# Patient Record
Sex: FEMALE | Race: BLACK OR AFRICAN AMERICAN | NOT HISPANIC OR LATINO | Employment: OTHER | URBAN - METROPOLITAN AREA
[De-identification: names, ages, dates, MRNs, and addresses within clinical notes are randomized per-mention and may not be internally consistent; named-entity substitution may affect disease eponyms.]

---

## 2017-01-11 ENCOUNTER — OFFICE VISIT (OUTPATIENT)
Dept: OPHTHALMOLOGY | Facility: CLINIC | Age: 72
End: 2017-01-11
Payer: MEDICARE

## 2017-01-11 DIAGNOSIS — H18.20 CORNEAL EDEMA: Primary | ICD-10-CM

## 2017-01-11 DIAGNOSIS — H27.02 APHAKIA, LEFT: ICD-10-CM

## 2017-01-11 DIAGNOSIS — H54.10 BLINDNESS AND LOW VISION: ICD-10-CM

## 2017-01-11 DIAGNOSIS — H21.41: ICD-10-CM

## 2017-01-11 PROCEDURE — 99212 OFFICE O/P EST SF 10 MIN: CPT | Mod: PBBFAC | Performed by: OPHTHALMOLOGY

## 2017-01-11 PROCEDURE — 99999 PR PBB SHADOW E&M-EST. PATIENT-LVL II: CPT | Mod: PBBFAC,,, | Performed by: OPHTHALMOLOGY

## 2017-01-11 PROCEDURE — 92014 COMPRE OPH EXAM EST PT 1/>: CPT | Mod: S$PBB,,, | Performed by: OPHTHALMOLOGY

## 2017-01-11 RX ORDER — MOXIFLOXACIN 5 MG/ML
1 SOLUTION/ DROPS OPHTHALMIC
Status: CANCELLED | OUTPATIENT
Start: 2017-01-11

## 2017-01-11 RX ORDER — TETRACAINE HYDROCHLORIDE 5 MG/ML
1 SOLUTION OPHTHALMIC
Status: CANCELLED | OUTPATIENT
Start: 2017-01-11

## 2017-01-11 RX ORDER — LIDOCAINE HYDROCHLORIDE 10 MG/ML
1 INJECTION, SOLUTION EPIDURAL; INFILTRATION; INTRACAUDAL; PERINEURAL ONCE
Status: CANCELLED | OUTPATIENT
Start: 2017-01-11 | End: 2017-01-11

## 2017-01-11 NOTE — MR AVS SNAPSHOT
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   2017 9:45 AM   Office Visit    Description:  Female : 1945   Provider:  Lucia Calvo MD   Department:  [facility] - Ophthalmology           Reason for Visit     corneal edema eval           Diagnoses this Visit        Comments    Corneal edema    -  Primary     Glaucoma shunt device of both eyes         Blindness and low vision         Aphakia, left                To Do List           Future Appointments        Provider Department Dept Phone    3/14/2017 10:00 AM Sagar Wilkerson MD [facility] - Ophthalmology [phone]      Goals (5 Years of Data)     None      [facility]     [facility] On Call Nurse Care Line -  Assistance  Registered nurses in the [facility] On Call Center provide clinical advisement, health education, appointment booking, and other advisory services.  Call for this free service at [phone].             Medications           Message regarding Medications     Verify the changes and/or additions to your medication regime listed below are the same as discussed with your clinician today.  If any of these changes or additions are incorrect, please notify your healthcare provider.             Verify that the below list of medications is an accurate representation of the medications you are currently taking.  If none reported, the list may be blank. If incorrect, please contact your healthcare provider. Carry this list with you in case of emergency.           Current Medications     artificial tears,hypromellose, 0.3 % Gel Place 1 drop into both eyes 4 (four) times daily. 1 Drops Both eyes At bedtime    calcium-vitamin D 500-125 mg-unit tablet Take 1 tablet by mouth 2 (two) times daily.      dorzolamide-timolol 2-0.5% (COSOPT) 22.3-6.8 mg/mL ophthalmic solution Place 1 drop into both eyes 2 (two) times daily. Disp 90 day  supply.    erythromycin (ROMYCIN) ophthalmic ointment Place 1 inch into both eyes every evening.    MULTIVITAMINS,THER W-MINERALS (VITAMINS & MINERALS ORAL)     prednisoLONE acetate (PRED FORTE) 1 % DrpS Place 1 drop into both eyes 4 (four) times daily. Only in right eye    sodium chloride 5% () 5 % ophthalmic solution Place 1 drop into the left eye as needed. 1 Drops Both eyes Four times a day.  Dispense 90 day supply           Clinical Reference Information           Allergies as of 1/11/2017     Augmentin [Amoxicillin-pot Clavulanate]    Sulfa (Sulfonamide Antibiotics)      Immunizations Administered on Date of Encounter - 1/11/2017     None

## 2017-01-11 NOTE — PROGRESS NOTES
HPI     corneal edema eval    Additional comments: referred by Dr. Wilkerson           Comments   DLS: 12/12/2016 Dr. Wilkerson             2/19/14 Dr. Calvo    Pt states she was sent back for corneal swelling and possible removing of   the membrane OD. At last visit with Dr. Calvo, was told was not time to   remove it. Feels it would improve her VA at this time. Her eye is very   sensitive to light and gets a stinging sensation when instills eye drops.       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 (History)            Assessment /Plan     For exam results, see Encounter Report.    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  -     tetracaine HCl (PF) 0.5 % Drop 1 drop; Place 1 drop into the right eye On call Procedure (Surgery).  -     moxifloxacin 0.5 % ophthalmic solution 1 drop; Place 1 drop into the right eye On call Procedure (surgery).      Failing PKP OD with dense fibrotic membrane over IOL.  Looks like diffuse fibrous ingrowth throughout AC.  Will likely need repeat PKP,  But will attempt membranectomy alone,  first.   OS has K edema, but is Aphakic. Will need to decide between aphakic DSEK vs PKP  May also have some fibrous ingrowth.    Plan:  Anterior membranectomy (removal of fibrous adhesions) OD.  30-45 minutes, TH, need microinstruments.

## 2017-01-17 ENCOUNTER — TELEPHONE (OUTPATIENT)
Dept: OPHTHALMOLOGY | Facility: CLINIC | Age: 72
End: 2017-01-17

## 2017-01-17 DIAGNOSIS — H18.20 CORNEA EDEMA: ICD-10-CM

## 2017-01-17 DIAGNOSIS — H21.41: Primary | ICD-10-CM

## 2017-01-26 ENCOUNTER — HOSPITAL ENCOUNTER (OUTPATIENT)
Facility: OTHER | Age: 72
Discharge: HOME | End: 2017-01-26
Attending: OPHTHALMOLOGY | Admitting: OPHTHALMOLOGY
Payer: MEDICARE

## 2017-01-26 ENCOUNTER — ANESTHESIA EVENT (OUTPATIENT)
Dept: SURGERY | Facility: OTHER | Age: 72
End: 2017-01-26
Payer: MEDICARE

## 2017-01-26 ENCOUNTER — ANESTHESIA (OUTPATIENT)
Dept: SURGERY | Facility: OTHER | Age: 72
End: 2017-01-26
Payer: MEDICARE

## 2017-01-26 VITALS
SYSTOLIC BLOOD PRESSURE: 119 MMHG | OXYGEN SATURATION: 99 % | TEMPERATURE: 98 F | HEART RATE: 72 BPM | BODY MASS INDEX: 37.69 KG/M2 | DIASTOLIC BLOOD PRESSURE: 67 MMHG | RESPIRATION RATE: 16 BRPM | HEIGHT: 60 IN | WEIGHT: 192 LBS

## 2017-01-26 DIAGNOSIS — H18.20 CORNEAL EDEMA: ICD-10-CM

## 2017-01-26 DIAGNOSIS — H21.41: Primary | ICD-10-CM

## 2017-01-26 DIAGNOSIS — H21.41: ICD-10-CM

## 2017-01-26 PROBLEM — H21.40: Status: ACTIVE | Noted: 2017-01-26

## 2017-01-26 PROCEDURE — 71000015 HC POSTOP RECOV 1ST HR: Performed by: OPHTHALMOLOGY

## 2017-01-26 PROCEDURE — 37000008 HC ANESTHESIA 1ST 15 MINUTES: Performed by: OPHTHALMOLOGY

## 2017-01-26 PROCEDURE — 25000003 PHARM REV CODE 250: Performed by: ANESTHESIOLOGY

## 2017-01-26 PROCEDURE — 36000704 HC OR TIME LEV I 1ST 15 MIN: Performed by: OPHTHALMOLOGY

## 2017-01-26 PROCEDURE — 37000009 HC ANESTHESIA EA ADD 15 MINS: Performed by: OPHTHALMOLOGY

## 2017-01-26 PROCEDURE — 71000016 HC POSTOP RECOV ADDL HR: Performed by: OPHTHALMOLOGY

## 2017-01-26 PROCEDURE — 65870 INCISE INNER EYE ADHESIONS: CPT | Mod: RT,,, | Performed by: OPHTHALMOLOGY

## 2017-01-26 PROCEDURE — 36000705 HC OR TIME LEV I EA ADD 15 MIN: Performed by: OPHTHALMOLOGY

## 2017-01-26 PROCEDURE — 63600175 PHARM REV CODE 636 W HCPCS: Performed by: NURSE ANESTHETIST, CERTIFIED REGISTERED

## 2017-01-26 PROCEDURE — 25000003 PHARM REV CODE 250: Performed by: OPHTHALMOLOGY

## 2017-01-26 RX ORDER — MOXIFLOXACIN 5 MG/ML
1 SOLUTION/ DROPS OPHTHALMIC
Status: COMPLETED | OUTPATIENT
Start: 2017-01-26 | End: 2017-01-26

## 2017-01-26 RX ORDER — SODIUM CHLORIDE, SODIUM LACTATE, POTASSIUM CHLORIDE, CALCIUM CHLORIDE 600; 310; 30; 20 MG/100ML; MG/100ML; MG/100ML; MG/100ML
INJECTION, SOLUTION INTRAVENOUS CONTINUOUS PRN
Status: DISCONTINUED | OUTPATIENT
Start: 2017-01-26 | End: 2017-01-26

## 2017-01-26 RX ORDER — LIDOCAINE HYDROCHLORIDE 10 MG/ML
INJECTION, SOLUTION EPIDURAL; INFILTRATION; INTRACAUDAL; PERINEURAL
Status: DISCONTINUED | OUTPATIENT
Start: 2017-01-26 | End: 2017-01-26 | Stop reason: HOSPADM

## 2017-01-26 RX ORDER — MIDAZOLAM HYDROCHLORIDE 1 MG/ML
INJECTION INTRAMUSCULAR; INTRAVENOUS
Status: DISCONTINUED | OUTPATIENT
Start: 2017-01-26 | End: 2017-01-26

## 2017-01-26 RX ORDER — HYDROCODONE BITARTRATE AND ACETAMINOPHEN 5; 325 MG/1; MG/1
1 TABLET ORAL EVERY 4 HOURS PRN
Status: DISCONTINUED | OUTPATIENT
Start: 2017-01-26 | End: 2017-01-26 | Stop reason: HOSPADM

## 2017-01-26 RX ORDER — LIDOCAINE HYDROCHLORIDE 10 MG/ML
1 INJECTION, SOLUTION EPIDURAL; INFILTRATION; INTRACAUDAL; PERINEURAL ONCE
Status: DISCONTINUED | OUTPATIENT
Start: 2017-01-26 | End: 2017-01-26 | Stop reason: HOSPADM

## 2017-01-26 RX ORDER — ACETAMINOPHEN 325 MG/1
650 TABLET ORAL EVERY 4 HOURS PRN
Status: DISCONTINUED | OUTPATIENT
Start: 2017-01-26 | End: 2017-01-26 | Stop reason: HOSPADM

## 2017-01-26 RX ORDER — MOXIFLOXACIN 5 MG/ML
SOLUTION/ DROPS OPHTHALMIC
Status: DISCONTINUED | OUTPATIENT
Start: 2017-01-26 | End: 2017-01-26 | Stop reason: HOSPADM

## 2017-01-26 RX ORDER — TETRACAINE HYDROCHLORIDE 5 MG/ML
1 SOLUTION OPHTHALMIC
Status: COMPLETED | OUTPATIENT
Start: 2017-01-26 | End: 2017-01-26

## 2017-01-26 RX ORDER — FENTANYL CITRATE 50 UG/ML
INJECTION, SOLUTION INTRAMUSCULAR; INTRAVENOUS
Status: DISCONTINUED | OUTPATIENT
Start: 2017-01-26 | End: 2017-01-26

## 2017-01-26 RX ADMIN — TETRACAINE HYDROCHLORIDE 1 DROP: 5 SOLUTION OPHTHALMIC at 09:01

## 2017-01-26 RX ADMIN — MIDAZOLAM HYDROCHLORIDE 1 MG: 1 INJECTION, SOLUTION INTRAMUSCULAR; INTRAVENOUS at 10:01

## 2017-01-26 RX ADMIN — FENTANYL CITRATE 25 MCG: 50 INJECTION, SOLUTION INTRAMUSCULAR; INTRAVENOUS at 10:01

## 2017-01-26 RX ADMIN — MOXIFLOXACIN HYDROCHLORIDE 1 DROP: 5 SOLUTION/ DROPS OPHTHALMIC at 09:01

## 2017-01-26 RX ADMIN — SODIUM CHLORIDE, SODIUM LACTATE, POTASSIUM CHLORIDE, AND CALCIUM CHLORIDE: 600; 310; 30; 20 INJECTION, SOLUTION INTRAVENOUS at 09:01

## 2017-01-26 NOTE — PLAN OF CARE
Ligia Curran has met all discharge criteria from Phase II. Vital Signs are stable, ambulating  without difficulty.Pain is now under control and tolerable for the pt. Pain score 4 at this time.  Discharge instructions given, patient verbalized understanding. Discharged from facility via wheelchair in stable condition.

## 2017-01-26 NOTE — PLAN OF CARE
Patient prefers to have  Sr[employer] friend present for discharge teaching. Please contact them @ [phone].

## 2017-01-26 NOTE — ANESTHESIA PREPROCEDURE EVALUATION
01/26/2017  Ligia Curran is a 71 y.o., female.    [facility] Anesthesia Evaluation    I have reviewed the Patient Summary Reports.    I have reviewed the Nursing Notes.   I have reviewed the Medications.     Review of Systems  Anesthesia Hx:  Denies Family Hx of Anesthesia complications.  Personal Hx of Anesthesia complications, Post-Operative Nausea/Vomiting, in the past, but not with recent anesthetics / prophylaxis   Hematology/Oncology:  Hematology Normal   Oncology Normal     EENT/Dental:EENT/Dental Normal   Cardiovascular:  Cardiovascular Normal Exercise tolerance: good     Pulmonary:  Pulmonary Normal    Renal/:  Renal/ Normal     Hepatic/GI:  Hepatic/GI Normal    Musculoskeletal:  Musculoskeletal Normal    Neurological:  Neurology Normal    Endocrine:  Endocrine Normal    Dermatological:  Skin Normal    Psych:  Psychiatric Normal           Physical Exam  General:  Obesity    Airway/Jaw/Neck:  Airway Findings: Mouth Opening: Normal Tongue: Normal  General Airway Assessment: Adult  Mallampati: I  TM Distance: Normal, at least 6 cm      Dental:  Dental Findings:             Anesthesia Plan  Type of Anesthesia, risks & benefits discussed:  Anesthesia Type:  MAC  Patient's Preference:   Intra-op Monitoring Plan: standard ASA monitors  Intra-op Monitoring Plan Comments:   Post Op Pain Control Plan:   Post Op Pain Control Plan Comments:   Induction:    Beta Blocker:         Informed Consent: Patient understands risks and agrees with Anesthesia plan.  Questions answered. Anesthesia consent signed with patient.  ASA Score: 2     Day of Surgery Review of History & Physical:            Ready For Surgery From Anesthesia Perspective.

## 2017-01-26 NOTE — DISCHARGE INSTRUCTIONS
KEEP RIGHT EYE SHIELDED AND PATCHED UNTIL POST OP APPT, NO BENDING OR HEAVY LIFTING/BRING EYE BAG TO POST OP APPT.           Home Care Instructions for Eye Surgeries    1. ACTIVITY:   Limit your activity today. Increase activity gradually. You may return to work or school as directed by your physician.    2. DIET:   Drink plenty of fluids. Resume your normal diet unless instructed otherwise.  3. PAIN:   Expect a moderate amount of pain. If a prescription for pain is not sent home with you, you may take your commonly used pain reliever as directed. If this is not sufficient, call your physician. You may resume any other prescription medication unless otherwise directed by your physician.     4. DRESSING:   Keep your dressing dry and intact.  5. COMMENTS:   No driving, operating heavy machinery or singing legal documents for 24 hours.    No bending forward at the waist.   See attached schedule of eye drops.    Discuss any problem with your physician as soon as it arises. Do not Delay.      EMERGENCY- If you are unable to contact your physician, please go to the nearest Emergency Room.           Anesthesia: Monitored Anesthesia Care (MAC)    Anesthesia Safety  · Have an adult family member or friend drive you home after the procedure.  · For the first 24 hours after your surgery:  ¨ Do not drive or use heavy equipment.  ¨ Do not make important decisions or sign documents.  ¨ Avoid alcohol.  ¨ Have someone stay with you, if possible. They can watch for problems and help keep you safe.

## 2017-01-26 NOTE — IP AVS SNAPSHOT
[facility] Location ([facility])  [address] [place] [id number]  Phone: [phone]           Patient Discharge Instructions     Our goal is to set you up for success. This packet includes information on your condition, medications, and your home care. It will help you to care for yourself so you don't get sicker and need to go back to the hospital.     Please ask your nurse if you have any questions.        There are many details to remember when preparing to leave the hospital. Here is what you will need to do:    1. Take your medicine. If you are prescribed medications, review your Medication List in the following pages. You may have new medications to  at the pharmacy and others that you'll need to stop taking. Review the instructions for how and when to take your medications. Talk with your doctor or nurses if you are unsure of what to do.     2. Go to your follow-up appointments. Specific follow-up information is listed in the following pages. Your may be contacted by a transition nurse or clinical provider about future appointments. Be sure we have all of the phone numbers to reach you, if needed. Please contact your provider's office if you are unable to make an appointment.     3. Watch for warning signs. Your doctor or nurse will give you detailed warning signs to watch for and when to call for assistance. These instructions may also include educational information about your condition. If you experience any of warning signs to your health, call your doctor.               ** Verify the list of medication(s) below is accurate and up to date. Carry this with you in case of emergency. If your medications have changed, please notify your healthcare provider.             Medication List      CONTINUE taking these medications        Additional Info                      artificial tears(hypromellose) 0.3 % Gel   Quantity:  3.5 Tube   Refills:  4   Dose:  1 drop    Instructions:  Place 1  drop into both eyes 4 (four) times daily. 1 Drops Both eyes At bedtime     Begin Date    AM    Noon    PM    Bedtime       calcium-vitamin D 500-125 mg-unit tablet   Refills:  0   Dose:  1 tablet    Instructions:  Take 1 tablet by mouth once daily.     Begin Date    AM    Noon    PM    Bedtime       dorzolamide-timolol 2-0.5% 22.3-6.8 mg/mL ophthalmic solution   Commonly known as:  COSOPT   Quantity:  10 mL   Refills:  4   Dose:  1 drop    Instructions:  Place 1 drop into both eyes 2 (two) times daily. Disp 90 day supply.     Begin Date    AM    Noon    PM    Bedtime       erythromycin ophthalmic ointment   Commonly known as:  ROMYCIN   Quantity:  3.5 g   Refills:  4   Dose:  1 inch    Instructions:  Place 1 inch into both eyes every evening.     Begin Date    AM    Noon    PM    Bedtime       prednisoLONE acetate 1 % Drps   Commonly known as:  PRED FORTE   Quantity:  3 Bottle   Refills:  4   Dose:  1 drop    Instructions:  Place 1 drop into both eyes 4 (four) times daily. Only in right eye     Begin Date    AM    Noon    PM    Bedtime       sodium chloride 5% 5 % ophthalmic solution   Commonly known as:     Refills:  0   Dose:  1 drop    Instructions:  Place 1 drop into the left eye as needed. 1 Drops Both eyes Four times a day.  Dispense 90 day supply     Begin Date    AM    Noon    PM    Bedtime       VITAMINS & MINERALS ORAL   Refills:  0      Begin Date    AM    Noon    PM    Bedtime                  Please bring to all follow up appointments:    1. A copy of your discharge instructions.  2. All medicines you are currently taking in their original bottles.  3. Identification and insurance card.    Please arrive 15 minutes ahead of scheduled appointment time.    Please call 24 hours in advance if you must reschedule your appointment and/or time.        Your Scheduled Appointments     Jan 27, 2017  8:15 AM CST   Post OP with MD Tang [facility]manza [facility] - Ophthalmology ([facility] )    [address]  [facility] [address] [phone]            Mar 14, 2017 10:00 AM CDT   Established Patient Visit with Sagar Wilkerson MD   [facility] - Ophthalmology ([address] )    [address]  [facility] [address] [phone]              Follow-up Information     Follow up with Lucia Calvo MD.    Specialty:  Ophthalmology    Contact information:    [address]  [address]  [facility] [address] [phone]          Follow up with Lucia Calvo MD In 1 day.    Specialty:  Ophthalmology    Why:  Follow up on 1/27/2017 at either 0815 or 0915     Contact information:    [address]  [facility] [address] [phone]          Discharge Instructions     Future Orders    Diet general     Questions:    Total calories:      Fat restriction, if any:      Protein restriction, if any:      Na restriction, if any:      Fluid restriction:      Additional restrictions:          Discharge Instructions       KEEP RIGHT EYE SHIELDED AND PATCHED UNTIL POST OP APPT, NO BENDING OR HEAVY LIFTING/BRING EYE BAG TO POST OP APPT.           Home Care Instructions for Eye Surgeries    1. ACTIVITY:   Limit your activity today. Increase activity gradually. You may return to work or school as directed by your physician.    2. DIET:   Drink plenty of fluids. Resume your normal diet unless instructed otherwise.  3. PAIN:   Expect a moderate amount of pain. If a prescription for pain is not sent home with you, you may take your commonly used pain reliever as directed. If this is not sufficient, call your physician. You may resume any other prescription medication unless otherwise directed by your physician.     4. DRESSING:   Keep your dressing dry and intact.  5. COMMENTS:   No driving, operating heavy machinery or singing legal documents for 24 hours.    No bending forward at the waist.   See attached schedule of eye drops.    Discuss any problem with your physician as soon as it arises. Do not  Delay.      EMERGENCY- If you are unable to contact your physician, please go to the nearest Emergency Room.           Anesthesia: Monitored Anesthesia Care (MAC)    Anesthesia Safety  · Have an adult family member or friend drive you home after the procedure.  · For the first 24 hours after your surgery:  ¨ Do not drive or use heavy equipment.  ¨ Do not make important decisions or sign documents.  ¨ Avoid alcohol.  ¨ Have someone stay with you, if possible. They can watch for problems and help keep you safe.          Primary Diagnosis     Your primary diagnosis was:  Not on File      Admission Information     Date & Time Provider Department CSN    1/26/2017  8:34 AM Lucia Calvo MD [facility] [id number]      Care Providers     Provider Role Specialty Primary office phone    Lucia Calvo MD Attending Provider Ophthalmology [phone]    Lucia Calvo MD Surgeon  Ophthalmology [phone]      Your Vitals Were     BP Pulse Temp Resp Height Weight    140/65 (BP Location: Right arm, Patient Position: Lying, BP Method: Automatic) 81 98.2 °F (36.8 °C) 16 5' (1.524 m) 87.1 kg (192 lb)    SpO2 BMI             96% 37.5 kg/m2         Recent Lab Values     No lab values to display.      Allergies as of 1/26/2017        Reactions    Augmentin [Amoxicillin-pot Clavulanate] Diarrhea    Sulfa (Sulfonamide Antibiotics) Other (See Comments)      [facility]     [facility] Nurse Care Line - 24/7 Assistance  Unless otherwise directed by your provider, please contact [facility], our nurse care line that is available for 24/7 assistance.     Registered nurses in the [facility] On Call Center provide clinical advisement, health education, appointment booking, and other advisory services.  Call for this free service at [phone].        Advance Directives     An advance directive is a document which, in the event you are no longer able to make decisions for yourself, tells your healthcare team  what kind of treatment you do or do not want to receive, or who you would like to make those decisions for you.  If you do not currently have an advance directive, [facility] encourages you to create one.  For more information call:  [phone]), [phone] ([phone]),  or log on to [email or web address].        Language Assistance Services     ATTENTION: Language assistance services are available, free of charge. Please call [phone].      ATENCIÓN: Si habla español, tiene a pinto disposición servicios gratuitos de asistencia lingüística. Llame al [phone].     CHÚ Ý: N?u b?n nói Ti?ng Vi?t, có các d?ch v? h? tr? ngôn ng? mi?n phí dành cho b?n. G?i s? [phone].         [facility] complies with applicable Federal civil rights laws and does not discriminate on the basis of race, color, national origin, age, disability, or sex.

## 2017-01-26 NOTE — H&P
Pre-op Eye Surgery H&P    CC: Painless, progressive loss of vision  Present Illness: Pupillary membrane   Allergies/Current Meds: see meds  Mental Status: A&O x3  Pertinent Medical History: n/a    Physical Exam  General: NAD  HEENT: Eye white/quiet  Lungs: Adequate respirations  Heart: + pulses  Abdomen: soft  Rectal/GI/: deferred    Impression: pupillary membrane  Plan: Membranectomy

## 2017-01-26 NOTE — DISCHARGE SUMMARY
Outcome: Successful outpatient ophthalmic surgical procedure  Preprinted Instructions given to patient.  Regular diet.  Activity: No restrictions  Meds: see Med Rec  Condition: stable  Follow up: 1 day with Dr Calvo  Disposition: Home  Diagnosis: s/p eye surgery

## 2017-01-26 NOTE — OP NOTE
SURGEON:  Lucia Calvo M.D.    PREOPERATIVE DIAGNOSIS:    1) Fibrous ingrowth right eye  2) Fibrotic pupillary membrane Right Eye    POSTOPERATIVE DIAGNOSIS:    1) Fibrous ingrowth right eye  2) Fibrotic pupillary membrane Right Eye    PROCEDURES:    Dissection and removal of fibrous pupillary membrane    DATE OF SURGERY: 01/26/2017      ANESTHESIA:  MAC with topical Lidocaine    COMPLICATIONS:  None    ESTIMATED BLOOD LOSS: None    SPECIMENS: None    INDICATIONS:    The patient has a history of painless progressive visual loss and  difficulty with activities of daily living secondary to membrane formation.  After a thorough discussion of the risks, benefits, and alternatives to surgery, including, but not limited to, the rare risks of infection, retinal detachment, hemorrhage, need for additional surgery, loss of vision, and even loss of the eye, the patient voices understanding and desires to proceed.    DESCRIPTION OF PROCEDURE:     After verification and marking of the proper eye in the preop holding area, the patient was brought to the operating room in supine position where the eye was prepped and draped in standard sterile fashion with 5% Betadine and a lid speculum placed in the eye.   Topical 4% Lidocaine was used in addition to the preoperative anesthesia and the procedure was begun by the creation of a paracentesis incision through which viscoelastic was used to fill the anterior chamber.  Next, a cystatome and retinal scissors were used to remove the pupillary membrane from over the IOL. The PKP running suture was also removed. Drops of Vigamox and Pred Forte were instilled and a shield was placed over the eye. The patient will follow up with Dr. Calvo in the morning.

## 2017-01-26 NOTE — ANESTHESIA POSTPROCEDURE EVALUATION
Anesthesia Post Evaluation    Patient: Ligia Curran    Procedure(s) Performed: Procedure(s) (LRB):  INCISION-CORNEA/antrior membranectomy/will need microinstruments (Right)    Final Anesthesia Type: MAC  Patient location during evaluation: [facility]  Patient participation: Yes- Able to Participate  Level of consciousness: awake and alert  Post-procedure vital signs: reviewed and stable  Pain management: adequate  Airway patency: patent  PONV status at discharge: No PONV  Anesthetic complications: no      Cardiovascular status: blood pressure returned to baseline  Respiratory status: unassisted, spontaneous ventilation and room air  Hydration status: euvolemic  Follow-up not needed.        Visit Vitals    BP (!) 150/82 (BP Location: Right arm, Patient Position: Sitting, BP Method: Automatic)    Pulse 80    Temp 36.7 °C (98 °F) (Oral)    Resp 16    Ht 5' (1.524 m)    Wt 87.1 kg (192 lb)    SpO2 95%    Breastfeeding No    BMI 37.5 kg/m2       Pain/Ammon Score: Pain Assessment Performed: Yes (1/26/2017  9:14 AM)  Presence of Pain: denies (1/26/2017  9:14 AM)    B/P-137/59, HR-81, RR-14, O2%-100

## 2017-01-27 ENCOUNTER — OFFICE VISIT (OUTPATIENT)
Dept: OPHTHALMOLOGY | Facility: CLINIC | Age: 72
End: 2017-01-27
Payer: MEDICARE

## 2017-01-27 VITALS — DIASTOLIC BLOOD PRESSURE: 75 MMHG | SYSTOLIC BLOOD PRESSURE: 133 MMHG | HEART RATE: 77 BPM

## 2017-01-27 DIAGNOSIS — H21.41: ICD-10-CM

## 2017-01-27 DIAGNOSIS — Z98.890 POST-OPERATIVE STATE: Primary | ICD-10-CM

## 2017-01-27 PROCEDURE — 99024 POSTOP FOLLOW-UP VISIT: CPT | Mod: ,,, | Performed by: OPHTHALMOLOGY

## 2017-01-27 PROCEDURE — 99999 PR PBB SHADOW E&M-EST. PATIENT-LVL II: CPT | Mod: PBBFAC,,, | Performed by: OPHTHALMOLOGY

## 2017-01-27 PROCEDURE — 99212 OFFICE O/P EST SF 10 MIN: CPT | Mod: PBBFAC | Performed by: OPHTHALMOLOGY

## 2017-01-27 NOTE — MR AVS SNAPSHOT
[facility] - Ophthalmology  [address]  [facility] [address]  Phone: [phone]  Fax: [phone]                  Ligia Curran   2017 8:15 AM   Office Visit    Description:  Female : 1945   Provider:  Lucia Calvo MD   Department:  [facility] - Ophthalmology           Reason for Visit     Post-op Evaluation           Diagnoses this Visit        Comments    Post-operative state    -  Primary     Adhesions and disruptions of pupillary membranes, right                To Do List           Future Appointments        Provider Department Dept Phone    3/14/2017 10:00 AM Sagar Wilkerson MD [facility] - Ophthalmology [phone]      Goals (5 Years of Data)     None      [facility]     [facility] Care [facility] -  Assistance  Registered nurses in the [facility] Center provide clinical advisement, health education, appointment booking, and other advisory services.  Call for this free service at [phone].             Medications           Message regarding Medications     Verify the changes and/or additions to your medication regime listed below are the same as discussed with your clinician today.  If any of these changes or additions are incorrect, please notify your healthcare provider.             Verify that the below list of medications is an accurate representation of the medications you are currently taking.  If none reported, the list may be blank. If incorrect, please contact your healthcare provider. Carry this list with you in case of emergency.           Current Medications     artificial tears,hypromellose, 0.3 % Gel Place 1 drop into both eyes 4 (four) times daily. 1 Drops Both eyes At bedtime    calcium-vitamin D 500-125 mg-unit tablet Take 1 tablet by mouth once daily.     dorzolamide-timolol 2-0.5% (COSOPT) 22.3-6.8 mg/mL ophthalmic solution Place 1 drop into both eyes 2 (two) times daily. Disp 90 day supply.    erythromycin (ROMYCIN) ophthalmic  ointment Place 1 inch into both eyes every evening.    MULTIVITAMINS,THER W-MINERALS (VITAMINS & MINERALS ORAL)     prednisoLONE acetate (PRED FORTE) 1 % DrpS Place 1 drop into both eyes 4 (four) times daily. Only in right eye    sodium chloride 5% () 5 % ophthalmic solution Place 1 drop into the left eye as needed. 1 Drops Both eyes Four times a day.  Dispense 90 day supply           Clinical Reference Information           Vital Signs - Last Recorded  Most recent update: 1/27/2017  9:09 AM by Yara Wiley MA    BP Pulse                133/75 (BP Location: Left arm, Patient Position: Sitting, BP Method: Automatic) 77          Blood Pressure          Most Recent Value    BP  133/75      Allergies as of 1/27/2017     Augmentin [Amoxicillin-pot Clavulanate]    Sulfa (Sulfonamide Antibiotics)      Immunizations Administered on Date of Encounter - 1/27/2017     None

## 2017-01-27 NOTE — PROGRESS NOTES
HPI     Post-op Evaluation    Additional comments: 1 day check.            Comments   POD Membranectomy OD    Pt states doing well, some burning type sensation but no real pain.     Patch removed at 9:20 am.        Last edited by Adelaide Romo on 1/27/2017  9:21 AM. (History)            Assessment /Plan     For exam results, see Encounter Report.    Post-operative state    Adhesions and disruptions of pupillary membranes, right      POD1 s/p pupillary membranectomy.  PF/Viga qid  Wounds stable and rodney negative.  IOP 10-12  1 week

## 2017-01-30 ENCOUNTER — TELEPHONE (OUTPATIENT)
Dept: OPHTHALMOLOGY | Facility: CLINIC | Age: 72
End: 2017-01-30

## 2017-01-30 NOTE — TELEPHONE ENCOUNTER
----- Message from Shital Gomez sent at 1/30/2017 10:39 AM CST -----  Contact: Ligia  Ms. Curran have questions about the medication that she has to use before surgery. She can be reached at [phone].

## 2017-02-03 ENCOUNTER — OFFICE VISIT (OUTPATIENT)
Dept: OPHTHALMOLOGY | Facility: CLINIC | Age: 72
End: 2017-02-03
Payer: MEDICARE

## 2017-02-03 DIAGNOSIS — Z98.890 POST-OPERATIVE STATE: Primary | ICD-10-CM

## 2017-02-03 PROCEDURE — 99999 PR PBB SHADOW E&M-EST. PATIENT-LVL I: CPT | Mod: PBBFAC,,, | Performed by: OPHTHALMOLOGY

## 2017-02-03 PROCEDURE — 99211 OFF/OP EST MAY X REQ PHY/QHP: CPT | Mod: PBBFAC | Performed by: OPHTHALMOLOGY

## 2017-02-03 PROCEDURE — 99024 POSTOP FOLLOW-UP VISIT: CPT | Mod: ,,, | Performed by: OPHTHALMOLOGY

## 2017-02-03 RX ORDER — DIFLUPREDNATE OPHTHALMIC 0.5 MG/ML
1 EMULSION OPHTHALMIC 4 TIMES DAILY
Qty: 1 BOTTLE | Refills: 3 | Status: SHIPPED | OUTPATIENT
Start: 2017-02-03 | End: 2017-03-05

## 2017-02-03 RX ORDER — MOXIFLOXACIN 5 MG/ML
1 SOLUTION/ DROPS OPHTHALMIC 4 TIMES DAILY
COMMUNITY
End: 2017-04-11

## 2017-02-03 NOTE — MR AVS SNAPSHOT
[facility] - Ophthalmology  [address]  [facility] [address]  Phone: [phone]  Fax: [phone]                  Ligia Curran   2/3/2017 10:15 AM   Office Visit    Description:  Female : 1945   Provider:  Lucia Calvo MD   Department:  [facility] - Ophthalmology           Reason for Visit     Post-op Evaluation           Diagnoses this Visit        Comments    Post-operative state    -  Primary            To Do List           Future Appointments        Provider Department Dept Phone    3/14/2017 10:00 AM Sagar Wilkerson MD [facility] Ophthalmology [phone]      Goals (5 Years of Data)     None       These Medications        Disp Refills Start End    difluprednate 0.05 % Drop ophthalmic solution 1 Bottle 3 2/3/2017 3/5/2017    Place 1 drop into the right eye 4 (four) times daily. - Right Eye    Pharmacy: [facility] [address] [place] Ph #: [phone]         [facility] On Call     [facility] On Call Nurse Care Line -  Assistance  Registered nurses in the [facility] On Call Center provide clinical advisement, health education, appointment booking, and other advisory services.  Call for this free service at [phone].             Medications           Message regarding Medications     Verify the changes and/or additions to your medication regime listed below are the same as discussed with your clinician today.  If any of these changes or additions are incorrect, please notify your healthcare provider.        START taking these NEW medications        Refills    difluprednate 0.05 % Drop ophthalmic solution 3    Sig: Place 1 drop into the right eye 4 (four) times daily.    Class: Normal    Route: Right Eye           Verify that the below list of medications is an accurate representation of the medications you are currently taking.  If none reported, the list may be blank. If incorrect, please contact your healthcare provider. Carry this  list with you in case of emergency.           Current Medications     artificial tears,hypromellose, 0.3 % Gel Place 1 drop into both eyes 4 (four) times daily. 1 Drops Both eyes At bedtime    calcium-vitamin D 500-125 mg-unit tablet Take 1 tablet by mouth once daily.     dorzolamide-timolol 2-0.5% (COSOPT) 22.3-6.8 mg/mL ophthalmic solution Place 1 drop into both eyes 2 (two) times daily. Disp 90 day supply.    erythromycin (ROMYCIN) ophthalmic ointment Place 1 inch into both eyes every evening.    moxifloxacin (VIGAMOX) 0.5 % ophthalmic solution Place 1 drop into the right eye 4 (four) times daily.    MULTIVITAMINS,THER W-MINERALS (VITAMINS & MINERALS ORAL)     prednisoLONE acetate (PRED FORTE) 1 % DrpS Place 1 drop into both eyes 4 (four) times daily. Only in right eye    sodium chloride 5% () 5 % ophthalmic solution Place 1 drop into the left eye as needed. 1 Drops Both eyes Four times a day.  Dispense 90 day supply    difluprednate 0.05 % Drop ophthalmic solution Place 1 drop into the right eye 4 (four) times daily.           Clinical Reference Information           Allergies as of 2/3/2017     Augmentin [Amoxicillin-pot Clavulanate]    Sulfa (Sulfonamide Antibiotics)      Immunizations Administered on Date of Encounter - 2/3/2017     None      Language Assistance Services     ATTENTION: Language assistance services are available, free of charge. Please call [phone].      ATENCIÓN: Si habla donovan, tiene a pinto disposición servicios gratuitos de asistencia lingüística. Llame al [phone].     CAROL ANN Ý: N?u b?n nói Ti?ng Vi?t, có các d?ch v? h? tr? ngôn ng? mi?n phí dành cho b?n. G?i s? [phone].         [facility] complies with applicable Federal civil rights laws and does not discriminate on the basis of race, color, national origin, age, disability, or sex.

## 2017-02-03 NOTE — PROGRESS NOTES
HPI     Post-op Evaluation    Additional comments: Pt. here for post op visit.            Comments   DLS: 01/27/2017  Dr. Calvo    S/P Membranectomy OD 01/26/2017    C/o: pt states she hasn't had much pain but has been tearing a lot this   morning.     Meds: PF qid OD             Vigamox qid OD             Cosopt bid OU              ATears bid ou               EES oint.  qday OD              Idania 128 5% tid OS              Last edited by Yodit Zhang MA on 2/3/2017 10:10 AM. (History)            Assessment /Plan     For exam results, see Encounter Report.    Post-operative state      POW1 membranectomy.  PKP with 3+ haze, despite nl IOP.  Use Durexol in place of PF for a while.

## 2017-02-13 ENCOUNTER — TELEPHONE (OUTPATIENT)
Dept: OPHTHALMOLOGY | Facility: CLINIC | Age: 72
End: 2017-02-13

## 2017-02-13 NOTE — TELEPHONE ENCOUNTER
I spoke to patient.  She was questioning if she should continue use of Vigamox.  Patient advised per Dr Umesh husain to d/c Vigamox. Continue Durezol and follow-up on Wednesday as planned.

## 2017-02-13 NOTE — TELEPHONE ENCOUNTER
----- Message from Rom Sanchez sent at 2/13/2017  9:05 AM CST -----  Contact: Ligia Curran [[id number]]  Pt wants to speak with nurse regarding Rx,please call back at [phone],thanks

## 2017-02-15 ENCOUNTER — OFFICE VISIT (OUTPATIENT)
Dept: OPHTHALMOLOGY | Facility: CLINIC | Age: 72
End: 2017-02-15
Payer: MEDICARE

## 2017-02-15 DIAGNOSIS — H18.20 CORNEAL EDEMA: Primary | ICD-10-CM

## 2017-02-15 PROCEDURE — 99211 OFF/OP EST MAY X REQ PHY/QHP: CPT | Mod: PBBFAC | Performed by: OPHTHALMOLOGY

## 2017-02-15 PROCEDURE — 99024 POSTOP FOLLOW-UP VISIT: CPT | Mod: ,,, | Performed by: OPHTHALMOLOGY

## 2017-02-15 PROCEDURE — 99999 PR PBB SHADOW E&M-EST. PATIENT-LVL I: CPT | Mod: PBBFAC,,, | Performed by: OPHTHALMOLOGY

## 2017-02-15 NOTE — MR AVS SNAPSHOT
[facility] - Ophthalmology  [address]  [facility] [address]  Phone: [phone]  Fax: [phone]                  Ligia Curran   2/15/2017 9:15 AM   Office Visit    Description:  Female : 1945   Provider:  Lucia Calvo MD   Department:  [facility] - Ophthalmology           Reason for Visit     Post-op Evaluation           Diagnoses this Visit        Comments    Corneal edema    -  Primary            To Do List           Future Appointments        Provider Department Dept Phone    3/14/2017 10:00 AM Sagar Wilkerson MD [facility] Ophthalmology [phone]      Goals (5 Years of Data)     None      [facility]     [facility] On Call Nurse Care Line -  Assistance  Registered nurses in the [facility] On Call Center provide clinical advisement, health education, appointment booking, and other advisory services.  Call for this free service at [phone].             Medications           Message regarding Medications     Verify the changes and/or additions to your medication regime listed below are the same as discussed with your clinician today.  If any of these changes or additions are incorrect, please notify your healthcare provider.             Verify that the below list of medications is an accurate representation of the medications you are currently taking.  If none reported, the list may be blank. If incorrect, please contact your healthcare provider. Carry this list with you in case of emergency.           Current Medications     artificial tears,hypromellose, 0.3 % Gel Place 1 drop into both eyes 4 (four) times daily. 1 Drops Both eyes At bedtime    calcium-vitamin D 500-125 mg-unit tablet Take 1 tablet by mouth once daily.     difluprednate 0.05 % Drop ophthalmic solution Place 1 drop into the right eye 4 (four) times daily.    dorzolamide-timolol 2-0.5% (COSOPT) 22.3-6.8 mg/mL ophthalmic solution Place 1 drop into both eyes 2 (two) times daily. Disp 90 day supply.     erythromycin (ROMYCIN) ophthalmic ointment Place 1 inch into both eyes every evening.    moxifloxacin (VIGAMOX) 0.5 % ophthalmic solution Place 1 drop into the right eye 4 (four) times daily.    MULTIVITAMINS,THER W-MINERALS (VITAMINS & MINERALS ORAL)     prednisoLONE acetate (PRED FORTE) 1 % DrpS Place 1 drop into both eyes 4 (four) times daily. Only in right eye    sodium chloride 5% () 5 % ophthalmic solution Place 1 drop into the left eye as needed. 1 Drops Both eyes Four times a day.  Dispense 90 day supply           Clinical Reference Information           Allergies as of 2/15/2017     Augmentin [Amoxicillin-pot Clavulanate]    Sulfa (Sulfonamide Antibiotics)      Immunizations Administered on Date of Encounter - 2/15/2017     None      Language Assistance Services     ATTENTION: Language assistance services are available, free of charge. Please call [phone].      ATENCIÓN: Si luisala donovan, tiene a pinto disposición servicios gratuitos de asistencia lingüística. Llame al [phone].     CAROL ANN Ý: N?u b?n nói Ti?ng Vi?t, có các d?ch v? h? tr? ngôn ng? mi?n phí dành cho b?n. G?i s? [phone].         [facility] complies with applicable Federal civil rights laws and does not discriminate on the basis of race, color, national origin, age, disability, or sex.

## 2017-02-15 NOTE — PROGRESS NOTES
HPI     Post-op Evaluation    Additional comments: Right Eye.            Comments   Pt states OD is feeling better, still experiencing some burning and   sensitivity mostly to indoor light.  Vision may be improving slowly.    Using drops regularly.  Ran out of vigamox 4 days ago.     Meds: Durezol qid OD             Cosopt bid OU               ATears bid ou               EES oint.  qday OD               Idania 128 5% tid OS          Last edited by Adelaide Romo on 2/15/2017  9:34 AM. (History)            Assessment /Plan     For exam results, see Encounter Report.    Corneal edema      Right PKP still thick with pigment, but improved.  Durezol qid. IOP good.

## 2017-02-24 ENCOUNTER — OFFICE VISIT (OUTPATIENT)
Dept: OPHTHALMOLOGY | Facility: CLINIC | Age: 72
End: 2017-02-24
Payer: MEDICARE

## 2017-02-24 ENCOUNTER — TELEPHONE (OUTPATIENT)
Dept: OPHTHALMOLOGY | Facility: CLINIC | Age: 72
End: 2017-02-24

## 2017-02-24 DIAGNOSIS — H40.2233 CHRONIC ANGLE-CLOSURE GLAUCOMA OF BOTH EYES, SEVERE STAGE: ICD-10-CM

## 2017-02-24 DIAGNOSIS — Z94.7 CORNEA REPLACED BY TRANSPLANT: ICD-10-CM

## 2017-02-24 DIAGNOSIS — T85.318A: Primary | ICD-10-CM

## 2017-02-24 DIAGNOSIS — Z98.41 STATUS POST CATARACT EXTRACTION AND INSERTION OF INTRAOCULAR LENS, RIGHT: ICD-10-CM

## 2017-02-24 DIAGNOSIS — Z96.1 STATUS POST CATARACT EXTRACTION AND INSERTION OF INTRAOCULAR LENS, RIGHT: ICD-10-CM

## 2017-02-24 DIAGNOSIS — H18.20 CORNEAL EDEMA: ICD-10-CM

## 2017-02-24 DIAGNOSIS — T85.698A: Primary | ICD-10-CM

## 2017-02-24 PROBLEM — T85.9XXA COMPLICATION OF DEVICE: Status: ACTIVE | Noted: 2017-02-24

## 2017-02-24 PROCEDURE — 99999 PR PBB SHADOW E&M-EST. PATIENT-LVL II: CPT | Mod: PBBFAC,,, | Performed by: OPHTHALMOLOGY

## 2017-02-24 PROCEDURE — 92014 COMPRE OPH EXAM EST PT 1/>: CPT | Mod: S$PBB,,, | Performed by: OPHTHALMOLOGY

## 2017-02-24 PROCEDURE — 99212 OFFICE O/P EST SF 10 MIN: CPT | Mod: PBBFAC | Performed by: OPHTHALMOLOGY

## 2017-02-24 NOTE — PROGRESS NOTES
HPI     Post-op Evaluation    Additional comments: Pain in OD x's 1 day           Comments   DLS: 2/15/2017- Umesh    S/P Membranectomy OD 01/26/2017    C/o: pt states she started to have pain in OD(pain scale 5-6) yesterday   that has continued through today. No discharge. Slight photophobia OD.               Last edited by Orville Valadez on 2/24/2017  9:09 AM. (History)            Assessment /Plan     For exam results, see Encounter Report.    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            Patient feels better with Idania 128  Improved Va    Complex ocular hx  multiple sx --> trabs, tubes and tube removes etc   Too numerous to count    CCT  537 // 611    IOP acceptable    Right eye  PKP - Quiet  PC IOL  Fibrotic anterior capsule membrane  Tubes x 2 in place ST & IN --> good coverage    Glc Shunt graft site OD  Revision 04/09/2013    Exposed Tube ST BV tube 2/24/2017  Discussed options    Right eye  PKP with Ant Cap fibrosis  Failing  Consider DSEK / Ant capsular fibrosis removal --> P Umesh    Left eye  Aphakia  K-edema      Both eyes  Cosopt BID  EES antonio q HS --> prn    Right eye  PF1 % BID OD  PF ATs throughout the day    Left eye  Idania 128 BID + / -  Genteal Gel  Considering DSEK and Lens implant --> Lucia Calvo    Dry Eye Syndrome: discussed use of warm compresses, preserved & non-preserved artificial tears, gel and PM ointment options.  Also discussed options utilizing medications.      Plan Removal of ST tube and possible removal of BV plate Right eye  Non-functioning ST BV OD    Glaucoma Incisional Surgery Consent: Patient with exposed ST Tube OD.  Discussed with patient options, risks and benefits, expectations of glaucoma surgery; utilized a glaucoma  eye model / BV implant with questions and answers to facilitate discussion.  The patient voice good understanding and patient wishes to proceed with surgery.  The patient will likely benefit from surgery and patient signed consent for Right Eye.    Increase EES TID / QID OD  Decrease Durezol QID --> BID      Plan  RTC 3 days for Tube / GDD explantation OD  RTC sooner prn with understanding

## 2017-02-24 NOTE — MR AVS SNAPSHOT
[facility] - Ophthalmology  [address]  [place] [address]  Phone: [phone]  Fax: [phone]                  Ligia Curran   2017 8:30 AM   Office Visit    Description:  Female : 1945   Provider:  Sagar Wilkerson MD   Department:  [facility] - Ophthalmology           Reason for Visit     Post-op Evaluation           Diagnoses this Visit        Comments    Mechanical breakdown of other ocular device, initial encounter    -  Primary     Corneal edema         Chronic angle-closure glaucoma of both eyes, severe stage         Status post cataract extraction and insertion of intraocular lens, right         Glaucoma shunt device of both eyes         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      [facility]     [facility] On Call Nurse [facility] -  Assistance  Registered nurses in the [facility] On Call Center provide clinical advisement, health education, appointment booking, and other advisory services.  Call for this free service at [phone].             Medications           Message regarding Medications     Verify the changes and/or additions to your medication regime listed below are the same as discussed with your clinician today.  If any of these changes or additions are incorrect, please notify your healthcare provider.             Verify that the below list of medications is an accurate representation of the medications you are currently taking.  If none reported, the list may be blank. If incorrect, please contact your healthcare provider. Carry  this list with you in case of emergency.           Current Medications     artificial tears,hypromellose, 0.3 % Gel Place 1 drop into both eyes 4 (four) times daily. 1 Drops Both eyes At bedtime    calcium-vitamin D 500-125 mg-unit tablet Take 1 tablet by mouth once daily.     difluprednate 0.05 % Drop ophthalmic solution Place 1 drop into the right eye 4 (four) times daily.    dorzolamide-timolol 2-0.5% (COSOPT) 22.3-6.8 mg/mL ophthalmic solution Place 1 drop into both eyes 2 (two) times daily. Disp 90 day supply.    erythromycin (ROMYCIN) ophthalmic ointment Place 1 inch into both eyes every evening.    moxifloxacin (VIGAMOX) 0.5 % ophthalmic solution Place 1 drop into the right eye 4 (four) times daily.    MULTIVITAMINS,THER W-MINERALS (VITAMINS & MINERALS ORAL)     prednisoLONE acetate (PRED FORTE) 1 % DrpS Place 1 drop into both eyes 4 (four) times daily. Only in right eye    sodium chloride 5% () 5 % ophthalmic solution Place 1 drop into the left eye as needed. 1 Drops Both eyes Four times a day.  Dispense 90 day supply           Clinical Reference Information           Allergies as of 2/24/2017     Augmentin [Amoxicillin-pot Clavulanate]    Sulfa (Sulfonamide Antibiotics)      Immunizations Administered on Date of Encounter - 2/24/2017     None      Language Assistance Services     ATTENTION: Language assistance services are available, free of charge. Please call [phone].      ATENCIÓN: Si luisala donovan, tiene a pinto disposición servicios gratuitos de asistencia lingüística. Llame al [phone].     CAROL ANN Ý: N?u b?n nói Ti?ng Vi?t, có các d?ch v? h? tr? ngôn ng? mi?n phí dành cho b?n. G?i s? [phone].         [facility] complies with applicable Federal civil rights laws and does not discriminate on the basis of race, color, national origin, age, disability, or sex.

## 2017-02-27 ENCOUNTER — ANESTHESIA (OUTPATIENT)
Dept: SURGERY | Facility: HOSPITAL | Age: 72
End: 2017-02-27
Payer: MEDICARE

## 2017-02-27 ENCOUNTER — ANESTHESIA EVENT (OUTPATIENT)
Dept: SURGERY | Facility: HOSPITAL | Age: 72
End: 2017-02-27
Payer: MEDICARE

## 2017-02-27 ENCOUNTER — SURGERY (OUTPATIENT)
Age: 72
End: 2017-02-27

## 2017-02-27 ENCOUNTER — HOSPITAL ENCOUNTER (OUTPATIENT)
Facility: HOSPITAL | Age: 72
Discharge: HOME | End: 2017-02-27
Attending: OPHTHALMOLOGY | Admitting: OPHTHALMOLOGY
Payer: MEDICARE

## 2017-02-27 VITALS
BODY MASS INDEX: 38.09 KG/M2 | DIASTOLIC BLOOD PRESSURE: 62 MMHG | TEMPERATURE: 98 F | HEIGHT: 60 IN | WEIGHT: 194 LBS | OXYGEN SATURATION: 100 % | HEART RATE: 75 BPM | RESPIRATION RATE: 18 BRPM | SYSTOLIC BLOOD PRESSURE: 118 MMHG

## 2017-02-27 DIAGNOSIS — H40.2233 CHRONIC ANGLE-CLOSURE GLAUCOMA OF BOTH EYES, SEVERE STAGE: ICD-10-CM

## 2017-02-27 DIAGNOSIS — H40.9 GLAUCOMA: ICD-10-CM

## 2017-02-27 DIAGNOSIS — T85.318A: Primary | ICD-10-CM

## 2017-02-27 PROCEDURE — 67120 REMOVE EYE IMPLANT MATERIAL: CPT | Mod: RT,,, | Performed by: OPHTHALMOLOGY

## 2017-02-27 PROCEDURE — 88300 SURGICAL PATH GROSS: CPT | Mod: 26,,, | Performed by: PATHOLOGY

## 2017-02-27 PROCEDURE — 37000009 HC ANESTHESIA EA ADD 15 MINS: Performed by: OPHTHALMOLOGY

## 2017-02-27 PROCEDURE — 65779 COVER EYE W/MEMBRANE SUTURE: CPT | Mod: 51,RT,, | Performed by: OPHTHALMOLOGY

## 2017-02-27 PROCEDURE — V2790 AMNIOTIC MEMBRANE: HCPCS | Performed by: OPHTHALMOLOGY

## 2017-02-27 PROCEDURE — D9220A PRA ANESTHESIA: Mod: ANES,,, | Performed by: ANESTHESIOLOGY

## 2017-02-27 PROCEDURE — 71000033 HC RECOVERY, INTIAL HOUR: Performed by: OPHTHALMOLOGY

## 2017-02-27 PROCEDURE — 37000008 HC ANESTHESIA 1ST 15 MINUTES: Performed by: OPHTHALMOLOGY

## 2017-02-27 PROCEDURE — 25000003 PHARM REV CODE 250: Performed by: OPHTHALMOLOGY

## 2017-02-27 PROCEDURE — 99499 UNLISTED E&M SERVICE: CPT | Mod: ,,, | Performed by: OPHTHALMOLOGY

## 2017-02-27 PROCEDURE — 71000015 HC POSTOP RECOV 1ST HR: Performed by: OPHTHALMOLOGY

## 2017-02-27 PROCEDURE — 36000707: Performed by: OPHTHALMOLOGY

## 2017-02-27 PROCEDURE — 71000039 HC RECOVERY, EACH ADD'L HOUR: Performed by: OPHTHALMOLOGY

## 2017-02-27 PROCEDURE — 88305 TISSUE EXAM BY PATHOLOGIST: CPT | Performed by: PATHOLOGY

## 2017-02-27 PROCEDURE — 88305 TISSUE EXAM BY PATHOLOGIST: CPT | Mod: 26,,, | Performed by: PATHOLOGY

## 2017-02-27 PROCEDURE — 63600175 PHARM REV CODE 636 W HCPCS: Performed by: NURSE ANESTHETIST, CERTIFIED REGISTERED

## 2017-02-27 PROCEDURE — 63600175 PHARM REV CODE 636 W HCPCS: Performed by: ANESTHESIOLOGY

## 2017-02-27 PROCEDURE — 36000706: Performed by: OPHTHALMOLOGY

## 2017-02-27 PROCEDURE — D9220A PRA ANESTHESIA: Mod: CRNA,,, | Performed by: NURSE ANESTHETIST, CERTIFIED REGISTERED

## 2017-02-27 PROCEDURE — 25000003 PHARM REV CODE 250: Performed by: NURSE ANESTHETIST, CERTIFIED REGISTERED

## 2017-02-27 DEVICE — TISSUE AMNIOGRAFT: Type: IMPLANTABLE DEVICE | Site: EYE | Status: FUNCTIONAL

## 2017-02-27 RX ORDER — ATROPINE SULFATE 10 MG/ML
SOLUTION/ DROPS OPHTHALMIC
Status: DISCONTINUED
Start: 2017-02-27 | End: 2017-02-27 | Stop reason: WASHOUT

## 2017-02-27 RX ORDER — ONDANSETRON 2 MG/ML
INJECTION INTRAMUSCULAR; INTRAVENOUS
Status: DISCONTINUED | OUTPATIENT
Start: 2017-02-27 | End: 2017-02-27

## 2017-02-27 RX ORDER — MOXIFLOXACIN 5 MG/ML
SOLUTION/ DROPS OPHTHALMIC
Status: DISCONTINUED | OUTPATIENT
Start: 2017-02-27 | End: 2017-02-27 | Stop reason: HOSPADM

## 2017-02-27 RX ORDER — PROPOFOL 10 MG/ML
VIAL (ML) INTRAVENOUS
Status: DISCONTINUED | OUTPATIENT
Start: 2017-02-27 | End: 2017-02-27

## 2017-02-27 RX ORDER — SODIUM CHLORIDE 0.9 % (FLUSH) 0.9 %
3 SYRINGE (ML) INJECTION
Status: DISCONTINUED | OUTPATIENT
Start: 2017-02-27 | End: 2017-02-27 | Stop reason: HOSPADM

## 2017-02-27 RX ORDER — LIDOCAINE HYDROCHLORIDE 10 MG/ML
INJECTION, SOLUTION INTRAVENOUS
Status: DISCONTINUED | OUTPATIENT
Start: 2017-02-27 | End: 2017-02-27

## 2017-02-27 RX ORDER — ONDANSETRON 2 MG/ML
4 INJECTION INTRAMUSCULAR; INTRAVENOUS DAILY PRN
Status: DISCONTINUED | OUTPATIENT
Start: 2017-02-27 | End: 2017-02-27 | Stop reason: HOSPADM

## 2017-02-27 RX ORDER — SODIUM CHLORIDE 9 MG/ML
INJECTION, SOLUTION INTRAVENOUS CONTINUOUS PRN
Status: DISCONTINUED | OUTPATIENT
Start: 2017-02-27 | End: 2017-02-27

## 2017-02-27 RX ORDER — ACETAMINOPHEN 10 MG/ML
1000 INJECTION, SOLUTION INTRAVENOUS ONCE
Status: COMPLETED | OUTPATIENT
Start: 2017-02-27 | End: 2017-02-27

## 2017-02-27 RX ORDER — ALBUTEROL SULFATE 90 UG/1
AEROSOL, METERED RESPIRATORY (INHALATION)
Status: DISCONTINUED | OUTPATIENT
Start: 2017-02-27 | End: 2017-02-27

## 2017-02-27 RX ORDER — LIDOCAINE HYDROCHLORIDE 40 MG/ML
INJECTION, SOLUTION RETROBULBAR
Status: DISCONTINUED | OUTPATIENT
Start: 2017-02-27 | End: 2017-02-27 | Stop reason: HOSPADM

## 2017-02-27 RX ORDER — MOXIFLOXACIN 5 MG/ML
SOLUTION/ DROPS OPHTHALMIC
Status: DISCONTINUED
Start: 2017-02-27 | End: 2017-02-27 | Stop reason: HOSPADM

## 2017-02-27 RX ORDER — LIDOCAINE HYDROCHLORIDE 40 MG/ML
1 INJECTION, SOLUTION RETROBULBAR
Status: DISCONTINUED | OUTPATIENT
Start: 2017-02-27 | End: 2017-02-27 | Stop reason: HOSPADM

## 2017-02-27 RX ORDER — PREDNISOLONE ACETATE 10 MG/ML
SUSPENSION/ DROPS OPHTHALMIC
Status: DISCONTINUED | OUTPATIENT
Start: 2017-02-27 | End: 2017-02-27 | Stop reason: HOSPADM

## 2017-02-27 RX ORDER — PREDNISOLONE ACETATE 10 MG/ML
SUSPENSION/ DROPS OPHTHALMIC
Status: DISCONTINUED
Start: 2017-02-27 | End: 2017-02-27 | Stop reason: HOSPADM

## 2017-02-27 RX ORDER — ACETAMINOPHEN 325 MG/1
650 TABLET ORAL EVERY 4 HOURS PRN
Status: DISCONTINUED | OUTPATIENT
Start: 2017-02-27 | End: 2017-02-27 | Stop reason: HOSPADM

## 2017-02-27 RX ORDER — FENTANYL CITRATE 50 UG/ML
INJECTION, SOLUTION INTRAMUSCULAR; INTRAVENOUS
Status: DISCONTINUED | OUTPATIENT
Start: 2017-02-27 | End: 2017-02-27

## 2017-02-27 RX ORDER — ROCURONIUM BROMIDE 10 MG/ML
INJECTION, SOLUTION INTRAVENOUS
Status: DISCONTINUED | OUTPATIENT
Start: 2017-02-27 | End: 2017-02-27

## 2017-02-27 RX ORDER — SODIUM CHLORIDE 0.9 % (FLUSH) 0.9 %
3 SYRINGE (ML) INJECTION EVERY 8 HOURS
Status: DISCONTINUED | OUTPATIENT
Start: 2017-02-27 | End: 2017-02-27 | Stop reason: HOSPADM

## 2017-02-27 RX ORDER — LIDOCAINE HYDROCHLORIDE 10 MG/ML
1 INJECTION, SOLUTION EPIDURAL; INFILTRATION; INTRACAUDAL; PERINEURAL ONCE
Status: COMPLETED | OUTPATIENT
Start: 2017-02-27 | End: 2017-02-27

## 2017-02-27 RX ORDER — SODIUM CHLORIDE 9 MG/ML
INJECTION, SOLUTION INTRAVENOUS CONTINUOUS
Status: DISCONTINUED | OUTPATIENT
Start: 2017-02-27 | End: 2017-02-27 | Stop reason: HOSPADM

## 2017-02-27 RX ORDER — PHENYLEPHRINE HYDROCHLORIDE 10 MG/ML
INJECTION INTRAVENOUS
Status: DISCONTINUED | OUTPATIENT
Start: 2017-02-27 | End: 2017-02-27

## 2017-02-27 RX ORDER — LIDOCAINE HYDROCHLORIDE 40 MG/ML
INJECTION, SOLUTION RETROBULBAR
Status: DISCONTINUED
Start: 2017-02-27 | End: 2017-02-27 | Stop reason: HOSPADM

## 2017-02-27 RX ORDER — LIDOCAINE HYDROCHLORIDE 10 MG/ML
INJECTION, SOLUTION EPIDURAL; INFILTRATION; INTRACAUDAL; PERINEURAL
Status: DISCONTINUED
Start: 2017-02-27 | End: 2017-02-27 | Stop reason: WASHOUT

## 2017-02-27 RX ADMIN — SODIUM CHLORIDE: 0.9 INJECTION, SOLUTION INTRAVENOUS at 01:02

## 2017-02-27 RX ADMIN — MOXIFLOXACIN HYDROCHLORIDE 6 DROP: 5 SOLUTION/ DROPS OPHTHALMIC at 02:02

## 2017-02-27 RX ADMIN — PROPOFOL 200 MG: 10 INJECTION, EMULSION INTRAVENOUS at 01:02

## 2017-02-27 RX ADMIN — SODIUM CHLORIDE: 0.9 INJECTION, SOLUTION INTRAVENOUS at 12:02

## 2017-02-27 RX ADMIN — LIDOCAINE HYDROCHLORIDE 5 ML: 40 INJECTION, SOLUTION RETROBULBAR; TOPICAL at 02:02

## 2017-02-27 RX ADMIN — LIDOCAINE HYDROCHLORIDE 0.1 MG: 10 INJECTION, SOLUTION EPIDURAL; INFILTRATION; INTRACAUDAL; PERINEURAL at 12:02

## 2017-02-27 RX ADMIN — FENTANYL CITRATE 100 MCG: 50 INJECTION, SOLUTION INTRAMUSCULAR; INTRAVENOUS at 01:02

## 2017-02-27 RX ADMIN — ALBUTEROL SULFATE 10 PUFF: 90 AEROSOL, METERED RESPIRATORY (INHALATION) at 01:02

## 2017-02-27 RX ADMIN — PREDNISOLONE ACETATE 6 DROP: 10 SUSPENSION/ DROPS OPHTHALMIC at 02:02

## 2017-02-27 RX ADMIN — LIDOCAINE HYDROCHLORIDE 100 MG: 10 INJECTION, SOLUTION INTRAVENOUS at 01:02

## 2017-02-27 RX ADMIN — ACETAMINOPHEN 1000 MG: 10 INJECTION, SOLUTION INTRAVENOUS at 04:02

## 2017-02-27 RX ADMIN — PHENYLEPHRINE HYDROCHLORIDE 200 MCG: 10 INJECTION INTRAVENOUS at 02:02

## 2017-02-27 RX ADMIN — ROCURONIUM BROMIDE 10 MG: 10 INJECTION, SOLUTION INTRAVENOUS at 01:02

## 2017-02-27 RX ADMIN — ONDANSETRON 4 MG: 2 INJECTION INTRAMUSCULAR; INTRAVENOUS at 01:02

## 2017-02-27 NOTE — TRANSFER OF CARE
Anesthesia Transfer of Care Note    Patient: Ligia Curran    Procedure(s) Performed: Procedure(s) (LRB):  REVISION-IMPLANT-GLAUCOMA (Right)    Patient location: PACU    Anesthesia Type: general    Transport from OR: Transported from OR on room air with adequate spontaneous ventilation. Transported from OR on 6-10 L/min O2 by face mask with adequate spontaneous ventilation    Post pain: adequate analgesia    Post assessment: no apparent anesthetic complications    Post vital signs: stable    Level of consciousness: awake and alert    Nausea/Vomiting: no nausea/vomiting    Complications: none          Last vitals:   Visit Vitals    /72 (BP Location: Right arm, Patient Position: Lying, BP Method: Automatic)    Pulse 80    Temp 36.8 °C (98.3 °F) (Oral)    Resp 18    Ht 5' (1.524 m)    Wt 88 kg (194 lb)    Breastfeeding No    BMI 37.89 kg/m2

## 2017-02-27 NOTE — DISCHARGE INSTRUCTIONS
Home Care Instructions    ACTIVITY LEVEL:  If you received sedation or an anesthetic, you may feel sleepy for several hours. Rest until you are more awake. Gradually resume your normal activities.    RESTRICTIONS - for the next 7 days   Do not lift anything over 10 pounds.   When bending, bend at the knees not the waist.   Do not rub the eye.   Do not get water in the eye.   Do not sleep on the side that had surgery.   Protect your eye at bedtime with the shield provided.    DIET:  At home, continue with liquids, and if there is no nausea, you may eat a soft diet. Gradually resume your normal diet.    BATHING:  You may shower or take a bath. Protect your eye with the shield provided.    MEDICATIONS:  You will receive instructions for any pain and/or antibiotic prescriptions. Pain medication should be taken only if needed and as directed. Antibiotics should be taken as directed until the entire prescription is completed.    EYE CARE:  1. Protect your eye at all times for the first month after surgery. Your old prescription glasses or sunglasses may be worn during the day. Any time the glasses are removed (ANYTIME, even while bathing or showering) wear the protective shield instead, especially at night during sleep.  2. Your doctor will tell you when to start using the eye drops. Use them as directed. They will help decrease the normal postoperative reaction and tenderness and help prevent infection.  3. The eye will be sensitive to light and glare for several weeks. This may produce a headache, secondarily. However, the eye itself should not be very painful, and taking Tylenol should relieve this.  Avoid aspirin products for the first few days after surgery, if possible, as these produce a bleeding tendency.    WHEN TO CALL THE DOCTOR:   If your eye becomes more painful or more red than when you left the hospital   For any significant decrease in vision    FOR EMERGENCIES:  If any unusual problems or  difficulties occur, contact Dr. Sagar Wilkerson or the resident at [phone] (page ) or at the Clinic office, [phone].    Return apmt - See Dr. Wilkerson Tuesday morning (2/28/17) 1st floor atrium by the cesar at 8:30 am.

## 2017-02-27 NOTE — BRIEF OP NOTE
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    Specimens: 1) Baerveldt Glaucoma implant for gross inspection & 2) Glaucoma Capsule    ESTIMATED BLOOD LOSS: Minimal.    ANESTHESIA: GETA    PROCEDURE IN DETIAL: The patient is a 71 year old  woman with extensive of glaucoma and too numerous count previous glaucoma surgeries and revisions of the right eye.  Patient presented with erosion and exposure of the ST BV tube of the right eye.  This Glaucoma Shunt was non functioning for some time and little would be gained with attempts at closure of the shunt defect.   Discussions have been carried out with this patient regarding the pertinent options, surgical risks and benefits of the procedure and expectations and the decision was made to remove the tube and if at all possible to remove the implant device.  The patient  voiced good understanding and wished to proceed with the above procedure.    The patient and correct eye to be operated on were identified by the operating surgeon and the patient was brought into the operating room. The patient received topical & GETA anesthesia and then prepped and draped in the standard sterile fashion.  A superotemporal l fornix-based conjunctival peritomy was performed with Vannas and Pepe scissor dissection.  The  Conjunctiva was reflect back with good exposure of the tube entrance.  The tube  was removed from the eye and 3 pre-placed 7-0 vicryl sutures were tied so that the stent track was closed in a water tight  fashion.  Next dissection was carried out to remove the BV implant.  This was performed without undo difficulty.  The Conjunctiva was re-approxamated into position and fixaed to the globe with interrupted 8-0 vicryl.  To ensure closure and enhance healing, an amniotic membrane BM up was placed over the surgical site and fixated into postion with interrupted 8-0 vicryl and Tiseel glue. The anterior chamber was well formed throughout the case and there were no complications.  Following the procedure, a collagen shield soaked in vigamox and prednisone 1%.  The eye was closed, patched, and a Richard shield was placed.  The patient was taken to the recovery room in good and stable condition.  The patient tolerated the procedure well.  The patient  was instructed to refrain from any heavy lifting, bending, stooping, or straining activities, discharge Home and to follow up in the morning for routine postoperative care with Dr. Sagar Wilkerson.

## 2017-02-27 NOTE — IP AVS SNAPSHOT
[facility]  [address]  [facility] [address]  Phone: [phone]           Patient Discharge Instructions     Our goal is to set you up for success. This packet includes information on your condition, medications, and your home care. It will help you to care for yourself so you don't get sicker and need to go back to the hospital.     Please ask your nurse if you have any questions.        There are many details to remember when preparing to leave the hospital. Here is what you will need to do:    1. Take your medicine. If you are prescribed medications, review your Medication List in the following pages. You may have new medications to  at the pharmacy and others that you'll need to stop taking. Review the instructions for how and when to take your medications. Talk with your doctor or nurses if you are unsure of what to do.     2. Go to your follow-up appointments. Specific follow-up information is listed in the following pages. Your may be contacted by a transition nurse or clinical provider about future appointments. Be sure we have all of the phone numbers to reach you, if needed. Please contact your provider's office if you are unable to make an appointment.     3. Watch for warning signs. Your doctor or nurse will give you detailed warning signs to watch for and when to call for assistance. These instructions may also include educational information about your condition. If you experience any of warning signs to your health, call your doctor.               [facility]  Unless otherwise directed by your provider, please contact [facility], our nurse care line that is available for 24/7 assistance.     [phone] (toll-free)    Registered nurses in the [facility] provide clinical advisement, health education, appointment booking, and other advisory services.                    ** Verify the list of medication(s) below is accurate and up  to date. Carry this with you in case of emergency. If your medications have changed, please notify your healthcare provider.             Medication List      CONTINUE taking these medications        Additional Info                      artificial tears(hypromellose) 0.3 % Gel   Quantity:  3.5 Tube   Refills:  4   Dose:  1 drop    Instructions:  Place 1 drop into both eyes 4 (four) times daily. 1 Drops Both eyes At bedtime     Begin Date    AM    Noon    PM    Bedtime       calcium-vitamin D 500-125 mg-unit tablet   Refills:  0   Dose:  1 tablet    Instructions:  Take 1 tablet by mouth once daily.     Begin Date    AM    Noon    PM    Bedtime       difluprednate 0.05 % Drop ophthalmic solution   Quantity:  1 Bottle   Refills:  3   Dose:  1 drop    Instructions:  Place 1 drop into the right eye 4 (four) times daily.     Begin Date    AM    Noon    PM    Bedtime       dorzolamide-timolol 2-0.5% 22.3-6.8 mg/mL ophthalmic solution   Commonly known as:  COSOPT   Quantity:  10 mL   Refills:  4   Dose:  1 drop    Instructions:  Place 1 drop into both eyes 2 (two) times daily. Disp 90 day supply.     Begin Date    AM    Noon    PM    Bedtime       erythromycin ophthalmic ointment   Commonly known as:  ROMYCIN   Quantity:  3.5 g   Refills:  4   Dose:  1 inch    Instructions:  Place 1 inch into both eyes every evening.     Begin Date    AM    Noon    PM    Bedtime       moxifloxacin 0.5 % ophthalmic solution   Commonly known as:  VIGAMOX   Refills:  0   Dose:  1 drop    Last time this was given:  6 drops on 2/27/2017  2:11 PM   Instructions:  Place 1 drop into the right eye 4 (four) times daily.     Begin Date    AM    Noon    PM    Bedtime       prednisoLONE acetate 1 % Drps   Commonly known as:  PRED FORTE   Quantity:  3 Bottle   Refills:  4   Dose:  1 drop    Last time this was given:  6 drops on 2/27/2017  2:12 PM   Instructions:  Place 1 drop into both eyes 4 (four) times daily. Only in right eye     Begin Date    AM     Noon    PM    Bedtime       sodium chloride 5% 5 % ophthalmic solution   Commonly known as:     Refills:  0   Dose:  1 drop    Instructions:  Place 1 drop into the left eye as needed. 1 Drops Both eyes Four times a day.  Dispense 90 day supply     Begin Date    AM    Noon    PM    Bedtime       VITAMINS & MINERALS ORAL   Refills:  0      Begin Date    AM    Noon    PM    Bedtime                  Please bring to all follow up appointments:    1. A copy of your discharge instructions.  2. All medicines you are currently taking in their original bottles.  3. Identification and insurance card.    Please arrive 15 minutes ahead of scheduled appointment time.    Please call 24 hours in advance if you must reschedule your appointment and/or time.        Your Scheduled Appointments     Mar 14, 2017 10:00 AM CDT   Established Patient Visit with Sagar Wilkerson MD   [facility] - [facility]hthalmology ([facility] )    [address]  [place] [address] [phone]            Mar 15, 2017 11:00 AM CDT   Established Patient Visit with Lucia Calvo MD   [facility]hthal[facility] ([facility]    [address]  [place] [id number] [phone]              Follow-up Information     Follow up with Sagar Wilkerson MD. Go in 1 day.    Specialty:  Ophthalmology    Why:  post-op    Contact information:    [facility] [address]  [phone]          Discharge Instructions     Future Orders    Activity as tolerated     Diet general     Questions:    Total calories:      Fat restriction, if any:      Protein restriction, if any:      Na restriction, if any:      Fluid restriction:      Additional restrictions:      Weight bearing restrictions (specify)         Discharge Instructions       Home Care Instructions    ACTIVITY LEVEL:  If you received sedation or an anesthetic, you may feel sleepy for several hours. Rest until you are more awake. Gradually resume your normal  activities.    RESTRICTIONS - for the next 7 days   Do not lift anything over 10 pounds.   When bending, bend at the knees not the waist.   Do not rub the eye.   Do not get water in the eye.   Do not sleep on the side that had surgery.   Protect your eye at bedtime with the shield provided.    DIET:  At home, continue with liquids, and if there is no nausea, you may eat a soft diet. Gradually resume your normal diet.    BATHING:  You may shower or take a bath. Protect your eye with the shield provided.    MEDICATIONS:  You will receive instructions for any pain and/or antibiotic prescriptions. Pain medication should be taken only if needed and as directed. Antibiotics should be taken as directed until the entire prescription is completed.    EYE CARE:  1. Protect your eye at all times for the first month after surgery. Your old prescription glasses or sunglasses may be worn during the day. Any time the glasses are removed (ANYTIME, even while bathing or showering) wear the protective shield instead, especially at night during sleep.  2. Your doctor will tell you when to start using the eye drops. Use them as directed. They will help decrease the normal postoperative reaction and tenderness and help prevent infection.  3. The eye will be sensitive to light and glare for several weeks. This may produce a headache, secondarily. However, the eye itself should not be very painful, and taking Tylenol should relieve this.  Avoid aspirin products for the first few days after surgery, if possible, as these produce a bleeding tendency.    WHEN TO CALL THE DOCTOR:   If your eye becomes more painful or more red than when you left the hospital   For any significant decrease in vision    FOR EMERGENCIES:  If any unusual problems or difficulties occur, contact Dr. Sagar Wilkerson or the resident at [phone] (page ) or at the Clinic office, [phone].    [facility] - See Dr. Wilkerson Tuesday morning  (2/28/17) 1st floor atrium by the cesar at 8:30 am.         Admission Information     Date & Time Provider Department CSN    2/27/2017 10:43 AM Sgaar Wilkerson MD [facility] [id number]      Care Providers     Provider Role Specialty Primary office phone    Sagar Wilkerson MD Attending Provider Ophthalmology [phone]    Sagar Wilkerson MD Surgeon  Ophthalmology [phone]      Your Vitals Were     BP                   131/82 (BP Location: Right arm)           Recent Lab Values     No lab values to display.      Pending Labs     Order Current Status    Specimen to Pathology - Surgery Collected (02/27/17 1431)      Allergies as of 2/27/2017        Reactions    Augmentin [Amoxicillin-pot Clavulanate] Diarrhea    Sulfa (Sulfonamide Antibiotics) Other (See Comments)      Advance Directives     An advance directive is a document which, in the event you are no longer able to make decisions for yourself, tells your healthcare team what kind of treatment you do or do not want to receive, or who you would like to make those decisions for you.  If you do not currently have an advance directive, [facility] encourages you to create one.  For more information call:  [phone]), [phone] ([phone]),  or log on to [email or web address].        Language Assistance Services     ATTENTION: Language assistance services are available, free of charge. Please call [phone].      ATENCIÓN: Si habla español, tiene a pinto disposición servicios gratuitos de asistencia lingüística. Llame al [phone].     CHÚ Ý: N?u b?n nói Ti?ng Vi?t, có các d?ch v? h? tr? ngôn ng? mi?n phí dành cho b?n. G?i s? [phone].        [facility] Sign-Up     Activating your [facility] account is as easy as 1-2-3!     1) Visit [email or web address], select Sign Up Now, enter this activation code and your date of birth, then select Next.  Activation code not generated  Current Patient Portal  Status: Account disabled      2) Create a username and password to use when you visit [facility] in the future and select a security question in case you lose your password and select Next.    3) Enter your e-mail address and click Sign Up!    Additional Information  If you have questions, please e-mail [email or web address] or call [phone] to talk to our [facility] staff. Remember, [facility] is NOT to be used for urgent needs. For medical emergencies, dial 911.          [facility] complies with applicable Federal civil rights laws and does not discriminate on the basis of race, color, national origin, age, disability, or sex.

## 2017-02-27 NOTE — ANESTHESIA PREPROCEDURE EVALUATION
02/27/2017  Ligia Curran is a 71 y.o., female with multiple prior surgeries, obesity, and glaucoma here for revision of glaucoma.  Prior remote PONV but no issues since more recent surgeries.  No problems with last surgeries here.    [facility] Anesthesia Evaluation    I have reviewed the Patient Summary Reports.     I have reviewed the Nursing Notes.      Review of Systems  Anesthesia Hx:  History of prior surgery of interest to airway management or planning: Denies Family Hx of Anesthesia complications.  Personal Hx of Anesthesia complications, Post-Operative Nausea/Vomiting, in the past, but not with recent anesthetics / prophylaxis   Social:  Non-Smoker    Hematology/Oncology:  Hematology Normal        Cardiovascular:   Exercise tolerance: good Denies Hypertension.   Denies Angina. ECG has been reviewed.    >4 mets   Pulmonary:   Denies Asthma.  Denies Sleep Apnea.    Renal/:  Renal/ Normal     Hepatic/GI:   Denies GERD.    Musculoskeletal:   Arthritis     Neurological:  Neurology Normal Denies Seizures.    Endocrine:   Denies Diabetes.        Physical Exam  General:  Well nourished    Airway/Jaw/Neck:  Airway Findings: Mouth Opening: Normal Tongue: Normal  General Airway Assessment: Average, Adult  Mallampati: III  Improves to II with phonation.  TM Distance: Normal, at least 6 cm  Jaw/Neck Findings:     Neck ROM: Normal ROM      Dental:  Dental Findings: (top right front implant, molar bridge & caps.) In tact, molar caps   Chest/Lungs:  Chest/Lungs Findings: Clear to auscultation, Normal Respiratory Rate     Heart/Vascular:  Heart Findings: Rate: Normal  Rhythm: Regular Rhythm        Mental Status:  Mental Status Findings:  Cooperative, Alert and Oriented         Anesthesia Plan  Type of Anesthesia, risks & benefits discussed:  Anesthesia Type:  general  Patient's Preference:   Intra-op Monitoring Plan:  standard ASA monitors  Intra-op Monitoring Plan Comments:   Post Op Pain Control Plan:   Post Op Pain Control Plan Comments:   Induction:   IV  Beta Blocker:  Patient is not currently on a Beta-Blocker (No further documentation required).       Informed Consent: Patient understands risks and agrees with Anesthesia plan.  Questions answered. Anesthesia consent signed with patient.  ASA Score: 2     Day of Surgery Review of History & Physical:    H&P update referred to the surgeon.         Ready For Surgery From Anesthesia Perspective.

## 2017-02-27 NOTE — ANESTHESIA POSTPROCEDURE EVALUATION
Anesthesia Post Evaluation    Patient: Ligia Curran    Procedure(s) Performed: Procedure(s) (LRB):  REVISION-IMPLANT-GLAUCOMA (Right)    Final Anesthesia Type: general  Patient location during evaluation: PACU  Patient participation: Yes- Able to Participate  Level of consciousness: awake and alert, awake and oriented  Post-procedure vital signs: reviewed and stable  Pain management: adequate  Airway patency: patent  PONV status at discharge: No PONV  Anesthetic complications: no      Cardiovascular status: blood pressure returned to baseline and hemodynamically stable  Respiratory status: unassisted, spontaneous ventilation and room air  Hydration status: euvolemic  Follow-up not needed.        Visit Vitals    BP (!) 118/46    Pulse 74    Temp 36.5 °C (97.7 °F) (Temporal)    Resp 18    Ht 5' (1.524 m)    Wt 88 kg (194 lb)    SpO2 100%    Breastfeeding No    BMI 37.89 kg/m2       Pain/Ammon Score: Pain Assessment Performed: Yes (2/27/2017  3:45 PM)  Presence of Pain: non-verbal indicators absent (2/27/2017  3:45 PM)  Pain Rating Prior to Med Admin: 6 (2/27/2017  4:04 PM)  Modified Ammon Score: 20 (2/27/2017 12:05 PM)

## 2017-02-27 NOTE — PROGRESS NOTES
Plan of care reviewed with pt & fellow sisters, all both verbalized understanding, pt progressing with plan of care, denies nausea & pain, tolerating PO, reviewed all DC instructions, home meds, when to call MD, when to follow-up, answered questions.

## 2017-02-27 NOTE — ANESTHESIA RELEASE NOTE
Anesthesia Release from PACU Note    Patient: Ligia Curran    Procedure(s) Performed: Procedure(s) (LRB):  REVISION-IMPLANT-GLAUCOMA (Right)    Anesthesia type: general    Post pain: Adequate analgesia    Post assessment: no apparent anesthetic complications, tolerated procedure well and no evidence of recall    Last Vitals:   Visit Vitals    BP (!) 118/46    Pulse 74    Temp 36.5 °C (97.7 °F) (Temporal)    Resp 18    Ht 5' (1.524 m)    Wt 88 kg (194 lb)    SpO2 100%    Breastfeeding No    BMI 37.89 kg/m2       Post vital signs: stable    Level of consciousness: awake, alert  and oriented    Nausea/Vomiting: no nausea/no vomiting    Complications: none    Airway Patency: patent    Respiratory: unassisted, spontaneous ventilation, room air    Cardiovascular: stable and blood pressure at baseline    Hydration: euvolemic

## 2017-03-03 ENCOUNTER — OFFICE VISIT (OUTPATIENT)
Dept: OPHTHALMOLOGY | Facility: CLINIC | Age: 72
End: 2017-03-03
Payer: MEDICARE

## 2017-03-03 DIAGNOSIS — H04.123 BILATERAL DRY EYES: ICD-10-CM

## 2017-03-03 DIAGNOSIS — H54.10 BLINDNESS AND LOW VISION: ICD-10-CM

## 2017-03-03 DIAGNOSIS — T85.318A: Primary | ICD-10-CM

## 2017-03-03 DIAGNOSIS — H40.2233 CHRONIC ANGLE-CLOSURE GLAUCOMA OF BOTH EYES, SEVERE STAGE: ICD-10-CM

## 2017-03-03 DIAGNOSIS — Z96.1 STATUS POST CATARACT EXTRACTION AND INSERTION OF INTRAOCULAR LENS, RIGHT: ICD-10-CM

## 2017-03-03 DIAGNOSIS — Z98.41 STATUS POST CATARACT EXTRACTION AND INSERTION OF INTRAOCULAR LENS, RIGHT: ICD-10-CM

## 2017-03-03 PROCEDURE — 99211 OFF/OP EST MAY X REQ PHY/QHP: CPT | Mod: PBBFAC | Performed by: OPHTHALMOLOGY

## 2017-03-03 PROCEDURE — 99999 PR PBB SHADOW E&M-EST. PATIENT-LVL I: CPT | Mod: PBBFAC,,, | Performed by: OPHTHALMOLOGY

## 2017-03-03 PROCEDURE — 99024 POSTOP FOLLOW-UP VISIT: CPT | Mod: ,,, | Performed by: OPHTHALMOLOGY

## 2017-03-03 NOTE — PROGRESS NOTES
HPI     S/P Superior Temporal Baerveldt  glaucoma shunt removal with Amniotic   Membrane placement over Conjunctival defect right eye 02/27/2017  4 day post op    BOTH EYES  Cosopt BID   EES antonio PRN     RIGHT EYE   PF 1% BID OD     LEFT EYE   Idania 128 BID   Genteal gel          Last edited by Rukhsana Carty on 3/3/2017 10:58 AM.         Assessment /Plan     For exam results, see Encounter Report.    Mechanical breakdown of other ocular device, initial encounter    Chronic angle-closure glaucoma of both eyes, severe stage    Bilateral dry eyes    Blindness and low vision - Both Eyes    Status post cataract extraction and insertion of intraocular lens, right    Glaucoma shunt device of both eyes          Patient feels better with Idania 128  Improved Va    Complex ocular hx  multiple sx --> trabs, tubes and tube removes etc   Too numerous to count    CCT  537 // 611    IOP acceptable    Right eye  PKP - Quiet  PC IOL  Fibrotic anterior capsule membrane  Tubes x 2 in place ST & IN --> good coverage    Glc Shunt graft site OD  Revision 04/09/2013    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  Also discussed options utilizing medications.      Glaucoma Incisional Surgery  Patient with exposed ST Tube OD  SP OD ST BV / Amniotic membrane Removal 2/27/2017    Post-op right Eye, POD 4: Patient doing well, reviewed post-op instructions handout with limited activity & eye care instructions, eye drop therapy and endophthalmitis precautions. The patient and family voice good understanding and will return as scheduled or sooner as needed.      EES BID  Durezol  BID  Viq QID  PF1% QID  Cosopt  BID  Xal q day      Plan  RTC 1 week postTube / GDD explantation OD  RTC sooner prn with understanding

## 2017-03-03 NOTE — MR AVS SNAPSHOT
[facility] - Ophthalmology  [address]  [facility] [address]  Phone: [phone]  Fax: [phone]                  Ligia Curran   3/3/2017 10:30 AM   Office Visit    Description:  Female : 1945   Provider:  Sagar Wilkerson MD   Department:  [facility] - Ophthalmology           Reason for Visit     Post-op Evaluation           Diagnoses this Visit        Comments    Mechanical breakdown of other ocular device, initial encounter    -  Primary     Chronic angle-closure glaucoma of both eyes, severe stage         Bilateral dry eyes         Blindness and low vision         Status post cataract extraction and insertion of intraocular lens, right         Glaucoma shunt device of both eyes                To Do List           Future Appointments        Provider Department Dept Phone    3/10/2017 11:30 AM Sagar Wilkerson MD [facility] [phone]    3/14/2017 10:00 AM aSgar Wilkerson MD [facility] [phone]    3/15/2017 11:00 AM Lucia Calvo MD [facility] [phone]      Goals (5 Years of Data)     None      Follow-Up and Disposition     Return in about 1 week (around 3/10/2017), or if symptoms worsen or fail to improve, for Pressure check, Post-op check.      [facility]     [facility] On Call Nurse Care Line - 24/7 Assistance  Registered nurses in the [facility] On Call Center provide clinical advisement, health education, appointment booking, and other advisory services.  Call for this free service at [phone].             Medications           Message regarding Medications     Verify the changes and/or additions to your medication regime listed below are the same as discussed with your clinician today.  If any of these changes or additions are incorrect, please notify your healthcare provider.             Verify that the below list of medications is an accurate representation of the medications you are currently taking.  If  none reported, the list may be blank. If incorrect, please contact your healthcare provider. Carry this list with you in case of emergency.           Current Medications     artificial tears,hypromellose, 0.3 % Gel Place 1 drop into both eyes 4 (four) times daily. 1 Drops Both eyes At bedtime    calcium-vitamin D 500-125 mg-unit tablet Take 1 tablet by mouth once daily.     dorzolamide-timolol 2-0.5% (COSOPT) 22.3-6.8 mg/mL ophthalmic solution Place 1 drop into both eyes 2 (two) times daily. Disp 90 day supply.    erythromycin (ROMYCIN) ophthalmic ointment Place 1 inch into both eyes every evening.    moxifloxacin (VIGAMOX) 0.5 % ophthalmic solution Place 1 drop into the right eye 4 (four) times daily.    MULTIVITAMINS,THER W-MINERALS (VITAMINS & MINERALS ORAL)     prednisoLONE acetate (PRED FORTE) 1 % DrpS Place 1 drop into both eyes 4 (four) times daily. Only in right eye    sodium chloride 5% () 5 % ophthalmic solution Place 1 drop into the left eye as needed. 1 Drops Both eyes Four times a day.  Dispense 90 day supply    difluprednate 0.05 % Drop ophthalmic solution Place 1 drop into the right eye 4 (four) times daily.           Clinical Reference Information           Allergies as of 3/3/2017     Augmentin [Amoxicillin-pot Clavulanate]    Sulfa (Sulfonamide Antibiotics)      Immunizations Administered on Date of Encounter - 3/3/2017     None      [facility] Sign-Up     Activating your [facility] account is as easy as 1-2-3!     1) Visit [email or web address], select Sign Up Now, enter this activation code and your date of birth, then select Next.  Activation code not generated  Current Patient Portal Status: Account disabled      2) Create a username and password to use when you visit [facility] in the future and select a security question in case you lose your password and select Next.    3) Enter your e-mail address and click Sign Up!    Additional Information  If you have questions, please e-mail  [email or web address] or call [phone] to talk to our [facility] staff. Remember, [facility] is NOT to be used for urgent needs. For medical emergencies, dial 911.         Language Assistance Services     ATTENTION: Language assistance services are available, free of charge. Please call [phone].      ATENCIÓN: Si habla español, tiene a pinto disposición servicios gratuitos de asistencia lingüística. Llame al [phone].     CHÚ Ý: N?u b?n nói Ti?ng Vi?t, có các d?ch v? h? tr? ngôn ng? mi?n phí dành cho b?n. G?i s? [phone].         [facility] complies with applicable Federal civil rights laws and does not discriminate on the basis of race, color, national origin, age, disability, or sex.

## 2017-03-10 ENCOUNTER — OFFICE VISIT (OUTPATIENT)
Dept: OPHTHALMOLOGY | Facility: CLINIC | Age: 72
End: 2017-03-10
Payer: MEDICARE

## 2017-03-10 DIAGNOSIS — H18.232 SECONDARY CORNEAL EDEMA OF LEFT EYE: ICD-10-CM

## 2017-03-10 DIAGNOSIS — T85.318A: Primary | ICD-10-CM

## 2017-03-10 DIAGNOSIS — H40.2233 CHRONIC ANGLE-CLOSURE GLAUCOMA OF BOTH EYES, SEVERE STAGE: ICD-10-CM

## 2017-03-10 DIAGNOSIS — H04.123 BILATERAL DRY EYES: ICD-10-CM

## 2017-03-10 DIAGNOSIS — H27.02 APHAKIA, LEFT: ICD-10-CM

## 2017-03-10 DIAGNOSIS — H54.10 BLINDNESS AND LOW VISION: ICD-10-CM

## 2017-03-10 DIAGNOSIS — Z96.1 STATUS POST CATARACT EXTRACTION AND INSERTION OF INTRAOCULAR LENS, RIGHT: ICD-10-CM

## 2017-03-10 DIAGNOSIS — Z98.41 STATUS POST CATARACT EXTRACTION AND INSERTION OF INTRAOCULAR LENS, RIGHT: ICD-10-CM

## 2017-03-10 DIAGNOSIS — Z94.7 CORNEA REPLACED BY TRANSPLANT: ICD-10-CM

## 2017-03-10 PROCEDURE — 99999 PR PBB SHADOW E&M-EST. PATIENT-LVL II: CPT | Mod: PBBFAC,,, | Performed by: OPHTHALMOLOGY

## 2017-03-10 PROCEDURE — 99024 POSTOP FOLLOW-UP VISIT: CPT | Mod: ,,, | Performed by: OPHTHALMOLOGY

## 2017-03-10 PROCEDURE — 99212 OFFICE O/P EST SF 10 MIN: CPT | Mod: PBBFAC | Performed by: OPHTHALMOLOGY

## 2017-03-10 NOTE — PROGRESS NOTES
HPI     Post-op Evaluation    Additional comments: 1 wk po iop chk           Comments   Patient states her eyes are feeling better today. She still has some   photophobia in OD but doing ok.    BOTH EYES   Cosopt BID     RIGHT EYE   PF 1% BID OD   EES antonio OD    LEFT EYE    Idania 128 BID   Genteal gel        Last edited by Orville Valadez on 3/10/2017 11:48 AM. (History)            Assessment /Plan     For exam results, see Encounter Report.    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        Patient feels better with Idania 128  Improved Va    Complex ocular hx  multiple sx --> trabs, tubes and tube removes etc   Too numerous to count    CCT  537 // 611    IOP acceptable    Right eye  PKP - Quiet  PC IOL  Fibrotic anterior capsule membrane  Tubes x 2 in place ST & IN --> good coverage    Glc Shunt graft site OD  Revision 04/09/2013    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  Also discussed options utilizing medications.      Glaucoma Incisional Surgery  Patient with exposed ST Tube OD  SP OD ST BV / Amniotic membrane Removal 2/27/2017    Post-op right Eye, POW 2: Patient doing well, reviewed post-op instructions handout with limited activity & eye care instructions, eye drop therapy and endophthalmitis precautions. The patient and  family voice good understanding and will return as scheduled or sooner as needed.      EES BID  Durezol  BID  Viq QID --> finish  PF1% QID --> BID  Cosopt BID  Xal q day  EES q HS and BID      Plan  RTC 2 week postTube / GDD explantation OD  RTC sooner prn with understanding

## 2017-03-10 NOTE — MR AVS SNAPSHOT
[facility] - Ophthalmology  [address]  [facility] [address]  Phone: [phone]  Fax: [phone]                  Ligia Curran   3/10/2017 11:30 AM   Office Visit    Description:  Female : 1945   Provider:  Sagar Wilkerson MD   Department:  [facility] - Ophthalmology           Reason for Visit     Post-op Evaluation           Diagnoses this Visit        Comments    Mechanical breakdown of other ocular device, initial encounter    -  Primary     Chronic angle-closure glaucoma of both eyes, severe stage         Secondary corneal edema of left eye         Bilateral dry eyes         Status post cataract extraction and insertion of intraocular lens, right         Blindness and low vision         Aphakia, left         Glaucoma shunt device of both eyes         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      [facility]     [facility] On Call Nurse [place] [facility] Line -  Assistance  Registered nurses in the [facility] On Call Center provide clinical advisement, health education, appointment booking, and other advisory services.  Call for this free service at [phone].             Medications           Message regarding Medications     Verify the changes and/or additions to your medication regime listed below are the same as discussed with your clinician today.  If any of these changes or additions are incorrect, please notify your healthcare provider.             Verify that the below list of medications is an accurate representation of the medications you are  currently taking.  If none reported, the list may be blank. If incorrect, please contact your healthcare provider. Carry this list with you in case of emergency.           Current Medications     artificial tears,hypromellose, 0.3 % Gel Place 1 drop into both eyes 4 (four) times daily. 1 Drops Both eyes At bedtime    calcium-vitamin D 500-125 mg-unit tablet Take 1 tablet by mouth once daily.     dorzolamide-timolol 2-0.5% (COSOPT) 22.3-6.8 mg/mL ophthalmic solution Place 1 drop into both eyes 2 (two) times daily. Disp 90 day supply.    erythromycin (ROMYCIN) ophthalmic ointment Place 1 inch into both eyes every evening.    moxifloxacin (VIGAMOX) 0.5 % ophthalmic solution Place 1 drop into the right eye 4 (four) times daily.    MULTIVITAMINS,THER W-MINERALS (VITAMINS & MINERALS ORAL)     prednisoLONE acetate (PRED FORTE) 1 % DrpS Place 1 drop into both eyes 4 (four) times daily. Only in right eye    sodium chloride 5% () 5 % ophthalmic solution Place 1 drop into the left eye as needed. 1 Drops Both eyes Four times a day.  Dispense 90 day supply           Clinical Reference Information           Allergies as of 3/10/2017     Augmentin [Amoxicillin-pot Clavulanate]    Sulfa (Sulfonamide Antibiotics)      Immunizations Administered on Date of Encounter - 3/10/2017     None      [facility] Sign-Up     Activating your [facility] account is as easy as 1-2-3!     1) Visit [email or web address], select Sign Up Now, enter this activation code and your date of birth, then select Next.  Activation code not generated  Current Patient Portal Status: Account disabled      2) Create a username and password to use when you visit [facility] in the future and select a security question in case you lose your password and select Next.    3) Enter your e-mail address and click Sign Up!    Additional Information  If you have questions, please e-mail [email or web address] or call [phone] to talk to our [facility] staff. Remember,  [facility] is NOT to be used for urgent needs. For medical emergencies, dial 911.         Language Assistance Services     ATTENTION: Language assistance services are available, free of charge. Please call [phone].      ATENCIÓN: Si habla donovan, tiene a pinto disposición servicios gratuitos de asistencia lingüística. Llame al [phone].     CHÚ Ý: N?u b?n nói Ti?ng Vi?t, có các d?ch v? h? tr? ngôn ng? mi?n phí dành cho b?n. G?i s? [phone].         [facility] complies with applicable Federal civil rights laws and does not discriminate on the basis of race, color, national origin, age, disability, or sex.

## 2017-03-15 ENCOUNTER — OFFICE VISIT (OUTPATIENT)
Dept: OPHTHALMOLOGY | Facility: CLINIC | Age: 72
End: 2017-03-15
Payer: MEDICARE

## 2017-03-15 DIAGNOSIS — H40.2233 CHRONIC ANGLE-CLOSURE GLAUCOMA OF BOTH EYES, SEVERE STAGE: Primary | ICD-10-CM

## 2017-03-15 DIAGNOSIS — H18.20 CORNEAL EDEMA: ICD-10-CM

## 2017-03-15 DIAGNOSIS — Z94.7 CORNEA REPLACED BY TRANSPLANT: ICD-10-CM

## 2017-03-15 PROCEDURE — 99999 PR PBB SHADOW E&M-EST. PATIENT-LVL II: CPT | Mod: PBBFAC,,, | Performed by: OPHTHALMOLOGY

## 2017-03-15 PROCEDURE — 92014 COMPRE OPH EXAM EST PT 1/>: CPT | Mod: 24,S$PBB,, | Performed by: OPHTHALMOLOGY

## 2017-03-15 PROCEDURE — 99212 OFFICE O/P EST SF 10 MIN: CPT | Mod: PBBFAC | Performed by: OPHTHALMOLOGY

## 2017-03-15 NOTE — MR AVS SNAPSHOT
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 Ophthalmology [phone]    3/24/2017 9:30 AM Sagar Wilkerson MD [facility] Ophthalmology [phone]      Goals (5 Years of Data)     None      [facility]     [facility] Nurse Care Line -  Assistance  Registered nurses in the [facility] On Call Center provide clinical advisement, health education, appointment booking, and other advisory services.  Call for this free service at [phone].             Medications           Message regarding Medications     Verify the changes and/or additions to your medication regime listed below are the same as discussed with your clinician today.  If any of these changes or additions are incorrect, please notify your healthcare provider.             Verify that the below list of medications is an accurate representation of the medications you are currently taking.  If none reported, the list may be blank. If incorrect, please contact your healthcare provider. Carry this list with you in case of emergency.           Current Medications     artificial tears,hypromellose, 0.3 % Gel Place 1 drop into both eyes 4 (four) times daily. 1 Drops Both eyes At bedtime    calcium-vitamin D 500-125 mg-unit tablet Take 1 tablet by mouth once daily.     dorzolamide-timolol 2-0.5% (COSOPT) 22.3-6.8 mg/mL ophthalmic solution Place 1 drop into both eyes 2 (two) times  daily. Disp 90 day supply.    erythromycin (ROMYCIN) ophthalmic ointment Place 1 inch into both eyes every evening.    moxifloxacin (VIGAMOX) 0.5 % ophthalmic solution Place 1 drop into the right eye 4 (four) times daily.    MULTIVITAMINS,THER W-MINERALS (VITAMINS & MINERALS ORAL)     prednisoLONE acetate (PRED FORTE) 1 % DrpS Place 1 drop into both eyes 4 (four) times daily. Only in right eye    sodium chloride 5% () 5 % ophthalmic solution Place 1 drop into the left eye as needed. 1 Drops Both eyes Four times a day.  Dispense 90 day supply           Clinical Reference Information           Allergies as of 3/15/2017     Augmentin [Amoxicillin-pot Clavulanate]    Sulfa (Sulfonamide Antibiotics)      Immunizations Administered on Date of Encounter - 3/15/2017     None      [facility] Sign-Up     Activating your [facility] account is as easy as 1-2-3!     1) Visit [email or web address], select Sign Up Now, enter this activation code and your date of birth, then select Next.  Activation code not generated  Current Patient Portal Status: Account disabled      2) Create a username and password to use when you visit [facility] in the future and select a security question in case you lose your password and select Next.    3) Enter your e-mail address and click Sign Up!    Additional Information  If you have questions, please e-mail [email or web address] or call [phone] to talk to our [facility] staff. Remember, [facility] is NOT to be used for urgent needs. For medical emergencies, dial 911.         Language Assistance Services     ATTENTION: Language assistance services are available, free of charge. Please call [phone].      ATENCIÓN: Si habla español, tiene a pinto disposición servicios gratuitos de asistencia lingüística. Llame al [phone].     CHÚ Ý: N?u b?n nói Ti?ng Vi?t, có các d?ch v? h? tr? ngôn ng? mi?n phí dành cho b?n. G?i s? [phone].         [facility] complies with applicable  Federal civil rights laws and does not discriminate on the basis of race, color, national origin, age, disability, or sex.

## 2017-03-15 NOTE — PROGRESS NOTES
HPI     DLS 2/15/17 Dr. Calvo          3/10/17 Dr. Wilkerson has appt with him next week    Pt states since her visit with Dr. Calvo had emergency surgery on the OD to   remove the tube that was not working.  She is having lots of probs with OD   burning and is seeing Dr. Wilkerson Friday for another f/u.    OS is a little worse, but dealing with OD at present.  Can see better some   days out of it.     BOTH EYES   Cosopt BID     RIGHT EYE   PF 1% BID OD   VM BID til bottle empty  EES antonio OD    LEFT EYE    Idania 128 BID   Genteal gel        Last edited by Alice Montano on 3/15/2017 10:30 AM.         Assessment /Plan     For exam results, see Encounter Report.    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  OS edema progressing (aphakic OS)...

## 2017-03-24 ENCOUNTER — OFFICE VISIT (OUTPATIENT)
Dept: OPHTHALMOLOGY | Facility: CLINIC | Age: 72
End: 2017-03-24
Payer: MEDICARE

## 2017-03-24 DIAGNOSIS — H54.10 BLINDNESS AND LOW VISION: ICD-10-CM

## 2017-03-24 DIAGNOSIS — H04.123 BILATERAL DRY EYES: ICD-10-CM

## 2017-03-24 DIAGNOSIS — T85.318A: Primary | ICD-10-CM

## 2017-03-24 DIAGNOSIS — Z94.7 CORNEA REPLACED BY TRANSPLANT: ICD-10-CM

## 2017-03-24 DIAGNOSIS — Z98.41 STATUS POST CATARACT EXTRACTION AND INSERTION OF INTRAOCULAR LENS, RIGHT: ICD-10-CM

## 2017-03-24 DIAGNOSIS — H27.02 APHAKIA, LEFT: ICD-10-CM

## 2017-03-24 DIAGNOSIS — H40.2233 CHRONIC ANGLE-CLOSURE GLAUCOMA OF BOTH EYES, SEVERE STAGE: ICD-10-CM

## 2017-03-24 DIAGNOSIS — Z96.1 STATUS POST CATARACT EXTRACTION AND INSERTION OF INTRAOCULAR LENS, RIGHT: ICD-10-CM

## 2017-03-24 PROCEDURE — 99024 POSTOP FOLLOW-UP VISIT: CPT | Mod: ,,, | Performed by: OPHTHALMOLOGY

## 2017-03-24 PROCEDURE — 99999 PR PBB SHADOW E&M-EST. PATIENT-LVL II: CPT | Mod: PBBFAC,,, | Performed by: OPHTHALMOLOGY

## 2017-03-24 PROCEDURE — 99212 OFFICE O/P EST SF 10 MIN: CPT | Mod: PBBFAC | Performed by: OPHTHALMOLOGY

## 2017-03-24 NOTE — PROGRESS NOTES
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         Assessment /Plan     For exam results, see Encounter Report.    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          Patient feels better with Idania 128  Improved Va    Complex ocular hx  multiple sx --> trabs, tubes and tube removes etc   Too numerous to count    CCT  537 // 611    IOP acceptable    Right eye  PKP - Quiet  PC IOL  Fibrotic anterior capsule membrane  Tubes x 2 in place ST & IN --> good coverage    Glc Shunt graft site OD  Revision 04/09/2013    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  Also discussed options utilizing medications.      Glaucoma Incisional Surgery  Patient with exposed ST Tube OD  SP OD ST BV / Amniotic membrane Removal 2/27/2017    Post-op right Eye, POW 4: Patient doing well, reviewed post-op instructions handout with limited activity & eye care instructions, eye drop therapy and endophthalmitis precautions. The patient and family voice good understanding and will return as scheduled or sooner as needed.      EES BID  Durezol  BID  PF1%  BID  Cosopt BID  Xal q day  EES q HS  and TID      Plan  RTC 2-3 week postTube / GDD explantation OD  Will need fu with CORDELIA Calvo  RTC sooner prn with understanding

## 2017-03-24 NOTE — MR AVS SNAPSHOT
[facility] - Ophthalmology  [address]  [facility] [address]  Phone: [phone]  Fax: [phone]                  Ligia Curran   3/24/2017 9:30 AM   Office Visit    Description:  Female : 1945   Provider:  Sagar Wilkerson MD   Department:  [facility] - Ophthalmology           Reason for Visit     Post-op Evaluation           Diagnoses this Visit        Comments    Mechanical breakdown of other ocular device, initial encounter    -  Primary     Glaucoma shunt device of both eyes         Aphakia, left         Blindness and low vision         Status post cataract extraction and insertion of intraocular lens, right         Bilateral dry eyes         Chronic angle-closure glaucoma of both eyes, severe stage         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      [facility]     [facility] On Call Nurse Care Line -  Assistance  Registered nurses in the [facility] On Call Center provide clinical advisement, health education, appointment booking, and other advisory services.  Call for this free service at [phone].             Medications           Message regarding Medications     Verify the changes and/or additions to your medication regime listed below are the same as discussed with your clinician today.  If any of these changes or additions are incorrect, please notify your healthcare provider.             Verify that the below list of medications is an accurate representation of the medications you are currently taking.  If none reported, the list may be blank. If incorrect, please contact your healthcare provider. Carry this  list with you in case of emergency.           Current Medications     artificial tears,hypromellose, 0.3 % Gel Place 1 drop into both eyes 4 (four) times daily. 1 Drops Both eyes At bedtime    calcium-vitamin D 500-125 mg-unit tablet Take 1 tablet by mouth once daily.     dorzolamide-timolol 2-0.5% (COSOPT) 22.3-6.8 mg/mL ophthalmic solution Place 1 drop into both eyes 2 (two) times daily. Disp 90 day supply.    erythromycin (ROMYCIN) ophthalmic ointment Place 1 inch into both eyes every evening.    moxifloxacin (VIGAMOX) 0.5 % ophthalmic solution Place 1 drop into the right eye 4 (four) times daily.    MULTIVITAMINS,THER W-MINERALS (VITAMINS & MINERALS ORAL)     prednisoLONE acetate (PRED FORTE) 1 % DrpS Place 1 drop into both eyes 4 (four) times daily. Only in right eye    sodium chloride 5% () 5 % ophthalmic solution Place 1 drop into the left eye as needed. 1 Drops Both eyes Four times a day.  Dispense 90 day supply           Clinical Reference Information           Allergies as of 3/24/2017     Augmentin [Amoxicillin-pot Clavulanate]    Sulfa (Sulfonamide Antibiotics)      Immunizations Administered on Date of Encounter - 3/24/2017     None      [facility] Sign-Up     Activating your [facility] account is as easy as 1-2-3!     1) Visit [email or web address], select Sign Up Now, enter this activation code and your date of birth, then select Next.  Activation code not generated  Current Patient Portal Status: Account disabled      2) Create a username and password to use when you visit [facility] in the future and select a security question in case you lose your password and select Next.    3) Enter your e-mail address and click Sign Up!    Additional Information  If you have questions, please e-mail [email or web address] or call [phone] to talk to our [facility] staff. Remember, [facility] is NOT to be used for urgent needs. For medical emergencies, dial 911.         Language Assistance Services     ATTENTION:  Language assistance services are available, free of charge. Please call [phone].      ATENCIÓN: Si habla donovan, tiene a pinto disposición servicios gratuitos de asistencia lingüística. Llame al [phone].     CHÚ Ý: N?u b?n nói Ti?ng Vi?t, có các d?ch v? h? tr? ngôn ng? mi?n phí dành cho b?n. G?i s? [phone].         [facility] complies with applicable Federal civil rights laws and does not discriminate on the basis of race, color, national origin, age, disability, or sex.

## 2017-04-11 ENCOUNTER — OFFICE VISIT (OUTPATIENT)
Dept: OPHTHALMOLOGY | Facility: CLINIC | Age: 72
End: 2017-04-11
Payer: MEDICARE

## 2017-04-11 DIAGNOSIS — H40.2233 CHRONIC ANGLE-CLOSURE GLAUCOMA OF BOTH EYES, SEVERE STAGE: ICD-10-CM

## 2017-04-11 DIAGNOSIS — H18.232 SECONDARY CORNEAL EDEMA OF LEFT EYE: ICD-10-CM

## 2017-04-11 DIAGNOSIS — H54.10 BLINDNESS AND LOW VISION: ICD-10-CM

## 2017-04-11 DIAGNOSIS — H18.20 CORNEAL EDEMA: ICD-10-CM

## 2017-04-11 DIAGNOSIS — H27.02 APHAKIA, LEFT: ICD-10-CM

## 2017-04-11 DIAGNOSIS — T85.318A: Primary | ICD-10-CM

## 2017-04-11 DIAGNOSIS — Z94.7 CORNEA REPLACED BY TRANSPLANT: ICD-10-CM

## 2017-04-11 DIAGNOSIS — Z98.41 STATUS POST CATARACT EXTRACTION AND INSERTION OF INTRAOCULAR LENS, RIGHT: ICD-10-CM

## 2017-04-11 DIAGNOSIS — H04.123 BILATERAL DRY EYES: ICD-10-CM

## 2017-04-11 DIAGNOSIS — Z96.1 STATUS POST CATARACT EXTRACTION AND INSERTION OF INTRAOCULAR LENS, RIGHT: ICD-10-CM

## 2017-04-11 PROCEDURE — 99024 POSTOP FOLLOW-UP VISIT: CPT | Mod: ,,, | Performed by: OPHTHALMOLOGY

## 2017-04-11 PROCEDURE — 99999 PR PBB SHADOW E&M-EST. PATIENT-LVL II: CPT | Mod: PBBFAC,,, | Performed by: OPHTHALMOLOGY

## 2017-04-11 PROCEDURE — 99212 OFFICE O/P EST SF 10 MIN: CPT | Mod: PBBFAC | Performed by: OPHTHALMOLOGY

## 2017-04-11 NOTE — PROGRESS NOTES
HPI     Glaucoma    Additional comments: 2 wk iop chk           Comments           BOTH EYES   Cosopt BID     RIGHT EYE   PF 1% BID OD   EES antonio OD    LEFT EYE    Idania 128 BID           Last edited by Orville Valadez on 4/11/2017 10:49 AM. (History)            Assessment /Plan     For exam results, see Encounter Report.    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 - Right Eye    Aphakia, left        Complex ocular hx  multiple sx --> trabs, tubes and tube removes etc   Too numerous to count    CCT  537 // 611    IOP acceptable    Right eye  PKP - Quiet  PC IOL  Fibrotic anterior capsule membrane  Tubes x 2 in place ST & IN --> removed ST BV 2/27/2017  Glc Shunt graft site OD  Revision 04/09/2013    Right eye  PKP with Ant Cap fibrosis  Failing  Consider DSEK / Ant capsular fibrosis removal --> CORDELIA Calvo    Left eye  Aphakia  K-edema      Dry Eye Syndrome: discussed use of warm compresses, preserved & non-preserved artificial tears, gel and PM ointment options.  Also discussed options utilizing medications.      Glaucoma Incisional Surgery  Patient with exposed ST Tube OD  SP OD ST BV / Amniotic membrane Removal 2/27/2017    Post-op right Eye, POW 6: Patient doing well, reviewed post-op instructions handout with limited activity & eye care instructions, eye drop therapy and endophthalmitis precautions. The patient and family voice good understanding and will return as scheduled or sooner as needed.    Right eye  EES BID  PF1%  BID    Left eye  Idania 128 BID + / -  Genteal Gel  Considering DSEK and Lens implant --> Lucia Calvo    Both eyes  Cosopt BID      Plan  RTC 6 week postTube /  GDD explantation OD  Will need fu with CORDELIA KEY sooner prn with understanding

## 2017-04-11 NOTE — MR AVS SNAPSHOT
[facility] - Ophthalmology  [address]  [facility] [address]  Phone: [phone]  Fax: [phone]                  Ligia Curran   2017 10:00 AM   Office Visit    Description:  Female : 1945   Provider:  Sagar Wilkerson MD   Department:  [facility] - Ophthalmology           Reason for Visit     Glaucoma           Diagnoses this Visit        Comments    Mechanical breakdown of other ocular device, initial encounter    -  Primary     Corneal edema         Chronic angle-closure glaucoma of both eyes, severe stage         Secondary corneal edema of left eye         Status post cataract extraction and insertion of intraocular lens, right         Bilateral dry eyes         Blindness and low vision         Glaucoma shunt device of both eyes         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      [facility] On Call     [facility] On Call Nurse Care Line -  Assistance  Unless otherwise directed by your provider, please contact [facility] On-Call, our nurse care line that is available for  assistance.     Registered nurses in the [facility] provide: appointment scheduling, clinical advisement, health education, and other advisory services.  Call: [phone] (toll free)               Medications           Message regarding Medications     Verify the changes and/or additions to your medication regime listed below are the same as discussed with your clinician today.  If any of these changes or additions are incorrect, please notify your healthcare provider.        STOP taking these medications     moxifloxacin (VIGAMOX)  0.5 % ophthalmic solution Place 1 drop into the right eye 4 (four) times daily.           Verify that the below list of medications is an accurate representation of the medications you are currently taking.  If none reported, the list may be blank. If incorrect, please contact your healthcare provider. Carry this list with you in case of emergency.           Current Medications     artificial tears,hypromellose, 0.3 % Gel Place 1 drop into both eyes 4 (four) times daily. 1 Drops Both eyes At bedtime    calcium-vitamin D 500-125 mg-unit tablet Take 1 tablet by mouth once daily.     dorzolamide-timolol 2-0.5% (COSOPT) 22.3-6.8 mg/mL ophthalmic solution Place 1 drop into both eyes 2 (two) times daily. Disp 90 day supply.    erythromycin (ROMYCIN) ophthalmic ointment Place 1 inch into both eyes every evening.    MULTIVITAMINS,THER W-MINERALS (VITAMINS & MINERALS ORAL)     prednisoLONE acetate (PRED FORTE) 1 % DrpS Place 1 drop into both eyes 4 (four) times daily. Only in right eye    sodium chloride 5% () 5 % ophthalmic solution Place 1 drop into the left eye as needed. 1 Drops Both eyes Four times a day.  Dispense 90 day supply           Clinical Reference Information           Allergies as of 4/11/2017     Augmentin [Amoxicillin-pot Clavulanate]    Sulfa (Sulfonamide Antibiotics)      Immunizations Administered on Date of Encounter - 4/11/2017     None      [facility] Sign-Up     Activating your [facility] account is as easy as 1-2-3!     1) Visit [email or web address], select Sign Up Now, enter this activation code and your date of birth, then select Next.  Activation code not generated  Current Patient Portal Status: Account disabled      2) Create a username and password to use when you visit [facility] in the future and select a security question in case you lose your password and select Next.    3) Enter your e-mail address and click Sign Up!    Additional Information  If you have questions, please e-mail  [email or web address] or call [phone] to talk to our [facility] staff. Remember, [facility] is NOT to be used for urgent needs. For medical emergencies, dial 911.         Language Assistance Services     ATTENTION: Language assistance services are available, free of charge. Please call [phone].      ATENCIÓN: Si habla español, tiene a pinto disposición servicios gratuitos de asistencia lingüística. Llame al [phone].     CHÚ Ý: N?u b?n nói Ti?ng Vi?t, có các d?ch v? h? tr? ngôn ng? mi?n phí dành cho b?n. G?i s? [phone].         [facility] complies with applicable Federal civil rights laws and does not discriminate on the basis of race, color, national origin, age, disability, or sex.

## 2017-05-30 ENCOUNTER — OFFICE VISIT (OUTPATIENT)
Dept: OPHTHALMOLOGY | Facility: CLINIC | Age: 72
End: 2017-05-30
Payer: MEDICARE

## 2017-05-30 DIAGNOSIS — Z98.41 STATUS POST CATARACT EXTRACTION AND INSERTION OF INTRAOCULAR LENS, RIGHT: ICD-10-CM

## 2017-05-30 DIAGNOSIS — H40.2233 CHRONIC ANGLE-CLOSURE GLAUCOMA OF BOTH EYES, SEVERE STAGE: Primary | ICD-10-CM

## 2017-05-30 DIAGNOSIS — Z96.1 STATUS POST CATARACT EXTRACTION AND INSERTION OF INTRAOCULAR LENS, RIGHT: ICD-10-CM

## 2017-05-30 DIAGNOSIS — H54.10 BLINDNESS AND LOW VISION: ICD-10-CM

## 2017-05-30 DIAGNOSIS — H18.232 SECONDARY CORNEAL EDEMA OF LEFT EYE: ICD-10-CM

## 2017-05-30 DIAGNOSIS — H27.02 APHAKIA, LEFT: ICD-10-CM

## 2017-05-30 DIAGNOSIS — H18.20 CORNEAL EDEMA: ICD-10-CM

## 2017-05-30 PROBLEM — T85.9XXA COMPLICATION OF DEVICE: Status: RESOLVED | Noted: 2017-02-24 | Resolved: 2017-05-30

## 2017-05-30 PROCEDURE — 99999 PR PBB SHADOW E&M-EST. PATIENT-LVL II: CPT | Mod: PBBFAC,,, | Performed by: OPHTHALMOLOGY

## 2017-05-30 PROCEDURE — 99212 OFFICE O/P EST SF 10 MIN: CPT | Mod: PBBFAC | Performed by: OPHTHALMOLOGY

## 2017-05-30 PROCEDURE — 92012 INTRM OPH EXAM EST PATIENT: CPT | Mod: S$PBB,,, | Performed by: OPHTHALMOLOGY

## 2017-05-30 NOTE — PROGRESS NOTES
HPI     Glaucoma    Additional comments: 7 wk iop chk           Comments           BOTH EYES   Cosopt BID     RIGHT EYE   PF 1% BID OD   EES antonio OD    LEFT EYE    Idania 128 BID   Genteal Gel QHS          Last edited by Orville Valadez on 5/30/2017  9:31 AM. (History)            Assessment /Plan     For exam results, see Encounter Report.    Chronic angle-closure glaucoma of both eyes, severe stage    Corneal edema    Secondary corneal edema of left eye    Blindness and low vision - Both Eyes    Aphakia, left    Status post cataract extraction and insertion of intraocular lens, right        [facility] [employer]    Complex ocular hx  multiple sx --> trabs, tubes and tube removes etc   Too numerous to count    CCT  537 // 611    IOP acceptable    Right eye  PKP - Quiet  PC IOL  Fibrotic anterior capsule membrane  Tubes x 2 in place ST & IN --> removed ST BV 2/27/2017  Glc Shunt graft site OD  Revision 04/09/2013    Right eye  PKP with Ant Cap fibrosis  Failing  Consider DSEK / Ant capsular fibrosis removal --> CORDELIA Calvo    Left eye  Aphakia  K-edema      Dry Eye Syndrome: discussed use of warm compresses, preserved & non-preserved artificial tears, gel and PM ointment options.  Also discussed options utilizing medications.      Glaucoma Incisional Surgery  Patient with exposed ST Tube OD  SP OD ST BV / Amniotic membrane Removal 2/27/2017      Right eye  EES BID  PF1%  BID    Left eye  Idania 128 BID + / -  Genteal Gel  Considering DSEK and Lens implant --> Lucia Calvo    Both eyes  Cosopt BID      Plan  Will need fu with CORDELIA Calvo --> consider PKP OD  RTC sooner prn with understanding

## 2017-06-21 ENCOUNTER — OFFICE VISIT (OUTPATIENT)
Dept: OPHTHALMOLOGY | Facility: CLINIC | Age: 72
End: 2017-06-21
Payer: MEDICARE

## 2017-06-21 DIAGNOSIS — H18.232 SECONDARY CORNEAL EDEMA OF LEFT EYE: ICD-10-CM

## 2017-06-21 DIAGNOSIS — H54.10 BLINDNESS AND LOW VISION: ICD-10-CM

## 2017-06-21 DIAGNOSIS — H18.20 CORNEAL EDEMA: ICD-10-CM

## 2017-06-21 DIAGNOSIS — Z94.7 CORNEA REPLACED BY TRANSPLANT: ICD-10-CM

## 2017-06-21 DIAGNOSIS — Z96.1 STATUS POST CATARACT EXTRACTION AND INSERTION OF INTRAOCULAR LENS, RIGHT: ICD-10-CM

## 2017-06-21 DIAGNOSIS — H40.2233 CHRONIC ANGLE-CLOSURE GLAUCOMA OF BOTH EYES, SEVERE STAGE: Primary | ICD-10-CM

## 2017-06-21 DIAGNOSIS — H27.02 APHAKIA, LEFT: ICD-10-CM

## 2017-06-21 DIAGNOSIS — Z98.41 STATUS POST CATARACT EXTRACTION AND INSERTION OF INTRAOCULAR LENS, RIGHT: ICD-10-CM

## 2017-06-21 PROCEDURE — 99999 PR PBB SHADOW E&M-EST. PATIENT-LVL II: CPT | Mod: PBBFAC,,, | Performed by: OPHTHALMOLOGY

## 2017-06-21 PROCEDURE — 92012 INTRM OPH EXAM EST PATIENT: CPT | Mod: S$PBB,,, | Performed by: OPHTHALMOLOGY

## 2017-06-21 PROCEDURE — 99212 OFFICE O/P EST SF 10 MIN: CPT | Mod: PBBFAC | Performed by: OPHTHALMOLOGY

## 2017-06-21 NOTE — PROGRESS NOTES
HPI     DLS: 05/30/2017  Glaucoma   Pt awoke with eye pain in the right eye  RIGHT EYE   PKP   PC IOL   Fibrotic anterior capsule membrane  Tubes x 2 in place ST & IN--removed ST BV 02/27/2017  Glaucoma Shunt graft site OD   Revision 04/09/2013    LEFT EYE   Aphakia   K-edema    RIGHT EYE  EES BID   PF 1% BID     LEFT EYE   Idania 128 BID   Genteal gel     BOTH EYE   Cosopt BID OU      Last edited by Rukhsana Carty on 6/21/2017 10:59 AM. (History)            Assessment /Plan     For exam results, see Encounter Report.    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      Right periorbital pain not Ocular origin  Has had similar associated with sinuses in past      [facility] [employer]    Complex ocular hx  multiple sx --> trabs, tubes and tube removes etc   Too numerous to count    CCT  537 // 611    IOP acceptable    Right eye  PKP - Quiet  PC IOL  Fibrotic anterior capsule membrane  Tubes x 2 in place ST & IN --> removed ST BV 2/27/2017  Glc Shunt graft site OD  Revision 04/09/2013    Right eye  PKP with Ant Cap fibrosis  Failing  Consider DSEK / Ant capsular fibrosis removal --> CORDELIA Calvo    Left eye  Aphakia  K-edema      Dry Eye Syndrome: discussed use of warm compresses, preserved & non-preserved artificial tears, gel and PM ointment options.  Also discussed options utilizing medications.      Glaucoma Incisional Surgery  Patient with exposed ST Tube OD  SP OD ST BV / Amniotic membrane Removal 2/27/2017      Right eye  EES BID  PF1%  BID    Left eye  Idania 128 BID + / -  Genteal Gel  Considering DSEK and Lens implant --> Lucia Calvo    Both eyes  Cosopt BID      Plan  fu with CORDELIA Calvo --> consider PKP OD  Keep fu with [facility]  sooner prn with understanding

## 2017-07-12 ENCOUNTER — OFFICE VISIT (OUTPATIENT)
Dept: OPHTHALMOLOGY | Facility: CLINIC | Age: 72
End: 2017-07-12
Payer: MEDICARE

## 2017-07-12 DIAGNOSIS — H27.02 APHAKIA, LEFT: ICD-10-CM

## 2017-07-12 DIAGNOSIS — H18.20 CORNEAL EDEMA: Primary | ICD-10-CM

## 2017-07-12 DIAGNOSIS — T86.8419 CORNEAL TRANSPLANT FAILURE: ICD-10-CM

## 2017-07-12 DIAGNOSIS — H54.10 BLINDNESS AND LOW VISION: ICD-10-CM

## 2017-07-12 PROCEDURE — 92014 COMPRE OPH EXAM EST PT 1/>: CPT | Mod: S$PBB,,, | Performed by: OPHTHALMOLOGY

## 2017-07-12 PROCEDURE — 99999 PR PBB SHADOW E&M-EST. PATIENT-LVL II: CPT | Mod: PBBFAC,,, | Performed by: OPHTHALMOLOGY

## 2017-07-12 PROCEDURE — 99212 OFFICE O/P EST SF 10 MIN: CPT | Mod: PBBFAC | Performed by: OPHTHALMOLOGY

## 2017-07-12 RX ORDER — LIDOCAINE HYDROCHLORIDE 10 MG/ML
1 INJECTION, SOLUTION EPIDURAL; INFILTRATION; INTRACAUDAL; PERINEURAL ONCE
Status: CANCELLED | OUTPATIENT
Start: 2017-07-12 | End: 2017-07-12

## 2017-07-12 RX ORDER — MOXIFLOXACIN 5 MG/ML
1 SOLUTION/ DROPS OPHTHALMIC
Status: CANCELLED | OUTPATIENT
Start: 2017-07-12

## 2017-07-12 RX ORDER — TETRACAINE HYDROCHLORIDE 5 MG/ML
1 SOLUTION OPHTHALMIC
Status: CANCELLED | OUTPATIENT
Start: 2017-07-12

## 2017-07-12 NOTE — PROGRESS NOTES
HPI     Corneal Edema    Additional comments: 3 month ck            Comments   Pt here for 3 month ck of corneal edema and possible repeat PKP OD per Dr. Brown.    1. Chronic Angle Closure OU  2. Failed PKP OD  3. Corneal Edema OD>OS  4. Aphakic OS    MEDS:  Cosopt BID OU  PF BID OD  EES antonio PRN OD  Idania 128 BID OS  Genteal gel PRN OS       Last edited by Vandana Stauffer MA on 7/12/2017 10:45 AM. (History)            Assessment /Plan     For exam results, see Encounter Report.    Corneal edema    Corneal transplant failure    Aphakia, left    Blindness and low vision - Both Eyes      PKP OD failing (after 5 yrs).   May consider repeat PKP OD.(has tube/fibrous ingrowth)  PKP OD    OS with K edema and folds, but functional.  Aphakic, but May need DSEK later.

## 2017-07-20 ENCOUNTER — TELEPHONE (OUTPATIENT)
Dept: OPHTHALMOLOGY | Facility: CLINIC | Age: 72
End: 2017-07-20

## 2017-07-20 NOTE — TELEPHONE ENCOUNTER
----- Message from Mireya Willis sent at 7/20/2017  2:46 PM CDT -----  Contact: Pt      ----- Message -----  From: Cristina Zhou  Sent: 7/20/2017   2:32 PM  To: Umesh JARAMILLO Staff    Pt would like to schedule her corneal transplant surgery with Dr. Calvo. She can be reached at [phone]

## 2017-08-14 ENCOUNTER — TELEPHONE (OUTPATIENT)
Dept: OPHTHALMOLOGY | Facility: CLINIC | Age: 72
End: 2017-08-14

## 2017-08-14 NOTE — TELEPHONE ENCOUNTER
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 Calvo. She can be reached at [phone]    Spoke with pt. Surgery moved to 10/26/17. [facility]

## 2017-08-17 ENCOUNTER — TELEPHONE (OUTPATIENT)
Dept: OPHTHALMOLOGY | Facility: CLINIC | Age: 72
End: 2017-08-17

## 2017-08-17 DIAGNOSIS — T86.8419 CORNEAL TRANSPLANT FAILURE: Primary | ICD-10-CM

## 2017-09-13 ENCOUNTER — OFFICE VISIT (OUTPATIENT)
Dept: OPHTHALMOLOGY | Facility: CLINIC | Age: 72
End: 2017-09-13
Payer: MEDICARE

## 2017-09-13 DIAGNOSIS — H27.02 APHAKIA, LEFT: ICD-10-CM

## 2017-09-13 DIAGNOSIS — Z94.7 CORNEA REPLACED BY TRANSPLANT: ICD-10-CM

## 2017-09-13 DIAGNOSIS — Z96.1 STATUS POST CATARACT EXTRACTION AND INSERTION OF INTRAOCULAR LENS, RIGHT: ICD-10-CM

## 2017-09-13 DIAGNOSIS — H18.20 CORNEAL EDEMA: ICD-10-CM

## 2017-09-13 DIAGNOSIS — H40.20X3 ANGLE-CLOSURE GLAUCOMA, SEVERE STAGE: ICD-10-CM

## 2017-09-13 DIAGNOSIS — H54.10 BLINDNESS AND LOW VISION: ICD-10-CM

## 2017-09-13 DIAGNOSIS — Z98.41 STATUS POST CATARACT EXTRACTION AND INSERTION OF INTRAOCULAR LENS, RIGHT: ICD-10-CM

## 2017-09-13 DIAGNOSIS — H21.41: ICD-10-CM

## 2017-09-13 DIAGNOSIS — H04.123 BILATERAL DRY EYES: ICD-10-CM

## 2017-09-13 DIAGNOSIS — H40.2233 CHRONIC ANGLE-CLOSURE GLAUCOMA OF BOTH EYES, SEVERE STAGE: Primary | ICD-10-CM

## 2017-09-13 PROCEDURE — 92012 INTRM OPH EXAM EST PATIENT: CPT | Mod: S$PBB,,, | Performed by: OPHTHALMOLOGY

## 2017-09-13 PROCEDURE — 99999 PR PBB SHADOW E&M-EST. PATIENT-LVL II: CPT | Mod: PBBFAC,,, | Performed by: OPHTHALMOLOGY

## 2017-09-13 PROCEDURE — 99212 OFFICE O/P EST SF 10 MIN: CPT | Mod: PBBFAC | Performed by: OPHTHALMOLOGY

## 2017-09-13 RX ORDER — PREDNISOLONE ACETATE 10 MG/ML
1 SUSPENSION/ DROPS OPHTHALMIC 4 TIMES DAILY
Qty: 3 BOTTLE | Refills: 4 | Status: SHIPPED | OUTPATIENT
Start: 2017-09-13 | End: 2018-01-10 | Stop reason: SDUPTHER

## 2017-09-13 RX ORDER — DORZOLAMIDE HYDROCHLORIDE AND TIMOLOL MALEATE 20; 5 MG/ML; MG/ML
1 SOLUTION/ DROPS OPHTHALMIC 2 TIMES DAILY
Qty: 10 ML | Refills: 4 | Status: SHIPPED | OUTPATIENT
Start: 2017-09-13 | End: 2018-05-08 | Stop reason: SDUPTHER

## 2017-09-13 RX ORDER — ERYTHROMYCIN 5 MG/G
1 OINTMENT OPHTHALMIC NIGHTLY
Qty: 3.5 G | Refills: 4 | Status: SHIPPED | OUTPATIENT
Start: 2017-09-13 | End: 2018-05-04

## 2017-09-13 NOTE — PROGRESS NOTES
"HPI     Eye Pain    Additional comments: eye pain od x's 1 day           Comments   Patient states she has been experiencing pain in OD that "comes and goes"   over the past day or so. OS is doing ok. No discharge OU.    1. Chronic Angle Closure OU  2. Failed PKP OD  3. Corneal Edema OD>OS  4. Aphakic OS    MEDS:  Cosopt BID OU  PF BID OD  EES antonio PRN OD  Idania 128 BID OS  Genteal gel PRN OS       Last edited by Orville Valadez on 9/13/2017 11:28 AM. (History)            Assessment /Plan     For exam results, see Encounter Report.    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        Right periorbital pain not Ocular origin  Has had similar associated with sinuses in past      [facility] [employer]    Complex ocular hx  multiple sx --> trabs, tubes and tube removes etc   Too numerous to count    CCT  537 // 611    IOP acceptable    Right eye  PKP - Quiet  PC IOL  Fibrotic anterior capsule membrane  Tubes x 2 in place ST & IN --> removed ST BV 2/27/2017  Glc Shunt graft site OD  Revision 04/09/2013    Right eye  PKP with Ant Cap fibrosis  Failing  Consider DSEK / Ant capsular fibrosis removal --> CORDELIA Calvo    Left eye  Aphakia  K-edema      Dry Eye Syndrome: discussed use of warm compresses, preserved & non-preserved artificial tears, gel and PM ointment options.  Also discussed options utilizing medications.      Glaucoma Incisional Surgery  Patient with exposed ST Tube OD  SP OD ST BV / Amniotic membrane Removal 2/27/2017      Right eye  EES BID  PF1%  BID    Left eye  Idania 128 BID + / -  Genteal Gel  Considering DSEK and Lens implant --> Lucia Calvo    Both eyes  Cosopt BID      Plan  CORDELIA Calvo --> " planning PKP YURIY Wilkerson 3 months after PKP  RTC sooner prn with understanding

## 2017-10-16 ENCOUNTER — OFFICE VISIT (OUTPATIENT)
Dept: FAMILY MEDICINE | Facility: CLINIC | Age: 72
End: 2017-10-16
Attending: FAMILY MEDICINE
Payer: MEDICARE

## 2017-10-16 ENCOUNTER — LAB VISIT (OUTPATIENT)
Dept: LAB | Facility: HOSPITAL | Age: 72
End: 2017-10-16
Attending: FAMILY MEDICINE
Payer: MEDICARE

## 2017-10-16 VITALS
RESPIRATION RATE: 16 BRPM | WEIGHT: 201.19 LBS | HEART RATE: 85 BPM | BODY MASS INDEX: 39.5 KG/M2 | DIASTOLIC BLOOD PRESSURE: 78 MMHG | SYSTOLIC BLOOD PRESSURE: 110 MMHG | OXYGEN SATURATION: 97 % | HEIGHT: 60 IN

## 2017-10-16 DIAGNOSIS — E66.9 OBESITY, UNSPECIFIED CLASSIFICATION, UNSPECIFIED OBESITY TYPE, UNSPECIFIED WHETHER SERIOUS COMORBIDITY PRESENT: ICD-10-CM

## 2017-10-16 DIAGNOSIS — Z12.31 ENCOUNTER FOR SCREENING MAMMOGRAM FOR BREAST CANCER: ICD-10-CM

## 2017-10-16 DIAGNOSIS — Z00.00 ANNUAL PHYSICAL EXAM: Primary | ICD-10-CM

## 2017-10-16 DIAGNOSIS — Z00.00 ANNUAL PHYSICAL EXAM: ICD-10-CM

## 2017-10-16 LAB
BASOPHILS # BLD AUTO: 0.04 K/UL
BASOPHILS NFR BLD: 0.5 %
BILIRUB SERPL-MCNC: NEGATIVE MG/DL
BLOOD URINE, POC: NEGATIVE
COLOR, POC UA: YELLOW
DIFFERENTIAL METHOD: NORMAL
EOSINOPHIL # BLD AUTO: 0.1 K/UL
EOSINOPHIL NFR BLD: 0.6 %
ERYTHROCYTE [DISTWIDTH] IN BLOOD BY AUTOMATED COUNT: 12.7 %
GLUCOSE UR QL STRIP: NORMAL
HCT VFR BLD AUTO: 45.3 %
HGB BLD-MCNC: 14.7 G/DL
IMM GRANULOCYTES # BLD AUTO: 0.02 K/UL
IMM GRANULOCYTES NFR BLD AUTO: 0.2 %
KETONES UR QL STRIP: NEGATIVE
LEUKOCYTE ESTERASE URINE, POC: NEGATIVE
LYMPHOCYTES # BLD AUTO: 2.3 K/UL
LYMPHOCYTES NFR BLD: 26.8 %
MCH RBC QN AUTO: 30.6 PG
MCHC RBC AUTO-ENTMCNC: 32.5 G/DL
MCV RBC AUTO: 94 FL
MONOCYTES # BLD AUTO: 0.5 K/UL
MONOCYTES NFR BLD: 5.8 %
NEUTROPHILS # BLD AUTO: 5.6 K/UL
NEUTROPHILS NFR BLD: 66.1 %
NITRITE, POC UA: NEGATIVE
NRBC BLD-RTO: 0 /100 WBC
PH, POC UA: 7
PLATELET # BLD AUTO: 179 K/UL
PMV BLD AUTO: 11.9 FL
PROTEIN, POC: NEGATIVE
RBC # BLD AUTO: 4.8 M/UL
SPECIFIC GRAVITY, POC UA: 1.01
UROBILINOGEN, POC UA: NORMAL
WBC # BLD AUTO: 8.43 K/UL

## 2017-10-16 PROCEDURE — 99999 PR PBB SHADOW E&M-EST. PATIENT-LVL III: CPT | Mod: PBBFAC,,, | Performed by: FAMILY MEDICINE

## 2017-10-16 PROCEDURE — 81001 URINALYSIS AUTO W/SCOPE: CPT | Mod: PBBFAC,PO | Performed by: FAMILY MEDICINE

## 2017-10-16 PROCEDURE — 99214 OFFICE O/P EST MOD 30 MIN: CPT | Mod: 25,S$PBB,, | Performed by: FAMILY MEDICINE

## 2017-10-16 PROCEDURE — 36415 COLL VENOUS BLD VENIPUNCTURE: CPT | Mod: PO

## 2017-10-16 PROCEDURE — 85025 COMPLETE CBC W/AUTO DIFF WBC: CPT

## 2017-10-16 PROCEDURE — 80061 LIPID PANEL: CPT

## 2017-10-16 PROCEDURE — 80053 COMPREHEN METABOLIC PANEL: CPT

## 2017-10-16 PROCEDURE — 99213 OFFICE O/P EST LOW 20 MIN: CPT | Mod: PBBFAC,PO | Performed by: FAMILY MEDICINE

## 2017-10-16 NOTE — PROGRESS NOTES
Subjective:       Patient ID: Ligia Curran is a 71 y.o. female.    Chief Complaint: Annual Exam    HPI   Pt is here for annual exam pt is generally well chronic eye problems followed in opthalmology  Pt is overweight is active but not actively exercising she is not on a weight loss diet   Review of Systems   Constitutional: Negative for activity change, chills, diaphoresis, fatigue and fever.   HENT: Negative for congestion, ear discharge, ear pain, hearing loss, postnasal drip, rhinorrhea, sinus pressure, sneezing, sore throat, trouble swallowing and voice change.    Eyes: Negative for photophobia, discharge, redness, itching and visual disturbance.   Respiratory: Negative for cough, chest tightness, shortness of breath and wheezing.    Cardiovascular: Negative for chest pain, palpitations and leg swelling.   Gastrointestinal: Negative for abdominal pain, anal bleeding, blood in stool, constipation, diarrhea, nausea, rectal pain and vomiting.   Genitourinary: Negative for dyspareunia, dysuria, flank pain, frequency, hematuria, menstrual problem, pelvic pain, urgency, vaginal bleeding and vaginal discharge.   Musculoskeletal: Negative for arthralgias, back pain, joint swelling and neck pain.   Skin: Negative for color change and rash.   Neurological: Negative for dizziness, speech difficulty, weakness, light-headedness, numbness and headaches.   Hematological: Does not bruise/bleed easily.   Psychiatric/Behavioral: Negative for agitation, confusion, decreased concentration, sleep disturbance and suicidal ideas. The patient is not nervous/anxious.        Objective:      Physical Exam   Constitutional: She appears well-developed and well-nourished.   HENT:   Head: Normocephalic and atraumatic.   Right Ear: External ear normal.   Left Ear: External ear normal.   Nose: Nose normal.   Mouth/Throat: Oropharynx is clear and moist.   Eyes: Conjunctivae and EOM are normal. Pupils are equal, round, and reactive to light.  "Right eye exhibits no discharge. Left eye exhibits no discharge.   Neck: Normal range of motion. Neck supple. No thyromegaly present.   Cardiovascular: Normal rate, regular rhythm and intact distal pulses.  Exam reveals no gallop and no friction rub.    Pulmonary/Chest: Effort normal and breath sounds normal. She has no wheezes. She has no rales.   Abdominal: Soft. Bowel sounds are normal. She exhibits no distension. There is no tenderness. There is no rebound and no guarding.   Musculoskeletal: Normal range of motion. She exhibits no edema or tenderness.   Lymphadenopathy:     She has no cervical adenopathy.   Neurological: She is alert. No cranial nerve deficit. She exhibits normal muscle tone. Coordination normal.   Skin: Skin is warm and dry. No rash noted. No erythema.   Psychiatric: She has a normal mood and affect. Her behavior is normal. Judgment and thought content normal.       Assessment:       1. Annual physical exam    2. Obesity, unspecified classification, unspecified obesity type, unspecified whether serious comorbidity present        Plan:     orders cmp lipid poc u (tsh declined ins doesn't cover)  Cont meds  F/u ophthalmology  Weight loss diet  Graded exercise  rtc annually and prn    Health maintenance  Colonoscopy discussed  Tetanus q 10 years  Flu shot discussed  prevnar declined  Mammogram discussed         "This note will not be shared with the patient."   "

## 2017-10-17 LAB
ALBUMIN SERPL BCP-MCNC: 3.8 G/DL
ALP SERPL-CCNC: 93 U/L
ALT SERPL W/O P-5'-P-CCNC: 13 U/L
ANION GAP SERPL CALC-SCNC: 6 MMOL/L
AST SERPL-CCNC: 18 U/L
BILIRUB SERPL-MCNC: 1 MG/DL
BUN SERPL-MCNC: 7 MG/DL
CALCIUM SERPL-MCNC: 9.2 MG/DL
CHLORIDE SERPL-SCNC: 105 MMOL/L
CHOLEST SERPL-MCNC: 158 MG/DL
CHOLEST/HDLC SERPL: 3.6 {RATIO}
CO2 SERPL-SCNC: 29 MMOL/L
CREAT SERPL-MCNC: 0.7 MG/DL
EST. GFR  (AFRICAN AMERICAN): >60 ML/MIN/1.73 M^2
EST. GFR  (NON AFRICAN AMERICAN): >60 ML/MIN/1.73 M^2
GLUCOSE SERPL-MCNC: 100 MG/DL
HDLC SERPL-MCNC: 44 MG/DL
HDLC SERPL: 27.8 %
LDLC SERPL CALC-MCNC: 86.6 MG/DL
NONHDLC SERPL-MCNC: 114 MG/DL
POTASSIUM SERPL-SCNC: 4.3 MMOL/L
PROT SERPL-MCNC: 7.6 G/DL
SODIUM SERPL-SCNC: 140 MMOL/L
TRIGL SERPL-MCNC: 137 MG/DL

## 2017-10-18 ENCOUNTER — TELEPHONE (OUTPATIENT)
Dept: FAMILY MEDICINE | Facility: CLINIC | Age: 72
End: 2017-10-18

## 2017-10-18 NOTE — TELEPHONE ENCOUNTER
----- Message from Vanessa Pelaez sent at 10/18/2017  3:22 PM CDT -----  Contact: Pt  x_  1st Request  _  2nd Request  _  3rd Request        Who:  Pt    Why: Requesting a call back in regards to  Lab results    What Number to Call Back:  [phone]    When to Expect a call back: (Within 24 hours)    Please return the call at earliest convenience. Thanks!

## 2017-10-19 ENCOUNTER — TELEPHONE (OUTPATIENT)
Dept: FAMILY MEDICINE | Facility: CLINIC | Age: 72
End: 2017-10-19

## 2017-10-19 ENCOUNTER — HOSPITAL ENCOUNTER (OUTPATIENT)
Dept: RADIOLOGY | Facility: HOSPITAL | Age: 72
Discharge: HOME | End: 2017-10-19
Attending: FAMILY MEDICINE
Payer: MEDICARE

## 2017-10-19 VITALS — WEIGHT: 201 LBS | HEIGHT: 60 IN | BODY MASS INDEX: 39.46 KG/M2

## 2017-10-19 DIAGNOSIS — Z12.31 ENCOUNTER FOR SCREENING MAMMOGRAM FOR BREAST CANCER: ICD-10-CM

## 2017-10-19 PROCEDURE — 77067 SCR MAMMO BI INCL CAD: CPT | Mod: TC

## 2017-10-19 PROCEDURE — 77063 BREAST TOMOSYNTHESIS BI: CPT | Mod: 26,,, | Performed by: RADIOLOGY

## 2017-10-19 PROCEDURE — 77067 SCR MAMMO BI INCL CAD: CPT | Mod: 26,,, | Performed by: RADIOLOGY

## 2017-10-19 NOTE — TELEPHONE ENCOUNTER
----- Message from Jacoby Price sent at 10/19/2017  3:53 PM CDT -----  Contact: Ligia Curran  X_  1st Request  _  2nd Request  _  3rd Request        Who: Ligia Curran    Why: Patient called for her test results of blood work    What Number to Call Back: [phone]    When to Expect a call back: (Within 24 hours)    Please return the call at earliest convenience. Thanks!

## 2017-10-20 ENCOUNTER — TELEPHONE (OUTPATIENT)
Dept: OPHTHALMOLOGY | Facility: CLINIC | Age: 72
End: 2017-10-20

## 2017-10-20 NOTE — TELEPHONE ENCOUNTER
The patient was left a detailed message stating her labs are fine,showing nothing acute. Patient will receive a copy of her test results.

## 2017-10-20 NOTE — TELEPHONE ENCOUNTER
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 She can be reached at [phone]

## 2017-10-24 RX ORDER — OFLOXACIN 3 MG/ML
1 SOLUTION/ DROPS OPHTHALMIC 4 TIMES DAILY
COMMUNITY
End: 2018-01-10 | Stop reason: ALTCHOICE

## 2017-10-26 ENCOUNTER — HOSPITAL ENCOUNTER (OUTPATIENT)
Facility: OTHER | Age: 72
Discharge: HOME | End: 2017-10-26
Attending: OPHTHALMOLOGY | Admitting: OPHTHALMOLOGY
Payer: MEDICARE

## 2017-10-26 ENCOUNTER — ANESTHESIA (OUTPATIENT)
Dept: SURGERY | Facility: OTHER | Age: 72
End: 2017-10-26
Payer: MEDICARE

## 2017-10-26 ENCOUNTER — ANESTHESIA EVENT (OUTPATIENT)
Dept: SURGERY | Facility: OTHER | Age: 72
End: 2017-10-26
Payer: MEDICARE

## 2017-10-26 VITALS
OXYGEN SATURATION: 98 % | SYSTOLIC BLOOD PRESSURE: 144 MMHG | WEIGHT: 201 LBS | BODY MASS INDEX: 39.46 KG/M2 | HEIGHT: 60 IN | HEART RATE: 73 BPM | TEMPERATURE: 98 F | RESPIRATION RATE: 18 BRPM | DIASTOLIC BLOOD PRESSURE: 65 MMHG

## 2017-10-26 DIAGNOSIS — T86.8419 CORNEAL TRANSPLANT FAILURE: ICD-10-CM

## 2017-10-26 DIAGNOSIS — H18.20 CORNEAL EDEMA: ICD-10-CM

## 2017-10-26 PROCEDURE — 36000707: Performed by: OPHTHALMOLOGY

## 2017-10-26 PROCEDURE — 65730 CORNEAL TRANSPLANT: CPT | Mod: RT,,, | Performed by: OPHTHALMOLOGY

## 2017-10-26 PROCEDURE — 37000009 HC ANESTHESIA EA ADD 15 MINS: Performed by: OPHTHALMOLOGY

## 2017-10-26 PROCEDURE — 36000706: Performed by: OPHTHALMOLOGY

## 2017-10-26 PROCEDURE — 88305 TISSUE EXAM BY PATHOLOGIST: CPT | Performed by: PATHOLOGY

## 2017-10-26 PROCEDURE — 27201423 OPTIME MED/SURG SUP & DEVICES STERILE SUPPLY: Performed by: OPHTHALMOLOGY

## 2017-10-26 PROCEDURE — 37000008 HC ANESTHESIA 1ST 15 MINUTES: Performed by: OPHTHALMOLOGY

## 2017-10-26 PROCEDURE — 63600175 PHARM REV CODE 636 W HCPCS: Performed by: OPHTHALMOLOGY

## 2017-10-26 PROCEDURE — 25000003 PHARM REV CODE 250: Performed by: OPHTHALMOLOGY

## 2017-10-26 PROCEDURE — 88305 TISSUE EXAM BY PATHOLOGIST: CPT | Mod: 26,,, | Performed by: PATHOLOGY

## 2017-10-26 PROCEDURE — 71000015 HC POSTOP RECOV 1ST HR: Performed by: OPHTHALMOLOGY

## 2017-10-26 PROCEDURE — 63600175 PHARM REV CODE 636 W HCPCS: Performed by: NURSE ANESTHETIST, CERTIFIED REGISTERED

## 2017-10-26 PROCEDURE — V2785 CORNEAL TISSUE PROCESSING: HCPCS | Performed by: OPHTHALMOLOGY

## 2017-10-26 PROCEDURE — S0020 INJECTION, BUPIVICAINE HYDRO: HCPCS | Performed by: OPHTHALMOLOGY

## 2017-10-26 RX ORDER — TETRACAINE HYDROCHLORIDE 5 MG/ML
1 SOLUTION OPHTHALMIC
Status: COMPLETED | OUTPATIENT
Start: 2017-10-26 | End: 2017-10-26

## 2017-10-26 RX ORDER — HYDROCODONE BITARTRATE AND ACETAMINOPHEN 5; 325 MG/1; MG/1
1 TABLET ORAL EVERY 4 HOURS PRN
Status: DISCONTINUED | OUTPATIENT
Start: 2017-10-26 | End: 2017-10-26 | Stop reason: HOSPADM

## 2017-10-26 RX ORDER — LIDOCAINE HYDROCHLORIDE 20 MG/ML
INJECTION, SOLUTION INFILTRATION; PERINEURAL
Status: DISCONTINUED | OUTPATIENT
Start: 2017-10-26 | End: 2017-10-26 | Stop reason: HOSPADM

## 2017-10-26 RX ORDER — MOXIFLOXACIN 5 MG/ML
1 SOLUTION/ DROPS OPHTHALMIC
Status: COMPLETED | OUTPATIENT
Start: 2017-10-26 | End: 2017-10-26

## 2017-10-26 RX ORDER — LIDOCAINE HYDROCHLORIDE 10 MG/ML
1 INJECTION, SOLUTION EPIDURAL; INFILTRATION; INTRACAUDAL; PERINEURAL ONCE
Status: DISCONTINUED | OUTPATIENT
Start: 2017-10-26 | End: 2017-10-26 | Stop reason: HOSPADM

## 2017-10-26 RX ORDER — ONDANSETRON 2 MG/ML
INJECTION INTRAMUSCULAR; INTRAVENOUS
Status: DISCONTINUED | OUTPATIENT
Start: 2017-10-26 | End: 2017-10-26

## 2017-10-26 RX ORDER — MIDAZOLAM HYDROCHLORIDE 1 MG/ML
INJECTION INTRAMUSCULAR; INTRAVENOUS
Status: DISCONTINUED | OUTPATIENT
Start: 2017-10-26 | End: 2017-10-26

## 2017-10-26 RX ORDER — CEFAZOLIN SODIUM 1 G/3ML
INJECTION, POWDER, FOR SOLUTION INTRAMUSCULAR; INTRAVENOUS
Status: DISCONTINUED | OUTPATIENT
Start: 2017-10-26 | End: 2017-10-26 | Stop reason: HOSPADM

## 2017-10-26 RX ORDER — BUPIVACAINE HYDROCHLORIDE 7.5 MG/ML
INJECTION, SOLUTION EPIDURAL; RETROBULBAR
Status: DISCONTINUED | OUTPATIENT
Start: 2017-10-26 | End: 2017-10-26 | Stop reason: HOSPADM

## 2017-10-26 RX ORDER — DEXAMETHASONE SODIUM PHOSPHATE 4 MG/ML
INJECTION, SOLUTION INTRA-ARTICULAR; INTRALESIONAL; INTRAMUSCULAR; INTRAVENOUS; SOFT TISSUE
Status: DISCONTINUED | OUTPATIENT
Start: 2017-10-26 | End: 2017-10-26 | Stop reason: HOSPADM

## 2017-10-26 RX ORDER — ACETAMINOPHEN 325 MG/1
650 TABLET ORAL EVERY 4 HOURS PRN
Status: DISCONTINUED | OUTPATIENT
Start: 2017-10-26 | End: 2017-10-26 | Stop reason: HOSPADM

## 2017-10-26 RX ADMIN — ONDANSETRON 4 MG: 2 INJECTION INTRAMUSCULAR; INTRAVENOUS at 12:10

## 2017-10-26 RX ADMIN — MOXIFLOXACIN HYDROCHLORIDE 1 DROP: 5 SOLUTION/ DROPS OPHTHALMIC at 10:10

## 2017-10-26 RX ADMIN — TETRACAINE HYDROCHLORIDE 1 DROP: 5 SOLUTION OPHTHALMIC at 10:10

## 2017-10-26 RX ADMIN — MIDAZOLAM HYDROCHLORIDE 2 MG: 1 INJECTION, SOLUTION INTRAMUSCULAR; INTRAVENOUS at 11:10

## 2017-10-26 RX ADMIN — MIDAZOLAM HYDROCHLORIDE 2 MG: 1 INJECTION, SOLUTION INTRAMUSCULAR; INTRAVENOUS at 12:10

## 2017-10-26 NOTE — H&P
Pre-op Eye Surgery H&P     CC: Painless, progressive loss of vision  Present Illness :Corneal edema/opacity  Allergies/Current Meds: see meds  Mental Status: A&O x3  Pertinent Medical History: n/a     Physical Exam  General: NAD  HEENT: Eye white/quiet  Lungs: Adequate respirations  Heart: + pulses  Abdomen: soft  Rectal/GI/: deferred     Impression: Corneal Edema/opacity  Plan:Corneal Transplant

## 2017-10-26 NOTE — PLAN OF CARE
Problem: Patient Care Overview  Goal: Individualization & Mutuality  Patient prefers to have [place] present for discharge. [phone]

## 2017-10-26 NOTE — LETTER
October 26, 2017         [address]  [place] [address]  Phone: [phone]  Fax: [phone]       Patient: Ligia Curran   YOB: 1945  Date of Visit: 10/26/2017    To Whom It May Concern:    Emir Curran  was at [facility] on 10/26/2017. {HE SHE CAPITAL LETTER:87343} may return to work/school on *** {With/no:93633} restrictions. If you have any questions or concerns, or if I can be of further assistance, please do not hesitate to contact me.    Sincerely,    Peggy Wells RN

## 2017-10-26 NOTE — OP NOTE
SURGEON:  Lucia Calvo M.D.    PREOPERATIVE DIAGNOSIS:    1) Failed Corneal Graft OD  2) Severe glaucoma    POSTOPERATIVE DIAGNOSIS:     1) Failed Corneal Graft OD  2) Severe glaucoma    PROCEDURE:    1. Penetrating keratoplasty, right eye  2.     ANESTHESIA:  RBB      DATE OF SURGERY:  10/26/2017      GRAFT SIZE:  8.25 mm donor button into a   7.75 mm host trephination      COMPLICATIONS:  None.    BLOOD LOSS: Less than 5 cc    SPECIMENS:   1) Cornea     INDICATIONS FOR PROCEDURE :       After a thorough discussion of risks, benefits and alternatives, including, but not limited to, infection, severe hemorrhage, graft failure, need for repeat transplantation, loss of vision, and loss of the eye,  the patient voices understanding and desires to proceed with corneal transplantation.    PROCEDURE IN DETAIL:    The patient was brought to the operating room in supine position where anesthesia was achieved without complication.  Next, the eye was prepped and draped in standard sterile fashion with 5% Betadine and a lid speculum placed in the eye.  The procedure was begun by marking the center of the cornea and sizers  used to determine the necessary size of the grafts.    Attention was then directed to the side table where a donor punch was used to cut the donor tissue.  Attention was then redirected to the patient's eye where a  trephine and an [place] PKP tess blade were used to excise the patient's cornea.  The donor button was then sewn into place with 10-0 nylon using 8 interrupted sutures and a 16 x running.  All remaining viscoelastics were removed from the anterior chamber.  The eye was reformed with BSS and wound integrity verified.  Subconjunctival injections of antibiotic and steroid were administered and a patch placed over the eye. The patient will follow up in the eye clinic tomorrow with Dr. Calvo.

## 2017-10-26 NOTE — ANESTHESIA PREPROCEDURE EVALUATION
10/26/2017  Ligia Curran is a 71 y.o., female.    Anesthesia Evaluation    I have reviewed the Patient Summary Reports.    I have reviewed the Nursing Notes.   I have reviewed the Medications.     Review of Systems  Anesthesia Hx:  Minor PONV in past Denies Family Hx of Anesthesia complications.   Denies Personal Hx of Anesthesia complications.   Social:  Non-Smoker    Hematology/Oncology:  Hematology Normal        EENT/Dental:   chronic allergic rhinitis   Cardiovascular:  Cardiovascular Normal     Pulmonary:  Pulmonary Normal    Renal/:  Renal/ Normal     Hepatic/GI:  Hepatic/GI Normal    Musculoskeletal:   Arthritis     Neurological:  Neurology Normal    Endocrine:  Endocrine Normal        Physical Exam  General:  Well nourished    Airway/Jaw/Neck:  Airway Findings: Mouth Opening: Small, but > 3cm Tongue: Normal  General Airway Assessment: Adult  Mallampati: III         Dental:  Dental Findings:Upper permanent bridge             Anesthesia Plan  Type of Anesthesia, risks & benefits discussed:  Anesthesia Type:  MAC  Patient's Preference:   Intra-op Monitoring Plan: standard ASA monitors  Intra-op Monitoring Plan Comments:   Post Op Pain Control Plan:   Post Op Pain Control Plan Comments:   Induction:    Beta Blocker:         Informed Consent: Patient understands risks and agrees with Anesthesia plan.  Questions answered. Anesthesia consent signed with patient.  ASA Score: 2     Day of Surgery Review of History & Physical:    H&P update referred to the surgeon.         Ready For Surgery From Anesthesia Perspective.

## 2017-10-26 NOTE — DISCHARGE INSTRUCTIONS
SEE DR. BRANNON'S POSTOP INSTRUCTION SHEET ON CORNEAL TRANSPLANT SURGERY    Home Care Instructions for Eye Surgeries    1. ACTIVITY:   Limit your activity today. Increase activity gradually. You may return to work or school as directed by your physician.    2. DIET:   Drink plenty of fluids. Resume your normal diet unless instructed otherwise.  3. PAIN:   Expect a moderate amount of pain. If a prescription for pain is not sent home with you, you may take your commonly used pain reliever as directed. If this is not sufficient, call your physician. You may resume any other prescription medication unless otherwise directed by your physician.     4. DRESSING:   Keep your dressing dry and intact.  5. COMMENTS:   No driving, operating heavy machinery or singing legal documents for 24 hours.    No bending forward at the waist.   See attached schedule of eye drops.    Discuss any problem with your physician as soon as it arises. Do not Delay.      EMERGENCY- If you are unable to contact your physician, please go to the nearest Emergency Room.       Anesthesia: Monitored Anesthesia Care (MAC)    Youre due to have surgery. During surgery, youll be given medicine called anesthesia. This will keep you comfortable and pain-free. Your surgeon will use monitored anesthesia care (MAC). This sheet tells you more about this type of anesthesia.  What is monitored anesthesia care?  MAC keeps you very drowsy during surgery. You may be awake, but you will likely not remember much. And you wont feel pain. With MAC, medicines are given through an IV line into a vein in your arm or hand. A local anesthetic will usually be injected into the skin and muscle around the surgical site to numb it. The anesthesia provider monitors you during the procedure. He or she checks your heart rate and rhythm, blood pressure, and blood oxygen level.  Anesthesia tools and medicines that may be near you during your procedure  You will likely have:  · A  pulse oximeter on the end of your finger. This measures your blood oxygen level.  · Electrocardiography leads (electrodes) on your chest. These record your heart rate and rhythm.  · Medicines given through an IV. These relax you and prevent pain. You may be awake or sleep lightly. If you have local anesthetic, it is injected directly into your skin.  · A facemask to give you oxygen, if needed.  Risks and possible complications  MAC has some risks. These include:  · Breathing problems  · Nausea and vomiting  · Allergic reaction to the anesthetic    Anesthesia safety  Tips for anesthesia safety include the following:   · Follow all instructions you are given for how long not to eat or drink before your procedure.  · Be sure your healthcare provider knows what medicines you take, especially any anti-inflammatory medicine or blood thinners. This includes aspirin and any other over-the-counter medicines, herbs, and supplements.  · Have an adult family member or friend drive you home after the procedure.  · For the first 24 hours after your surgery:  ¨ Do not drive or use heavy equipment.  ¨ Do not make important decisions or sign documents.  ¨ Avoid alcohol.  ¨ Have someone stay with you, if possible. They can watch for problems and help keep you safe.  Date Last Reviewed: 12/1/2016  © 9974-4951 [employer]. [address] [place] [address], [place] [address]. All rights reserved. This information is not intended as a substitute for professional medical care. Always follow your healthcare professional's instructions.

## 2017-10-26 NOTE — ANESTHESIA POSTPROCEDURE EVALUATION
Anesthesia Post Evaluation    Patient: Ligia Curran    Procedure(s) Performed: Procedure(s) (LRB):  TRANSPLANT-CORNEA (Right)    Final Anesthesia Type: MAC  Patient location during evaluation: [facility]  Patient participation: Yes- Able to Participate  Level of consciousness: awake and alert  Post-procedure vital signs: reviewed and stable  Pain management: adequate  Airway patency: patent  PONV status at discharge: No PONV  Anesthetic complications: no      Cardiovascular status: blood pressure returned to baseline  Respiratory status: unassisted, spontaneous ventilation and room air  Hydration status: euvolemic  Follow-up not needed.        Visit Vitals  BP (!) 145/82 (BP Location: Right arm, Patient Position: Lying)   Pulse 77   Temp 36.6 °C (97.8 °F) (Oral)   Resp 16   Ht 5' (1.524 m)   Wt 91.2 kg (201 lb)   SpO2 98%   Breastfeeding? No   BMI 39.26 kg/m²       Pain/Ammon Score: Pain Assessment Performed: Yes (10/26/2017 10:11 AM)  Presence of Pain: denies (10/26/2017 10:11 AM)

## 2017-10-27 ENCOUNTER — TELEPHONE (OUTPATIENT)
Dept: OPHTHALMOLOGY | Facility: CLINIC | Age: 72
End: 2017-10-27

## 2017-10-27 ENCOUNTER — OFFICE VISIT (OUTPATIENT)
Dept: OPHTHALMOLOGY | Facility: CLINIC | Age: 72
End: 2017-10-27
Payer: MEDICARE

## 2017-10-27 DIAGNOSIS — Z94.7 STATUS POST CORNEAL TRANSPLANT: Primary | ICD-10-CM

## 2017-10-27 PROCEDURE — 99212 OFFICE O/P EST SF 10 MIN: CPT | Mod: PBBFAC | Performed by: OPHTHALMOLOGY

## 2017-10-27 PROCEDURE — 99024 POSTOP FOLLOW-UP VISIT: CPT | Mod: ,,, | Performed by: OPHTHALMOLOGY

## 2017-10-27 PROCEDURE — 99999 PR PBB SHADOW E&M-EST. PATIENT-LVL II: CPT | Mod: PBBFAC,,, | Performed by: OPHTHALMOLOGY

## 2017-10-27 NOTE — TELEPHONE ENCOUNTER
----- Message from Cristina Jose sent at 10/27/2017 12:38 PM CDT -----  Contact: Pt  Pt states that she has a question regarding the prescription, She can be reached at [phone]

## 2017-10-27 NOTE — TELEPHONE ENCOUNTER
Patient questioning if she can use E-mycin or Genteal Gel for dryness in operative eye.  Advised yes per Dr Calvo may use E-mycin ungt or Genteal Gel per for dryness.

## 2017-10-27 NOTE — PROGRESS NOTES
HPI     1 DAY PO PKP OD --Pt reports no eye pain.     NOTE : Eye patch removed and area cleaned by Diana. 10/27/17      PohX:   S/P PKP OD---Penetrating keratoplasty, right eye- ODGRAFT SIZE:  8.25 mm   donor button into a   7.75 mm host trephination by Dr. Umesh MD  (10/26/17)     1. Chronic Angle Closure OU  2. Failed PKP OD  3. Corneal Edema OD>OS  4. Aphakic OS    MEDS:  Cosopt BID OU  PF QID OD  EES antonio PRN OD  Idania 128 BID OS  Ocuflox OD QID  Genteal gel PRN OS    Last edited by Diana Pederson MA on 10/27/2017  9:05 AM. (History)            Assessment /Plan     For exam results, see Encounter Report.    Status post corneal transplant      POD1 PKP: Wound stable. Graft with normal edema, folds. Post operative precautions reviewed.

## 2017-11-08 ENCOUNTER — OFFICE VISIT (OUTPATIENT)
Dept: OPHTHALMOLOGY | Facility: CLINIC | Age: 72
End: 2017-11-08
Payer: MEDICARE

## 2017-11-08 ENCOUNTER — TELEPHONE (OUTPATIENT)
Dept: RADIOLOGY | Facility: HOSPITAL | Age: 72
End: 2017-11-08

## 2017-11-08 DIAGNOSIS — Z94.7 STATUS POST CORNEAL TRANSPLANT: Primary | ICD-10-CM

## 2017-11-08 PROCEDURE — 99024 POSTOP FOLLOW-UP VISIT: CPT | Mod: ,,, | Performed by: OPHTHALMOLOGY

## 2017-11-08 PROCEDURE — 99212 OFFICE O/P EST SF 10 MIN: CPT | Mod: PBBFAC | Performed by: OPHTHALMOLOGY

## 2017-11-08 PROCEDURE — 99999 PR PBB SHADOW E&M-EST. PATIENT-LVL II: CPT | Mod: PBBFAC,,, | Performed by: OPHTHALMOLOGY

## 2017-11-08 NOTE — TELEPHONE ENCOUNTER
Spoke with patient and explained mammogram findings.Patient expressed understanding of results. Patient is scheduled for a abnormal mammogram follow up appointment at [facility] on 11/14/17

## 2017-11-08 NOTE — PROGRESS NOTES
HPI     DLS: 10/27/17    Pt here for 10 day post PKP OD- 10/26/17  Pt states no pain but it does burn after she puts her drops in.     Meds: Cosopt bid ou             PF qid od             Idania 128 bid os             Ocuflox qid od             EES antonio prn od             Genteal gel prn os     Last edited by Alycia Conley on 11/8/2017  9:58 AM. (History)            Assessment /Plan     For exam results, see Encounter Report.    Status post corneal transplant      PKP stable and clearing. Signs and symptoms of graft rejection reviewed.  2 week

## 2017-11-14 ENCOUNTER — HOSPITAL ENCOUNTER (OUTPATIENT)
Dept: RADIOLOGY | Facility: HOSPITAL | Age: 72
Discharge: HOME | End: 2017-11-14
Attending: FAMILY MEDICINE
Payer: MEDICARE

## 2017-11-14 DIAGNOSIS — R92.8 ABNORMAL MAMMOGRAM: ICD-10-CM

## 2017-11-14 PROCEDURE — 77061 BREAST TOMOSYNTHESIS UNI: CPT | Mod: TC

## 2017-11-14 PROCEDURE — 76642 ULTRASOUND BREAST LIMITED: CPT | Mod: 26,RT,, | Performed by: RADIOLOGY

## 2017-11-14 PROCEDURE — 76642 ULTRASOUND BREAST LIMITED: CPT | Mod: TC,RT

## 2017-11-14 PROCEDURE — 77065 DX MAMMO INCL CAD UNI: CPT | Mod: 26,,, | Performed by: RADIOLOGY

## 2017-11-14 PROCEDURE — 77061 BREAST TOMOSYNTHESIS UNI: CPT | Mod: 26,,, | Performed by: RADIOLOGY

## 2017-12-01 ENCOUNTER — OFFICE VISIT (OUTPATIENT)
Dept: OPHTHALMOLOGY | Facility: CLINIC | Age: 72
End: 2017-12-01
Payer: MEDICARE

## 2017-12-01 DIAGNOSIS — Z94.7 STATUS POST CORNEAL TRANSPLANT: Primary | ICD-10-CM

## 2017-12-01 DIAGNOSIS — H40.2233 CHRONIC ANGLE-CLOSURE GLAUCOMA OF BOTH EYES, SEVERE STAGE: ICD-10-CM

## 2017-12-01 PROCEDURE — 99024 POSTOP FOLLOW-UP VISIT: CPT | Mod: ,,, | Performed by: OPHTHALMOLOGY

## 2017-12-01 PROCEDURE — 99999 PR PBB SHADOW E&M-EST. PATIENT-LVL II: CPT | Mod: PBBFAC,,, | Performed by: OPHTHALMOLOGY

## 2017-12-01 PROCEDURE — 99212 OFFICE O/P EST SF 10 MIN: CPT | Mod: PBBFAC | Performed by: OPHTHALMOLOGY

## 2017-12-01 RX ORDER — LATANOPROST 50 UG/ML
1 SOLUTION/ DROPS OPHTHALMIC DAILY
Qty: 2.5 ML | Refills: 4 | Status: SHIPPED | OUTPATIENT
Start: 2017-12-01 | End: 2019-05-17 | Stop reason: SDUPTHER

## 2017-12-01 NOTE — PROGRESS NOTES
HPI     Post-op Evaluation    Additional comments: Right Eye.            Comments   S/p PKP OD- 10/26/17    Pt states OD has been burning. More so when instilling drops.  Questions   if the sutures could be bothering her.  Also concerned that tube could be   malfunctioning.     Pred qid OD  Cosopt bid OU  Refresh tid-qid OD  Genteal Gel prn OS  E-mycin ungt qhs OD       Last edited by Adelaide Romo on 12/1/2017 10:26 AM. (History)            Assessment /Plan     For exam results, see Encounter Report.    Status post corneal transplant    Chronic angle-closure glaucoma of both eyes, severe stage      Few filaments on PKP, but otherwise eye looks great.  Increase lubrication.    IOP elevated to 26 today, so add Xalatan qhs

## 2017-12-07 NOTE — PROGRESS NOTES
Assessment /Plan     For exam results, see Encounter Report.    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  Also discussed options utilizing medications.      Glaucoma Incisional Surgery  Patient with exposed ST Tube OD  SP OD ST BV / Amniotic membrane Removal 2/27/2017      Right eye  EES BID  PF1%  BID    Left eye  Idania 128 BID + / -  Genteal Gel  Considering DSEK and Lens implant --> Lucia Calvo    Both eyes  Cosopt BID      Plan  RTC 1 month Nussrf IOP and OCT RNFL OU  RTC sooner prn with understanding

## 2017-12-08 ENCOUNTER — OFFICE VISIT (OUTPATIENT)
Dept: OPHTHALMOLOGY | Facility: CLINIC | Age: 72
End: 2017-12-08
Payer: MEDICARE

## 2017-12-08 DIAGNOSIS — H40.2233 CHRONIC ANGLE-CLOSURE GLAUCOMA OF BOTH EYES, SEVERE STAGE: Primary | ICD-10-CM

## 2017-12-08 DIAGNOSIS — Z96.1 STATUS POST CATARACT EXTRACTION AND INSERTION OF INTRAOCULAR LENS OF RIGHT EYE: ICD-10-CM

## 2017-12-08 DIAGNOSIS — H54.10 BLINDNESS AND LOW VISION: ICD-10-CM

## 2017-12-08 DIAGNOSIS — Z98.41 STATUS POST CATARACT EXTRACTION AND INSERTION OF INTRAOCULAR LENS OF RIGHT EYE: ICD-10-CM

## 2017-12-08 DIAGNOSIS — H27.02 APHAKIA OF LEFT EYE: ICD-10-CM

## 2017-12-08 DIAGNOSIS — Z94.7 CORNEA REPLACED BY TRANSPLANT: ICD-10-CM

## 2017-12-08 PROCEDURE — 99212 OFFICE O/P EST SF 10 MIN: CPT | Mod: PBBFAC | Performed by: OPHTHALMOLOGY

## 2017-12-08 PROCEDURE — 99999 PR PBB SHADOW E&M-EST. PATIENT-LVL II: CPT | Mod: PBBFAC,,, | Performed by: OPHTHALMOLOGY

## 2017-12-08 PROCEDURE — 92012 INTRM OPH EXAM EST PATIENT: CPT | Mod: 24,S$PBB,, | Performed by: OPHTHALMOLOGY

## 2018-01-10 ENCOUNTER — OFFICE VISIT (OUTPATIENT)
Dept: OPHTHALMOLOGY | Facility: CLINIC | Age: 73
End: 2018-01-10
Payer: MEDICARE

## 2018-01-10 DIAGNOSIS — H18.20 CORNEAL EDEMA: Primary | ICD-10-CM

## 2018-01-10 DIAGNOSIS — Z94.7 STATUS POST CORNEAL TRANSPLANT: ICD-10-CM

## 2018-01-10 DIAGNOSIS — Z94.7 CORNEA REPLACED BY TRANSPLANT: ICD-10-CM

## 2018-01-10 DIAGNOSIS — H40.2233 CHRONIC ANGLE-CLOSURE GLAUCOMA OF BOTH EYES, SEVERE STAGE: ICD-10-CM

## 2018-01-10 DIAGNOSIS — H27.02 APHAKIA, LEFT: ICD-10-CM

## 2018-01-10 PROCEDURE — 99024 POSTOP FOLLOW-UP VISIT: CPT | Mod: ,,, | Performed by: OPHTHALMOLOGY

## 2018-01-10 PROCEDURE — 99212 OFFICE O/P EST SF 10 MIN: CPT | Mod: PBBFAC | Performed by: OPHTHALMOLOGY

## 2018-01-10 PROCEDURE — 99999 PR PBB SHADOW E&M-EST. PATIENT-LVL II: CPT | Mod: PBBFAC,,, | Performed by: OPHTHALMOLOGY

## 2018-01-10 RX ORDER — PREDNISOLONE ACETATE 10 MG/ML
1 SUSPENSION/ DROPS OPHTHALMIC 4 TIMES DAILY
Qty: 3 BOTTLE | Refills: 4 | Status: SHIPPED | OUTPATIENT
Start: 2018-01-10 | End: 2018-10-31 | Stop reason: ALTCHOICE

## 2018-01-10 NOTE — PROGRESS NOTES
HPI     Post-op Evaluation    Additional comments: Pt states that OD feels dry and sometimes blurry           Comments   S/p PKP OD 10/26/17    MEDS:  PF QID OD  Cosopt BID OD  Refresh QID OD  EES antonio QHS OD    Cosopt BID OS  Idania 128 BID OS  Genteal gel QHS OS       Last edited by Vandana Stauffer MA on 1/10/2018 10:11 AM. (History)            Assessment /Plan     For exam results, see Encounter Report.    Corneal edema    Aphakia, left    Status post corneal transplant    Chronic angle-closure glaucoma of both eyes, severe stage      PKP OD 10/2017  Healing well, needs new MRx OD    OS with edema and aphakia, may consider surgery later.  Able to read small print with 5x magnifier OS.

## 2018-02-12 NOTE — PROGRESS NOTES
Assessment /Plan     For exam results, see Encounter Report.    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  Also discussed options utilizing medications.      Glaucoma Incisional Surgery  Patient with exposed ST Tube OD  SP OD ST BV / Amniotic membrane Removal 2/27/2017      Right eye  EES BID  PF1%  BID  Xal q day    Left eye  Idania 128 BID + / -  Genteal Gel  Considering DSEK and Lens implant --> Lucia Calvo    Both eyes  Cosopt BID      Plan  LV with New PKP OD  RTC 6 weeks [place] IOP & CCT  RTC sooner prn with understanding

## 2018-02-21 ENCOUNTER — OFFICE VISIT (OUTPATIENT)
Dept: OPHTHALMOLOGY | Facility: CLINIC | Age: 73
End: 2018-02-21
Payer: MEDICARE

## 2018-02-21 DIAGNOSIS — H40.2233 CHRONIC ANGLE-CLOSURE GLAUCOMA OF BOTH EYES, SEVERE STAGE: Primary | ICD-10-CM

## 2018-02-21 DIAGNOSIS — Z98.41 STATUS POST CATARACT EXTRACTION AND INSERTION OF INTRAOCULAR LENS OF RIGHT EYE: ICD-10-CM

## 2018-02-21 DIAGNOSIS — H04.123 BILATERAL DRY EYES: ICD-10-CM

## 2018-02-21 DIAGNOSIS — Z94.7 CORNEA REPLACED BY TRANSPLANT: ICD-10-CM

## 2018-02-21 DIAGNOSIS — Z96.1 STATUS POST CATARACT EXTRACTION AND INSERTION OF INTRAOCULAR LENS OF RIGHT EYE: ICD-10-CM

## 2018-02-21 DIAGNOSIS — H54.10 BLINDNESS AND LOW VISION: ICD-10-CM

## 2018-02-21 DIAGNOSIS — H27.02 APHAKIA OF LEFT EYE: ICD-10-CM

## 2018-02-21 PROCEDURE — 99212 OFFICE O/P EST SF 10 MIN: CPT | Mod: PBBFAC,25 | Performed by: OPHTHALMOLOGY

## 2018-02-21 PROCEDURE — 99999 PR PBB SHADOW E&M-EST. PATIENT-LVL II: CPT | Mod: PBBFAC,,, | Performed by: OPHTHALMOLOGY

## 2018-02-21 PROCEDURE — 92133 CPTRZD OPH DX IMG PST SGM ON: CPT | Mod: PBBFAC | Performed by: OPHTHALMOLOGY

## 2018-02-21 PROCEDURE — 92012 INTRM OPH EXAM EST PATIENT: CPT | Mod: S$PBB,,, | Performed by: OPHTHALMOLOGY

## 2018-03-26 NOTE — PROGRESS NOTES
Assessment /Plan     For exam results, see Encounter Report.    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  Also discussed options utilizing medications.      Glaucoma Incisional Surgery  Patient with exposed ST Tube OD  SP OD ST BV / Amniotic membrane Removal 2/27/2017      Right eye  EES BID  PF1%  BID  Xal q day    Left eye  Idania 128 BID + / -  Genteal Gel  Considering DSEK and Lens implant --> Lucia Calvo    Both eyes  Cosopt BID      Plan  Keep fu with Dr. Calvo --> consider DSEK / IOL placement OS  RTC 6 weeks [place] [facility] ==> after sx with Dr. Calvo  RTC sooner prn with understanding

## 2018-04-03 ENCOUNTER — OFFICE VISIT (OUTPATIENT)
Dept: OPHTHALMOLOGY | Facility: CLINIC | Age: 73
End: 2018-04-03
Payer: MEDICARE

## 2018-04-03 DIAGNOSIS — Z98.41 STATUS POST CATARACT EXTRACTION AND INSERTION OF INTRAOCULAR LENS OF RIGHT EYE: ICD-10-CM

## 2018-04-03 DIAGNOSIS — H54.10 BLINDNESS AND LOW VISION: ICD-10-CM

## 2018-04-03 DIAGNOSIS — H04.123 BILATERAL DRY EYES: ICD-10-CM

## 2018-04-03 DIAGNOSIS — H27.02 APHAKIA OF LEFT EYE: ICD-10-CM

## 2018-04-03 DIAGNOSIS — Z94.7 CORNEA REPLACED BY TRANSPLANT: ICD-10-CM

## 2018-04-03 DIAGNOSIS — H40.2233 CHRONIC ANGLE-CLOSURE GLAUCOMA OF BOTH EYES, SEVERE STAGE: Primary | ICD-10-CM

## 2018-04-03 DIAGNOSIS — Z96.1 STATUS POST CATARACT EXTRACTION AND INSERTION OF INTRAOCULAR LENS OF RIGHT EYE: ICD-10-CM

## 2018-04-03 PROCEDURE — 99212 OFFICE O/P EST SF 10 MIN: CPT | Mod: PBBFAC | Performed by: OPHTHALMOLOGY

## 2018-04-03 PROCEDURE — 92012 INTRM OPH EXAM EST PATIENT: CPT | Mod: S$PBB,,, | Performed by: OPHTHALMOLOGY

## 2018-04-03 PROCEDURE — 99999 PR PBB SHADOW E&M-EST. PATIENT-LVL II: CPT | Mod: PBBFAC,,, | Performed by: OPHTHALMOLOGY

## 2018-04-11 ENCOUNTER — OFFICE VISIT (OUTPATIENT)
Dept: OPHTHALMOLOGY | Facility: CLINIC | Age: 73
End: 2018-04-11
Payer: MEDICARE

## 2018-04-11 DIAGNOSIS — H18.20 CORNEAL EDEMA: ICD-10-CM

## 2018-04-11 DIAGNOSIS — H27.02 APHAKIA OF LEFT EYE: ICD-10-CM

## 2018-04-11 DIAGNOSIS — H40.2233 CHRONIC ANGLE-CLOSURE GLAUCOMA OF BOTH EYES, SEVERE STAGE: Primary | ICD-10-CM

## 2018-04-11 DIAGNOSIS — Z94.7 CORNEA REPLACED BY TRANSPLANT: ICD-10-CM

## 2018-04-11 PROCEDURE — 99999 PR PBB SHADOW E&M-EST. PATIENT-LVL II: CPT | Mod: PBBFAC,,, | Performed by: OPHTHALMOLOGY

## 2018-04-11 PROCEDURE — 99212 OFFICE O/P EST SF 10 MIN: CPT | Mod: PBBFAC | Performed by: OPHTHALMOLOGY

## 2018-04-11 PROCEDURE — 92014 COMPRE OPH EXAM EST PT 1/>: CPT | Mod: S$PBB,,, | Performed by: OPHTHALMOLOGY

## 2018-04-11 NOTE — PROGRESS NOTES
HPI     Eye Problem    Additional comments: Recheck s/p PKP            Comments   Pt presents for 1 month check.  Last visit with Dr Wilkerson 04/03/2018.   Pt states OU are feeling good.  Some times feel dry but relieved with   drops.   Has not yet had new Mrx done.  Wanted to wait until after this visit.     COAG-severe   PC IOL OD   Aphakia OS  Glaucoma Shunt OU   PKP OD 10/26/2017    MEDS:   PF BID OD   Cosopt BID OU  Refresh QID OU   EES FERNANDO QOHS OD   Idania 128 BID OS   Genteal gel Q HS OS       Last edited by Adelaide Romo on 4/11/2018 11:05 AM. (History)            Assessment /Plan     For exam results, see Encounter Report.    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 Signs and symptoms of graft rejection reviewed.  OS K 3+ edema, aphakic, but ok for now.    Continue current treatment/medications

## 2018-04-18 ENCOUNTER — TELEPHONE (OUTPATIENT)
Dept: OPHTHALMOLOGY | Facility: CLINIC | Age: 73
End: 2018-04-18

## 2018-05-04 ENCOUNTER — OFFICE VISIT (OUTPATIENT)
Dept: OPTOMETRY | Facility: CLINIC | Age: 73
End: 2018-05-04
Payer: MEDICARE

## 2018-05-04 DIAGNOSIS — T86.8419 CORNEAL TRANSPLANT FAILURE: ICD-10-CM

## 2018-05-04 DIAGNOSIS — H54.3 LOW VISION, BOTH EYES: Primary | ICD-10-CM

## 2018-05-04 PROCEDURE — 92012 INTRM OPH EXAM EST PATIENT: CPT | Mod: S$PBB,,, | Performed by: OPTOMETRIST

## 2018-05-04 PROCEDURE — 99212 OFFICE O/P EST SF 10 MIN: CPT | Mod: PBBFAC,PO | Performed by: OPTOMETRIST

## 2018-05-04 PROCEDURE — 99999 PR PBB SHADOW E&M-EST. PATIENT-LVL II: CPT | Mod: PBBFAC,,, | Performed by: OPTOMETRIST

## 2018-05-04 PROCEDURE — 99499 UNLISTED E&M SERVICE: CPT | Mod: S$PBB,,, | Performed by: OPTOMETRIST

## 2018-05-04 PROCEDURE — 92015 DETERMINE REFRACTIVE STATE: CPT | Mod: ,,, | Performed by: OPTOMETRIST

## 2018-05-04 PROCEDURE — 99212 OFFICE O/P EST SF 10 MIN: CPT | Mod: PBBFAC,27,PO | Performed by: OPTOMETRIST

## 2018-05-04 NOTE — PROGRESS NOTES
"BRODY MOSS 04/2018 with Dr. Chamberlain, last low vision exam 06/2014. Using   Latanoprost OD QHS, Idaina 128 prn OS, PF BID OD and Cosopt BID OU.  Has had   corneal transplant since last low vision exam, Dr. Wilkerson recommended   another visit to check glasses and magnifiers.  Patient is using using   lighted hand held magnifier  "Smart Mag" 5 x.  Has large CC TV, zoom text   program on computer, has audio books, has been [facility] [employer] and taken   all the required classes.Sometimes patient sees better near when holding   glasses about 2 inches away from face.   Has magnifier, may want larger one.     Last edited by Sea Owusu, OD on 5/4/2018  1:36 PM. (History)            Assessment /Plan     For exam results, see Encounter Report.    Low vision, both eyes    Corneal transplant failure      1. Gave rx for new specs and hand held magnifier. Pt read J1 print with magnifier.   2. Unable to get improved vision with trial frame refraction OD, no auto refract reading and no keratometer. Will recheck in 6 mos and get topography.                "

## 2018-05-04 NOTE — PROGRESS NOTES
Assessment /Plan     For exam results, see Encounter Report.    Low vision, both eyes      1. See low vision exam same date.

## 2018-05-08 DIAGNOSIS — H40.20X3 ANGLE-CLOSURE GLAUCOMA, SEVERE STAGE: ICD-10-CM

## 2018-05-08 RX ORDER — DORZOLAMIDE HYDROCHLORIDE AND TIMOLOL MALEATE 20; 5 MG/ML; MG/ML
1 SOLUTION/ DROPS OPHTHALMIC 2 TIMES DAILY
Qty: 10 ML | Refills: 4 | Status: SHIPPED | OUTPATIENT
Start: 2018-05-08 | End: 2019-01-03 | Stop reason: SDUPTHER

## 2018-05-21 ENCOUNTER — TELEPHONE (OUTPATIENT)
Dept: OPHTHALMOLOGY | Facility: CLINIC | Age: 73
End: 2018-05-21

## 2018-05-21 NOTE — TELEPHONE ENCOUNTER
----- Message from Ashlyn Ash sent at 5/21/2018 11:11 AM CDT -----  Contact: Tobi(Pharmacist)  Pharmacy Calling:  Drugs    Name of Caller:Tobi(Pharmacist)  Pharmacy Name:[facility]  Prescription Name:dorzolamide-timolol 2-0.5% (COSOPT) 22.3-6.8 mg/mL ophthalmic solution  What do they need to clarify?: Rx is on back and is looking for a replacement drug.  Communication Preference: Call Back # and timeframe):[phone]  Additional Information:

## 2018-06-14 ENCOUNTER — OFFICE VISIT (OUTPATIENT)
Dept: URGENT CARE | Facility: CLINIC | Age: 73
End: 2018-06-14
Payer: MEDICARE

## 2018-06-14 VITALS
TEMPERATURE: 98 F | WEIGHT: 201 LBS | DIASTOLIC BLOOD PRESSURE: 84 MMHG | SYSTOLIC BLOOD PRESSURE: 162 MMHG | HEIGHT: 60 IN | RESPIRATION RATE: 18 BRPM | OXYGEN SATURATION: 98 % | BODY MASS INDEX: 39.46 KG/M2 | HEART RATE: 79 BPM
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
## 2018-06-14 PROCEDURE — 99214 OFFICE O/P EST MOD 30 MIN: CPT | Mod: S$GLB,,, | Performed by: EMERGENCY MEDICINE

## 2018-06-14 RX ORDER — TRIAMCINOLONE ACETONIDE 5 MG/G
CREAM TOPICAL 3 TIMES DAILY
Qty: 30 G | Refills: 1 | Status: SHIPPED | OUTPATIENT
Start: 2018-06-14 | End: 2021-04-13

## 2018-06-14 NOTE — PATIENT INSTRUCTIONS
Return to Clinic for worsening symptoms or signs of infection    Benadryl OTC as directed for 7 days    Claritin, Zyrtec, or Allegra OTC as directed for 7 days      Insect Bite  Insects most often bite to protect themselves or their nests. Certain bugs, like fleas and mosquitoes, bite to feed. In some cases, the actual bite causes no pain. An itchy red welt or swelling may develop at the site of the bite. Most insect bites do not cause illness. And the itching and swelling most often go away without treatment. However, an infection can develop if the bite is scratched and the skin broken. Rarely, a person may have an allergic reaction to an insect bite.  If a stinger is visible at the bite spot, remove it as quickly as possible, as this can decrease the amount of venom that gets into your body. Scrape it out with a dull edge, such as the edge of a credit card. Try not to squeeze it. Do not try to dig it out, as you may damage the skin and also increase the chance of infection.     To help reduce swelling and itching, apply a cold pack or ice in a zip-top plastic bag wrapped in a thin towel.   Home care  · Your healthcare provider may prescribe over-the-counter medicines to help relieve itching and swelling. Use each medicine according to the directions on the package. If the bite becomes infected, you will need an antibiotic. This may be in pill form taken by mouth or as an ointment or cream put directly on the skin. Be sure to use them exactly as prescribed.  · Bite symptoms usually go away on their own within a week or two.  · To help prevent infection, avoid scratching or picking at the bite.  · To help relieve itching and swelling, apply ice in a zip-top plastic bag wrapped in a thin towel to the bites. Do this for up to 10 minutes at a time. Avoid hot showers or baths as these tend to make itching worse.  · An over-the-counter anti-itch medicine such as calamine lotion or an antihistamine cream may be  helpful.  · If you suspect you have insects in your home, talk to a licensed pest-control professional. He or she can inspect your home and tell you how to get rid of bugs safely.  Follow-up care  Follow up with your healthcare provider, or as advised.  Call 911  Call 911 if any of these occur:  · Trouble breathing or swallowing  · Wheezing  · Feeling like your throat is closing up  · Fainting, loss of consciousness  · Swelling around the face or mouth  When to seek medical advice  Call your healthcare provider right away if any of these occur:  · Fever of 100.4°F (38°C) or higher, or as directed by your healthcare provider  · Signs of infection, such as increased swelling and pain, warmth, red streaks, or drainage from the skin  · Signs of allergic reaction, such as hives, a spreading rash, or throat itching  Date Last Reviewed: 10/1/2016  © 0963-5260 [employer]. [address] [place] [address], [place] [address]. All rights reserved. This information is not intended as a substitute for professional medical care. Always follow your healthcare professional's instructions.

## 2018-06-14 NOTE — PROGRESS NOTES
Subjective:       Patient ID: Ligia Curran is a 72 y.o. female.    Vitals:    06/14/18 1005   BP: (!) 162/84   Pulse: 79   Resp: 18   Temp: 97.6 °F (36.4 °C)   TempSrc: Oral   SpO2: 98%   Weight: 91.2 kg (201 lb)   Height: 5' (1.524 m)       Chief Complaint: Insect Bite    Pt c/o an insect bite that has been there for a few weeks that has been itching. Pt states she thought it was a mole at first and noticed on Sunday that it has gotten bigger and might be inflamed.  Pt states it is near her right shoulder.  Pt states it has been itching a lot more.  Pt states she has been using anti itch cream in a yellow tube.      Insect Bite   This is a recurrent problem. The current episode started 1 to 4 weeks ago. The problem occurs constantly. The problem has been gradually worsening. Pertinent negatives include no chills, fever, rash or sore throat. Nothing aggravates the symptoms. Treatments tried: anti itch cream. The treatment provided mild relief.     Review of Systems   Constitution: Negative for chills and fever.   HENT: Negative for sore throat.    Respiratory: Negative for shortness of breath.    Skin: Positive for color change, itching, poor wound healing and suspicious lesions. Negative for rash.   Musculoskeletal: Negative for joint pain.       Objective:      Physical Exam   Constitutional: She is oriented to person, place, and time. She appears well-developed and well-nourished.   HENT:   Head: Normocephalic and atraumatic. Head is without abrasion, without contusion and without laceration.   Right Ear: External ear normal.   Left Ear: External ear normal.   Nose: Nose normal.   Mouth/Throat: Oropharynx is clear and moist.   Eyes: Conjunctivae, EOM and lids are normal. Pupils are equal, round, and reactive to light.   Neck: Trachea normal, full passive range of motion without pain and phonation normal. Neck supple.   Cardiovascular: Normal rate, regular rhythm and normal heart sounds.    Pulmonary/Chest:  Effort normal and breath sounds normal. No stridor. No respiratory distress.   Musculoskeletal: Normal range of motion.   Neurological: She is alert and oriented to person, place, and time.   Skin: Skin is warm, dry and intact. Capillary refill takes less than 2 seconds. No abrasion, no bruising, no burn, no ecchymosis, no laceration, no lesion and no rash noted. No erythema.        Insect bite 1cm round to right upper chest wall  Mild erythema  No induration  nontender  No fluctuance   Psychiatric: She has a normal mood and affect. Her speech is normal and behavior is normal. Judgment and thought content normal. Cognition and memory are normal.   Nursing note and vitals reviewed.      Assessment:       1. Insect bite, initial encounter        Plan:       Ligia was seen today for insect bite.    Diagnoses and all orders for this visit:    Insect bite, initial encounter    Other orders  -     triamcinolone acetonide 0.5% (KENALOG) 0.5 % Crea; Apply topically 3 (three) times daily.          Patient Instructions       Return to Clinic for worsening symptoms or signs of infection    Benadryl OTC as directed for 7 days    Claritin, Zyrtec, or Allegra OTC as directed for 7 days      Insect Bite  Insects most often bite to protect themselves or their nests. Certain bugs, like fleas and mosquitoes, bite to feed. In some cases, the actual bite causes no pain. An itchy red welt or swelling may develop at the site of the bite. Most insect bites do not cause illness. And the itching and swelling most often go away without treatment. However, an infection can develop if the bite is scratched and the skin broken. Rarely, a person may have an allergic reaction to an insect bite.  If a stinger is visible at the bite spot, remove it as quickly as possible, as this can decrease the amount of venom that gets into your body. Scrape it out with a dull edge, such as the edge of a credit card. Try not to squeeze it. Do not try to dig it  out, as you may damage the skin and also increase the chance of infection.     To help reduce swelling and itching, apply a cold pack or ice in a zip-top plastic bag wrapped in a thin towel.   Home care  · Your healthcare provider may prescribe over-the-counter medicines to help relieve itching and swelling. Use each medicine according to the directions on the package. If the bite becomes infected, you will need an antibiotic. This may be in pill form taken by mouth or as an ointment or cream put directly on the skin. Be sure to use them exactly as prescribed.  · Bite symptoms usually go away on their own within a week or two.  · To help prevent infection, avoid scratching or picking at the bite.  · To help relieve itching and swelling, apply ice in a zip-top plastic bag wrapped in a thin towel to the bites. Do this for up to 10 minutes at a time. Avoid hot showers or baths as these tend to make itching worse.  · An over-the-counter anti-itch medicine such as calamine lotion or an antihistamine cream may be helpful.  · If you suspect you have insects in your home, talk to a licensed pest-control professional. He or she can inspect your home and tell you how to get rid of bugs safely.  Follow-up care  Follow up with your healthcare provider, or as advised.  Call 911  Call 911 if any of these occur:  · Trouble breathing or swallowing  · Wheezing  · Feeling like your throat is closing up  · Fainting, loss of consciousness  · Swelling around the face or mouth  When to seek medical advice  Call your healthcare provider right away if any of these occur:  · Fever of 100.4°F (38°C) or higher, or as directed by your healthcare provider  · Signs of infection, such as increased swelling and pain, warmth, red streaks, or drainage from the skin  · Signs of allergic reaction, such as hives, a spreading rash, or throat itching  Date Last Reviewed: 10/1/2016  © 3964-7006 [employer]. [address] [place] [address]  [place] [address]. All rights reserved. This information is not intended as a substitute for professional medical care. Always follow your healthcare professional's instructions.

## 2018-07-16 ENCOUNTER — TELEPHONE (OUTPATIENT)
Dept: OPHTHALMOLOGY | Facility: CLINIC | Age: 73
End: 2018-07-16

## 2018-07-16 NOTE — TELEPHONE ENCOUNTER
----- Message from Ashlyn Ash sent at 7/16/2018  4:03 PM CDT -----  Contact: Ligia  Needs Advice    Reason for call:   Pt called to discuss her current condition and possibly getting scheduled this week.   Communication Preference:[phone]  Additional Information:

## 2018-07-18 ENCOUNTER — OFFICE VISIT (OUTPATIENT)
Dept: OPHTHALMOLOGY | Facility: CLINIC | Age: 73
End: 2018-07-18
Payer: MEDICARE

## 2018-07-18 DIAGNOSIS — Z98.41 STATUS POST CATARACT EXTRACTION AND INSERTION OF INTRAOCULAR LENS OF RIGHT EYE: ICD-10-CM

## 2018-07-18 DIAGNOSIS — H04.123 BILATERAL DRY EYES: ICD-10-CM

## 2018-07-18 DIAGNOSIS — T86.8419 CORNEAL TRANSPLANT FAILURE: ICD-10-CM

## 2018-07-18 DIAGNOSIS — Z96.1 STATUS POST CATARACT EXTRACTION AND INSERTION OF INTRAOCULAR LENS OF RIGHT EYE: ICD-10-CM

## 2018-07-18 DIAGNOSIS — H27.02 APHAKIA OF LEFT EYE: ICD-10-CM

## 2018-07-18 DIAGNOSIS — Z94.7 CORNEA REPLACED BY TRANSPLANT: ICD-10-CM

## 2018-07-18 DIAGNOSIS — H40.2233 CHRONIC ANGLE-CLOSURE GLAUCOMA OF BOTH EYES, SEVERE STAGE: Primary | ICD-10-CM

## 2018-07-18 PROCEDURE — 99211 OFF/OP EST MAY X REQ PHY/QHP: CPT | Mod: PBBFAC | Performed by: OPHTHALMOLOGY

## 2018-07-18 PROCEDURE — 99999 PR PBB SHADOW E&M-EST. PATIENT-LVL I: CPT | Mod: PBBFAC,,, | Performed by: OPHTHALMOLOGY

## 2018-07-18 PROCEDURE — 92012 INTRM OPH EXAM EST PATIENT: CPT | Mod: S$PBB,,, | Performed by: OPHTHALMOLOGY

## 2018-07-18 NOTE — PROGRESS NOTES
Assessment /Plan     For exam results, see Encounter Report.    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    Aphakia of left eye    Bilateral dry eyes    Glaucoma shunt device of both eyes    Corneal transplant failure    Status post cataract extraction and insertion of intraocular lens of right eye        Right periorbital pain not Ocular origin  Has had similar associated with sinuses in past      [facility]    Complex ocular hx  multiple sx --> trabs, tubes and tube removes etc   Too numerous to count    CCT  537 // 611    IOP acceptable    Right eye  PKP - Quiet --> 10/26/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  Also discussed options utilizing medications.      Glaucoma Incisional Surgery  Patient with exposed ST Tube OD  SP OD ST BV / Amniotic membrane Removal 2/27/2017      Right eye  EES BID  PF1%  BID  Xal q day    Left eye  Idania 128 BID + / -  Genteal Gel  Considering DSEK and Lens implant --> Lucia Calvo    Both eyes  Cosopt BID      Plan  Keep fu with Dr. Calvo --> consider DSEK / IOL placement OS  RTC 4 months [place] IOP ==> after sx with Dr. Calvo  RTC sooner prn with understanding

## 2018-07-20 ENCOUNTER — TELEPHONE (OUTPATIENT)
Dept: OPHTHALMOLOGY | Facility: CLINIC | Age: 73
End: 2018-07-20

## 2018-07-23 ENCOUNTER — TELEPHONE (OUTPATIENT)
Dept: OPHTHALMOLOGY | Facility: CLINIC | Age: 73
End: 2018-07-23

## 2018-07-23 NOTE — TELEPHONE ENCOUNTER
Faxed ok, again, to [facility], [phone], to ok 3 refills w/90 day supply for latanoprost

## 2018-08-06 ENCOUNTER — OFFICE VISIT (OUTPATIENT)
Dept: DERMATOLOGY | Facility: CLINIC | Age: 73
End: 2018-08-06
Payer: MEDICARE

## 2018-08-06 DIAGNOSIS — L91.8 SKIN TAG: ICD-10-CM

## 2018-08-06 DIAGNOSIS — D48.9 NEOPLASM OF UNCERTAIN BEHAVIOR: Primary | ICD-10-CM

## 2018-08-06 PROCEDURE — 99202 OFFICE O/P NEW SF 15 MIN: CPT | Mod: S$PBB,,, | Performed by: DERMATOLOGY

## 2018-08-06 PROCEDURE — 99212 OFFICE O/P EST SF 10 MIN: CPT | Mod: PBBFAC | Performed by: DERMATOLOGY

## 2018-08-06 PROCEDURE — 99999 PR PBB SHADOW E&M-EST. PATIENT-LVL II: CPT | Mod: PBBFAC,,, | Performed by: DERMATOLOGY

## 2018-08-06 NOTE — PROGRESS NOTES
Subjective:       Patient ID:  Ligia Curran is a 72 y.o. female who presents for   Chief Complaint   Patient presents with    Insect Bite     right upper chest     Insect Bite  - Initial  Affected locations: chest  Duration: 2 months  Signs / symptoms: non-healing and itching  Aggravated by: nothing  Relieving factors/Treatments tried: OTC hydrocortisone    Pt noted an itchy mole or bug bite in June 2018; went to urgent care for persistent itching and was given TAC cream - it helps a little with the itching but the spot has not healed.  +firm  +history of keloids in past after gallbladder surgery.   No known bite that she can recall in the area or otherwise inciting trauma.    Review of Systems   Eyes: Positive for visual change.   Hematologic/Lymphatic: Bruises/bleeds easily.        Objective:    Physical Exam   Constitutional: She appears well-developed and well-nourished. No distress.   Neurological: She is alert and oriented to person, place, and time. She is not disoriented.   Psychiatric: She has a normal mood and affect.   Skin:   Areas Examined (abnormalities noted in diagram):   Head / Face Inspection Performed  Neck Inspection Performed  Chest / Axilla Inspection Performed              Diagram Legend     Erythematous scaling macule/papule c/w actinic keratosis       Vascular papule c/w angioma      Pigmented verrucoid papule/plaque c/w seborrheic keratosis      Yellow umbilicated papule c/w sebaceous hyperplasia      Irregularly shaped tan macule c/w lentigo     1-2 mm smooth white papules consistent with Milia      Movable subcutaneous cyst with punctum c/w epidermal inclusion cyst      Subcutaneous movable cyst c/w pilar cyst      Firm pink to brown papule c/w dermatofibroma      Pedunculated fleshy papule(s) c/w skin tag(s)      Evenly pigmented macule c/w junctional nevus     Mildly variegated pigmented, slightly irregular-bordered macule c/w mildly atypical nevus      Flesh colored to evenly  pigmented papule c/w intradermal nevus       Pink pearly papule/plaque c/w basal cell carcinoma      Erythematous hyperkeratotic cursted plaque c/w SCC      Surgical scar with no sign of skin cancer recurrence      Open and closed comedones      Inflammatory papules and pustules      Verrucoid papule consistent consistent with wart     Erythematous eczematous patches and plaques     Dystrophic onycholytic nail with subungual debris c/w onychomycosis     Umbilicated papule    Erythematous-base heme-crusted tan verrucoid plaque consistent with inflamed seborrheic keratosis     Erythematous Silvery Scaling Plaque c/w Psoriasis     See annotation      Assessment / Plan:        Neoplasm of uncertain behavior - right chest  May be keloid or dermatofibroma; advise excision to rule out malignancy ;   rv for excision    Skin tag  Reassurance given to patient. No treatment is necessary.              Follow-up in about 2 weeks (around 8/20/2018) for for excision.

## 2018-08-06 NOTE — LETTER
August 6, 2018      Destinee Louie MD  [address]  [address]  [facility] [address]           [facility] - Dermatology  [address]  [place] [address]  Phone: [phone]  Fax: [phone]          Patient: Ligia Curran   MR Number: [id number]   YOB: 1945   Date of Visit: 8/6/2018       Dear Dr. Destinee Louie:    Thank you for referring Ligia Curran to me for evaluation. Attached you will find relevant portions of my assessment and plan of care.    If you have questions, please do not hesitate to call me. I look forward to following Ligia Curran along with you.    Sincerely,    Mireya Lees MD    Enclosure  CC:  No Recipients    If you would like to receive this communication electronically, please contact [email or web address] or [phone] to request more information on [employer] access.    For providers and/or their staff who would like to refer a patient to [facility], please contact us through our one-stop-shop provider referral line, [facility], at [phone].    If you feel you have received this communication in error or would no longer like to receive these types of communications, please e-mail [email or web address]

## 2018-08-08 ENCOUNTER — OFFICE VISIT (OUTPATIENT)
Dept: OPHTHALMOLOGY | Facility: CLINIC | Age: 73
End: 2018-08-08
Payer: MEDICARE

## 2018-08-08 DIAGNOSIS — H27.02 APHAKIA OF LEFT EYE: ICD-10-CM

## 2018-08-08 DIAGNOSIS — H18.20 CORNEAL EDEMA: ICD-10-CM

## 2018-08-08 DIAGNOSIS — H16.121 FILAMENTARY KERATITIS OF RIGHT EYE: Primary | ICD-10-CM

## 2018-08-08 PROCEDURE — 99999 PR PBB SHADOW E&M-EST. PATIENT-LVL II: CPT | Mod: PBBFAC,,, | Performed by: OPHTHALMOLOGY

## 2018-08-08 PROCEDURE — 92014 COMPRE OPH EXAM EST PT 1/>: CPT | Mod: S$PBB,,, | Performed by: OPHTHALMOLOGY

## 2018-08-08 PROCEDURE — 99212 OFFICE O/P EST SF 10 MIN: CPT | Mod: PBBFAC | Performed by: OPHTHALMOLOGY

## 2018-08-08 RX ORDER — ACETYLCYSTEINE 100 MG/ML
10 SOLUTION ORAL; RESPIRATORY (INHALATION) EVERY 4 HOURS
Qty: 10 ML | Refills: 3 | Status: SHIPPED | OUTPATIENT
Start: 2018-08-08 | End: 2018-09-07

## 2018-08-08 RX ORDER — AMOXICILLIN 500 MG
1 CAPSULE ORAL DAILY
COMMUNITY

## 2018-08-08 NOTE — PROGRESS NOTES
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 Brown. Pt has been noticing that OD seems to be getting dimmer.    1. Severe Chronic Angle Closure OU  2. PCIOL OD  3. Aphakic OS  4. PKP OD  5. Dry Eyes    MEDS:  Cosopt BID  OU  PF BID OD  Xalatan QHS OD  Idania 128 BID OS  Genteal Gel QHS OS       Last edited by Vandana Stauffer MA on 8/8/2018  9:41 AM. (History)            Assessment /Plan     For exam results, see Encounter Report.    Filamentary keratitis of right eye    Aphakia of left eye    Corneal edema      Filaments on PKP, so use Mucomyst qid  OD PKP stable and clear. Signs and symptoms of graft rejection reviewed.    OS aphakic with K edema, may consider aphakic DSEK OS...

## 2018-08-10 NOTE — PROGRESS NOTES
Assessment /Plan     For exam results, see Encounter Report.    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    Blindness and low vision - Both Eyes    Glaucoma shunt device of both eyes        Right periorbital pain not Ocular origin  Has had similar associated with sinuses in past      [facility]    Complex ocular hx  multiple sx --> trabs, tubes and tube removes etc   Too numerous to count    CCT  537 // 611    IOP acceptable    Right eye  PKP - Quiet --> 10/26/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  Also discussed options utilizing medications.      Glaucoma Incisional Surgery  Patient with exposed ST Tube OD  SP OD ST BV / Amniotic membrane Removal 2/27/2017      Right eye  EES BID  PF1%  BID --> FML BID --> possible steroid response --> discussed Rejection RTC sooner  Xal q day    Left eye  Idania 128 BID + / -  Genteal Gel  Considering DSEK and Lens implant --> Pulin Calvo    Both eyes  Cosopt BID      Plan  RTC 2-3 weeks [place] IOP  RTC sooner prn with understanding

## 2018-08-14 ENCOUNTER — OFFICE VISIT (OUTPATIENT)
Dept: OPHTHALMOLOGY | Facility: CLINIC | Age: 73
End: 2018-08-14
Payer: MEDICARE

## 2018-08-14 DIAGNOSIS — H54.10 BLINDNESS AND LOW VISION: ICD-10-CM

## 2018-08-14 DIAGNOSIS — H40.2233 CHRONIC ANGLE-CLOSURE GLAUCOMA OF BOTH EYES, SEVERE STAGE: Primary | ICD-10-CM

## 2018-08-14 DIAGNOSIS — Z94.7 CORNEA REPLACED BY TRANSPLANT: ICD-10-CM

## 2018-08-14 PROCEDURE — 92012 INTRM OPH EXAM EST PATIENT: CPT | Mod: S$PBB,,, | Performed by: OPHTHALMOLOGY

## 2018-08-14 PROCEDURE — 99213 OFFICE O/P EST LOW 20 MIN: CPT | Mod: PBBFAC | Performed by: OPHTHALMOLOGY

## 2018-08-14 PROCEDURE — 99999 PR PBB SHADOW E&M-EST. PATIENT-LVL III: CPT | Mod: PBBFAC,,, | Performed by: OPHTHALMOLOGY

## 2018-08-16 ENCOUNTER — TELEPHONE (OUTPATIENT)
Dept: DERMATOLOGY | Facility: CLINIC | Age: 73
End: 2018-08-16

## 2018-08-16 NOTE — TELEPHONE ENCOUNTER
Returned pt's call about her concerns with tomorrow's procedure. Advised pt there are no pre-op instructions for her. Let her know we will go over all instructions about her procedure and what to expect afterwards. Pt thanked me for the call. ----- Message from Janis Chávez sent at 8/16/2018 11:37 AM CDT -----  Contact: babita   Needs Advice    Reason for call: Patient is calling in ref to her procedure on tomorrow. Would like to know if there is anything she needs to do prior to appt.        Communication Preference: [phone]

## 2018-08-17 ENCOUNTER — PROCEDURE VISIT (OUTPATIENT)
Dept: DERMATOLOGY | Facility: CLINIC | Age: 73
End: 2018-08-17
Payer: MEDICARE

## 2018-08-17 DIAGNOSIS — D48.5 NEOPLASM OF UNCERTAIN BEHAVIOR OF SKIN: Primary | ICD-10-CM

## 2018-08-17 PROCEDURE — 88341 IMHCHEM/IMCYTCHM EA ADD ANTB: CPT | Mod: 26,,, | Performed by: PATHOLOGY

## 2018-08-17 PROCEDURE — 88341 IMHCHEM/IMCYTCHM EA ADD ANTB: CPT | Mod: 59 | Performed by: PATHOLOGY

## 2018-08-17 PROCEDURE — 99499 UNLISTED E&M SERVICE: CPT | Mod: S$PBB,,, | Performed by: DERMATOLOGY

## 2018-08-17 PROCEDURE — 88365 INSITU HYBRIDIZATION (FISH): CPT | Mod: 26,,, | Performed by: PATHOLOGY

## 2018-08-17 PROCEDURE — 11402 EXC TR-EXT B9+MARG 1.1-2 CM: CPT | Mod: PBBFAC | Performed by: DERMATOLOGY

## 2018-08-17 PROCEDURE — 88342 IMHCHEM/IMCYTCHM 1ST ANTB: CPT | Performed by: PATHOLOGY

## 2018-08-17 PROCEDURE — 12031 INTMD RPR S/A/T/EXT 2.5 CM/<: CPT | Mod: S$PBB,,, | Performed by: DERMATOLOGY

## 2018-08-17 PROCEDURE — 88305 TISSUE EXAM BY PATHOLOGIST: CPT | Mod: 26,,, | Performed by: PATHOLOGY

## 2018-08-17 PROCEDURE — 11402 EXC TR-EXT B9+MARG 1.1-2 CM: CPT | Mod: S$PBB,51,, | Performed by: DERMATOLOGY

## 2018-08-17 PROCEDURE — 81340 TRB@ GENE REARRANGE AMPLIFY: CPT | Performed by: PATHOLOGY

## 2018-08-17 PROCEDURE — 88364 INSITU HYBRIDIZATION (FISH): CPT | Mod: 26,,, | Performed by: PATHOLOGY

## 2018-08-17 PROCEDURE — 88342 IMHCHEM/IMCYTCHM 1ST ANTB: CPT | Mod: 26,,, | Performed by: PATHOLOGY

## 2018-08-17 PROCEDURE — 12031 INTMD RPR S/A/T/EXT 2.5 CM/<: CPT | Mod: 59,PBBFAC | Performed by: DERMATOLOGY

## 2018-08-17 NOTE — PROGRESS NOTES
PROCEDURE: Elliptical excision with intermediate layered repair in order to decrease dead space.    ANESTHETIC: 4.5 cc 1% Xylocaine with Epinephrine 1:100,000, buffered    SURGEON: Mireya Lees M.D.    ASSISTANTS: Desirae Carpio MA    PREOPERATIVE DIAGNOSIS:  neoplasm uncertain behavior - dermatofibroma vs non melanoma skin cancer     Right chest pre op photograph shows 1 cm dermal sq pink nodule        POSTOPERATIVE DIAGNOSIS:  Same as preoperative diagnosis    PATHOLOGIC DIAGNOSIS: Pending    LOCATION: right chest    INITIAL LESION SIZE: 1 cm    EXCISED DIAMETER: 1 cm    PREPARATION: The diagnosis, procedure, alternatives, benefits and risks, including but not limited to: infection, bleeding/bruising, drug reactions, pain, scar or cosmetic defect, local sensation disturbances, wound dehiscence (separation of wound edges after sutures removed) and/or recurrence of present condition were explained to the patient. The patient elected to proceed.  Patient's identity was verified using 2 patient identifiers and the side and site was verified.  Time out period with surgeon, assistant and patient in surgical suite was taken.    PROCEDURE: The location noted above was prepped, draped, and anesthetized in the usual sterile fashion per Ghislaine Solano LPN. Lesional tissue was carefully marked with at least 3 mm margins of clinically normal skin in all directions. A fusiform elliptical excision was done with #15 blade carried down completely through the dermis into the deep subcutaneous tissues to the level of the non-muscle fascia, and dissection was carried out in that plane.  Electrocoagulation was used to obtain hemostasis. Blood loss was minimal. The wound was then approximated in a layered fashion with subcutaneous and intradermal sutures of 4.0 Vicryl, approximately 3 in number, and the wound was then superficially closed with running sutures of 4.0 Prolene.    The patient tolerated the procedure well.    The area was  cleaned and dressed appropriately and the patient was given wound care instructions, as well as an appointment for follow-up evaluation.    LENGTH OF REPAIR: 2 cm

## 2018-08-23 NOTE — PROGRESS NOTES
Assessment /Plan     For exam results, see Encounter Report.    Corneal edema    Corneal transplant failure    Aphakia of left eye    Bilateral dry eyes    Blindness and low vision - Both Eyes    Glaucoma shunt device of both eyes    Status post cataract extraction and insertion of intraocular lens of right eye        Right periorbital pain not Ocular origin  Has had similar associated with sinuses in past      [facility]    Complex ocular hx  multiple sx --> trabs, tubes and tube removes etc   Too numerous to count    CCT  537 // 611    IOP livable --> discussed options OD with G6/MP3 laser --> patient held with Q & A    Right eye  PKP - Quiet --> 10/26/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  Also discussed options utilizing medications.      Glaucoma Incisional Surgery  Patient with exposed ST Tube OD  SP OD ST BV / Amniotic membrane Removal 2/27/2017      Right eye --> consider G6 Laser as discussed  EES BID  PF1%  BID --> FML BID --> possible steroid response --> discussed Rejection RTC sooner  Xal q day    Left eye  Idania 128 BID + / -  Genteal Gel  Considering DSEK and Lens implant --> Pulin Calvo    Both eyes  Cosopt BID      Plan  RTC 2 months [place] IOP  RTC sooner prn with understanding

## 2018-08-31 ENCOUNTER — OFFICE VISIT (OUTPATIENT)
Dept: DERMATOLOGY | Facility: CLINIC | Age: 73
End: 2018-08-31
Payer: MEDICARE

## 2018-08-31 ENCOUNTER — OFFICE VISIT (OUTPATIENT)
Dept: OPHTHALMOLOGY | Facility: CLINIC | Age: 73
End: 2018-08-31
Payer: MEDICARE

## 2018-08-31 DIAGNOSIS — H04.123 BILATERAL DRY EYES: ICD-10-CM

## 2018-08-31 DIAGNOSIS — Z98.41 STATUS POST CATARACT EXTRACTION AND INSERTION OF INTRAOCULAR LENS OF RIGHT EYE: ICD-10-CM

## 2018-08-31 DIAGNOSIS — H18.20 CORNEAL EDEMA: ICD-10-CM

## 2018-08-31 DIAGNOSIS — H40.1122 PRIMARY OPEN ANGLE GLAUCOMA (POAG) OF LEFT EYE, MODERATE STAGE: ICD-10-CM

## 2018-08-31 DIAGNOSIS — T86.8419 CORNEAL TRANSPLANT FAILURE: ICD-10-CM

## 2018-08-31 DIAGNOSIS — H40.2213 CHRONIC ANGLE-CLOSURE GLAUCOMA OF RIGHT EYE, SEVERE STAGE: Primary | ICD-10-CM

## 2018-08-31 DIAGNOSIS — H54.10 BLINDNESS AND LOW VISION: ICD-10-CM

## 2018-08-31 DIAGNOSIS — H27.02 APHAKIA OF LEFT EYE: ICD-10-CM

## 2018-08-31 DIAGNOSIS — Z96.1 STATUS POST CATARACT EXTRACTION AND INSERTION OF INTRAOCULAR LENS OF RIGHT EYE: ICD-10-CM

## 2018-08-31 DIAGNOSIS — L98.6 ATYPICAL LYMPHOCYTIC INFILTRATE OF SKIN: Primary | ICD-10-CM

## 2018-08-31 PROCEDURE — 99212 OFFICE O/P EST SF 10 MIN: CPT | Mod: S$PBB,,, | Performed by: DERMATOLOGY

## 2018-08-31 PROCEDURE — 99999 PR PBB SHADOW E&M-EST. PATIENT-LVL II: CPT | Mod: PBBFAC,,, | Performed by: OPHTHALMOLOGY

## 2018-08-31 PROCEDURE — 92012 INTRM OPH EXAM EST PATIENT: CPT | Mod: S$PBB,,, | Performed by: OPHTHALMOLOGY

## 2018-08-31 PROCEDURE — 99212 OFFICE O/P EST SF 10 MIN: CPT | Mod: PBBFAC,27 | Performed by: OPHTHALMOLOGY

## 2018-08-31 PROCEDURE — 99211 OFF/OP EST MAY X REQ PHY/QHP: CPT | Mod: PBBFAC | Performed by: DERMATOLOGY

## 2018-08-31 PROCEDURE — 99999 PR PBB SHADOW E&M-EST. PATIENT-LVL I: CPT | Mod: PBBFAC,,, | Performed by: DERMATOLOGY

## 2018-08-31 NOTE — PROGRESS NOTES
"73 yo female here for suture removal and biopsy results.  Path below.   Site has healed well, no recurrence or pain.    ROS positive intermittent arthritis  No bony pains, fevers, nightsweats, weight loss    FINAL PATHOLOGIC DIAGNOSIS  Skin, right chest, excision:  -ATYPICAL LYMPHOID PROLIFERATION, see comment  COMMENT: Although I favor the lesion to represent a benign process (such as cutaneous lymphoid hyperplasia  which may occur as a solitary lesion), there are a few atypical findings including an increased number of T-cells in  the infiltrate, partial loss of CD7 and T-cell gene receptor rearrangement studies that were equivocal (see  supplemental T-cell gene rearrangement report). For these reasons, I recommend follow up of the patient and  monitor for new lesions of this nature with additional biopsy if indicated. Correlation is recommended.  CD20,ASR,ASR  Diagnosed by: Ezekiel Dominguez  (Electronically Signed: 2018-08-29 16:53:27)  Microscopic Examination  Sections of the excision show a multinodular infiltrate involving the superficial dermis, dermis, and extending into the  subcutaneous tissue. The infiltrate also surrounds adnexal structures and vessels. The infiltrate is predominantly  composed of small lymphocytes, histiocytes, plasma cells, and rare eosinophils. Given the density of the infiltrate, a  panel of immunohistochemical stains was performed on block "B" to evaluate for a hematopoietic malignancy.  Immunohistochemical stains show that the infiltrate is a mixture of B-cells (CD20) and T-cells (CD3), with the T-cells  somewhat predominating the infiltrate. CD5 and BCL-2 also highlight the T-cells in the infiltrate. CD4 to CD8 ratio is  approximately 3:1. There is partial loss of CD7. CD10 is overall negative in the lymphoid population. BCL-6 highlights  rare scattered lymphocytes.  highlights scattered and focally clustered plasma cells which are biclonal as  demonstrated by Kappa and Lambda " in-situ hybridization (Kappa to Lambda ratio is approximately 1:1). The infiltrate  does not involve the epidermis.  This case was also reviewed by Dr. Ethan Calhoun who agrees with the above diagnosis.    TCR equivocal    PE  Well healed scar site right chest  TBSE performed - SKs on back, chest; no other erythematous dermal nodules noted    A/P  Atypical lymphoid proliferation right chest   Excised  TCR equivocal  Discussed results with patient, will monitor for recurrence at site and for any new lesions in future  May have been benign reaction to arthropod as started with itching  Regardless, close clinical monitoring indicated  No 'B' symptoms on ROS

## 2018-09-12 ENCOUNTER — TELEPHONE (OUTPATIENT)
Dept: DERMATOLOGY | Facility: CLINIC | Age: 73
End: 2018-09-12

## 2018-09-12 NOTE — TELEPHONE ENCOUNTER
Spoke with pt and was able to reschedule pt to October 3rd.----- Message from Janis Chávez sent at 9/12/2018  3:31 PM CDT -----  Patient Requesting Sooner Appointment.     Reason for sooner appt.: Patient is currently scheduled for 10/01 and would like to reschedule.     When is the first available appointment? 10/24, but patient would like to know If there is an available appt on 10/03    Communication Preference: [phone]

## 2018-10-03 ENCOUNTER — OFFICE VISIT (OUTPATIENT)
Dept: DERMATOLOGY | Facility: CLINIC | Age: 73
End: 2018-10-03
Payer: MEDICARE

## 2018-10-03 DIAGNOSIS — L82.1 SEBORRHEIC KERATOSES: ICD-10-CM

## 2018-10-03 DIAGNOSIS — L98.6 ATYPICAL LYMPHOCYTIC INFILTRATE OF SKIN: Primary | ICD-10-CM

## 2018-10-03 PROCEDURE — 99999 PR PBB SHADOW E&M-EST. PATIENT-LVL II: CPT | Mod: PBBFAC,,, | Performed by: DERMATOLOGY

## 2018-10-03 PROCEDURE — 99212 OFFICE O/P EST SF 10 MIN: CPT | Mod: PBBFAC | Performed by: DERMATOLOGY

## 2018-10-03 PROCEDURE — 99212 OFFICE O/P EST SF 10 MIN: CPT | Mod: S$PBB,,, | Performed by: DERMATOLOGY

## 2018-10-03 NOTE — PROGRESS NOTES
Subjective:       Patient ID:  Ligia Curran is a 72 y.o. female who presents for   Chief Complaint   Patient presents with    Spot     f/u     Spot  - Follow-up  Symptom course: resolved  Affected locations: chest  Signs / symptoms: asymptomatic    71 yo female here for f/u recently excised 8/17/2018 atypical lymphoid proliferation of the right chest, with negative TCR gene rearrangement studies.  The area has healed well, no recurrence, pain, or itching.  Patient repots no new nodules have developed near the site or elsewhere.    Skin, right chest, excision:  -ATYPICAL LYMPHOID PROLIFERATION, see comment  COMMENT: Although I favor the lesion to represent a benign process (such as cutaneous lymphoid hyperplasia  which may occur as a solitary lesion), there are a few atypical findings including an increased number of T-cells in  the infiltrate, partial loss of CD7 and T-cell gene receptor rearrangement studies that were equivocal (see  supplemental T-cell gene rearrangement report). For these reasons, I recommend follow up of the patient and  monitor for new lesions of this nature with additional biopsy if indicated. Correlation is recommended.    Review of Systems   Hematologic/Lymphatic: Does not bruise/bleed easily.        Objective:    Physical Exam   Constitutional: She appears well-developed and well-nourished. No distress.   Lymphadenopathy:     She has no cervical adenopathy.     She has no axillary adenopathy.   Neurological: She is alert and oriented to person, place, and time. She is not disoriented.   Psychiatric: She has a normal mood and affect.   Skin:   Areas Examined (abnormalities noted in diagram):   Head / Face Inspection Performed  Neck Inspection Performed  Chest / Axilla Inspection Performed  Abdomen Inspection Performed  Back Inspection Performed  RUE Inspected  LUE Inspection Performed              Diagram Legend     Erythematous scaling macule/papule c/w actinic keratosis       Vascular  papule c/w angioma      Pigmented verrucoid papule/plaque c/w seborrheic keratosis      Yellow umbilicated papule c/w sebaceous hyperplasia      Irregularly shaped tan macule c/w lentigo     1-2 mm smooth white papules consistent with Milia      Movable subcutaneous cyst with punctum c/w epidermal inclusion cyst      Subcutaneous movable cyst c/w pilar cyst      Firm pink to brown papule c/w dermatofibroma      Pedunculated fleshy papule(s) c/w skin tag(s)      Evenly pigmented macule c/w junctional nevus     Mildly variegated pigmented, slightly irregular-bordered macule c/w mildly atypical nevus      Flesh colored to evenly pigmented papule c/w intradermal nevus       Pink pearly papule/plaque c/w basal cell carcinoma      Erythematous hyperkeratotic cursted plaque c/w SCC      Surgical scar with no sign of skin cancer recurrence      Open and closed comedones      Inflammatory papules and pustules      Verrucoid papule consistent consistent with wart     Erythematous eczematous patches and plaques     Dystrophic onycholytic nail with subungual debris c/w onychomycosis     Umbilicated papule    Erythematous-base heme-crusted tan verrucoid plaque consistent with inflamed seborrheic keratosis     Erythematous Silvery Scaling Plaque c/w Psoriasis     See annotation      Assessment / Plan:        Atypical lymphocytic infiltrate of skin - right chest - s/p excision - site has healed well; two small spitting buried stitches removed today  Continue to monitor for regrowth/recurrence or any similar lesions in future, rv 3 mo  No cervical/axillary LAD    Seborrheic keratoses  These are benign inherited growths without a malignant potential. Reassurance given to patient. No treatment is necessary.              Follow-up in about 3 months (around 1/3/2019).

## 2018-10-23 ENCOUNTER — LAB VISIT (OUTPATIENT)
Dept: LAB | Facility: HOSPITAL | Age: 73
End: 2018-10-23
Attending: FAMILY MEDICINE
Payer: MEDICARE

## 2018-10-23 ENCOUNTER — OFFICE VISIT (OUTPATIENT)
Dept: FAMILY MEDICINE | Facility: CLINIC | Age: 73
End: 2018-10-23
Attending: FAMILY MEDICINE
Payer: MEDICARE

## 2018-10-23 VITALS
BODY MASS INDEX: 38.97 KG/M2 | HEART RATE: 77 BPM | OXYGEN SATURATION: 98 % | SYSTOLIC BLOOD PRESSURE: 130 MMHG | WEIGHT: 198.5 LBS | DIASTOLIC BLOOD PRESSURE: 70 MMHG | HEIGHT: 60 IN

## 2018-10-23 DIAGNOSIS — Z00.00 ANNUAL PHYSICAL EXAM: Primary | ICD-10-CM

## 2018-10-23 DIAGNOSIS — R53.83 OTHER FATIGUE: ICD-10-CM

## 2018-10-23 DIAGNOSIS — R10.32 LEFT LOWER QUADRANT PAIN: ICD-10-CM

## 2018-10-23 DIAGNOSIS — E66.9 OBESITY (BMI 35.0-39.9 WITHOUT COMORBIDITY): ICD-10-CM

## 2018-10-23 DIAGNOSIS — Z00.00 ANNUAL PHYSICAL EXAM: ICD-10-CM

## 2018-10-23 LAB
ALBUMIN SERPL BCP-MCNC: 3.8 G/DL
ALP SERPL-CCNC: 95 U/L
ALT SERPL W/O P-5'-P-CCNC: 13 U/L
ANION GAP SERPL CALC-SCNC: 7 MMOL/L
AST SERPL-CCNC: 19 U/L
BASOPHILS # BLD AUTO: 0.05 K/UL
BASOPHILS NFR BLD: 0.5 %
BILIRUB SERPL-MCNC: 1 MG/DL
BILIRUB SERPL-MCNC: NEGATIVE MG/DL
BLOOD URINE, POC: NEGATIVE
BUN SERPL-MCNC: 7 MG/DL
CALCIUM SERPL-MCNC: 10 MG/DL
CHLORIDE SERPL-SCNC: 105 MMOL/L
CHOLEST SERPL-MCNC: 166 MG/DL
CHOLEST/HDLC SERPL: 3.6 {RATIO}
CO2 SERPL-SCNC: 29 MMOL/L
COLOR, POC UA: YELLOW
CREAT SERPL-MCNC: 0.7 MG/DL
DIFFERENTIAL METHOD: ABNORMAL
EOSINOPHIL # BLD AUTO: 0.1 K/UL
EOSINOPHIL NFR BLD: 0.5 %
ERYTHROCYTE [DISTWIDTH] IN BLOOD BY AUTOMATED COUNT: 12.7 %
EST. GFR  (AFRICAN AMERICAN): >60 ML/MIN/1.73 M^2
EST. GFR  (NON AFRICAN AMERICAN): >60 ML/MIN/1.73 M^2
GLUCOSE SERPL-MCNC: 113 MG/DL
GLUCOSE UR QL STRIP: NORMAL
HCT VFR BLD AUTO: 47.6 %
HDLC SERPL-MCNC: 46 MG/DL
HDLC SERPL: 27.7 %
HGB BLD-MCNC: 15.2 G/DL
IMM GRANULOCYTES # BLD AUTO: 0.02 K/UL
IMM GRANULOCYTES NFR BLD AUTO: 0.2 %
KETONES UR QL STRIP: NEGATIVE
LDLC SERPL CALC-MCNC: 96.6 MG/DL
LEUKOCYTE ESTERASE URINE, POC: NEGATIVE
LYMPHOCYTES # BLD AUTO: 2.3 K/UL
LYMPHOCYTES NFR BLD: 25.1 %
MCH RBC QN AUTO: 30.7 PG
MCHC RBC AUTO-ENTMCNC: 31.9 G/DL
MCV RBC AUTO: 96 FL
MONOCYTES # BLD AUTO: 0.5 K/UL
MONOCYTES NFR BLD: 5.6 %
NEUTROPHILS # BLD AUTO: 6.2 K/UL
NEUTROPHILS NFR BLD: 68.1 %
NITRITE, POC UA: NEGATIVE
NONHDLC SERPL-MCNC: 120 MG/DL
NRBC BLD-RTO: 0 /100 WBC
PH, POC UA: 6
PLATELET # BLD AUTO: 193 K/UL
PMV BLD AUTO: 12 FL
POTASSIUM SERPL-SCNC: 4.5 MMOL/L
PROT SERPL-MCNC: 7.8 G/DL
PROTEIN, POC: NEGATIVE
RBC # BLD AUTO: 4.95 M/UL
SODIUM SERPL-SCNC: 141 MMOL/L
SPECIFIC GRAVITY, POC UA: 1.01
TRIGL SERPL-MCNC: 117 MG/DL
TSH SERPL DL<=0.005 MIU/L-ACNC: 1.25 UIU/ML
UROBILINOGEN, POC UA: NORMAL
WBC # BLD AUTO: 9.16 K/UL

## 2018-10-23 PROCEDURE — 85025 COMPLETE CBC W/AUTO DIFF WBC: CPT

## 2018-10-23 PROCEDURE — 81001 URINALYSIS AUTO W/SCOPE: CPT | Mod: PBBFAC,PO | Performed by: FAMILY MEDICINE

## 2018-10-23 PROCEDURE — 80061 LIPID PANEL: CPT

## 2018-10-23 PROCEDURE — 99214 OFFICE O/P EST MOD 30 MIN: CPT | Mod: PBBFAC,PO | Performed by: FAMILY MEDICINE

## 2018-10-23 PROCEDURE — 36415 COLL VENOUS BLD VENIPUNCTURE: CPT | Mod: PO

## 2018-10-23 PROCEDURE — 80053 COMPREHEN METABOLIC PANEL: CPT

## 2018-10-23 PROCEDURE — 99999 PR PBB SHADOW E&M-EST. PATIENT-LVL IV: CPT | Mod: PBBFAC,,, | Performed by: FAMILY MEDICINE

## 2018-10-23 PROCEDURE — 84443 ASSAY THYROID STIM HORMONE: CPT

## 2018-10-23 PROCEDURE — 99214 OFFICE O/P EST MOD 30 MIN: CPT | Mod: S$PBB,,, | Performed by: FAMILY MEDICINE

## 2018-10-23 NOTE — MEDICAL/APP STUDENT
Subjective:       Patient ID: Ligia Curran is a 72 y.o. female.    Chief Complaint: Annual Exam    Mrs Curran is a 72 year old female coming in today for an annual exam. Pt complains of abdominal burning that started four month ago. Sates that is not always present and is not present this visit. Reports burning is in her left lower abdominal quadrant. Denies any OTC medications. Reports nothing relieves or alleviates symptoms. Denies any other complaints.       Review of Systems   Constitutional: Negative.    HENT: Negative.    Eyes:        Glaucoma left eye, right eye cornea transplant with decrease vision   Respiratory: Negative.    Cardiovascular: Negative.    Gastrointestinal:        LLQ abdominal pain/burning   Endocrine: Negative.    Genitourinary: Negative.    Musculoskeletal: Negative.    Skin: Negative.    Allergic/Immunologic: Negative.    Neurological: Negative.    Hematological: Negative.    Psychiatric/Behavioral: Negative.        Objective:      Physical Exam   Constitutional: She is oriented to person, place, and time. She appears well-developed and well-nourished.   HENT:   Head: Normocephalic and atraumatic.   Right Ear: External ear normal.   Left Ear: External ear normal.   Nose: Nose normal.   Mouth/Throat: Oropharynx is clear and moist.   Eyes: Conjunctivae and EOM are normal. Pupils are equal, round, and reactive to light.   Neck: Normal range of motion. Neck supple.   Abdominal: Soft. Bowel sounds are normal. There is tenderness.   Left lower quadrant tenderness   Musculoskeletal: Normal range of motion.   Neurological: She is alert and oriented to person, place, and time.   Skin: Skin is warm and dry.   Psychiatric: She has a normal mood and affect. Her behavior is normal.       Assessment:       1. Annual physical exam    2. Left lower quadrant pain    3. Obesity (BMI 35.0-39.9 without comorbidity)    4. Other fatigue        Plan:       Ligia was seen today for annual exam.    Diagnoses  and all orders for this visit:    Annual physical exam  -     CBC auto differential; Future  -     Comprehensive metabolic panel; Future  -     POCT urinalysis, dipstick or tablet reag  -     Lipid panel; Future    Left lower quadrant pain  -     CBC auto differential; Future  -     Comprehensive metabolic panel; Future  -     POCT urinalysis, dipstick or tablet reag  -     Lipid panel; Future  -     X-Ray Abdomen Flat And Erect; Future  -     Fecal Immunochemical Test (iFOBT); Future    Obesity (BMI 35.0-39.9 without comorbidity)  -     CBC auto differential; Future  -     Comprehensive metabolic panel; Future  -     POCT urinalysis, dipstick or tablet reag  -     Lipid panel; Future    Other fatigue  -     TSH; Future    Pt to RTC PRN

## 2018-10-23 NOTE — PATIENT INSTRUCTIONS
Your test results will be communicated to you via : [facility], Telephone or Letter.   If you have not received your test results in one week, please contact the clinic at [phone].

## 2018-10-23 NOTE — PROGRESS NOTES
Subjective:       Patient ID: Ligia Curran is a 72 y.o. female.    Chief Complaint: Annual Exam    HPI   Pt is here for annual exam pt is generally well however over the past few months pt has noted intermittent left lower quadrant pain burning sensation no change in bowel habits   No brbpr no vaginal bleeding no dysuria or hematuria pt I s s/p kai bso no unexplained weight changes pt is obese she is not on a weight loss diet no regular exercise  Review of Systems   Constitutional: Negative for activity change, chills, diaphoresis, fatigue and fever.   HENT: Negative for congestion, ear discharge, ear pain, hearing loss, postnasal drip, rhinorrhea, sinus pressure, sneezing, sore throat, trouble swallowing and voice change.    Eyes: Negative for photophobia, discharge, redness, itching and visual disturbance.   Respiratory: Negative for cough, chest tightness, shortness of breath and wheezing.    Cardiovascular: Negative for chest pain, palpitations and leg swelling.   Gastrointestinal: Positive for abdominal pain. Negative for anal bleeding, blood in stool, constipation, diarrhea, nausea, rectal pain and vomiting.   Genitourinary: Negative for dyspareunia, dysuria, flank pain, frequency, hematuria, menstrual problem, pelvic pain, urgency, vaginal bleeding and vaginal discharge.   Musculoskeletal: Negative for arthralgias, back pain, joint swelling and neck pain.   Skin: Negative for color change and rash.   Neurological: Negative for dizziness, speech difficulty, weakness, light-headedness, numbness and headaches.   Hematological: Does not bruise/bleed easily.   Psychiatric/Behavioral: Negative for agitation, confusion, decreased concentration, sleep disturbance and suicidal ideas. The patient is not nervous/anxious.        Objective:      Physical Exam   Constitutional: She appears well-developed and well-nourished.   HENT:   Head: Normocephalic and atraumatic.   Right Ear: External ear normal.   Left Ear:  "External ear normal.   Nose: Nose normal.   Mouth/Throat: Oropharynx is clear and moist.   Eyes: Conjunctivae and EOM are normal. Pupils are equal, round, and reactive to light. Right eye exhibits no discharge. Left eye exhibits no discharge.   Neck: Normal range of motion. Neck supple. No thyromegaly present.   Cardiovascular: Normal rate, regular rhythm and intact distal pulses. Exam reveals no gallop and no friction rub.   Pulmonary/Chest: Effort normal and breath sounds normal. She has no wheezes. She has no rales.   Abdominal: Soft. Bowel sounds are normal. She exhibits no distension. There is no tenderness. There is no rebound and no guarding.   Genitourinary:   Genitourinary Comments: declined   Musculoskeletal: Normal range of motion. She exhibits no edema or tenderness.   Lymphadenopathy:     She has no cervical adenopathy.   Neurological: She is alert. No cranial nerve deficit. She exhibits normal muscle tone. Coordination normal.   Skin: Skin is warm and dry. No rash noted. No erythema.   Psychiatric: She has a normal mood and affect. Her behavior is normal. Judgment and thought content normal.       Assessment:       1. Annual physical exam    2. Left lower quadrant pain    3. Obesity (BMI 35.0-39.9 without comorbidity)    4. Other fatigue        Plan:     orders cmp cbc lipid tsh urine abd film stool hem  Cont meds  Increase water intake  High fiber diet  Graded exercise  rtc 6 months and prn    Health maintenance     lipid ordered  Flu discussed  tetanus q 10 years   Pneumonia discussed  Shingles discussed  Colonoscopy discussed      "This note will not be shared with the patient."   "

## 2018-10-26 ENCOUNTER — TELEPHONE (OUTPATIENT)
Dept: FAMILY MEDICINE | Facility: CLINIC | Age: 73
End: 2018-10-26

## 2018-10-26 NOTE — TELEPHONE ENCOUNTER
When You Have Temporomandibular Disorder (TMD)  The temporomandibular joint (TMJ) is a ball-and-socket joint located where the upper and lower jaws meet. The TMJ and its nearby muscles make up a complex, loosely connected system. Because of this, a problem in one part of the system can affect the other parts. This can cause you to have temporomandibular disorder (TMD).         How the temporomandibular joint works  You have 1 joint on each side of your mouth that together make up the TMJ. These joints are part of a large group of muscles, ligaments, and bones that work together as a system. When the system is healthy, you can talk, chew, and even yawn in comfort. Muscles contract and relax to open and close the joint. The disk is made of cartilage and is located between the condyle and the skull. It absorbs pressure in the joint and allows the jaws to open and close smoothly. Fluid (called synovial fluid) lubricates the joints. Ligaments connect the lower jaw bones to the skull. They also support the joint.  Common temporomandibular problems  When there is a problem with the TMJ and its related system, you can develop TMD. Common TMD problems include tight muscles, inflamed joints, and damaged joints.  In some cases, symptoms may be related to the teeth or bite.  · Tight muscles. The muscles surrounding the TMJ can go into spasm (tighten) and cause pain. Referred pain happens in a part of the body separate from the source of the problem. For example, pain in the face or teeth could be coming from a problem in the TMJ. Myofascial pain happens in soft tissues, like muscle. Trigger points in these pain areas often cause referred pain. You may feel jaw, neck, or shoulder pain.  · Inflamed joints. Inflammation may include pain, redness, heat, swelling, or loss of function. Synovitis happens when certain tissues surrounding the TMJ become inflamed. It causes pain that increases with jaw movement. Inflamed ligamentscan  Patient was informed her labs look fine.Patient will also get a copy of her test results mailed to her home.   be caused by strain or injury. When this happens, the ligaments are unable to support the joint. Rheumatoid arthritis is a joint disease. It leads to inflammation in the TMJ.  · Damaged joints. Many people hear clicking when their jaw moves. If you feel pain along with the noise, the joint may be damaged. Impingement happens when the disk slips out of place (displacement). This causes the jaw to catch. As the disk slips, you may hear a clicking sound. Locked jaw happens when the disk gets stuck in one position. As a result, the jaw locks open or closed. Osteoarthritis is a joint disease. It causes the TMJ to wear away (degenerate). This leads to pain during movement.     Other problems  The parts of the jaw and mouth make up a single unit. Thats why a problem in 1 area can cause symptoms elsewhere. Teeth or bite problems associated with TMD include:  · Bruxism (grinding your teeth side to side)  · Clenching (biting down on your teeth)  · Malocclusion (when the teeth or bite is out of alignment)  Your health care provider will give you more information about these problems if needed.   Date Last Reviewed: 7/13/2015 © 2000-2017 [employer]. [address] [place] [address], [place] [address]. All rights reserved. This information is not intended as a substitute for professional medical care. Always follow your healthcare professional's instructions.

## 2018-10-29 ENCOUNTER — HOSPITAL ENCOUNTER (OUTPATIENT)
Dept: RADIOLOGY | Facility: HOSPITAL | Age: 73
Discharge: HOME | End: 2018-10-29
Attending: FAMILY MEDICINE
Payer: MEDICARE

## 2018-10-29 DIAGNOSIS — R10.32 LEFT LOWER QUADRANT PAIN: ICD-10-CM

## 2018-10-29 PROCEDURE — 74019 RADEX ABDOMEN 2 VIEWS: CPT | Mod: TC

## 2018-10-29 PROCEDURE — 74019 RADEX ABDOMEN 2 VIEWS: CPT | Mod: 26,,, | Performed by: RADIOLOGY

## 2018-10-30 ENCOUNTER — TELEPHONE (OUTPATIENT)
Dept: FAMILY MEDICINE | Facility: CLINIC | Age: 73
End: 2018-10-30

## 2018-10-30 NOTE — TELEPHONE ENCOUNTER
----- Message from Destinee Louie MD sent at 10/29/2018  8:20 PM CDT -----  Please let pt know her abd x-ray is fine please offer pt a ct scan for better visualization

## 2018-10-30 NOTE — TELEPHONE ENCOUNTER
Patient was left a detailed message regarding her xray results.Also a copy of her results have been mailed.

## 2018-10-31 ENCOUNTER — TELEPHONE (OUTPATIENT)
Dept: OPHTHALMOLOGY | Facility: CLINIC | Age: 73
End: 2018-10-31

## 2018-10-31 ENCOUNTER — OFFICE VISIT (OUTPATIENT)
Dept: OPHTHALMOLOGY | Facility: CLINIC | Age: 73
End: 2018-10-31
Payer: MEDICARE

## 2018-10-31 DIAGNOSIS — H18.20 CORNEAL EDEMA: ICD-10-CM

## 2018-10-31 DIAGNOSIS — H16.121 FILAMENTARY KERATITIS OF RIGHT EYE: Primary | ICD-10-CM

## 2018-10-31 DIAGNOSIS — H54.10 BLINDNESS AND LOW VISION: ICD-10-CM

## 2018-10-31 PROCEDURE — 92014 COMPRE OPH EXAM EST PT 1/>: CPT | Mod: S$PBB,,, | Performed by: OPHTHALMOLOGY

## 2018-10-31 PROCEDURE — 99212 OFFICE O/P EST SF 10 MIN: CPT | Mod: PBBFAC | Performed by: OPHTHALMOLOGY

## 2018-10-31 PROCEDURE — 99999 PR PBB SHADOW E&M-EST. PATIENT-LVL II: CPT | Mod: PBBFAC,,, | Performed by: OPHTHALMOLOGY

## 2018-10-31 RX ORDER — ACETYLCYSTEINE 100 MG/ML
10 SOLUTION ORAL; RESPIRATORY (INHALATION) EVERY 4 HOURS
Qty: 10 ML | Refills: 3 | Status: SHIPPED | OUTPATIENT
Start: 2018-10-31 | End: 2018-11-30

## 2018-10-31 RX ORDER — FLUOROMETHOLONE 1 MG/ML
1 SUSPENSION/ DROPS OPHTHALMIC 2 TIMES DAILY
COMMUNITY
Start: 2018-10-31 | End: 2019-07-02

## 2018-10-31 RX ORDER — FLUOROMETHOLONE 1 MG/ML
1 SUSPENSION/ DROPS OPHTHALMIC 2 TIMES DAILY
Qty: 15 ML | Refills: 3 | Status: SHIPPED | OUTPATIENT
Start: 2018-10-31 | End: 2018-11-10

## 2018-10-31 NOTE — TELEPHONE ENCOUNTER
----- Message from Althea Avery sent at 10/31/2018  3:45 PM CDT -----  Contact: Ligia  Pt states she need to speak with some about her prescription. She can be reached at [phone].

## 2018-10-31 NOTE — PROGRESS NOTES
HPI     Eye Pain      Additional comments: Pt c/o red painful OD x 2 days              Comments     Pt here today for red irritated OD with some pain x 2 days. Pt states eye   feels tender and scratchy.      1. Severe Chronic Angle Closure OU  2. PCIOL OD  3. Aphakic OS  4. PKP OD  5. Dry Eyes     MEDS:  Cosopt BID  OU  FML BID OD  Xalatan QHS OD  Idania 128 BID OS  Genteal Gel QHS OS          Last edited by Vandana Stauffer MA on 10/31/2018 10:39 AM. (History)            Assessment /Plan     For exam results, see Encounter Report.    Filamentary keratitis of right eye    Blindness and low vision - Both Eyes    Corneal edema      Filaments OD PKP, otherwise clear, but IOP elevated. Recheck with MARGARETH.  K edema aphakia OS.    Use Mucomyst 10% qid OD cont FML.

## 2018-11-13 ENCOUNTER — OFFICE VISIT (OUTPATIENT)
Dept: OPHTHALMOLOGY | Facility: CLINIC | Age: 73
End: 2018-11-13
Payer: MEDICARE

## 2018-11-13 DIAGNOSIS — H27.02 APHAKIA OF LEFT EYE: ICD-10-CM

## 2018-11-13 DIAGNOSIS — T86.8419 CORNEAL TRANSPLANT FAILURE: ICD-10-CM

## 2018-11-13 DIAGNOSIS — H40.2213 CHRONIC ANGLE-CLOSURE GLAUCOMA OF RIGHT EYE, SEVERE STAGE: ICD-10-CM

## 2018-11-13 DIAGNOSIS — Z98.41 STATUS POST CATARACT EXTRACTION AND INSERTION OF INTRAOCULAR LENS OF RIGHT EYE: ICD-10-CM

## 2018-11-13 DIAGNOSIS — Z94.7 CORNEA REPLACED BY TRANSPLANT: ICD-10-CM

## 2018-11-13 DIAGNOSIS — H18.232 SECONDARY CORNEAL EDEMA OF LEFT EYE: ICD-10-CM

## 2018-11-13 DIAGNOSIS — H04.123 BILATERAL DRY EYES: ICD-10-CM

## 2018-11-13 DIAGNOSIS — H40.1122 PRIMARY OPEN ANGLE GLAUCOMA (POAG) OF LEFT EYE, MODERATE STAGE: Primary | ICD-10-CM

## 2018-11-13 DIAGNOSIS — Z96.1 STATUS POST CATARACT EXTRACTION AND INSERTION OF INTRAOCULAR LENS OF RIGHT EYE: ICD-10-CM

## 2018-11-13 PROCEDURE — 99999 PR PBB SHADOW E&M-EST. PATIENT-LVL II: CPT | Mod: PBBFAC,,, | Performed by: OPHTHALMOLOGY

## 2018-11-13 PROCEDURE — 92012 INTRM OPH EXAM EST PATIENT: CPT | Mod: S$PBB,,, | Performed by: OPHTHALMOLOGY

## 2018-11-13 PROCEDURE — 99212 OFFICE O/P EST SF 10 MIN: CPT | Mod: PBBFAC | Performed by: OPHTHALMOLOGY

## 2018-11-13 NOTE — PROGRESS NOTES
Assessment /Plan     For exam results, see Encounter Report.    Primary open angle glaucoma (POAG) of left eye, moderate stage    Status post cataract extraction and insertion of intraocular lens of right eye    Secondary corneal edema of left eye    Glaucoma shunt device of both eyes    Corneal transplant failure    Chronic angle-closure glaucoma of right eye, severe stage    Cornea replaced by transplant - Right Eye    Aphakia of left eye    Bilateral dry eyes        Right periorbital pain not Ocular origin  Has had similar associated with sinuses in past      [facility]    Complex ocular hx  multiple sx --> trabs, tubes and tube removes etc   Too numerous to count    CCT  537 // 611    IOP livable --> discussed options OD with G6/MP3 laser --> patient held with Q & A    Right eye  PKP - Quiet --> 10/26/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  Also discussed options utilizing medications.      Glaucoma Incisional Surgery  Patient with exposed ST Tube OD  SP OD ST BV / Amniotic membrane Removal 2/27/2017      Right eye --> consider G6 Laser as re-discussed  EES BID  PF1%  BID --> FML BID --> possible steroid response --> discussed Rejection RTC sooner  Xal q day      Left eye  Idania 128 BID + / -  Genteal Gel  Mucomist  Considering DSEK and Lens implant --> Lucia Calvo    Both eyes  Cosopt BID      Plan  RTC 2 months [place] [facility]  RTC sooner prn with understanding

## 2019-01-03 DIAGNOSIS — H40.20X3 ANGLE-CLOSURE GLAUCOMA, SEVERE STAGE: ICD-10-CM

## 2019-01-04 RX ORDER — DORZOLAMIDE HYDROCHLORIDE AND TIMOLOL MALEATE 20; 5 MG/ML; MG/ML
1 SOLUTION/ DROPS OPHTHALMIC 2 TIMES DAILY
Qty: 10 ML | Refills: 6 | Status: SHIPPED | OUTPATIENT
Start: 2019-01-04 | End: 2019-10-01 | Stop reason: SDUPTHER

## 2019-01-09 ENCOUNTER — OFFICE VISIT (OUTPATIENT)
Dept: DERMATOLOGY | Facility: CLINIC | Age: 74
End: 2019-01-09
Payer: MEDICARE

## 2019-01-09 DIAGNOSIS — L82.1 SEBORRHEIC KERATOSES: ICD-10-CM

## 2019-01-09 DIAGNOSIS — L98.6 ATYPICAL LYMPHOCYTIC INFILTRATE OF SKIN: Primary | ICD-10-CM

## 2019-01-09 DIAGNOSIS — L91.8 SKIN TAG: ICD-10-CM

## 2019-01-09 PROCEDURE — 99212 OFFICE O/P EST SF 10 MIN: CPT | Mod: PBBFAC | Performed by: DERMATOLOGY

## 2019-01-09 PROCEDURE — 99212 PR OFFICE/OUTPT VISIT, EST, LEVL II, 10-19 MIN: ICD-10-PCS | Mod: S$PBB,,, | Performed by: DERMATOLOGY

## 2019-01-09 PROCEDURE — 99999 PR PBB SHADOW E&M-EST. PATIENT-LVL II: CPT | Mod: PBBFAC,,, | Performed by: DERMATOLOGY

## 2019-01-09 PROCEDURE — 99999 PR PBB SHADOW E&M-EST. PATIENT-LVL II: ICD-10-PCS | Mod: PBBFAC,,, | Performed by: DERMATOLOGY

## 2019-01-09 PROCEDURE — 99212 OFFICE O/P EST SF 10 MIN: CPT | Mod: S$PBB,,, | Performed by: DERMATOLOGY

## 2019-01-09 NOTE — PROGRESS NOTES
Subjective:       Patient ID:  Ligia Curran is a 73 y.o. female who presents for   Chief Complaint   Patient presents with    Spot     f/u     Spot  - Follow-up  Symptom course: unchanged and stable  Affected locations: chest  Signs / symptoms: asymptomatic      73 yo female here for f/u excised 8/17/2018 atypical lymphoid proliferation of the right chest, with negative TCR gene rearrangement studies.  The area has healed well, no recurrence, pain, or itching.  Patient repots no new nodules have developed near the site or elsewhere.    Skin, right chest, excision:  -ATYPICAL LYMPHOID PROLIFERATION, see comment  COMMENT: Although I favor the lesion to represent a benign process (such as cutaneous lymphoid hyperplasia  which may occur as a solitary lesion), there are a few atypical findings including an increased number of T-cells in  the infiltrate, partial loss of CD7 and T-cell gene receptor rearrangement studies that were equivocal (see  supplemental T-cell gene rearrangement report). For these reasons, I recommend follow up of the patient and  monitor for new lesions of this nature with additional biopsy if indicated. Correlation is recommended.    Review of Systems   Skin: Negative for daily sunscreen use, activity-related sunscreen use and recent sunburn.   Hematologic/Lymphatic: Does not bruise/bleed easily.        Objective:    Physical Exam   Constitutional: She appears well-developed and well-nourished. No distress.   Lymphadenopathy:     She has no cervical adenopathy.     She has no axillary adenopathy.   Neurological: She is alert and oriented to person, place, and time. She is not disoriented.   Psychiatric: She has a normal mood and affect.   Skin:   Areas Examined (abnormalities noted in diagram):   Head / Face Inspection Performed  Neck Inspection Performed  Chest / Axilla Inspection Performed  Back Inspection Performed  RUE Inspected  LUE Inspection Performed              Diagram Legend      Erythematous scaling macule/papule c/w actinic keratosis       Vascular papule c/w angioma      Pigmented verrucoid papule/plaque c/w seborrheic keratosis      Yellow umbilicated papule c/w sebaceous hyperplasia      Irregularly shaped tan macule c/w lentigo     1-2 mm smooth white papules consistent with Milia      Movable subcutaneous cyst with punctum c/w epidermal inclusion cyst      Subcutaneous movable cyst c/w pilar cyst      Firm pink to brown papule c/w dermatofibroma      Pedunculated fleshy papule(s) c/w skin tag(s)      Evenly pigmented macule c/w junctional nevus     Mildly variegated pigmented, slightly irregular-bordered macule c/w mildly atypical nevus      Flesh colored to evenly pigmented papule c/w intradermal nevus       Pink pearly papule/plaque c/w basal cell carcinoma      Erythematous hyperkeratotic cursted plaque c/w SCC      Surgical scar with no sign of skin cancer recurrence      Open and closed comedones      Inflammatory papules and pustules      Verrucoid papule consistent consistent with wart     Erythematous eczematous patches and plaques     Dystrophic onycholytic nail with subungual debris c/w onychomycosis     Umbilicated papule    Erythematous-base heme-crusted tan verrucoid plaque consistent with inflamed seborrheic keratosis     Erythematous Silvery Scaling Plaque c/w Psoriasis     See annotation      Assessment / Plan:        Atypical lymphocytic infiltrate of skin - s/p excision - no clinical recurrence at site - new pink papule at chest favor acne - to monitor this site and skin for any new or growing painful or itchy bump similar to this one when it began  No cervical or axillary LAD on exam    Seborrheic keratoses  These are benign inherited growths without a malignant potential. Reassurance given to patient. No treatment is necessary.     Skin tag   - stable and chronic               Follow-up in about 6 months (around 7/9/2019).

## 2019-01-30 ENCOUNTER — OFFICE VISIT (OUTPATIENT)
Dept: OPHTHALMOLOGY | Facility: CLINIC | Age: 74
End: 2019-01-30
Payer: MEDICARE

## 2019-01-30 DIAGNOSIS — H27.02 APHAKIA OF LEFT EYE: ICD-10-CM

## 2019-01-30 DIAGNOSIS — Z96.1 STATUS POST CATARACT EXTRACTION AND INSERTION OF INTRAOCULAR LENS OF RIGHT EYE: ICD-10-CM

## 2019-01-30 DIAGNOSIS — H16.121: Primary | ICD-10-CM

## 2019-01-30 DIAGNOSIS — Z98.41 STATUS POST CATARACT EXTRACTION AND INSERTION OF INTRAOCULAR LENS OF RIGHT EYE: ICD-10-CM

## 2019-01-30 DIAGNOSIS — H04.123 BILATERAL DRY EYES: Primary | ICD-10-CM

## 2019-01-30 DIAGNOSIS — Z94.7 CORNEA REPLACED BY TRANSPLANT: ICD-10-CM

## 2019-01-30 DIAGNOSIS — H54.10 BLINDNESS AND LOW VISION: ICD-10-CM

## 2019-01-30 DIAGNOSIS — H40.1122 PRIMARY OPEN ANGLE GLAUCOMA (POAG) OF LEFT EYE, MODERATE STAGE: ICD-10-CM

## 2019-01-30 DIAGNOSIS — H16.121 FILAMENTARY KERATITIS OF RIGHT EYE: ICD-10-CM

## 2019-01-30 DIAGNOSIS — H40.2213 CHRONIC ANGLE-CLOSURE GLAUCOMA OF RIGHT EYE, SEVERE STAGE: ICD-10-CM

## 2019-01-30 PROCEDURE — 92012 INTRM OPH EXAM EST PATIENT: CPT | Mod: S$PBB,,, | Performed by: OPHTHALMOLOGY

## 2019-01-30 PROCEDURE — 99999 PR PBB SHADOW E&M-EST. PATIENT-LVL II: ICD-10-PCS | Mod: PBBFAC,,, | Performed by: OPHTHALMOLOGY

## 2019-01-30 PROCEDURE — 92012 PR EYE EXAM, EST PATIENT,INTERMED: ICD-10-PCS | Mod: S$PBB,,, | Performed by: OPHTHALMOLOGY

## 2019-01-30 PROCEDURE — 99999 PR PBB SHADOW E&M-EST. PATIENT-LVL II: CPT | Mod: PBBFAC,,, | Performed by: OPHTHALMOLOGY

## 2019-01-30 PROCEDURE — 99212 OFFICE O/P EST SF 10 MIN: CPT | Mod: PBBFAC | Performed by: OPHTHALMOLOGY

## 2019-01-30 RX ORDER — ACETYLCYSTEINE 100 MG/ML
10 SOLUTION ORAL; RESPIRATORY (INHALATION) 4 TIMES DAILY
Qty: 10 ML | Refills: 3 | Status: SHIPPED | OUTPATIENT
Start: 2019-01-30 | End: 2019-05-29 | Stop reason: SDUPTHER

## 2019-01-30 RX ORDER — ACETYLCYSTEINE 100 MG/ML
10 SOLUTION ORAL; RESPIRATORY (INHALATION) 4 TIMES DAILY
COMMUNITY
End: 2019-01-30 | Stop reason: SDUPTHER

## 2019-01-30 NOTE — PROGRESS NOTES
Assessment /Plan     For exam results, see Encounter Report.    Bilateral dry eyes    Cornea replaced by transplant - Right Eye    Glaucoma shunt device of both eyes    Status post cataract extraction and insertion of intraocular lens of right eye    Primary open angle glaucoma (POAG) of left eye, moderate stage    Chronic angle-closure glaucoma of right eye, severe stage    Blindness and low vision - Both Eyes    Aphakia of left eye    Filamentary keratitis of right eye        Right periorbital pain not Ocular origin  Has had similar associated with sinuses in past      [facility]    Complex ocular hx  multiple sx --> trabs, tubes and tube removes etc   Too numerous to count    CCT  537 // 611    IOP livable --> discussed options OD with G6/MP3 laser --> patient held with Q & A    Right eye  PKP - Quiet --> 10/26/2017 PKP  PC IOL  Fibrotic anterior capsule membrane  Tubes x 2 in place ST & IN --> removed ST BV 2/27/2017  Glc Shunt graft site OD  Revision 04/09/2013    Right eye  PKP with Ant Cap fibrosis    Left eye  Aphakia  K-edema        Glaucoma Incisional Surgery  Patient with exposed ST Tube OD  SP OD ST BV / Amniotic membrane Removal 2/27/2017      Right eye --> consider G6 Laser as re-discussed  Mucomyst 10% --> start BID / QID OD  EES BID  PF1%  BID --> FML BID --> possible steroid response --> discussed Rejection RTC sooner  Xal q day      Left eye  Idania 128 BID + / -  Genteal Gel  Considering DSEK and Lens implant --> Pulin Calvo    Both eyes  Cosopt BID    Dry Eye Syndrome: discussed use of warm compresses, preserved & non-preserved artificial tears, gel and PM ointment options.  Also discussed options utilizing medications.        Plan  RTC 1 months [place] IOP & keratitis check  RTC sooner prn with understanding

## 2019-02-07 NOTE — TELEPHONE ENCOUNTER
Daily Note     Today's date: 2019  Patient name: Stephanie Hayward  : 2000  MRN: [id number]  Referring provider: Kathy Dawson MD  Dx:   Encounter Diagnosis     ICD-10-CM    1  Closed fracture of lateral portion of right tibial plateau with malunion S82 121P    2  Orthopedic aftercare Z47 89                   Subjective: Patient reports he is feeling pretty good with no major changes or complaints        Objective: See treatment diary below    Precautions: asthma, prediabetic, hypothyroidism  s/p MCL tear and tibial plateau fracture with ORIF 18      Daily Treatment Diary     Manual                                                                               Exercise Diary              Bike/elliptical x10' 10'  x10' x10' x10'        Hamstring stretch long sitting 30"x4 30"x4 30"x4 30"x4 30"x4        Calf stretch 30"x4 30"x4 30"x4 30"x4 30"x4        Prone quad stretch 30"x4 30"x4 30"x4 30"x4 30"x4        Heel slides 10" x15 10" x15 10" x15 10" x15 10"x15        Quad sets (prone) 5# 5"x30 5# 5"x30 5# 5"x30 10# 5"x30 10# 5"x30        SLR: flexion, extension, abduction 3# 3x10 4# 3x10 ea 4# 3x10 ea 5# 3x10 ea 5# 3x10 ea        Forward step up L3 x15 12" x15 12" x20 18" 2x10 18" 3x10        Lateral step up L3 x15 np np          Eccentric step down: forward, lateral L3 x15 ea L3 2x10 ea L3 2x10 ea L3 3x10 ea L3 3x10 ea        Leg press -130 x10 ea 130# x10  150# 2x10 130# x10  150# 2x10 -- --        SLS ABC's x2 x2 x2 x2 x2        Single RDL 2x10 1/2 depth 2x10 1/2 depth 2x10 3/4 depth 2x10 3/4 depth 3x10 3/4 depth        SL- leg press    50# 2x10 50# 2x10        BOSU squat    2x10 2x10        Jump downs    nv nv                                                                Modalities              CP- post x10' x10' np np np                                    MMT:  R quads: 4+/5  R HS: 4+/5      Assessment: Held jump downs due to patient demonstrating 4+/5 quad and HS strength Patient wants to know if she can use her erythromycin antonio and tristen 128. Advised patient to hold the tristen 128 and continue to use her PF and viga. Patient states the antonio soothes the eye and wanted to use it during the day. Informed patient the antonio will make her vision blurry. She was ok with that.    on MMT  Patient demonstrated good technique with all exercise  Able to progress in reps for RDL and step ups with no difficulty  Tolerated treatment well  Patient would benefit from continued PT to progress strengthening and progress to return to running  Plan: Continue per plan of care

## 2019-02-13 ENCOUNTER — OFFICE VISIT (OUTPATIENT)
Dept: OPHTHALMOLOGY | Facility: CLINIC | Age: 74
End: 2019-02-13
Payer: MEDICARE

## 2019-02-13 DIAGNOSIS — H40.1122 PRIMARY OPEN ANGLE GLAUCOMA (POAG) OF LEFT EYE, MODERATE STAGE: ICD-10-CM

## 2019-02-13 DIAGNOSIS — H54.10 BLINDNESS AND LOW VISION: ICD-10-CM

## 2019-02-13 DIAGNOSIS — H16.121 FILAMENTARY KERATITIS OF RIGHT EYE: Primary | ICD-10-CM

## 2019-02-13 DIAGNOSIS — Z96.1 STATUS POST CATARACT EXTRACTION AND INSERTION OF INTRAOCULAR LENS OF RIGHT EYE: ICD-10-CM

## 2019-02-13 DIAGNOSIS — H27.02 APHAKIA OF LEFT EYE: ICD-10-CM

## 2019-02-13 DIAGNOSIS — Z94.7 CORNEA REPLACED BY TRANSPLANT: ICD-10-CM

## 2019-02-13 DIAGNOSIS — Z98.41 STATUS POST CATARACT EXTRACTION AND INSERTION OF INTRAOCULAR LENS OF RIGHT EYE: ICD-10-CM

## 2019-02-13 DIAGNOSIS — T86.8419 CORNEAL TRANSPLANT FAILURE: ICD-10-CM

## 2019-02-13 DIAGNOSIS — H40.2213 CHRONIC ANGLE-CLOSURE GLAUCOMA OF RIGHT EYE, SEVERE STAGE: ICD-10-CM

## 2019-02-13 PROCEDURE — 99999 PR PBB SHADOW E&M-EST. PATIENT-LVL II: ICD-10-PCS | Mod: PBBFAC,,, | Performed by: OPHTHALMOLOGY

## 2019-02-13 PROCEDURE — 99999 PR PBB SHADOW E&M-EST. PATIENT-LVL II: CPT | Mod: PBBFAC,,, | Performed by: OPHTHALMOLOGY

## 2019-02-13 PROCEDURE — 92012 INTRM OPH EXAM EST PATIENT: CPT | Mod: S$PBB,,, | Performed by: OPHTHALMOLOGY

## 2019-02-13 PROCEDURE — 99212 OFFICE O/P EST SF 10 MIN: CPT | Mod: PBBFAC | Performed by: OPHTHALMOLOGY

## 2019-02-13 PROCEDURE — 92012 PR EYE EXAM, EST PATIENT,INTERMED: ICD-10-PCS | Mod: S$PBB,,, | Performed by: OPHTHALMOLOGY

## 2019-02-13 NOTE — PROGRESS NOTES
Assessment /Plan     For exam results, see Encounter Report.    Filamentary keratitis of right eye    Primary open angle glaucoma (POAG) of left eye, moderate stage    Corneal transplant failure    Chronic angle-closure glaucoma of right eye, severe stage    Blindness and low vision - Both Eyes    Cornea replaced by transplant - Right Eye    Glaucoma shunt device of both eyes    Status post cataract extraction and insertion of intraocular lens of right eye    Aphakia of left eye        Right periorbital pain not Ocular origin  Has had similar associated with sinuses in past      [facility]    Complex ocular hx  multiple sx --> trabs, tubes and tube removes etc   Too numerous to count    CCT  537 // 611    IOP livable --> discussed options OD with G6/MP3 laser --> patient held with Q & A    Right eye  PKP - Quiet --> 10/26/2017 PKP  PC IOL  Fibrotic anterior capsule membrane  Tubes x 2 in place ST & IN --> removed ST BV 2/27/2017  Glc Shunt graft site OD  Revision 04/09/2013    Right eye  PKP with Ant Cap fibrosis    Left eye  Aphakia  K-edema        Glaucoma Incisional Surgery  Patient with exposed ST Tube OD  SP OD ST BV / Amniotic membrane Removal 2/27/2017    Both eyes  Cosopt BID      Right eye --> consider G6 Laser as re-discussed    Mucomyst 10% --> start BID / QID OD --> BID / PRN  EES BID  PF1%  BID --> FML BID --> possible steroid response --> discussed Rejection RTC sooner  Xal q day      Left eye  Idania 128 BID + / -  Genteal Gel  Considering DSEK and Lens implant --> Lucia Calvo    Dry Eye Syndrome: discussed use of warm compresses, preserved & non-preserved artificial tears, gel and PM ointment options.  Also discussed options utilizing medications.      Plan  RTC 1 months [facility] IOP & keratitis check  RTC sooner prn with understanding

## 2019-03-12 ENCOUNTER — TELEPHONE (OUTPATIENT)
Dept: FAMILY MEDICINE | Facility: CLINIC | Age: 74
End: 2019-03-12

## 2019-03-12 NOTE — TELEPHONE ENCOUNTER
----- Message from Lucero Minor sent at 3/12/2019  1:44 PM CDT -----  Name of Who is Calling: MAYANK DIAL [[id number]]      What is the request in detail: Pt would like to make an appt sooner than next avail for abdomen pain       Can the clinic reply by [facility]:   No       What Number to Call Back if not in [facility]: ##[phone]

## 2019-03-12 NOTE — TELEPHONE ENCOUNTER
----- Message from Lucero Minor sent at 3/12/2019  1:44 PM CDT -----  Name of Who is Calling: MAYANK DIAL [[id number]]      What is the request in detail: Pt would like to make an appt sooner than next avail for abdomen pain       Can the clinic reply by [facility]:   No       What Number to Call Back if not in [facility]: ##[phone]

## 2019-03-13 ENCOUNTER — OFFICE VISIT (OUTPATIENT)
Dept: FAMILY MEDICINE | Facility: CLINIC | Age: 74
End: 2019-03-13
Payer: MEDICARE

## 2019-03-13 VITALS
BODY MASS INDEX: 40.09 KG/M2 | RESPIRATION RATE: 16 BRPM | HEART RATE: 78 BPM | HEIGHT: 60 IN | WEIGHT: 204.19 LBS | OXYGEN SATURATION: 98 % | SYSTOLIC BLOOD PRESSURE: 124 MMHG | DIASTOLIC BLOOD PRESSURE: 80 MMHG

## 2019-03-13 DIAGNOSIS — R10.32 ABDOMINAL PAIN, LEFT LOWER QUADRANT: Primary | ICD-10-CM

## 2019-03-13 DIAGNOSIS — Z12.39 SCREENING FOR BREAST CANCER: ICD-10-CM

## 2019-03-13 PROCEDURE — 99213 OFFICE O/P EST LOW 20 MIN: CPT | Mod: S$PBB,,, | Performed by: NURSE PRACTITIONER

## 2019-03-13 PROCEDURE — 99214 OFFICE O/P EST MOD 30 MIN: CPT | Mod: PBBFAC,PO | Performed by: NURSE PRACTITIONER

## 2019-03-13 PROCEDURE — 99999 PR PBB SHADOW E&M-EST. PATIENT-LVL IV: ICD-10-PCS | Mod: PBBFAC,,, | Performed by: NURSE PRACTITIONER

## 2019-03-13 PROCEDURE — 99213 PR OFFICE/OUTPT VISIT, EST, LEVL III, 20-29 MIN: ICD-10-PCS | Mod: S$PBB,,, | Performed by: NURSE PRACTITIONER

## 2019-03-13 PROCEDURE — 99999 PR PBB SHADOW E&M-EST. PATIENT-LVL IV: CPT | Mod: PBBFAC,,, | Performed by: NURSE PRACTITIONER

## 2019-03-13 NOTE — PROGRESS NOTES
Subjective:       Patient ID: Ligia Curran is a 73 y.o. female.    Chief Complaint: No chief complaint on file.    HPI     Patient presents with complaints of left lower abdominal pain. 6/10, burning pain.  Pain is not constant but when it is present it is worse than it was 4 months ago.  Also states that she notices some swelling to the area that was not present 4 months ago. She will take tylenol or ibuprofen for the pain. BM movements daily. Fairly soft stools. Does not notice straining when having a bowel movement. Rates diet as fair, does eat a good bit of fried foods but diet includes salads, vegetables and lots of water and she eats prunes regularly for BM regularity.     Review of Systems   Constitutional: Negative for chills, fatigue and fever.   HENT: Negative.    Gastrointestinal: Positive for abdominal pain. Negative for blood in stool, constipation, diarrhea, nausea and vomiting.   Musculoskeletal: Positive for arthralgias (arthiritis pain).   Neurological: Negative for dizziness, light-headedness and headaches (occasional sinus headache).         Objective:      Vitals:    03/13/19 1104   BP: 124/80   Pulse: 78   Resp: 16     Physical Exam   Constitutional: She is oriented to person, place, and time. She appears well-developed and well-nourished.   HENT:   Head: Normocephalic and atraumatic.   Right Ear: Tympanic membrane normal.   Left Ear: Tympanic membrane normal.   Mouth/Throat: Oropharynx is clear and moist.   Eyes: No scleral icterus.   Cardiovascular: Normal rate, regular rhythm and normal heart sounds. Exam reveals no friction rub.   No murmur heard.  Pulmonary/Chest: Effort normal and breath sounds normal. No respiratory distress. She has no wheezes. She has no rales.   Abdominal: Soft. Bowel sounds are normal. She exhibits no distension, no abdominal bruit and no mass. There is no hepatosplenomegaly. There is tenderness (states that she feels pressure more than pain) in the left lower  quadrant. There is no rigidity, no rebound and negative Herrera's sign. No hernia.   Neurological: She is alert and oriented to person, place, and time.   Skin: Skin is warm and dry. No rash noted. No erythema.   Psychiatric: She has a normal mood and affect. Her behavior is normal. Thought content normal.       Assessment:       1. Abdominal pain, left lower quadrant    2. Screening for breast cancer        Plan:     CT of abdomen  Avoid fried and fatty foods. Eat more greens and vegetables. Drink plenty water.  Discussed possible GI referral - pt would like to wait until after her CT results.     Mammogram ordered.     Further recommendations to follow after above.

## 2019-03-13 NOTE — PATIENT INSTRUCTIONS
Your test results will be communicated to you via : [facility], Telephone or Letter.   If you have not received your test results in one week, please contact the clinic at [phone].        Ligia,     We are always striving for excellence. Should you receive a patient experience survey in the mail, we would appreciate if you would take a few moments to give us your feedback. These surveys let us know our strengths as well as areas of opportunity for improvement to better serve you.    Thank you for your time,  Alexey Simpson MA

## 2019-03-14 ENCOUNTER — HOSPITAL ENCOUNTER (OUTPATIENT)
Dept: RADIOLOGY | Facility: OTHER | Age: 74
Discharge: HOME | End: 2019-03-14
Attending: NURSE PRACTITIONER
Payer: MEDICARE

## 2019-03-14 DIAGNOSIS — R10.32 ABDOMINAL PAIN, LEFT LOWER QUADRANT: ICD-10-CM

## 2019-03-14 PROCEDURE — 74177 CT ABD & PELVIS W/CONTRAST: CPT | Mod: 26,,, | Performed by: RADIOLOGY

## 2019-03-14 PROCEDURE — 74177 CT ABDOMEN PELVIS WITH CONTRAST: ICD-10-PCS | Mod: 26,,, | Performed by: RADIOLOGY

## 2019-03-14 PROCEDURE — 74177 CT ABD & PELVIS W/CONTRAST: CPT | Mod: TC

## 2019-03-14 PROCEDURE — 25500020 PHARM REV CODE 255: Performed by: NURSE PRACTITIONER

## 2019-03-14 RX ADMIN — IOHEXOL 30 ML: 350 INJECTION, SOLUTION INTRAVENOUS at 12:03

## 2019-03-14 RX ADMIN — IOHEXOL 100 ML: 350 INJECTION, SOLUTION INTRAVENOUS at 12:03

## 2019-03-15 ENCOUNTER — TELEPHONE (OUTPATIENT)
Dept: FAMILY MEDICINE | Facility: CLINIC | Age: 74
End: 2019-03-15

## 2019-03-15 NOTE — TELEPHONE ENCOUNTER
Detailed message with results was left for Sister Ligia.  Sister Ligia was asked to return my call if she has any questions or would like to see JANIS Miller. thanks

## 2019-03-15 NOTE — TELEPHONE ENCOUNTER
----- Message from Angela Joseph NP sent at 3/14/2019  7:32 PM CDT -----  Please call Ms Curran and let her know her CT results have come back and is unremarkable. There were no findings in her abdomen that would explain her pain. There is no free air or gas found, no masses seen and no other abnormalities. She has one renal cyst in each kidney but they are benign and by looking at her lab work, they are not effecting her kidney function. She also has a simple cyst in her liver.  These can be common finding and would also not explain her pain.  Let her know that I can send in a referral for GI if she would be interested in that.   Thanks!

## 2019-03-21 ENCOUNTER — TELEPHONE (OUTPATIENT)
Dept: FAMILY MEDICINE | Facility: CLINIC | Age: 74
End: 2019-03-21

## 2019-03-21 NOTE — TELEPHONE ENCOUNTER
----- Message from April [facility] sent at 3/21/2019  2:26 PM CDT -----  Name of Who is Calling:Self        What is the request in detail: Pt is requesting a call back from clinical staff in regards to a order or a referral that was to go out. Please contact to further discuss and advise.        Can the clinic reply by [facility]: No      [facility]: [phone] Sister Magdy

## 2019-04-09 ENCOUNTER — HOSPITAL ENCOUNTER (OUTPATIENT)
Dept: RADIOLOGY | Facility: HOSPITAL | Age: 74
Discharge: HOME | End: 2019-04-09
Attending: NURSE PRACTITIONER
Payer: MEDICARE

## 2019-04-09 DIAGNOSIS — Z12.39 SCREENING FOR BREAST CANCER: ICD-10-CM

## 2019-04-09 PROCEDURE — 77063 BREAST TOMOSYNTHESIS BI: CPT | Mod: 26,,, | Performed by: RADIOLOGY

## 2019-04-09 PROCEDURE — 77063 MAMMO DIGITAL SCREENING BILAT WITH TOMOSYNTHESIS_CAD: ICD-10-PCS | Mod: 26,,, | Performed by: RADIOLOGY

## 2019-04-09 PROCEDURE — 77067 SCR MAMMO BI INCL CAD: CPT | Mod: 26,,, | Performed by: RADIOLOGY

## 2019-04-09 PROCEDURE — 77067 SCR MAMMO BI INCL CAD: CPT | Mod: TC

## 2019-04-09 PROCEDURE — 77067 MAMMO DIGITAL SCREENING BILAT WITH TOMOSYNTHESIS_CAD: ICD-10-PCS | Mod: 26,,, | Performed by: RADIOLOGY

## 2019-04-10 ENCOUNTER — TELEPHONE (OUTPATIENT)
Dept: FAMILY MEDICINE | Facility: CLINIC | Age: 74
End: 2019-04-10

## 2019-04-10 NOTE — TELEPHONE ENCOUNTER
"Left detailed message informing pt per Nurse Practitioner Angela's note "that mammogram results are back and there are no changes when compared to her previous studies.  No signs of malignancy"    Requested a call with any question.  "

## 2019-04-10 NOTE — TELEPHONE ENCOUNTER
----- Message from Angela Joseph NP sent at 4/10/2019  2:26 PM CDT -----  Please call patient and let her know that her mammogram results are back and there are no changes when compared to her previous studies. No signs of malignancy.  Thanks  Angela

## 2019-05-08 ENCOUNTER — OFFICE VISIT (OUTPATIENT)
Dept: OPHTHALMOLOGY | Facility: CLINIC | Age: 74
End: 2019-05-08
Payer: MEDICARE

## 2019-05-08 DIAGNOSIS — H40.2213 CHRONIC ANGLE-CLOSURE GLAUCOMA OF RIGHT EYE, SEVERE STAGE: Primary | ICD-10-CM

## 2019-05-08 DIAGNOSIS — H21.41: ICD-10-CM

## 2019-05-08 DIAGNOSIS — Z98.41 STATUS POST CATARACT EXTRACTION AND INSERTION OF INTRAOCULAR LENS OF RIGHT EYE: ICD-10-CM

## 2019-05-08 DIAGNOSIS — H27.02 APHAKIA OF LEFT EYE: ICD-10-CM

## 2019-05-08 DIAGNOSIS — Z96.1 STATUS POST CATARACT EXTRACTION AND INSERTION OF INTRAOCULAR LENS OF RIGHT EYE: ICD-10-CM

## 2019-05-08 DIAGNOSIS — H04.123 BILATERAL DRY EYES: ICD-10-CM

## 2019-05-08 DIAGNOSIS — Z94.7 CORNEA REPLACED BY TRANSPLANT: ICD-10-CM

## 2019-05-08 DIAGNOSIS — H54.10 BLINDNESS AND LOW VISION: ICD-10-CM

## 2019-05-08 DIAGNOSIS — H40.1122 PRIMARY OPEN ANGLE GLAUCOMA (POAG) OF LEFT EYE, MODERATE STAGE: ICD-10-CM

## 2019-05-08 PROCEDURE — 99212 OFFICE O/P EST SF 10 MIN: CPT | Mod: PBBFAC | Performed by: OPHTHALMOLOGY

## 2019-05-08 PROCEDURE — 92012 INTRM OPH EXAM EST PATIENT: CPT | Mod: S$PBB,,, | Performed by: OPHTHALMOLOGY

## 2019-05-08 PROCEDURE — 92012 PR EYE EXAM, EST PATIENT,INTERMED: ICD-10-PCS | Mod: S$PBB,,, | Performed by: OPHTHALMOLOGY

## 2019-05-08 PROCEDURE — 99999 PR PBB SHADOW E&M-EST. PATIENT-LVL II: ICD-10-PCS | Mod: PBBFAC,,, | Performed by: OPHTHALMOLOGY

## 2019-05-08 PROCEDURE — 99999 PR PBB SHADOW E&M-EST. PATIENT-LVL II: CPT | Mod: PBBFAC,,, | Performed by: OPHTHALMOLOGY

## 2019-05-08 NOTE — PROGRESS NOTES
Assessment /Plan     For exam results, see Encounter Report.    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    Primary open angle glaucoma (POAG) of left eye, moderate stage    Cornea replaced by transplant - Right Eye    Glaucoma shunt device of both eyes    Bilateral dry eyes    Status post cataract extraction and insertion of intraocular lens of right eye    Aphakia of left eye        Right periorbital pain not Ocular origin  Has had similar associated with sinuses in past      [facility]    Complex ocular hx  multiple sx --> trabs, tubes and tube removes etc   Too numerous to count    CCT  537 // 611    IOP livable --> discussed options OD with G6/MP3 laser    Right eye  PKP - Quiet --> 10/26/2017 PKP  PC IOL  Fibrotic anterior capsule membrane  Tubes x 2 in place ST & IN --> removed ST BV 2/27/2017  Glc Shunt graft site OD  Revision 04/09/2013    Right eye  PKP with Ant Cap fibrosis    Left eye  Aphakia  K-edema        Glaucoma Incisional Surgery  Patient with exposed ST Tube OD  SP OD ST BV / Amniotic membrane Removal 2/27/2017    Both eyes  Cosopt BID    Right eye --> consider G6 Laser as re-discussed  Mucomyst 10% BID / QID OD --> BID / PRN --> not using  EES BID  PF1%  BID --> FML BID --> possible steroid response --> discussed Rejection RTC sooner  Xal q day      Left eye  Idania 128 BID + / -  Genteal Gel  Considering DSEK and Lens implant --> Lucia Calvo    Dry Eye Syndrome: discussed use of warm compresses, preserved & non-preserved artificial tears, gel and PM ointment options.  Also discussed options utilizing medications.      Plan  RTC 3 months [facility] IOP & keratitis check  RTC sooner prn with understanding

## 2019-05-16 ENCOUNTER — TELEPHONE (OUTPATIENT)
Dept: OPHTHALMOLOGY | Facility: CLINIC | Age: 74
End: 2019-05-16

## 2019-05-16 NOTE — TELEPHONE ENCOUNTER
Left Message.    ----- Message from Adry Hdz sent at 5/16/2019  2:07 PM CDT -----  Contact: Ligia Curran   Pt would like to speak with  nurse about the eye drops ,pt can be reached at cell#  [phone] please thank you.

## 2019-05-17 DIAGNOSIS — H40.2213 CHRONIC ANGLE-CLOSURE GLAUCOMA OF RIGHT EYE, SEVERE STAGE: Primary | ICD-10-CM

## 2019-05-17 RX ORDER — LATANOPROST 50 UG/ML
1 SOLUTION/ DROPS OPHTHALMIC DAILY
Qty: 2.5 ML | Refills: 4 | Status: SHIPPED | OUTPATIENT
Start: 2019-05-17 | End: 2020-03-20

## 2019-05-22 ENCOUNTER — OFFICE VISIT (OUTPATIENT)
Dept: OPHTHALMOLOGY | Facility: CLINIC | Age: 74
End: 2019-05-22
Payer: MEDICARE

## 2019-05-22 DIAGNOSIS — H54.10 BLINDNESS AND LOW VISION: ICD-10-CM

## 2019-05-22 DIAGNOSIS — H21.41: ICD-10-CM

## 2019-05-22 DIAGNOSIS — T86.8419 CORNEAL TRANSPLANT FAILURE: ICD-10-CM

## 2019-05-22 DIAGNOSIS — H18.232 SECONDARY CORNEAL EDEMA OF LEFT EYE: ICD-10-CM

## 2019-05-22 DIAGNOSIS — H40.2213 CHRONIC ANGLE-CLOSURE GLAUCOMA OF RIGHT EYE, SEVERE STAGE: Primary | ICD-10-CM

## 2019-05-22 PROCEDURE — 92014 PR EYE EXAM, EST PATIENT,COMPREHESV: ICD-10-PCS | Mod: S$PBB,,, | Performed by: OPHTHALMOLOGY

## 2019-05-22 PROCEDURE — 92014 COMPRE OPH EXAM EST PT 1/>: CPT | Mod: S$PBB,,, | Performed by: OPHTHALMOLOGY

## 2019-05-22 PROCEDURE — 99999 PR PBB SHADOW E&M-EST. PATIENT-LVL II: CPT | Mod: PBBFAC,,, | Performed by: OPHTHALMOLOGY

## 2019-05-22 PROCEDURE — 99212 OFFICE O/P EST SF 10 MIN: CPT | Mod: PBBFAC | Performed by: OPHTHALMOLOGY

## 2019-05-22 PROCEDURE — 99999 PR PBB SHADOW E&M-EST. PATIENT-LVL II: ICD-10-PCS | Mod: PBBFAC,,, | Performed by: OPHTHALMOLOGY

## 2019-05-22 NOTE — PROGRESS NOTES
HPI     Patient here for cornea check. Patient states that her vision is very   blurry, tearing, dryness, flashes. Patient denies any other ocular   complaints.    Cosopt BID OU  FML BID OD  Xalatan Q nightly OU  Idania 128 BID OS  Systane gel every other night--patient unable to obtain genteal gel.    S/p postop cataract/PKP OD (10/26/2017)  S/p corneal transplant OD (10/26/2017)  S/p removal of baerveldt glaucoma shunt OD (2/27/2017)  S/p dissection and removal of fibrous pupillary membrane OD (1/26/2017)  S/p removal of necrotic tissue of eye (4/9/2013)  S/p baerveldt glaucoma shunt OD (12/26/2012)  S/p corneal transplant OD (5/24/2012)  S/p CE  Glaucoma shunt device OU  Aphakia OS  Blindness and low vision OU  Dry eyes OU  Secondary corneal edema OS  Chronic angle-closure glaucoma severe stage OD  Corneal edema  Adhesions and disruptions of pupillary membrane  Corneal transplant failure  Filamentary keratitis OD    Last edited by Cb Espinoza on 5/22/2019 10:30 AM. (History)            Assessment /Plan     For exam results, see Encounter Report.    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 Signs and symptoms of graft rejection reviewed.  Severe fibrous ingrowth OD with occluding pupillary membrane. May try YAG again...    OS with 3-4+ K edema, aphakic, with 2 tubes...  Could consider ACIOL with DSEK...

## 2019-05-27 ENCOUNTER — TELEPHONE (OUTPATIENT)
Dept: OPHTHALMOLOGY | Facility: CLINIC | Age: 74
End: 2019-05-27

## 2019-05-27 NOTE — TELEPHONE ENCOUNTER
pt had yag cap with Dr. Calvo last week   OD tearing and burning.   Request check   Apt booked for 05/28/2019.

## 2019-05-27 NOTE — TELEPHONE ENCOUNTER
----- Message from Althea Avery sent at 5/27/2019  2:49 PM CDT -----  Contact: Ligia  Needs Advice    Reason for call: pt stated she needed to speak with someone in the office. Pt stated she wanted to come in on tomorrow she's having a lot of tearing and burning. Pt stated this have been going on since Friday evening.        Communication Preference: [phone]    Additional Information:

## 2019-05-28 ENCOUNTER — OFFICE VISIT (OUTPATIENT)
Dept: OPHTHALMOLOGY | Facility: CLINIC | Age: 74
End: 2019-05-28
Payer: MEDICARE

## 2019-05-28 DIAGNOSIS — Z96.1 STATUS POST CATARACT EXTRACTION AND INSERTION OF INTRAOCULAR LENS OF RIGHT EYE: ICD-10-CM

## 2019-05-28 DIAGNOSIS — H40.2213 CHRONIC ANGLE-CLOSURE GLAUCOMA OF RIGHT EYE, SEVERE STAGE: ICD-10-CM

## 2019-05-28 DIAGNOSIS — Z94.7 CORNEA REPLACED BY TRANSPLANT: ICD-10-CM

## 2019-05-28 DIAGNOSIS — H16.121 FILAMENTARY KERATITIS OF RIGHT EYE: Primary | ICD-10-CM

## 2019-05-28 DIAGNOSIS — Z98.41 STATUS POST CATARACT EXTRACTION AND INSERTION OF INTRAOCULAR LENS OF RIGHT EYE: ICD-10-CM

## 2019-05-28 DIAGNOSIS — H54.10 BLINDNESS AND LOW VISION: ICD-10-CM

## 2019-05-28 DIAGNOSIS — H40.1122 PRIMARY OPEN ANGLE GLAUCOMA (POAG) OF LEFT EYE, MODERATE STAGE: ICD-10-CM

## 2019-05-28 DIAGNOSIS — H21.41: ICD-10-CM

## 2019-05-28 DIAGNOSIS — H27.02 APHAKIA OF LEFT EYE: ICD-10-CM

## 2019-05-28 PROCEDURE — 99999 PR PBB SHADOW E&M-EST. PATIENT-LVL II: ICD-10-PCS | Mod: PBBFAC,,, | Performed by: OPHTHALMOLOGY

## 2019-05-28 PROCEDURE — 92133 CPTRZD OPH DX IMG PST SGM ON: CPT | Mod: PBBFAC | Performed by: OPHTHALMOLOGY

## 2019-05-28 PROCEDURE — 92012 INTRM OPH EXAM EST PATIENT: CPT | Mod: S$PBB,,, | Performed by: OPHTHALMOLOGY

## 2019-05-28 PROCEDURE — 92133 POSTERIOR SEGMENT OCT OPTIC NERVE(OCULAR COHERENCE TOMOGRAPHY) - OU - BOTH EYES: ICD-10-PCS | Mod: 26,S$PBB,, | Performed by: OPHTHALMOLOGY

## 2019-05-28 PROCEDURE — 99999 PR PBB SHADOW E&M-EST. PATIENT-LVL II: CPT | Mod: PBBFAC,,, | Performed by: OPHTHALMOLOGY

## 2019-05-28 PROCEDURE — 99212 OFFICE O/P EST SF 10 MIN: CPT | Mod: PBBFAC | Performed by: OPHTHALMOLOGY

## 2019-05-28 PROCEDURE — 92012 PR EYE EXAM, EST PATIENT,INTERMED: ICD-10-PCS | Mod: S$PBB,,, | Performed by: OPHTHALMOLOGY

## 2019-05-28 NOTE — PROGRESS NOTES
Assessment /Plan     For exam results, see Encounter Report.    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  -     B Scan    Glaucoma shunt device of both eyes    Status post cataract extraction and insertion of intraocular lens of right eye        SP YAG Ant Cap OD c P Umesh  Irritation  + YUAN as discussed  Feels a bit improvement with Yag Ant Cap  + red reflex  No F/ F etc  Unable to view with OCT  Will try B-scan OD @ Month  Consider Touch up    Right periorbital pain not Ocular origin  Has had similar associated with sinuses in past      [facility]    Complex ocular hx  multiple sx --> trabs, tubes and tube removes etc   Too numerous to count    CCT  537 // 611    IOP livable --> discussed options OD with G6/MP3 laser    Right eye  PKP - Quiet --> 10/26/2017 PKP  PC IOL  Fibrotic anterior capsule membrane  Tubes x 2 in place ST & IN --> removed ST BV 2/27/2017  Glc Shunt graft site OD  Revision 04/09/2013    Right eye  PKP with Ant Cap fibrosis    Left eye  Aphakia  K-edema        Glaucoma Incisional Surgery  Patient with exposed ST Tube OD  SP OD ST BV / Amniotic membrane Removal 2/27/2017    Both eyes  Cosopt BID    Right eye --> consider G6 Laser as re-discussed  Mucomyst 10% BID / QID OD --> BID / PRN --> not using  EES BID  PF1%  BID --> FML BID --> possible steroid response --> discussed Rejection RTC sooner  Xal q day      Left eye  Idania 128 BID + / -  Genteal Gel  Considering DSEK and Lens implant --> Lucia Calvo    Dry Eye Syndrome: discussed use of warm compresses, preserved & non-preserved artificial tears, gel and PM  ointment options.  Also discussed options utilizing medications.      Plan  RTC 1 months [place] [facility] & B-scan OD --> consider Yag Cap touch up  RTC sooner prn with understanding

## 2019-05-29 DIAGNOSIS — H16.121: ICD-10-CM

## 2019-06-04 RX ORDER — ACETYLCYSTEINE 100 MG/ML
SOLUTION ORAL; RESPIRATORY (INHALATION)
Qty: 10 ML | Refills: 3 | Status: SHIPPED | OUTPATIENT
Start: 2019-06-04 | End: 2019-08-06 | Stop reason: SDUPTHER

## 2019-07-01 NOTE — PROGRESS NOTES
Assessment /Plan     For exam results, see Encounter Report.    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    Bilateral dry eyes          [facility]  Strong FMHx Glc / Blindness --> Justyn Creole Dz / mom's side    Complex ocular hx  multiple sx --> trabs, tubes and tube removes etc   Too numerous to count    CCT  537 // 611    IOP livable --> discussed options OD with G6/MP3 laser    Right eye  PKP - Quiet --> 10/26/2017 PKP  PC IOL  Fibrotic anterior capsule membrane  Tubes x 2 in place ST & IN --> removed ST BV 2/27/2017  Glc Shunt graft site OD  Revision 04/09/2013    B-scan No RD 7/2/2019    Right eye  PKP with Ant Cap fibrosis    Left eye  Aphakia  K-edema        Glaucoma Incisional Surgery  Patient with exposed ST Tube OD  SP OD ST BV / Amniotic membrane Removal 2/27/2017    Both eyes  Cosopt BID    Right eye --> consider G6 Laser as re-discussed  Mucomyst 10% BID / QID OD --> BID / PRN --> not using  EES BID  PF1%  BID --> FML BID --> possible steroid response --> discussed Rejection RTC sooner  Xal q day      Left eye  Idania 128 BID + / -  Genteal Gel  Considering DSEK and Lens implant --> Lucia Calvo    Dry Eye Syndrome: discussed use of warm compresses, preserved & non-preserved artificial tears, gel and PM ointment options.  Also discussed options utilizing medications.      Plan  RTC 3 months [place] IOP --> consider Yag Cap touch up  RTC sooner prn with understanding

## 2019-07-02 ENCOUNTER — OFFICE VISIT (OUTPATIENT)
Dept: OPHTHALMOLOGY | Facility: CLINIC | Age: 74
End: 2019-07-02
Payer: MEDICARE

## 2019-07-02 DIAGNOSIS — Z94.7 CORNEA REPLACED BY TRANSPLANT: ICD-10-CM

## 2019-07-02 DIAGNOSIS — Z96.1 STATUS POST CATARACT EXTRACTION AND INSERTION OF INTRAOCULAR LENS OF RIGHT EYE: ICD-10-CM

## 2019-07-02 DIAGNOSIS — Z94.7 CORNEAL TRANSPLANT STATUS: Primary | ICD-10-CM

## 2019-07-02 DIAGNOSIS — H40.1122 PRIMARY OPEN ANGLE GLAUCOMA (POAG) OF LEFT EYE, MODERATE STAGE: ICD-10-CM

## 2019-07-02 DIAGNOSIS — Z98.41 STATUS POST CATARACT EXTRACTION AND INSERTION OF INTRAOCULAR LENS OF RIGHT EYE: ICD-10-CM

## 2019-07-02 DIAGNOSIS — H01.009 BLEPHARITIS, UNSPECIFIED LATERALITY, UNSPECIFIED TYPE: ICD-10-CM

## 2019-07-02 DIAGNOSIS — H54.10 BLINDNESS AND LOW VISION: ICD-10-CM

## 2019-07-02 DIAGNOSIS — H21.41: ICD-10-CM

## 2019-07-02 DIAGNOSIS — H04.123 BILATERAL DRY EYES: ICD-10-CM

## 2019-07-02 DIAGNOSIS — H27.02 APHAKIA OF LEFT EYE: ICD-10-CM

## 2019-07-02 DIAGNOSIS — H40.2213 CHRONIC ANGLE-CLOSURE GLAUCOMA OF RIGHT EYE, SEVERE STAGE: Primary | ICD-10-CM

## 2019-07-02 PROCEDURE — 76512 B SCAN: ICD-10-PCS | Mod: 26,50,S$PBB, | Performed by: OPHTHALMOLOGY

## 2019-07-02 PROCEDURE — 76512 OPH US DX B-SCAN: CPT | Mod: 50,PBBFAC | Performed by: OPHTHALMOLOGY

## 2019-07-02 PROCEDURE — 92012 PR EYE EXAM, EST PATIENT,INTERMED: ICD-10-PCS | Mod: S$PBB,,, | Performed by: OPHTHALMOLOGY

## 2019-07-02 PROCEDURE — 99999 PR PBB SHADOW E&M-EST. PATIENT-LVL II: ICD-10-PCS | Mod: PBBFAC,,, | Performed by: OPHTHALMOLOGY

## 2019-07-02 PROCEDURE — 99999 PR PBB SHADOW E&M-EST. PATIENT-LVL II: CPT | Mod: PBBFAC,,, | Performed by: OPHTHALMOLOGY

## 2019-07-02 PROCEDURE — 99212 OFFICE O/P EST SF 10 MIN: CPT | Mod: PBBFAC,25 | Performed by: OPHTHALMOLOGY

## 2019-07-02 PROCEDURE — 92012 INTRM OPH EXAM EST PATIENT: CPT | Mod: S$PBB,,, | Performed by: OPHTHALMOLOGY

## 2019-07-02 RX ORDER — FLUOROMETHOLONE 1 MG/ML
1 SUSPENSION/ DROPS OPHTHALMIC 2 TIMES DAILY
Qty: 10 ML | Refills: 6 | Status: SHIPPED | OUTPATIENT
Start: 2019-07-02 | End: 2021-05-06 | Stop reason: CLARIF

## 2019-07-02 RX ORDER — ERYTHROMYCIN 5 MG/G
1 OINTMENT OPHTHALMIC NIGHTLY
Qty: 1 TUBE | Refills: 6 | Status: SHIPPED | OUTPATIENT
Start: 2019-07-02 | End: 2019-09-20 | Stop reason: SDUPTHER

## 2019-07-02 RX ORDER — ERYTHROMYCIN 5 MG/G
1 OINTMENT OPHTHALMIC NIGHTLY
COMMUNITY
End: 2019-07-02 | Stop reason: SDUPTHER

## 2019-07-02 RX ORDER — FLUOROMETHOLONE 1 MG/ML
1 SUSPENSION/ DROPS OPHTHALMIC 2 TIMES DAILY
COMMUNITY
End: 2019-07-02 | Stop reason: SDUPTHER

## 2019-07-23 NOTE — PROGRESS NOTES
Assessment /Plan     For exam results, see Encounter Report.    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    Glaucoma shunt device of both eyes    Bilateral dry eyes    Status post cataract extraction and insertion of intraocular lens of right eye          [facility]  Strong FMHx Glc / Blindness --> Justyn Creole Dz / mom's side    Complex ocular hx  multiple sx --> trabs, tubes and tube removes etc   Too numerous to count    CCT  537 // 611    IOP livable --> discussed options OD with G6/MP3 laser    Right eye  PKP - Quiet --> 10/26/2017 PKP  PC IOL  Fibrotic anterior capsule membrane  Tubes x 2 in place ST & IN --> removed ST BV 2/27/2017  Glc Shunt graft site OD  Revision 04/09/2013    B-scan No RD 7/2/2019    Right eye  PKP with Ant Cap fibrosis    Left eye  Aphakia  K-edema        Glaucoma Incisional Surgery  Patient with exposed ST Tube OD  SP OD ST BV / Amniotic membrane Removal 2/27/2017    Both eyes  Cosopt BID    Right eye --> consider G6 Laser as re-discussed  Mucomyst 10% BID / QID OD --> BID / PRN --> restart OU  EES BID  PF1%  BID --> FML BID --> possible steroid response --> discussed Rejection RTC sooner  Xal q day      Left eye  Idania 128 BID + / -  Genteal Gel  Considering DSEK and Lens implant --> Lucia Calvo    Dry Eye Syndrome: discussed use of warm compresses, preserved & non-preserved artificial tears, gel and PM ointment options.  Also discussed options utilizing medications.      Plan  RTC 3 months [place] IOP --> consider Yag Cap touch up  RTC sooner prn with understanding

## 2019-08-06 ENCOUNTER — OFFICE VISIT (OUTPATIENT)
Dept: OPHTHALMOLOGY | Facility: CLINIC | Age: 74
End: 2019-08-06
Payer: MEDICARE

## 2019-08-06 ENCOUNTER — TELEPHONE (OUTPATIENT)
Dept: OPHTHALMOLOGY | Facility: CLINIC | Age: 74
End: 2019-08-06

## 2019-08-06 DIAGNOSIS — Z98.41 STATUS POST CATARACT EXTRACTION AND INSERTION OF INTRAOCULAR LENS OF RIGHT EYE: ICD-10-CM

## 2019-08-06 DIAGNOSIS — Z94.7 CORNEA REPLACED BY TRANSPLANT: ICD-10-CM

## 2019-08-06 DIAGNOSIS — T86.8419 CORNEAL TRANSPLANT FAILURE: ICD-10-CM

## 2019-08-06 DIAGNOSIS — H40.2213 CHRONIC ANGLE-CLOSURE GLAUCOMA OF RIGHT EYE, SEVERE STAGE: ICD-10-CM

## 2019-08-06 DIAGNOSIS — Z96.1 STATUS POST CATARACT EXTRACTION AND INSERTION OF INTRAOCULAR LENS OF RIGHT EYE: ICD-10-CM

## 2019-08-06 DIAGNOSIS — H54.10 BLINDNESS AND LOW VISION: ICD-10-CM

## 2019-08-06 DIAGNOSIS — H27.02 APHAKIA OF LEFT EYE: ICD-10-CM

## 2019-08-06 DIAGNOSIS — H21.41: ICD-10-CM

## 2019-08-06 DIAGNOSIS — H04.123 BILATERAL DRY EYES: Primary | ICD-10-CM

## 2019-08-06 DIAGNOSIS — H16.121: ICD-10-CM

## 2019-08-06 PROCEDURE — 99999 PR PBB SHADOW E&M-EST. PATIENT-LVL II: ICD-10-PCS | Mod: PBBFAC,,, | Performed by: OPHTHALMOLOGY

## 2019-08-06 PROCEDURE — 92012 INTRM OPH EXAM EST PATIENT: CPT | Mod: S$PBB,,, | Performed by: OPHTHALMOLOGY

## 2019-08-06 PROCEDURE — 92012 PR EYE EXAM, EST PATIENT,INTERMED: ICD-10-PCS | Mod: S$PBB,,, | Performed by: OPHTHALMOLOGY

## 2019-08-06 PROCEDURE — 99212 OFFICE O/P EST SF 10 MIN: CPT | Mod: PBBFAC | Performed by: OPHTHALMOLOGY

## 2019-08-06 PROCEDURE — 99999 PR PBB SHADOW E&M-EST. PATIENT-LVL II: CPT | Mod: PBBFAC,,, | Performed by: OPHTHALMOLOGY

## 2019-08-06 RX ORDER — ACETYLCYSTEINE 100 MG/ML
4 SOLUTION ORAL; RESPIRATORY (INHALATION) 4 TIMES DAILY
Qty: 10 ML | Refills: 3 | Status: SHIPPED | OUTPATIENT
Start: 2019-08-06 | End: 2019-09-20

## 2019-08-06 NOTE — TELEPHONE ENCOUNTER
Clarified with [facility] proper way medication is to be dispensed.   Spoke to Monica in compounding

## 2019-08-06 NOTE — TELEPHONE ENCOUNTER
----- Message from Estela Parker sent at 8/6/2019 11:48 AM CDT -----  Contact: [facility]  Pharmacy Calling    Reason for call:Needs clarity on how to dispense medication    Pharm[facility] Name[facility]    Prescription Name: acetylcysteine 100 mg/ml, 10%, (MUCOMYST) 100 mg/mL (10 %) nebulizer solution    Phone Number: [phone]     Additional Information: directions state to use 4 ml 4x's a day but we only dispensed 10 ml, please call to clarify

## 2019-08-26 RX ORDER — DORZOLAMIDE HYDROCHLORIDE AND TIMOLOL MALEATE 20; 5 MG/ML; MG/ML
SOLUTION/ DROPS OPHTHALMIC
Qty: 30 ML | Refills: 0 | Status: SHIPPED | OUTPATIENT
Start: 2019-08-26 | End: 2019-09-20 | Stop reason: SDUPTHER

## 2019-08-26 RX ORDER — DORZOLAMIDE HYDROCHLORIDE AND TIMOLOL MALEATE 20; 5 MG/ML; MG/ML
SOLUTION/ DROPS OPHTHALMIC
Qty: 10 ML | Refills: 4 | Status: SHIPPED | OUTPATIENT
Start: 2019-08-26 | End: 2019-09-20 | Stop reason: SDUPTHER

## 2019-09-18 ENCOUNTER — TELEPHONE (OUTPATIENT)
Dept: OPHTHALMOLOGY | Facility: CLINIC | Age: 74
End: 2019-09-18

## 2019-09-18 NOTE — TELEPHONE ENCOUNTER
----- Message from Antonette Jean sent at 9/18/2019  1:40 PM CDT -----  Contact: self [phone]  Pt states she is having pain her right eye states she also having drainage with pain please call back to discuss states can she come Friday morning.

## 2019-09-20 ENCOUNTER — OFFICE VISIT (OUTPATIENT)
Dept: OPHTHALMOLOGY | Facility: CLINIC | Age: 74
End: 2019-09-20
Payer: MEDICARE

## 2019-09-20 DIAGNOSIS — Z94.7 CORNEA REPLACED BY TRANSPLANT: ICD-10-CM

## 2019-09-20 DIAGNOSIS — H54.10 BLINDNESS AND LOW VISION: ICD-10-CM

## 2019-09-20 DIAGNOSIS — Z98.41 STATUS POST CATARACT EXTRACTION AND INSERTION OF INTRAOCULAR LENS OF RIGHT EYE: ICD-10-CM

## 2019-09-20 DIAGNOSIS — H01.009 BLEPHARITIS, UNSPECIFIED LATERALITY, UNSPECIFIED TYPE: ICD-10-CM

## 2019-09-20 DIAGNOSIS — H40.1122 PRIMARY OPEN ANGLE GLAUCOMA (POAG) OF LEFT EYE, MODERATE STAGE: ICD-10-CM

## 2019-09-20 DIAGNOSIS — H40.2213 CHRONIC ANGLE-CLOSURE GLAUCOMA OF RIGHT EYE, SEVERE STAGE: ICD-10-CM

## 2019-09-20 DIAGNOSIS — H02.88B MEIBOMIAN GLAND DYSFUNCTION (MGD), BILATERAL, BOTH UPPER AND LOWER LIDS: Primary | ICD-10-CM

## 2019-09-20 DIAGNOSIS — H18.20 CORNEAL EDEMA: ICD-10-CM

## 2019-09-20 DIAGNOSIS — T86.8419 CORNEAL TRANSPLANT FAILURE: ICD-10-CM

## 2019-09-20 DIAGNOSIS — H02.88A MEIBOMIAN GLAND DYSFUNCTION (MGD), BILATERAL, BOTH UPPER AND LOWER LIDS: Primary | ICD-10-CM

## 2019-09-20 DIAGNOSIS — Z96.1 STATUS POST CATARACT EXTRACTION AND INSERTION OF INTRAOCULAR LENS OF RIGHT EYE: ICD-10-CM

## 2019-09-20 PROCEDURE — 99999 PR PBB SHADOW E&M-EST. PATIENT-LVL II: ICD-10-PCS | Mod: PBBFAC,,, | Performed by: OPHTHALMOLOGY

## 2019-09-20 PROCEDURE — 92012 PR EYE EXAM, EST PATIENT,INTERMED: ICD-10-PCS | Mod: S$PBB,,, | Performed by: OPHTHALMOLOGY

## 2019-09-20 PROCEDURE — 99999 PR PBB SHADOW E&M-EST. PATIENT-LVL II: CPT | Mod: PBBFAC,,, | Performed by: OPHTHALMOLOGY

## 2019-09-20 PROCEDURE — 92012 INTRM OPH EXAM EST PATIENT: CPT | Mod: S$PBB,,, | Performed by: OPHTHALMOLOGY

## 2019-09-20 PROCEDURE — 99212 OFFICE O/P EST SF 10 MIN: CPT | Mod: PBBFAC | Performed by: OPHTHALMOLOGY

## 2019-09-20 RX ORDER — ERYTHROMYCIN 5 MG/G
1 OINTMENT OPHTHALMIC NIGHTLY
Qty: 2 TUBE | Refills: 6 | Status: SHIPPED | OUTPATIENT
Start: 2019-09-20 | End: 2020-10-01

## 2019-09-20 NOTE — PROGRESS NOTES
Assessment /Plan     For exam results, see Encounter Report.    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    Glaucoma shunt device of both eyes    Status post cataract extraction and insertion of intraocular lens of right eye          [facility] [employer] [facility]  Strong FMHx Glc / Blindness --> Justyn Creole Dz / mom's side    Complex ocular hx  multiple sx --> trabs, tubes and tube removes etc   Too numerous to count    CCT  537 // 611    IOP livable --> discussed options OD with G6/MP3 laser    Right eye  PKP - Quiet --> 10/26/2017 PKP  PC IOL  Fibrotic anterior capsule membrane  Tubes x 2 in place ST & IN --> removed ST BV 2/27/2017  Glc Shunt graft site OD  Revision 04/09/2013    B-scan No RD 7/2/2019    Right eye  PKP with Ant Cap fibrosis    Left eye  Aphakia  K-edema        Glaucoma Incisional Surgery  Patient with exposed ST Tube OD  SP OD ST BV / Amniotic membrane Removal 2/27/2017    Both eyes  Cosopt BID    Right eye --> consider G6 Laser as re-discussed  PF1%  BID --> FML BID --> possible steroid response --> discussed Rejection RTC sooner  Xal q day      Left eye  Idania 128 BID + / -  Genteal Gel  Considering DSEK and Lens implant --> Lucia Calvo      MGD  Dry Eye Syndrome: discussed use of warm compresses, preserved & non-preserved artificial tears, gel and PM ointment options.  Also discussed options utilizing medications.    Mucomyst 10% BID / QID OD --> BID / PRN  EES BID --> q hs    --> consider Doxy PO as discussed      Plan  RTC  IOP --> as scheduled  RTC sooner prn with understanding

## 2019-09-24 NOTE — PROGRESS NOTES
Assessment /Plan     For exam results, see Encounter Report.    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    Glaucoma shunt device of both eyes    Status post cataract extraction and insertion of intraocular lens of right eye          [facility]  Strong FMHx Glc / Blindness --> Justyn Creole Dz / mom's side    Complex ocular hx  multiple sx --> trabs, tubes and tube removes etc   Too numerous to count    CCT  537 // 611    IOP livable --> discussed options OD with G6/MP3 laser    Right eye  PKP - Quiet --> 10/26/2017 PKP  PC IOL  Fibrotic anterior capsule membrane  Tubes x 2 in place ST & IN --> removed ST BV 2/27/2017  Glc Shunt graft site OD  Revision 04/09/2013    B-scan No RD 7/2/2019    Right eye  PKP with Ant Cap fibrosis    Left eye  Aphakia  K-edema        Glaucoma Incisional Surgery  Patient with exposed ST Tube OD  SP OD ST BV / Amniotic membrane Removal 2/27/2017    Both eyes  Cosopt BID    Right eye --> consider G6 Laser as re-discussed  PF1%  BID --> FML BID --> possible steroid response --> discussed Rejection RTC sooner  Xal q day      Left eye  Idania 128 BID + / -  Genteal Gel  Considering DSEK and Lens implant --> Lucia Calvo      MGD --> improved surface --> improved Va 10/1/2019  Dry Eye Syndrome: discussed use of warm compresses, preserved & non-preserved artificial tears, gel and PM ointment options.  Also discussed options utilizing medications.    Mucomyst 10% BID / QID OD --> BID / PRN  EES BID --> q hs    --> consider Doxy PO as discussed      Plan  RTC  3 months IOP  RTC sooner prn with understanding

## 2019-09-30 ENCOUNTER — PATIENT OUTREACH (OUTPATIENT)
Dept: ADMINISTRATIVE | Facility: OTHER | Age: 74
End: 2019-09-30

## 2019-10-01 ENCOUNTER — OFFICE VISIT (OUTPATIENT)
Dept: OPHTHALMOLOGY | Facility: CLINIC | Age: 74
End: 2019-10-01
Payer: MEDICARE

## 2019-10-01 DIAGNOSIS — H40.1122 PRIMARY OPEN ANGLE GLAUCOMA (POAG) OF LEFT EYE, MODERATE STAGE: ICD-10-CM

## 2019-10-01 DIAGNOSIS — H40.20X3 ANGLE-CLOSURE GLAUCOMA, SEVERE STAGE: ICD-10-CM

## 2019-10-01 DIAGNOSIS — H18.20 CORNEAL EDEMA: ICD-10-CM

## 2019-10-01 DIAGNOSIS — Z94.7 CORNEA REPLACED BY TRANSPLANT: ICD-10-CM

## 2019-10-01 DIAGNOSIS — H54.10 BLINDNESS AND LOW VISION: ICD-10-CM

## 2019-10-01 DIAGNOSIS — H40.2213 CHRONIC ANGLE-CLOSURE GLAUCOMA OF RIGHT EYE, SEVERE STAGE: Primary | ICD-10-CM

## 2019-10-01 DIAGNOSIS — Z98.41 STATUS POST CATARACT EXTRACTION AND INSERTION OF INTRAOCULAR LENS OF RIGHT EYE: ICD-10-CM

## 2019-10-01 DIAGNOSIS — Z96.1 STATUS POST CATARACT EXTRACTION AND INSERTION OF INTRAOCULAR LENS OF RIGHT EYE: ICD-10-CM

## 2019-10-01 PROCEDURE — 99999 PR PBB SHADOW E&M-EST. PATIENT-LVL II: CPT | Mod: PBBFAC,,, | Performed by: OPHTHALMOLOGY

## 2019-10-01 PROCEDURE — 99999 PR PBB SHADOW E&M-EST. PATIENT-LVL II: ICD-10-PCS | Mod: PBBFAC,,, | Performed by: OPHTHALMOLOGY

## 2019-10-01 PROCEDURE — 92012 INTRM OPH EXAM EST PATIENT: CPT | Mod: S$PBB,,, | Performed by: OPHTHALMOLOGY

## 2019-10-01 PROCEDURE — 99212 OFFICE O/P EST SF 10 MIN: CPT | Mod: PBBFAC | Performed by: OPHTHALMOLOGY

## 2019-10-01 PROCEDURE — 92012 PR EYE EXAM, EST PATIENT,INTERMED: ICD-10-PCS | Mod: S$PBB,,, | Performed by: OPHTHALMOLOGY

## 2019-10-01 RX ORDER — DORZOLAMIDE HYDROCHLORIDE AND TIMOLOL MALEATE 20; 5 MG/ML; MG/ML
1 SOLUTION/ DROPS OPHTHALMIC 2 TIMES DAILY
Qty: 30 ML | Refills: 4 | Status: SHIPPED | OUTPATIENT
Start: 2019-10-01 | End: 2020-11-05

## 2019-10-25 ENCOUNTER — LAB VISIT (OUTPATIENT)
Dept: LAB | Facility: HOSPITAL | Age: 74
End: 2019-10-25
Attending: FAMILY MEDICINE
Payer: MEDICARE

## 2019-10-25 ENCOUNTER — OFFICE VISIT (OUTPATIENT)
Dept: FAMILY MEDICINE | Facility: CLINIC | Age: 74
End: 2019-10-25
Attending: FAMILY MEDICINE
Payer: MEDICARE

## 2019-10-25 VITALS
BODY MASS INDEX: 39.07 KG/M2 | DIASTOLIC BLOOD PRESSURE: 77 MMHG | HEIGHT: 60 IN | HEART RATE: 74 BPM | WEIGHT: 199 LBS | SYSTOLIC BLOOD PRESSURE: 127 MMHG

## 2019-10-25 DIAGNOSIS — Z78.0 ASYMPTOMATIC MENOPAUSE: ICD-10-CM

## 2019-10-25 DIAGNOSIS — Z12.11 SCREEN FOR COLON CANCER: ICD-10-CM

## 2019-10-25 DIAGNOSIS — Z00.00 ANNUAL PHYSICAL EXAM: Primary | ICD-10-CM

## 2019-10-25 DIAGNOSIS — R73.9 HYPERGLYCEMIA: ICD-10-CM

## 2019-10-25 DIAGNOSIS — E66.9 OBESITY, CLASS II, BMI 35-39.9: ICD-10-CM

## 2019-10-25 DIAGNOSIS — Z00.00 ANNUAL PHYSICAL EXAM: ICD-10-CM

## 2019-10-25 LAB
ALBUMIN SERPL BCP-MCNC: 4 G/DL (ref 3.5–5.2)
ALP SERPL-CCNC: 94 U/L (ref 55–135)
ALT SERPL W/O P-5'-P-CCNC: 13 U/L (ref 10–44)
ANION GAP SERPL CALC-SCNC: 8 MMOL/L (ref 8–16)
AST SERPL-CCNC: 20 U/L (ref 10–40)
BASOPHILS # BLD AUTO: 0.05 K/UL (ref 0–0.2)
BASOPHILS NFR BLD: 0.6 % (ref 0–1.9)
BILIRUB SERPL-MCNC: 1 MG/DL (ref 0.1–1)
BILIRUB SERPL-MCNC: ABNORMAL MG/DL
BLOOD URINE, POC: ABNORMAL
BUN SERPL-MCNC: 8 MG/DL (ref 8–23)
CALCIUM SERPL-MCNC: 10.1 MG/DL (ref 8.7–10.5)
CHLORIDE SERPL-SCNC: 103 MMOL/L (ref 95–110)
CHOLEST SERPL-MCNC: 175 MG/DL (ref 120–199)
CHOLEST/HDLC SERPL: 4 {RATIO} (ref 2–5)
CO2 SERPL-SCNC: 29 MMOL/L (ref 23–29)
COLOR, POC UA: YELLOW
CREAT SERPL-MCNC: 0.8 MG/DL (ref 0.5–1.4)
DIFFERENTIAL METHOD: ABNORMAL
EOSINOPHIL # BLD AUTO: 0.1 K/UL (ref 0–0.5)
EOSINOPHIL NFR BLD: 0.6 % (ref 0–8)
ERYTHROCYTE [DISTWIDTH] IN BLOOD BY AUTOMATED COUNT: 12.7 % (ref 11.5–14.5)
EST. GFR  (AFRICAN AMERICAN): >60 ML/MIN/1.73 M^2
EST. GFR  (NON AFRICAN AMERICAN): >60 ML/MIN/1.73 M^2
GLUCOSE SERPL-MCNC: 107 MG/DL (ref 70–110)
GLUCOSE UR QL STRIP: NORMAL
HCT VFR BLD AUTO: 48.6 % (ref 37–48.5)
HDLC SERPL-MCNC: 44 MG/DL (ref 40–75)
HDLC SERPL: 25.1 % (ref 20–50)
HGB BLD-MCNC: 15.3 G/DL (ref 12–16)
IMM GRANULOCYTES # BLD AUTO: 0.02 K/UL (ref 0–0.04)
IMM GRANULOCYTES NFR BLD AUTO: 0.3 % (ref 0–0.5)
KETONES UR QL STRIP: ABNORMAL
LDLC SERPL CALC-MCNC: 106.4 MG/DL (ref 63–159)
LEUKOCYTE ESTERASE URINE, POC: ABNORMAL
LYMPHOCYTES # BLD AUTO: 2.3 K/UL (ref 1–4.8)
LYMPHOCYTES NFR BLD: 28.2 % (ref 18–48)
MCH RBC QN AUTO: 31.4 PG (ref 27–31)
MCHC RBC AUTO-ENTMCNC: 31.5 G/DL (ref 32–36)
MCV RBC AUTO: 100 FL (ref 82–98)
MONOCYTES # BLD AUTO: 0.4 K/UL (ref 0.3–1)
MONOCYTES NFR BLD: 5.5 % (ref 4–15)
NEUTROPHILS # BLD AUTO: 5.2 K/UL (ref 1.8–7.7)
NEUTROPHILS NFR BLD: 64.8 % (ref 38–73)
NITRITE, POC UA: ABNORMAL
NONHDLC SERPL-MCNC: 131 MG/DL
NRBC BLD-RTO: 0 /100 WBC
PH, POC UA: 9
PLATELET # BLD AUTO: 187 K/UL (ref 150–350)
PMV BLD AUTO: 12.1 FL (ref 9.2–12.9)
POTASSIUM SERPL-SCNC: 4.8 MMOL/L (ref 3.5–5.1)
PROT SERPL-MCNC: 7.8 G/DL (ref 6–8.4)
PROTEIN, POC: ABNORMAL
RBC # BLD AUTO: 4.87 M/UL (ref 4–5.4)
SODIUM SERPL-SCNC: 140 MMOL/L (ref 136–145)
SPECIFIC GRAVITY, POC UA: 1.01
TRIGL SERPL-MCNC: 123 MG/DL (ref 30–150)
UROBILINOGEN, POC UA: NORMAL
WBC # BLD AUTO: 7.97 K/UL (ref 3.9–12.7)

## 2019-10-25 PROCEDURE — 99999 PR PBB SHADOW E&M-EST. PATIENT-LVL III: CPT | Mod: PBBFAC,,, | Performed by: FAMILY MEDICINE

## 2019-10-25 PROCEDURE — 99213 OFFICE O/P EST LOW 20 MIN: CPT | Mod: PBBFAC,PO | Performed by: FAMILY MEDICINE

## 2019-10-25 PROCEDURE — 99999 PR PBB SHADOW E&M-EST. PATIENT-LVL III: ICD-10-PCS | Mod: PBBFAC,,, | Performed by: FAMILY MEDICINE

## 2019-10-25 PROCEDURE — 80061 LIPID PANEL: CPT

## 2019-10-25 PROCEDURE — 36415 COLL VENOUS BLD VENIPUNCTURE: CPT | Mod: PO

## 2019-10-25 PROCEDURE — 85025 COMPLETE CBC W/AUTO DIFF WBC: CPT

## 2019-10-25 PROCEDURE — 80053 COMPREHEN METABOLIC PANEL: CPT

## 2019-10-25 PROCEDURE — 99214 PR OFFICE/OUTPT VISIT, EST, LEVL IV, 30-39 MIN: ICD-10-PCS | Mod: S$PBB,,, | Performed by: FAMILY MEDICINE

## 2019-10-25 PROCEDURE — 81001 URINALYSIS AUTO W/SCOPE: CPT | Mod: PBBFAC,PO | Performed by: FAMILY MEDICINE

## 2019-10-25 PROCEDURE — 99214 OFFICE O/P EST MOD 30 MIN: CPT | Mod: S$PBB,,, | Performed by: FAMILY MEDICINE

## 2019-10-27 NOTE — PROGRESS NOTES
Subjective:       Patient ID: Ligia Curran is a 73 y.o. female.    Chief Complaint: Annual Exam    HPI   Pt is here for annual exam pt is generally well no sob/cp no change in bowel habits no brbpr  Pt is obese she is not on a weight loss diet no regular exercise.  Mildly elevated bs no polyuria/dipsia/phagia  Review of Systems   Constitutional: Negative for activity change, chills, diaphoresis, fatigue and fever.   HENT: Negative for congestion, ear discharge, ear pain, hearing loss, postnasal drip, rhinorrhea, sinus pressure, sneezing, sore throat, trouble swallowing and voice change.    Eyes: Negative for photophobia, discharge, redness, itching and visual disturbance.   Respiratory: Negative for cough, chest tightness, shortness of breath and wheezing.    Cardiovascular: Negative for chest pain, palpitations and leg swelling.   Gastrointestinal: Negative for abdominal pain, anal bleeding, blood in stool, constipation, diarrhea, nausea, rectal pain and vomiting.   Genitourinary: Negative for dyspareunia, dysuria, flank pain, frequency, hematuria, menstrual problem, pelvic pain, urgency, vaginal bleeding and vaginal discharge.   Musculoskeletal: Negative for arthralgias, back pain, joint swelling and neck pain.   Skin: Negative for color change and rash.   Neurological: Negative for dizziness, speech difficulty, weakness, light-headedness, numbness and headaches.   Hematological: Does not bruise/bleed easily.   Psychiatric/Behavioral: Negative for agitation, confusion, decreased concentration, sleep disturbance and suicidal ideas. The patient is not nervous/anxious.        Objective:      Physical Exam   Constitutional: She appears well-developed and well-nourished.   HENT:   Head: Normocephalic and atraumatic.   Right Ear: External ear normal.   Left Ear: External ear normal.   Nose: Nose normal.   Mouth/Throat: Oropharynx is clear and moist.   Eyes: Pupils are equal, round, and reactive to light. Conjunctivae  "and EOM are normal. Right eye exhibits no discharge. Left eye exhibits no discharge.   Neck: Normal range of motion. Neck supple. No thyromegaly present.   Cardiovascular: Normal rate and regular rhythm. Exam reveals no gallop.   Pulmonary/Chest: Effort normal and breath sounds normal. She has no wheezes. She has no rales.   Abdominal: Soft. Bowel sounds are normal. She exhibits no distension. There is no tenderness. There is no rebound and no guarding.   Musculoskeletal: Normal range of motion. She exhibits no edema or tenderness.   Lymphadenopathy:     She has no cervical adenopathy.   Neurological: She is alert. No cranial nerve deficit. She exhibits normal muscle tone. Coordination normal.   Skin: Skin is warm and dry. No rash noted. No erythema.   Psychiatric: She has a normal mood and affect. Her behavior is normal. Judgment and thought content normal.       Assessment:       1. Annual physical exam    2. Hyperglycemia    3. Screen for colon cancer    4. Obesity, Class II, BMI 35-39.9    5. Asymptomatic menopause        Plan:     orders cmp lipid cbc tsh (tsh not covered by ins)  Cont meds  Low sugar weight loss diet  Graded exercise  rtc quarterly    Health maintenance'  Colonoscopy discussed  Flu discussed  Pneumonia discussed  Tetanus q 10 years  bmd discussed  Shingles discussed         "This note will not be shared with the patient."   "

## 2019-10-29 ENCOUNTER — TELEPHONE (OUTPATIENT)
Dept: FAMILY MEDICINE | Facility: CLINIC | Age: 74
End: 2019-10-29

## 2019-10-29 NOTE — TELEPHONE ENCOUNTER
Labs are generally fine but id like to repeat the cbc in 3 months as her mcv is high this time.  Please make sure she eats all food groups including lean meats and avoids etoh

## 2019-10-29 NOTE — TELEPHONE ENCOUNTER
----- Message from Lesa Hester sent at 10/29/2019  4:05 PM CDT -----  Contact: Self    Name of Who is Calling: MAYANK DIAL [[id number]]      What is the request in detail: Pt is calling to request lab results that she had done on 10/25. Please contact to further discuss and advise.        Can the clinic reply by [facility]: N      What Number to Call Back [facility] not [facility]: [phone]

## 2019-10-30 NOTE — TELEPHONE ENCOUNTER
Patient returned call. Discussed lab results and recommendations.     Patient will receive an appt reminder in 3 months to  Repeat labs. Patient also states she would like a copy of her lab results mailed out to her home address.

## 2019-10-30 NOTE — TELEPHONE ENCOUNTER
Attempted to contact the patient to discuss labs and recommendations. No answer. Will try contacting again later.

## 2019-11-07 ENCOUNTER — TELEPHONE (OUTPATIENT)
Dept: OPHTHALMOLOGY | Facility: CLINIC | Age: 74
End: 2019-11-07

## 2019-11-07 NOTE — TELEPHONE ENCOUNTER
----- Message from Skinny Lewis sent at 11/7/2019  1:57 PM CST -----  Contact: Pt   Type:  Sooner Appoointment Request    Caller is requesting a sooner appointment.      Name of Caller: Ligia Curran    When is the first available appointment? N/A    Symptoms: Pain in R eye    Would the patient rather a call back or a response via [facility]? Callback    Best Call Back Number: [phone]     Additional Information: Pt would like to be seen tomorrow. If available pt would like appt     with Dr. Calvo

## 2019-11-07 NOTE — TELEPHONE ENCOUNTER
Spoke with pt and her OD started hurting this Am. I told her to use tears more and increase FML TID and scheduled her for 11/13/19. SDF

## 2019-11-13 ENCOUNTER — OFFICE VISIT (OUTPATIENT)
Dept: OPHTHALMOLOGY | Facility: CLINIC | Age: 74
End: 2019-11-13
Payer: MEDICARE

## 2019-11-13 DIAGNOSIS — H27.02 APHAKIA OF LEFT EYE: ICD-10-CM

## 2019-11-13 DIAGNOSIS — H40.2213 CHRONIC ANGLE-CLOSURE GLAUCOMA OF RIGHT EYE, SEVERE STAGE: Primary | ICD-10-CM

## 2019-11-13 DIAGNOSIS — H54.10 BLINDNESS AND LOW VISION: ICD-10-CM

## 2019-11-13 DIAGNOSIS — Z94.7 STATUS POST CORNEAL TRANSPLANT: ICD-10-CM

## 2019-11-13 PROCEDURE — 99212 OFFICE O/P EST SF 10 MIN: CPT | Mod: PBBFAC | Performed by: OPHTHALMOLOGY

## 2019-11-13 PROCEDURE — 99999 PR PBB SHADOW E&M-EST. PATIENT-LVL II: ICD-10-PCS | Mod: PBBFAC,,, | Performed by: OPHTHALMOLOGY

## 2019-11-13 PROCEDURE — 92014 COMPRE OPH EXAM EST PT 1/>: CPT | Mod: S$PBB,,, | Performed by: OPHTHALMOLOGY

## 2019-11-13 PROCEDURE — 99999 PR PBB SHADOW E&M-EST. PATIENT-LVL II: CPT | Mod: PBBFAC,,, | Performed by: OPHTHALMOLOGY

## 2019-11-13 PROCEDURE — 92014 PR EYE EXAM, EST PATIENT,COMPREHESV: ICD-10-PCS | Mod: S$PBB,,, | Performed by: OPHTHALMOLOGY

## 2019-11-13 RX ORDER — PREDNISOLONE ACETATE 10 MG/ML
1 SUSPENSION/ DROPS OPHTHALMIC 4 TIMES DAILY
Qty: 10 ML | Refills: 3 | Status: SHIPPED | OUTPATIENT
Start: 2019-11-13 | End: 2021-04-21 | Stop reason: SDUPTHER

## 2019-11-13 NOTE — PROGRESS NOTES
HPI     Concerns About Ocular Health      Additional comments: OD              Comments     Patient had called on 11/07/2019 due to her right eye being painful,   today she reports that the pain has fully went away and she thinks that it   could have been sinus pressure.   Today patient is concerned with her right eye because recently it has been   excessively tearing and running down her face as before she didn't have   that issue. She also is complaining that OD is very sensitive to light and   glare from over head lighting   Patient also is having a burning sensation in her right eye in and around   the area of the eye the Glaucoma Shunt was placed     On 10/01/2019 patient went and saw Dr. Wilkerson for her routine scheduled   Glaucoma follow-up which at that time Dr. Wilkerson noticed that she had   some corneal edema in her right eye and that her MG were clogged. Patient   admits to using all eye drops as directed        DROPS:   FML BID OD   Cosopt BID OU   Idania 128 BID OS   Latanaprost qhs OD    History:  Chronic Angle Closure glaucoma of the right eye   Blindness and low vision -both eyes   Corneal Edema   POAG of the left eye   PKP in the right eye   Aphakic in the left eye   PCIOL in the right eye   Glaucoma Shunt in both eyes             Last edited by Morgan Cadena on 11/13/2019 10:36 AM. (History)            Assessment /Plan     For exam results, see Encounter Report.    Chronic angle-closure glaucoma of right eye, severe stage    Blindness and low vision - Both Eyes    Aphakia of left eye    Status post corneal transplant      Pain OD: no conj erosions or epi defects noted.  PKP stable and clear with AC fibrosis, but no hypopyon.  Try PF tid OD

## 2019-11-14 ENCOUNTER — TELEPHONE (OUTPATIENT)
Dept: OPHTHALMOLOGY | Facility: CLINIC | Age: 74
End: 2019-11-14

## 2019-11-14 NOTE — TELEPHONE ENCOUNTER
----- Message from Raffaele Branham sent at 11/14/2019  2:34 PM CST -----  Contact: pt  Pt calling in regards to stronger eye drops, but worried eye pressure may go up, wants to    Set up appt to get a different opinion     Call back: [phone]

## 2019-11-14 NOTE — PROGRESS NOTES
Assessment /Plan     For exam results, see Encounter Report.    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    Status post cataract extraction and insertion of intraocular lens of right eye    Bilateral dry eyes          [facility]  Strong FMHx Glc / Blindness --> Justyn Creole Dz / mom's side    Complex ocular hx  multiple sx --> trabs, tubes and tube removes etc   Too numerous to count    CCT  537 // 611    IOP livable --> discussed options OD with G6/MP3 laser    Right eye  PKP - Quiet --> 10/26/2017 PKP  PC IOL  Fibrotic anterior capsule membrane  Tubes x 2 in place ST & IN --> removed ST BV 2/27/2017  Glc Shunt graft site OD  Revision 04/09/2013    B-scan No RD 7/2/2019    Right eye  PKP with Ant Cap fibrosis    Left eye  Aphakia  K-edema        Glaucoma Incisional Surgery  Patient with exposed ST Tube OD  SP OD ST BV / Amniotic membrane Removal 2/27/2017    Both eyes  Cosopt BID    Right eye --> consider G6 Laser as re-discussed  PF1%  BID --> x 2 weeks and taper off--> possible steroid response --> discussed Rejection RTC sooner  Xal q day      Left eye  Idania 128 BID + / -  Genteal Gel  Considering DSEK and Lens implant --> Lucia Calvo      MGD --> improved surface --> improved Va 10/1/2019  Dry Eye Syndrome: discussed use of warm compresses, preserved & non-preserved artificial tears, gel and PM ointment options.  Also discussed options utilizing medications.    Mucomyst 10% BID / QID OD --> BID / PRN  EES BID --> q hs    --> consider Doxy PO as discussed      Plan  RTC 1 months IOP  & iritis check  RTC sooner prn with understanding

## 2019-11-19 ENCOUNTER — OFFICE VISIT (OUTPATIENT)
Dept: OPHTHALMOLOGY | Facility: CLINIC | Age: 74
End: 2019-11-19
Payer: MEDICARE

## 2019-11-19 DIAGNOSIS — H18.232 SECONDARY CORNEAL EDEMA OF LEFT EYE: ICD-10-CM

## 2019-11-19 DIAGNOSIS — H40.1122 PRIMARY OPEN ANGLE GLAUCOMA (POAG) OF LEFT EYE, MODERATE STAGE: Primary | ICD-10-CM

## 2019-11-19 DIAGNOSIS — H21.41: ICD-10-CM

## 2019-11-19 DIAGNOSIS — Z98.41 STATUS POST CATARACT EXTRACTION AND INSERTION OF INTRAOCULAR LENS OF RIGHT EYE: ICD-10-CM

## 2019-11-19 DIAGNOSIS — T86.8419 CORNEAL TRANSPLANT FAILURE: ICD-10-CM

## 2019-11-19 DIAGNOSIS — Z94.7 CORNEA REPLACED BY TRANSPLANT: ICD-10-CM

## 2019-11-19 DIAGNOSIS — H04.123 BILATERAL DRY EYES: ICD-10-CM

## 2019-11-19 DIAGNOSIS — Z96.1 STATUS POST CATARACT EXTRACTION AND INSERTION OF INTRAOCULAR LENS OF RIGHT EYE: ICD-10-CM

## 2019-11-19 DIAGNOSIS — H54.10 BLINDNESS AND LOW VISION: ICD-10-CM

## 2019-11-19 DIAGNOSIS — H40.2213 CHRONIC ANGLE-CLOSURE GLAUCOMA OF RIGHT EYE, SEVERE STAGE: ICD-10-CM

## 2019-11-19 DIAGNOSIS — H16.121 FILAMENTARY KERATITIS OF RIGHT EYE: ICD-10-CM

## 2019-11-19 DIAGNOSIS — H18.20 CORNEAL EDEMA: ICD-10-CM

## 2019-11-19 DIAGNOSIS — H27.02 APHAKIA OF LEFT EYE: ICD-10-CM

## 2019-11-19 PROCEDURE — 92012 INTRM OPH EXAM EST PATIENT: CPT | Mod: S$PBB,,, | Performed by: OPHTHALMOLOGY

## 2019-11-19 PROCEDURE — 99212 OFFICE O/P EST SF 10 MIN: CPT | Mod: PBBFAC | Performed by: OPHTHALMOLOGY

## 2019-11-19 PROCEDURE — 92012 PR EYE EXAM, EST PATIENT,INTERMED: ICD-10-PCS | Mod: S$PBB,,, | Performed by: OPHTHALMOLOGY

## 2019-11-19 PROCEDURE — 99999 PR PBB SHADOW E&M-EST. PATIENT-LVL II: ICD-10-PCS | Mod: PBBFAC,,, | Performed by: OPHTHALMOLOGY

## 2019-11-19 PROCEDURE — 99999 PR PBB SHADOW E&M-EST. PATIENT-LVL II: CPT | Mod: PBBFAC,,, | Performed by: OPHTHALMOLOGY

## 2019-11-26 ENCOUNTER — HOSPITAL ENCOUNTER (OUTPATIENT)
Dept: RADIOLOGY | Facility: CLINIC | Age: 74
Discharge: HOME | End: 2019-11-26
Attending: FAMILY MEDICINE
Payer: MEDICARE

## 2019-11-26 DIAGNOSIS — Z78.0 ASYMPTOMATIC MENOPAUSE: ICD-10-CM

## 2019-11-26 PROCEDURE — 77080 DEXA BONE DENSITY SPINE HIP: ICD-10-PCS | Mod: 26,,, | Performed by: INTERNAL MEDICINE

## 2019-11-26 PROCEDURE — 77080 DXA BONE DENSITY AXIAL: CPT | Mod: 26,,, | Performed by: INTERNAL MEDICINE

## 2019-11-26 PROCEDURE — 77080 DXA BONE DENSITY AXIAL: CPT | Mod: TC

## 2019-12-06 ENCOUNTER — TELEPHONE (OUTPATIENT)
Dept: OPHTHALMOLOGY | Facility: CLINIC | Age: 74
End: 2019-12-06

## 2019-12-06 NOTE — TELEPHONE ENCOUNTER
----- Message from Estela Parker sent at 12/6/2019 11:35 AM CST -----  Contact: Jennifer Strong  Pt calling to inform that something has come up and she cannot come in for 12/18/19.  She would like to know if it could be changed to 12/17/19 instead.  Please contact her at [phone] to discuss further

## 2019-12-09 NOTE — PROGRESS NOTES
Assessment /Plan     For exam results, see Encounter Report.    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    Bilateral dry eyes          [facility]  Strong FMHx Glc / Blindness --> Justyn Creole Dz / mom's side    Complex ocular hx  multiple sx --> trabs, tubes and tube removes etc   Too numerous to count    CCT  537 // 611    IOP livable --> discussed options OD with G6/MP3 laser    Right eye  PKP - Quiet --> 10/26/2017 PKP  PC IOL  Fibrotic anterior capsule membrane  Tubes x 2 in place ST & IN --> removed ST BV 2/27/2017  Glc Shunt graft site OD  Revision 04/09/2013    B-scan No RD 7/2/2019    Right eye  PKP with Ant Cap fibrosis    Left eye  Aphakia  K-edema        Glaucoma Incisional Surgery  Patient with exposed ST Tube OD  SP OD ST BV / Amniotic membrane Removal 2/27/2017    Both eyes  Cosopt BID    Right eye --> consider G6 Laser as re-discussed  PF1%  BID --> x 2 weeks and taper off--> using  Xal q day      Left eye  Idania 128 BID + / -  Genteal Gel  Considering DSEK and Lens implant --> Lucia Calvo      MGD --> improved surface --> improved Va 10/1/2019  Dry Eye Syndrome: discussed use of warm compresses, preserved & non-preserved artificial tears, gel and PM ointment options.  Also discussed options utilizing medications.    Mucomyst 10% BID / QID OD --> BID / PRN  EES BID --> q hs    --> consider Doxy PO as discussed      Plan  RTC 2 months IOP & iritis check  RTC sooner prn with understanding

## 2019-12-12 ENCOUNTER — TELEPHONE (OUTPATIENT)
Dept: FAMILY MEDICINE | Facility: CLINIC | Age: 74
End: 2019-12-12

## 2019-12-12 NOTE — TELEPHONE ENCOUNTER
----- Message from Destinee Louie MD sent at 12/7/2019  8:04 AM CST -----  Please have pt come in to go over her bone test

## 2019-12-13 ENCOUNTER — TELEPHONE (OUTPATIENT)
Dept: FAMILY MEDICINE | Facility: CLINIC | Age: 74
End: 2019-12-13

## 2019-12-17 ENCOUNTER — OFFICE VISIT (OUTPATIENT)
Dept: OPHTHALMOLOGY | Facility: CLINIC | Age: 74
End: 2019-12-17
Payer: MEDICARE

## 2019-12-17 DIAGNOSIS — H40.2213 CHRONIC ANGLE-CLOSURE GLAUCOMA OF RIGHT EYE, SEVERE STAGE: ICD-10-CM

## 2019-12-17 DIAGNOSIS — Z96.1 STATUS POST CATARACT EXTRACTION AND INSERTION OF INTRAOCULAR LENS OF RIGHT EYE: ICD-10-CM

## 2019-12-17 DIAGNOSIS — H18.232 SECONDARY CORNEAL EDEMA OF LEFT EYE: ICD-10-CM

## 2019-12-17 DIAGNOSIS — H18.20 CORNEAL EDEMA: ICD-10-CM

## 2019-12-17 DIAGNOSIS — H27.02 APHAKIA OF LEFT EYE: ICD-10-CM

## 2019-12-17 DIAGNOSIS — Z98.41 STATUS POST CATARACT EXTRACTION AND INSERTION OF INTRAOCULAR LENS OF RIGHT EYE: ICD-10-CM

## 2019-12-17 DIAGNOSIS — H16.121 FILAMENTARY KERATITIS OF RIGHT EYE: ICD-10-CM

## 2019-12-17 DIAGNOSIS — H40.1122 PRIMARY OPEN ANGLE GLAUCOMA (POAG) OF LEFT EYE, MODERATE STAGE: Primary | ICD-10-CM

## 2019-12-17 DIAGNOSIS — H54.10 BLINDNESS AND LOW VISION: ICD-10-CM

## 2019-12-17 DIAGNOSIS — Z94.7 CORNEA REPLACED BY TRANSPLANT: ICD-10-CM

## 2019-12-17 DIAGNOSIS — H04.123 BILATERAL DRY EYES: ICD-10-CM

## 2019-12-17 PROCEDURE — 99212 OFFICE O/P EST SF 10 MIN: CPT | Mod: PBBFAC | Performed by: OPHTHALMOLOGY

## 2019-12-17 PROCEDURE — 92012 PR EYE EXAM, EST PATIENT,INTERMED: ICD-10-PCS | Mod: S$PBB,,, | Performed by: OPHTHALMOLOGY

## 2019-12-17 PROCEDURE — 99999 PR PBB SHADOW E&M-EST. PATIENT-LVL II: ICD-10-PCS | Mod: PBBFAC,,, | Performed by: OPHTHALMOLOGY

## 2019-12-17 PROCEDURE — 99999 PR PBB SHADOW E&M-EST. PATIENT-LVL II: CPT | Mod: PBBFAC,,, | Performed by: OPHTHALMOLOGY

## 2019-12-17 PROCEDURE — 92012 INTRM OPH EXAM EST PATIENT: CPT | Mod: S$PBB,,, | Performed by: OPHTHALMOLOGY

## 2020-01-13 ENCOUNTER — TELEPHONE (OUTPATIENT)
Dept: FAMILY MEDICINE | Facility: CLINIC | Age: 75
End: 2020-01-13

## 2020-01-13 DIAGNOSIS — Z12.10 SPECIAL SCREENING FOR MALIGNANT NEOPLASM OF INTESTINE: Primary | ICD-10-CM

## 2020-01-13 DIAGNOSIS — R71.8 ELEVATED MCV: Primary | ICD-10-CM

## 2020-01-13 RX ORDER — POLYETHYLENE GLYCOL 3350, SODIUM SULFATE ANHYDROUS, SODIUM BICARBONATE, SODIUM CHLORIDE, POTASSIUM CHLORIDE 236; 22.74; 6.74; 5.86; 2.97 G/4L; G/4L; G/4L; G/4L; G/4L
4 POWDER, FOR SOLUTION ORAL ONCE
Qty: 4000 ML | Refills: 0 | Status: SHIPPED | OUTPATIENT
Start: 2020-01-13 | End: 2020-01-13

## 2020-01-13 NOTE — TELEPHONE ENCOUNTER
----- Message from Vicki Trujillo sent at 1/13/2020 10:24 AM CST -----  Contact: Self      Name of Who is Calling: [facility] DIAL [[id number]]      What is the request in detail: Pt would like to schedule an colonoscopy appt and also won't be able to make her 1/22 appt and would like to reschedule it.Please contact to further discuss and advise.        Can the clinic reply by [facility]: Y      [facility] Back [facility]: [phone]

## 2020-01-13 NOTE — TELEPHONE ENCOUNTER
Called and spoke with pt.  Provided Colonoscopy telephone number and pt wanted to cancel appt with Dr Louie

## 2020-01-14 NOTE — TELEPHONE ENCOUNTER
----- Message from Amanda Da Silva sent at 1/14/2020 11:24 AM CST -----  Contact: [facility] DIAL   Name of Who is Calling: [facility] DIAL       What is the request in detail: Patient Is requesting a call back she states she has some more questions before she schedule her colonoscopy       Can the clinic reply by [facility]: no      What Number to Call Back if not in [facility]: [phone]

## 2020-01-14 NOTE — TELEPHONE ENCOUNTER
Spoke with the patient. Patient states she is scheduled for her colonoscopy. Patient states after her last office visit back in October 2019, patient was informed to repeat labs in 3 month. Patient states she does not want to schedule lab appt or follow up visit until after her colonoscopy on 02/19/2020.     Patient wanted to inform Dr. Louie.

## 2020-01-24 ENCOUNTER — TELEPHONE (OUTPATIENT)
Dept: OPHTHALMOLOGY | Facility: CLINIC | Age: 75
End: 2020-01-24

## 2020-01-24 NOTE — TELEPHONE ENCOUNTER
----- Message from Estela Parker sent at 1/24/2020 11:27 AM CST -----  Contact: Sister Ligia Curran  Calling to report that on last evening she had a flash of light in her OD and has not had any since but wanted to report and see if she should come in.  She can be reached at [phone]

## 2020-01-24 NOTE — TELEPHONE ENCOUNTER
Spoke to pt   Pt states that she had one light flash.   Pt did not have another light flash episode nor floaters.   Advised to call if she has an increase in flashes or floaters and if she needs to be seen over the weekend, the on call will see her.        Pt verbalized understanding.

## 2020-01-27 ENCOUNTER — OFFICE VISIT (OUTPATIENT)
Dept: OPHTHALMOLOGY | Facility: CLINIC | Age: 75
End: 2020-01-27
Payer: MEDICARE

## 2020-01-27 DIAGNOSIS — H40.1122 PRIMARY OPEN ANGLE GLAUCOMA (POAG) OF LEFT EYE, MODERATE STAGE: ICD-10-CM

## 2020-01-27 DIAGNOSIS — H27.02 APHAKIA OF LEFT EYE: ICD-10-CM

## 2020-01-27 DIAGNOSIS — Z96.1 STATUS POST CATARACT EXTRACTION AND INSERTION OF INTRAOCULAR LENS OF RIGHT EYE: ICD-10-CM

## 2020-01-27 DIAGNOSIS — Z94.7 CORNEA REPLACED BY TRANSPLANT: ICD-10-CM

## 2020-01-27 DIAGNOSIS — H18.232 SECONDARY CORNEAL EDEMA OF LEFT EYE: ICD-10-CM

## 2020-01-27 DIAGNOSIS — Z98.41 STATUS POST CATARACT EXTRACTION AND INSERTION OF INTRAOCULAR LENS OF RIGHT EYE: ICD-10-CM

## 2020-01-27 DIAGNOSIS — H21.41: ICD-10-CM

## 2020-01-27 DIAGNOSIS — H54.10 BLINDNESS AND LOW VISION: ICD-10-CM

## 2020-01-27 DIAGNOSIS — H40.2213 CHRONIC ANGLE-CLOSURE GLAUCOMA OF RIGHT EYE, SEVERE STAGE: Primary | ICD-10-CM

## 2020-01-27 PROCEDURE — 76512 B SCAN: ICD-10-PCS | Mod: 26,50,S$PBB, | Performed by: OPHTHALMOLOGY

## 2020-01-27 PROCEDURE — 99999 PR PBB SHADOW E&M-EST. PATIENT-LVL II: CPT | Mod: PBBFAC,,, | Performed by: OPHTHALMOLOGY

## 2020-01-27 PROCEDURE — 99212 OFFICE O/P EST SF 10 MIN: CPT | Mod: PBBFAC,25 | Performed by: OPHTHALMOLOGY

## 2020-01-27 PROCEDURE — 76512 OPH US DX B-SCAN: CPT | Mod: 50,PBBFAC | Performed by: OPHTHALMOLOGY

## 2020-01-27 PROCEDURE — 99999 PR PBB SHADOW E&M-EST. PATIENT-LVL II: ICD-10-PCS | Mod: PBBFAC,,, | Performed by: OPHTHALMOLOGY

## 2020-01-27 PROCEDURE — 92012 PR EYE EXAM, EST PATIENT,INTERMED: ICD-10-PCS | Mod: S$PBB,,, | Performed by: OPHTHALMOLOGY

## 2020-01-27 PROCEDURE — 92012 INTRM OPH EXAM EST PATIENT: CPT | Mod: S$PBB,,, | Performed by: OPHTHALMOLOGY

## 2020-01-27 NOTE — PROGRESS NOTES
Assessment /Plan     For exam results, see Encounter Report.    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  Patient/Family to call and return immediately to clinic should the symptoms of RD occur. Voiced good understanding with Q & A.        CCT  537 // 611    IOP livable --> discussed options OD with G6/MP3 laser    Right eye  PKP - Quiet --> 10/26/2017 PKP  PC IOL  Fibrotic anterior capsule membrane  Tubes x 2 in place ST & IN --> removed ST BV 2/27/2017  Glc Shunt graft site OD  Revision 04/09/2013    B-scan No RD 7/2/2019    Right eye  PKP with Ant Cap fibrosis    Left eye  Aphakia  K-edema        Glaucoma Incisional Surgery  Patient with exposed ST Tube OD  SP OD ST BV / Amniotic membrane Removal 2/27/2017    Both eyes  Cosopt BID    Right eye --> consider G6 Laser as re-discussed  PF1%  BID --> x 2 weeks and taper off--> using  Xal q day      Left eye  Idania 128 BID + / -  Genteal Gel  Considering DSEK and Lens implant --> Lucia Calvo      MGD --> improved surface --> improved Va 10/1/2019  Dry Eye Syndrome: discussed use of warm compresses, preserved & non-preserved artificial tears, gel and PM ointment  options.  Also discussed options utilizing medications.    Mucomyst 10% BID / QID OD --> BID / PRN  EES BID --> q hs    --> consider Doxy PO as discussed      Plan  RTC 2-3 weeks IOP & iritis check --. B-scan OD RD check  RTC sooner prn with understanding

## 2020-02-05 NOTE — PROGRESS NOTES
Assessment /Plan     For exam results, see Encounter Report.    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  Patient/Family to call and return immediately to clinic should the symptoms of RD occur. Voiced good understanding with Q & A.        CCT  537 // 611    IOP livable --> discussed options OD with G6/MP3 laser    Right eye  PKP - Quiet --> 10/26/2017 PKP  PC IOL  Fibrotic anterior capsule membrane  Tubes x 2 in place ST & IN --> removed ST BV 2/27/2017  Glc Shunt graft site OD  Revision 04/09/2013    B-scan No RD 7/2/2019    Right eye  PKP with Ant Cap fibrosis    Left eye  Aphakia  K-edema        Glaucoma Incisional Surgery  Patient with exposed ST Tube OD  SP OD ST BV / Amniotic membrane Removal 2/27/2017    Both eyes  Cosopt BID    Right eye --> consider G6 Laser as re-discussed  PF1%  BID --> using  Xal q day      Left eye  Idania 128 BID + / -  Genteal Gel  Considering DSEK and Lens implant --> Lucia Calvo      MGD --> improved surface --> improved Va 10/1/2019  Dry Eye Syndrome: discussed use of warm compresses, preserved & non-preserved artificial tears, gel and PM ointment options.  Also discussed options  utilizing medications.    Mucomyst 10% BID / QID OD --> BID / PRN  EES BID --> q hs    --> consider Doxy PO as discussed      Plan  RTC 3 months IOP & iritis check  RTC sooner prn with understanding

## 2020-02-18 ENCOUNTER — ANESTHESIA EVENT (OUTPATIENT)
Dept: ENDOSCOPY | Facility: HOSPITAL | Age: 75
End: 2020-02-18
Payer: MEDICARE

## 2020-02-18 ENCOUNTER — OFFICE VISIT (OUTPATIENT)
Dept: OPHTHALMOLOGY | Facility: CLINIC | Age: 75
End: 2020-02-18
Payer: MEDICARE

## 2020-02-18 DIAGNOSIS — H04.123 BILATERAL DRY EYES: ICD-10-CM

## 2020-02-18 DIAGNOSIS — H54.10 BLINDNESS AND LOW VISION: ICD-10-CM

## 2020-02-18 DIAGNOSIS — H40.2213 CHRONIC ANGLE-CLOSURE GLAUCOMA OF RIGHT EYE, SEVERE STAGE: ICD-10-CM

## 2020-02-18 DIAGNOSIS — Z98.41 STATUS POST CATARACT EXTRACTION AND INSERTION OF INTRAOCULAR LENS OF RIGHT EYE: ICD-10-CM

## 2020-02-18 DIAGNOSIS — H40.1122 PRIMARY OPEN ANGLE GLAUCOMA (POAG) OF LEFT EYE, MODERATE STAGE: Primary | ICD-10-CM

## 2020-02-18 DIAGNOSIS — Z96.1 STATUS POST CATARACT EXTRACTION AND INSERTION OF INTRAOCULAR LENS OF RIGHT EYE: ICD-10-CM

## 2020-02-18 DIAGNOSIS — Z94.7 CORNEA REPLACED BY TRANSPLANT: ICD-10-CM

## 2020-02-18 DIAGNOSIS — H27.02 APHAKIA OF LEFT EYE: ICD-10-CM

## 2020-02-18 DIAGNOSIS — H21.41: ICD-10-CM

## 2020-02-18 PROCEDURE — 76512 B SCAN: ICD-10-PCS | Mod: 26,50,S$PBB, | Performed by: OPHTHALMOLOGY

## 2020-02-18 PROCEDURE — 92012 PR EYE EXAM, EST PATIENT,INTERMED: ICD-10-PCS | Mod: S$PBB,,, | Performed by: OPHTHALMOLOGY

## 2020-02-18 PROCEDURE — 99999 PR PBB SHADOW E&M-EST. PATIENT-LVL II: ICD-10-PCS | Mod: PBBFAC,,, | Performed by: OPHTHALMOLOGY

## 2020-02-18 PROCEDURE — 92012 INTRM OPH EXAM EST PATIENT: CPT | Mod: S$PBB,,, | Performed by: OPHTHALMOLOGY

## 2020-02-18 PROCEDURE — 99999 PR PBB SHADOW E&M-EST. PATIENT-LVL II: CPT | Mod: PBBFAC,,, | Performed by: OPHTHALMOLOGY

## 2020-02-18 PROCEDURE — 76512 OPH US DX B-SCAN: CPT | Mod: 50,PBBFAC | Performed by: OPHTHALMOLOGY

## 2020-02-18 PROCEDURE — 99212 OFFICE O/P EST SF 10 MIN: CPT | Mod: PBBFAC | Performed by: OPHTHALMOLOGY

## 2020-02-19 ENCOUNTER — ANESTHESIA (OUTPATIENT)
Dept: ENDOSCOPY | Facility: HOSPITAL | Age: 75
End: 2020-02-19
Payer: MEDICARE

## 2020-02-19 ENCOUNTER — HOSPITAL ENCOUNTER (OUTPATIENT)
Facility: HOSPITAL | Age: 75
Discharge: HOME | End: 2020-02-19
Attending: COLON & RECTAL SURGERY | Admitting: COLON & RECTAL SURGERY
Payer: MEDICARE

## 2020-02-19 VITALS
TEMPERATURE: 97 F | BODY MASS INDEX: 38.87 KG/M2 | HEART RATE: 68 BPM | SYSTOLIC BLOOD PRESSURE: 142 MMHG | RESPIRATION RATE: 17 BRPM | HEIGHT: 60 IN | WEIGHT: 198 LBS | OXYGEN SATURATION: 99 % | DIASTOLIC BLOOD PRESSURE: 64 MMHG

## 2020-02-19 DIAGNOSIS — Z12.11 SCREENING FOR COLON CANCER: ICD-10-CM

## 2020-02-19 PROCEDURE — G0121 COLON CA SCRN NOT HI RSK IND: ICD-10-PCS | Mod: ,,, | Performed by: COLON & RECTAL SURGERY

## 2020-02-19 PROCEDURE — 63600175 PHARM REV CODE 636 W HCPCS: Performed by: COLON & RECTAL SURGERY

## 2020-02-19 PROCEDURE — 37000009 HC ANESTHESIA EA ADD 15 MINS: Performed by: COLON & RECTAL SURGERY

## 2020-02-19 PROCEDURE — G0121 COLON CA SCRN NOT HI RSK IND: HCPCS | Performed by: COLON & RECTAL SURGERY

## 2020-02-19 PROCEDURE — E9220 PRA ENDO ANESTHESIA: HCPCS | Mod: ,,, | Performed by: NURSE ANESTHETIST, CERTIFIED REGISTERED

## 2020-02-19 PROCEDURE — E9220 PRA ENDO ANESTHESIA: ICD-10-PCS | Mod: ,,, | Performed by: NURSE ANESTHETIST, CERTIFIED REGISTERED

## 2020-02-19 PROCEDURE — G0121 COLON CA SCRN NOT HI RSK IND: HCPCS | Mod: ,,, | Performed by: COLON & RECTAL SURGERY

## 2020-02-19 PROCEDURE — 37000008 HC ANESTHESIA 1ST 15 MINUTES: Performed by: COLON & RECTAL SURGERY

## 2020-02-19 PROCEDURE — 63600175 PHARM REV CODE 636 W HCPCS: Performed by: NURSE ANESTHETIST, CERTIFIED REGISTERED

## 2020-02-19 RX ORDER — LIDOCAINE HCL/PF 100 MG/5ML
SYRINGE (ML) INTRAVENOUS
Status: DISCONTINUED | OUTPATIENT
Start: 2020-02-19 | End: 2020-02-19

## 2020-02-19 RX ORDER — PROPOFOL 10 MG/ML
VIAL (ML) INTRAVENOUS CONTINUOUS PRN
Status: DISCONTINUED | OUTPATIENT
Start: 2020-02-19 | End: 2020-02-19

## 2020-02-19 RX ORDER — SODIUM CHLORIDE 9 MG/ML
INJECTION, SOLUTION INTRAVENOUS CONTINUOUS
Status: DISCONTINUED | OUTPATIENT
Start: 2020-02-19 | End: 2020-02-19 | Stop reason: HOSPADM

## 2020-02-19 RX ORDER — PROPOFOL 10 MG/ML
INJECTION, EMULSION INTRAVENOUS
Status: DISCONTINUED | OUTPATIENT
Start: 2020-02-19 | End: 2020-02-19

## 2020-02-19 RX ORDER — SODIUM CHLORIDE, SODIUM LACTATE, POTASSIUM CHLORIDE, CALCIUM CHLORIDE 600; 310; 30; 20 MG/100ML; MG/100ML; MG/100ML; MG/100ML
INJECTION, SOLUTION INTRAVENOUS CONTINUOUS
Status: DISCONTINUED | OUTPATIENT
Start: 2020-02-19 | End: 2020-02-19 | Stop reason: HOSPADM

## 2020-02-19 RX ADMIN — SODIUM CHLORIDE: 0.9 INJECTION, SOLUTION INTRAVENOUS at 11:02

## 2020-02-19 RX ADMIN — PROPOFOL 20 MG: 10 INJECTION, EMULSION INTRAVENOUS at 10:02

## 2020-02-19 RX ADMIN — Medication 100 MG: at 10:02

## 2020-02-19 RX ADMIN — PROPOFOL 50 MG: 10 INJECTION, EMULSION INTRAVENOUS at 10:02

## 2020-02-19 RX ADMIN — PROPOFOL 175 MCG/KG/MIN: 10 INJECTION, EMULSION INTRAVENOUS at 10:02

## 2020-02-19 RX ADMIN — SODIUM CHLORIDE 500 ML: 0.9 INJECTION, SOLUTION INTRAVENOUS at 10:02

## 2020-02-19 NOTE — H&P
[facility]  Colon and Rectal Surgery  History & Physical    Patient Name: Ligia Curran  MRN: [id number]  Admission Date: 2/19/2020  Attending Physician: Stewart Domingo MD  Primary Care Provider: Destinee Louie MD    Patient information was obtained from patient and medical records.    Subjective:     Chief Complaint/Reason for Admission: Here for Colonoscopy    History of Present Illness:  Patient is a 74 y.o. female presents for colonoscopy. Last cscope in 2009 without polyps or masses. No hematochezia, melena or change in bowel habits. No personal or fam hx of CRC, polyps or IBD.      No current facility-administered medications on file prior to encounter.      Current Outpatient Medications on File Prior to Encounter   Medication Sig    dorzolamide-timolol 2-0.5% (COSOPT) 22.3-6.8 mg/mL ophthalmic solution Place 1 drop into both eyes 2 (two) times daily. Disp 90 day supply.    erythromycin (ROMYCIN) ophthalmic ointment Place 1 inch into both eyes every evening.    latanoprost (XALATAN) 0.005 % ophthalmic solution Place 1 drop into the right eye once daily.    sodium chloride 5% () 5 % ophthalmic solution Place 1 drop into the left eye as needed. 1 Drops Both eyes Four times a day.  Dispense 90 day supply (Patient taking differently: Place 1 drop into the left eye 2 (two) times daily. )    calcium-vitamin D 500-125 mg-unit tablet Take 1 tablet by mouth once daily.     carboxymethylcell/hypromellose (GENTEAL GEL OPHT) Place into the left eye every evening.    fish oil-omega-3 fatty acids 300-1,000 mg capsule Take 1 capsule by mouth once daily.    fluorometholone 0.1% (FML) 0.1 % DrpS Place 1 drop into the right eye 2 (two) times daily.    hypromellose (SYSTANE GEL OPHT) Apply to eye.    MULTIVITAMINS,THER W-MINERALS (VITAMINS & MINERALS ORAL) Take 1 tablet by mouth once daily.     triamcinolone acetonide 0.5% (KENALOG) 0.5 % Crea Apply topically 3 (three) times daily.     TURMERIC ROOT EXTRACT ORAL Take 1 capsule by mouth once daily.       Review of patient's allergies indicates:   Allergen Reactions    Augmentin [amoxicillin-pot clavulanate] Diarrhea    Sulfa (sulfonamide antibiotics) Rash       Past Medical History:   Diagnosis Date    Breast cyst     Chronic angle-closure glaucoma of both eyes, severe stage 11/18/2016    Fibrocystic breast     Gallbladder problem     Glaucoma     Glaucoma     Joint pain     Keloid cicatrix     PONV (postoperative nausea and vomiting)      Past Surgical History:   Procedure Laterality Date    AK POST CATARACT/PKP Right 10/26/2017    PKP     BAERVELDT GLAUCOMA SHUNT Right 12/26/2012        breast biopsy      BREAST BIOPSY      CATARACT EXTRACTION Left n/a    Aphakic    CATARACT EXTRACTION W/  INTRAOCULAR LENS IMPLANT Right 05/24/2012    WITH DSEK ()    CHOLECYSTECTOMY      CORNEAL TRANSPLANT Right 05/24/2012    DSEK/PHACO IOL ()    CORNEAL TRANSPLANT Right 10/26/2017    PKP ()    DISSECTION AND REMOVAL OF FIBROUS PUPILLARY MEMBRANE Right 01/26/2017    DR. BRANNON    HYSTERECTOMY      OOPHORECTOMY      REMOVAL OF BAERVELDT GLAUCOMA SHUNT Right 02/27/2017        REMOVAL OF NECROTIC TISSUE OF EYE Right 04/09/2013    REMOVAL OF TISSUE AROUND SHUNT WITH PERICARDIAL PATCH ( AND )    STRABISMUS SURGERY       Family History     Problem Relation (Age of Onset)    Blindness Mother    Cataracts Sister    Glaucoma Mother, Sister    Hypertension Sister        Tobacco Use    Smoking status: Never Smoker    Smokeless tobacco: Never Used   Substance and Sexual Activity    Alcohol use: Yes     Comment: socially    Drug use: No    Sexual activity: Not on file     Review of Systems   Constitutional: Negative for activity change, appetite change, chills, fatigue, fever and unexpected weight change.   HENT: Negative for congestion, ear pain, sore throat and trouble swallowing.     Eyes: Negative for pain, redness and itching.   Respiratory: Negative for cough, shortness of breath and wheezing.    Cardiovascular: Negative for chest pain, palpitations and leg swelling.   Gastrointestinal: Negative for abdominal distention, abdominal pain, anal bleeding, blood in stool, constipation, diarrhea, nausea, rectal pain and vomiting.   Endocrine: Negative for cold intolerance, heat intolerance and polyuria.   Genitourinary: Negative for dysuria, flank pain, frequency and hematuria.   Musculoskeletal: Negative for gait problem, joint swelling and neck pain.   Skin: Negative for color change, rash and wound.   Allergic/Immunologic: Negative for environmental allergies and immunocompromised state.   Neurological: Negative for dizziness, speech difficulty, weakness and numbness.   Psychiatric/Behavioral: Negative for agitation, confusion and hallucinations.     Objective:     Vital Signs (Most Recent):  Temp: 98.2 °F (36.8 °C) (02/19/20 1022)  Pulse: 84 (02/19/20 1022)  Resp: 18 (02/19/20 1022)  BP: 132/74 (02/19/20 1022)  SpO2: 99 % (02/19/20 1022) Vital Signs (24h Range):  Temp:  [98.2 °F (36.8 °C)] 98.2 °F (36.8 °C)  Pulse:  [84] 84  Resp:  [18] 18  SpO2:  [99 %] 99 %  BP: (132)/(74) 132/74     Weight: 89.8 kg (198 lb)  Body mass index is 38.67 kg/m².    Physical Exam   Constitutional: She is oriented to person, place, and time. She appears well-developed.   HENT:   Head: Normocephalic and atraumatic.   Eyes: Conjunctivae and EOM are normal.   Neck: Normal range of motion. No thyromegaly present.   Cardiovascular: Normal rate and regular rhythm.   Pulmonary/Chest: Effort normal. No respiratory distress.   Abdominal: Soft. She exhibits no distension and no mass. There is no tenderness.   Musculoskeletal: Normal range of motion. She exhibits no edema or tenderness.   Neurological: She is alert and oriented to person, place, and time.   Skin: Skin is warm and dry. Capillary refill takes less than 2  seconds. No rash noted.   Psychiatric: She has a normal mood and affect.         Assessment/Plan:     Patient is a 74 y.o. female who presents for colonoscopy     - Ok to proceed to endoscopy suite for colonoscopy  - Consent obtained. All risks, benefits and alternatives fully explained to patient, including but not limited to bleeding, infection, perforation, and missed polyps. All questions appropriately answered to patient's satisfaction. Consent signed and placed on chart.    Active Diagnoses:    Diagnosis Date Noted POA    Screening for colon cancer [Z12.11] 02/19/2020 Not Applicable      Problems Resolved During this Admission:     VTE Risk Mitigation (From admission, onward)    None          Stewart Domingo MD  Colon and Rectal Surgery  [facility]

## 2020-02-19 NOTE — BRIEF OP NOTE
[facility]  Brief Operative Note     SUMMARY     Surgery Date: 2/19/2020     Surgeon(s) and Role:     * Stewart Domingo MD - Primary    Assisting Surgeon: None    Pre-op Diagnosis:  Screen for colon cancer [Z12.11]  Annual physical exam [Z00.00]  Hyperglycemia [R73.9]    Post-op Diagnosis:  Post-Op Diagnosis Codes:     * Screen for colon cancer [Z12.11]     * Annual physical exam [Z00.00]     * Hyperglycemia [R73.9]    Procedure(s) (LRB):  COLONOSCOPY (N/A)    Anesthesia: General    Description of the findings of the procedure: See Op Note    Findings/Key Components: See Op Note    Estimated Blood Loss: * No values recorded between 2/19/2020 12:00 AM and 2/19/2020 11:12 AM *         Specimens:   Specimen (12h ago, onward)    None          Discharge Note    SUMMARY     Admit Date: 2/19/2020    Discharge Date and Time: 2/19/2020 11:16 AM    Hospital Course Patient was seen in the preoperative area by both myself and anesthesia. All consents were verified and all questions appropriately answered. All risks, benefits and alternatives explained to patient. Patient proceeded to endoscopy suite for colonoscopy and was discharged home postoperative once cleared by anesthesia.    Final Diagnosis: Post-Op Diagnosis Codes:     * Screen for colon cancer [Z12.11]     * Annual physical exam [Z00.00]     * Hyperglycemia [R73.9]    Disposition: Home or Self Care    Follow Up/Patient Instructions: See Provation report    Medications:  Reconciled Home Medications:      Medication List      CHANGE how you take these medications    sodium chloride 5% 5 % ophthalmic solution  Commonly known as:  Idania 128  Place 1 drop into the left eye as needed. 1 Drops Both eyes Four times a day.  Dispense 90 day supply  What changed:    · when to take this  · additional instructions        CONTINUE taking these medications    calcium-vitamin D 500-125 mg-unit tablet  Take 1 tablet by mouth once daily.     dorzolamide-timolol  2-0.5% 22.3-6.8 mg/mL ophthalmic solution  Commonly known as:  Cosopt  Place 1 drop into both eyes 2 (two) times daily. Disp 90 day supply.     erythromycin ophthalmic ointment  Commonly known as:  ROMYCIN  Place 1 inch into both eyes every evening.     fish oil-omega-3 fatty acids 300-1,000 mg capsule  Take 1 capsule by mouth once daily.     fluorometholone 0.1% 0.1 % Drps  Commonly known as:  FML  Place 1 drop into the right eye 2 (two) times daily.     GENTEAL GEL OPHT  Place into the left eye every evening.     latanoprost 0.005 % ophthalmic solution  Commonly known as:  Xalatan  Place 1 drop into the right eye once daily.     prednisoLONE acetate 1 % Drps  Commonly known as:  PRED FORTE  Place 1 drop into the right eye 4 (four) times daily.     SYSTANE GEL OPHT  Apply to eye.     triamcinolone acetonide 0.5% 0.5 % Crea  Commonly known as:  KENALOG  Apply topically 3 (three) times daily.     TURMERIC ROOT EXTRACT ORAL  Take 1 capsule by mouth once daily.     VITAMINS & MINERALS ORAL  Take 1 tablet by mouth once daily.          Discharge Procedure Orders   Diet general     Call MD for:  temperature >100.4     Call MD for:  persistent nausea and vomiting     Call MD for:  severe uncontrolled pain     Call MD for:  difficulty breathing, headache or visual disturbances     Call MD for:  redness, tenderness, or signs of infection (pain, swelling, redness, odor or green/yellow discharge around incision site)     Call MD for:  hives     Call MD for:  persistent dizziness or light-headedness     Call MD for:  extreme fatigue     Activity as tolerated     Follow-up Information     Destinee Louie MD.    Specialty:  Family Medicine  Why:  As needed  Contact information:  [address]  [address]  [facility] [id number] [phone]

## 2020-02-19 NOTE — ANESTHESIA POSTPROCEDURE EVALUATION
Anesthesia Post Evaluation    Patient: Ligia Curran    Procedure(s) Performed: Procedure(s) (LRB):  COLONOSCOPY (N/A)    Final Anesthesia Type: general    Patient location during evaluation: GI PACU  Patient participation: Yes- Able to Participate  Level of consciousness: awake and alert  Post-procedure vital signs: reviewed and stable  Pain management: adequate  Airway patency: patent    PONV status at discharge: No PONV  Anesthetic complications: no      Cardiovascular status: blood pressure returned to baseline  Respiratory status: unassisted  Hydration status: euvolemic  Follow-up not needed.          Vitals Value Taken Time   /64 2/19/2020 11:47 AM   Temp 36.3 °C (97.3 °F) 2/19/2020 11:17 AM   Pulse 68 2/19/2020 11:47 AM   Resp 17 2/19/2020 11:47 AM   SpO2 99 % 2/19/2020 11:47 AM         Event Time     Out of Recovery 11:55:00          Pain/Ammon Score: Ammon Score: 9 (2/19/2020 11:32 AM)

## 2020-02-19 NOTE — ANESTHESIA PREPROCEDURE EVALUATION
02/19/2020  Ligia Curran is a 74 y.o., female. Here for screening colonoscopy.    Past Medical History:   Diagnosis Date    Breast cyst     Chronic angle-closure glaucoma of both eyes, severe stage 11/18/2016    Fibrocystic breast     Gallbladder problem     Glaucoma     Glaucoma     Joint pain     Keloid cicatrix     PONV (postoperative nausea and vomiting)      Past Surgical History:   Procedure Laterality Date    AK POST CATARACT/PKP Right 10/26/2017    PKP     BAERVELDT GLAUCOMA SHUNT Right 12/26/2012        breast biopsy      BREAST BIOPSY      CATARACT EXTRACTION Left n/a    Aphakic    CATARACT EXTRACTION W/  INTRAOCULAR LENS IMPLANT Right 05/24/2012    WITH DSEK ()    CHOLECYSTECTOMY      CORNEAL TRANSPLANT Right 05/24/2012    DSEK/PHACO IOL ()    CORNEAL TRANSPLANT Right 10/26/2017    PKP ()    DISSECTION AND REMOVAL OF FIBROUS PUPILLARY MEMBRANE Right 01/26/2017    DR. BRANNON    HYSTERECTOMY      OOPHORECTOMY      REMOVAL OF BAERVELDT GLAUCOMA SHUNT Right 02/27/2017        REMOVAL OF NECROTIC TISSUE OF EYE Right 04/09/2013    REMOVAL OF TISSUE AROUND SHUNT WITH PERICARDIAL PATCH ( AND )    STRABISMUS SURGERY           Anesthesia Evaluation    I have reviewed the Patient Summary Reports.    I have reviewed the Nursing Notes.   I have reviewed the Medications.     Review of Systems  Anesthesia Hx:  No problems with previous Anesthesia  Denies Family Hx of Anesthesia complications.  Personal Hx of Anesthesia complications, Post-Operative Nausea/Vomiting   Hematology/Oncology:  Hematology Normal   Oncology Normal     EENT/Dental:EENT/Dental Normal   Cardiovascular:  Cardiovascular Normal     Pulmonary:  Pulmonary Normal    Renal/:  Renal/ Normal     Hepatic/GI:  Hepatic/GI Normal     Musculoskeletal:  Musculoskeletal Normal    Neurological:  Neurology Normal    Endocrine:  Endocrine Normal    Dermatological:  Skin Normal    Psych:  Psychiatric Normal           Physical Exam  General:  Well nourished    Airway/Jaw/Neck:  Airway Findings: Mouth Opening: Normal Tongue: Normal  General Airway Assessment: Adult  Mallampati: II  Improves to I with phonation.  TM Distance: Normal, at least 6 cm        Eyes/Ears/Nose:  EYES/EARS/NOSE FINDINGS: Normal   Dental:  DENTAL FINDINGS: Normal   Chest/Lungs:  Chest/Lungs Findings: Clear to auscultation, Normal Respiratory Rate     Heart/Vascular:  Heart Findings: Rate: Normal  Rhythm: Regular Rhythm  Sounds: Normal  Vascular Findings: Normal    Abdomen:  Abdomen Findings: Normal    Musculoskeletal:  Musculoskeletal Findings: Normal   Skin:  Skin Findings: Normal    Mental Status:  Mental Status Findings: Normal        Anesthesia Plan  Type of Anesthesia, risks & benefits discussed:  Anesthesia Type:  general  Patient's Preference: General  Intra-op Monitoring Plan: standard ASA monitors  Intra-op Monitoring Plan Comments:   Post Op Pain Control Plan: per primary service following discharge from PACU  Post Op Pain Control Plan Comments:   Induction:   IV  Beta Blocker:  Patient is not currently on a Beta-Blocker (No further documentation required).       Informed Consent: Patient understands risks and agrees with Anesthesia plan.  Questions answered. Anesthesia consent signed with patient.  ASA Score: 3     Day of Surgery Review of History & Physical: I have interviewed and examined the patient. I have reviewed the patient's H&P dated:  There are no significant changes.  H&P update referred to the provider.         Ready For Surgery From Anesthesia Perspective.

## 2020-02-19 NOTE — DISCHARGE INSTRUCTIONS
Colonoscopy     A camera attached to a flexible tube with a viewing lens is used to take video pictures.     Colonoscopy is a test to view the inside of your lower digestive tract (colon and rectum). Sometimes it can show the last part of the small intestine (ileum). During the test, small pieces of tissue may be removed for testing. This is called a biopsy. Small growths, such as polyps, may also be removed.   Why is colonoscopy done?  The test is done to help look for colon cancer. And it can help find the source of abdominal pain, bleeding, and changes in bowel habits. It may be needed once a year, depending on factors such as your:  · Age  · Health history  · Family health history  · Symptoms  · Results from any prior colonoscopy  Risks and possible complications  These include:  · Bleeding               · A puncture or tear in the colon   · Risks of anesthesia  · A cancer lesion not being seen  Getting ready   To prepare for the test:  · Talk with your healthcare provider about the risks of the test (see below). Also ask your healthcare provider about alternatives to the test.  · Tell your healthcare provider about any medicines you take. Also tell him or her about any health conditions you may have.  · Make sure your rectum and colon are empty for the test. Follow the diet and bowel prep instructions exactly. If you dont, the test may need to be rescheduled.  · Plan for a friend or family member to drive you home after the test.     Colonoscopy provides an inside view of the entire colon.     You may discuss the results with your doctor right away or at a future visit.  During the test   The test is usually done in the hospital on an outpatient basis. This means you go home the same day. The procedure takes about 30 minutes. During that time:  · You are given relaxing (sedating) medicine through an IV line. You may be drowsy, or fully asleep.  · The healthcare provider will first give you a physical exam to  check for anal and rectal problems.  · Then the anus is lubricated and the scope inserted.  · If you are awake, you may have a feeling similar to needing to have a bowel movement. You may also feel pressure as air is pumped into the colon. Its OK to pass gas during the procedure.  · Biopsy, polyp removal, or other treatments may be done during the test.  After the test   You may have gas right after the test. It can help to try to pass it to help prevent later bloating. Your healthcare provider may discuss the results with you right away. Or you may need to schedule a follow-up visit to talk about the results. After the test, you can go back to your normal eating and other activities. You may be tired from the sedation and need to rest for a few hours.  Date Last Reviewed: 11/1/2016 © 2000-2017 [employer]. [address] [place] [address], [place] [address]. All rights reserved. This information is not intended as a substitute for professional medical care. Always follow your healthcare professional's instructions.

## 2020-02-19 NOTE — TRANSFER OF CARE
Anesthesia Transfer of Care Note    Patient: Ligia Curran    Procedure(s) Performed: Procedure(s) (LRB):  COLONOSCOPY (N/A)    Patient location: PACU    Anesthesia Type: general    Transport from OR: Transported from OR on 6-10 L/min O2 by face mask with adequate spontaneous ventilation    Post pain: adequate analgesia    Post assessment: no apparent anesthetic complications    Post vital signs: stable    Level of consciousness: awake and alert    Nausea/Vomiting: no nausea/vomiting    Complications: none    Transfer of care protocol was followed      Last vitals:   Visit Vitals  /74 (BP Location: Left arm, Patient Position: Lying)   Pulse 84   Temp 36.8 °C (98.2 °F) (Temporal)   Resp 18   Ht 5' (1.524 m)   Wt 89.8 kg (198 lb)   SpO2 99%   BMI 38.67 kg/m²

## 2020-02-19 NOTE — [employer] PATIENT INSTRUCTIONS
Discharge Summary/Instructions after an Endoscopic Procedure  Patient Name: Ligia Curran  Patient MRN: [id number]  Patient YOB: 1945 Wednesday, February 19, 2020  Stewart Domingo MD  RESTRICTIONS:  During your procedure today, you received medications for sedation.  These   medications may affect your judgment, balance and coordination.  Therefore,   for 24 hours, you have the following restrictions:   - DO NOT drive a car, operate machinery, make legal/financial decisions,   sign important papers or drink alcohol.    ACTIVITY:  Today: no heavy lifting, straining or running due to procedural   sedation/anesthesia.  The following day: return to full activity including work.  DIET:  Eat and drink normally unless instructed otherwise.     TREATMENT FOR COMMON SIDE EFFECTS:  - Mild abdominal pain, nausea, belching, bloating or excessive gas:  rest,   eat lightly and use a heating pad.  - Sore Throat: treat with throat lozenges and/or gargle with warm salt   water.  - Because air was used during the procedure, expelling large amounts of air   from your rectum or belching is normal.  - If a bowel prep was taken, you may not have a bowel movement for 1-3 days.    This is normal.  SYMPTOMS TO WATCH FOR AND REPORT TO YOUR PHYSICIAN:  1. Abdominal pain or bloating, other than gas cramps.  2. Chest pain.  3. Back pain.  4. Signs of infection such as: chills or fever occurring within 24 hours   after the procedure.  5. Rectal bleeding, which would show as bright red, maroon, or black stools.   (A tablespoon of blood from the rectum is not serious, especially if   hemorrhoids are present.)  6. Vomiting.  7. Weakness or dizziness.  GO DIRECTLY TO THE NEAREST EMERGENCY ROOM IF YOU HAVE ANY OF THE FOLLOWING:      Difficulty breathing              Chills and/or fever over 101 F   Persistent vomiting and/or vomiting blood   Severe abdominal pain   Severe chest pain   Black, tarry stools   Bleeding- more than one  tablespoon   Any other symptom or condition that you feel may need urgent attention  Your doctor recommends these additional instructions:  If any biopsies were taken, your doctors clinic will contact you in 1 to 2   weeks with any results.  - Discharge patient to home.   - Resume previous diet.   - Continue present medications.   - Repeat colonoscopy in 10 years for screening purposes.   - Return to primary care physician PRN.  For questions, problems or results please call your physician - Stewart Domingo MD at Work:  [phone].  [facility], EMERGENCY ROOM PHONE NUMBER: [phone]  IF A COMPLICATION OR EMERGENCY SITUATION ARISES AND YOU ARE UNABLE TO REACH   YOUR PHYSICIAN - GO DIRECTLY TO THE EMERGENCY ROOM.  MD Stewart Olson MD  2/19/2020 11:15:30 AM  This report has been verified and signed electronically.  [employer]

## 2020-03-12 ENCOUNTER — TELEPHONE (OUTPATIENT)
Dept: FAMILY MEDICINE | Facility: CLINIC | Age: 75
End: 2020-03-12

## 2020-03-12 NOTE — TELEPHONE ENCOUNTER
----- Message from Brennen Conte sent at 3/12/2020  2:36 PM CDT -----  Contact: Pt   Name of Who is Calling: MAYANK DIAL [[id number]]    What is the request in detail: Patient is requesting call back regards to the f/u visit she had with Dr. Louie  Please contact to further discuss and advise      Can the clinic reply by [facility]: No     What Number to Call Back if not in [facility]:  [phone]

## 2020-03-17 ENCOUNTER — LAB VISIT (OUTPATIENT)
Dept: LAB | Facility: HOSPITAL | Age: 75
End: 2020-03-17
Attending: FAMILY MEDICINE
Payer: MEDICARE

## 2020-03-17 ENCOUNTER — OFFICE VISIT (OUTPATIENT)
Dept: FAMILY MEDICINE | Facility: CLINIC | Age: 75
End: 2020-03-17
Attending: FAMILY MEDICINE
Payer: MEDICARE

## 2020-03-17 VITALS
BODY MASS INDEX: 39.07 KG/M2 | HEIGHT: 60 IN | DIASTOLIC BLOOD PRESSURE: 80 MMHG | WEIGHT: 199 LBS | HEART RATE: 67 BPM | SYSTOLIC BLOOD PRESSURE: 127 MMHG

## 2020-03-17 DIAGNOSIS — R71.8 ELEVATED MCV: ICD-10-CM

## 2020-03-17 DIAGNOSIS — R10.32 LLQ ABDOMINAL PAIN: Primary | ICD-10-CM

## 2020-03-17 DIAGNOSIS — R10.32 LLQ ABDOMINAL PAIN: ICD-10-CM

## 2020-03-17 LAB
ANION GAP SERPL CALC-SCNC: 5 MMOL/L (ref 8–16)
BASOPHILS # BLD AUTO: 0.03 K/UL (ref 0–0.2)
BASOPHILS NFR BLD: 0.4 % (ref 0–1.9)
BUN SERPL-MCNC: 7 MG/DL (ref 8–23)
CALCIUM SERPL-MCNC: 9.6 MG/DL (ref 8.7–10.5)
CHLORIDE SERPL-SCNC: 104 MMOL/L (ref 95–110)
CO2 SERPL-SCNC: 32 MMOL/L (ref 23–29)
CREAT SERPL-MCNC: 0.7 MG/DL (ref 0.5–1.4)
DIFFERENTIAL METHOD: ABNORMAL
EOSINOPHIL # BLD AUTO: 0.1 K/UL (ref 0–0.5)
EOSINOPHIL NFR BLD: 0.6 % (ref 0–8)
ERYTHROCYTE [DISTWIDTH] IN BLOOD BY AUTOMATED COUNT: 12.4 % (ref 11.5–14.5)
EST. GFR  (AFRICAN AMERICAN): >60 ML/MIN/1.73 M^2
EST. GFR  (NON AFRICAN AMERICAN): >60 ML/MIN/1.73 M^2
GLUCOSE SERPL-MCNC: 109 MG/DL (ref 70–110)
HCT VFR BLD AUTO: 48.5 % (ref 37–48.5)
HGB BLD-MCNC: 15 G/DL (ref 12–16)
IMM GRANULOCYTES # BLD AUTO: 0.02 K/UL (ref 0–0.04)
IMM GRANULOCYTES NFR BLD AUTO: 0.2 % (ref 0–0.5)
LYMPHOCYTES # BLD AUTO: 2.1 K/UL (ref 1–4.8)
LYMPHOCYTES NFR BLD: 24.5 % (ref 18–48)
MCH RBC QN AUTO: 30.7 PG (ref 27–31)
MCHC RBC AUTO-ENTMCNC: 30.9 G/DL (ref 32–36)
MCV RBC AUTO: 99 FL (ref 82–98)
MONOCYTES # BLD AUTO: 0.5 K/UL (ref 0.3–1)
MONOCYTES NFR BLD: 5.5 % (ref 4–15)
NEUTROPHILS # BLD AUTO: 5.8 K/UL (ref 1.8–7.7)
NEUTROPHILS NFR BLD: 68.8 % (ref 38–73)
NRBC BLD-RTO: 0 /100 WBC
PLATELET # BLD AUTO: 190 K/UL (ref 150–350)
PMV BLD AUTO: 11.9 FL (ref 9.2–12.9)
POTASSIUM SERPL-SCNC: 4.3 MMOL/L (ref 3.5–5.1)
RBC # BLD AUTO: 4.88 M/UL (ref 4–5.4)
SODIUM SERPL-SCNC: 141 MMOL/L (ref 136–145)
WBC # BLD AUTO: 8.44 K/UL (ref 3.9–12.7)

## 2020-03-17 PROCEDURE — 99214 OFFICE O/P EST MOD 30 MIN: CPT | Mod: PBBFAC,PO | Performed by: FAMILY MEDICINE

## 2020-03-17 PROCEDURE — 85025 COMPLETE CBC W/AUTO DIFF WBC: CPT

## 2020-03-17 PROCEDURE — 99213 OFFICE O/P EST LOW 20 MIN: CPT | Mod: S$PBB,,, | Performed by: FAMILY MEDICINE

## 2020-03-17 PROCEDURE — 99999 PR PBB SHADOW E&M-EST. PATIENT-LVL IV: CPT | Mod: PBBFAC,,, | Performed by: FAMILY MEDICINE

## 2020-03-17 PROCEDURE — 36415 COLL VENOUS BLD VENIPUNCTURE: CPT | Mod: PO

## 2020-03-17 PROCEDURE — 99999 PR PBB SHADOW E&M-EST. PATIENT-LVL IV: ICD-10-PCS | Mod: PBBFAC,,, | Performed by: FAMILY MEDICINE

## 2020-03-17 PROCEDURE — 99213 PR OFFICE/OUTPT VISIT, EST, LEVL III, 20-29 MIN: ICD-10-PCS | Mod: S$PBB,,, | Performed by: FAMILY MEDICINE

## 2020-03-17 PROCEDURE — 80048 BASIC METABOLIC PNL TOTAL CA: CPT

## 2020-03-17 NOTE — PROGRESS NOTES
"Subjective:       Patient ID: Ligia Curran is a 74 y.o. female.    Chief Complaint: Follow-up; Abdominal Pain (Left side); and Results    HPI   Pt is here for follow up of llq abd pain many months pt is s/p hyst with oophorectomy per pt but she is not actually sure  No change in bowel habits no brbpr no weight loss.  No n/v/f/c/d/c Colonoscopy fine repeat in 10 years   Review of Systems   Constitutional: Negative for chills, fatigue and fever.   Respiratory: Negative for cough, chest tightness and shortness of breath.    Cardiovascular: Negative for chest pain and palpitations.   Gastrointestinal: Positive for abdominal pain. Negative for abdominal distention, blood in stool, constipation, nausea, rectal pain and vomiting.       Objective:      Physical Exam   Constitutional: She appears well-developed and well-nourished. No distress.   Cardiovascular: Normal rate and regular rhythm. Exam reveals no gallop.   Pulmonary/Chest: Effort normal and breath sounds normal. No stridor. No respiratory distress.   Abdominal: Soft. Bowel sounds are normal. She exhibits no distension. There is tenderness. There is no rebound and no guarding.   llq       Assessment:       1. LLQ abdominal pain        Plan:     orders mri pelvis/abd  Cont meds  High fiber low fat diet  Increase water intake  Graded exercise  F/u gi    rtc after imaging    "This note will not be shared with the patient."   "

## 2020-03-19 DIAGNOSIS — H40.2213 CHRONIC ANGLE-CLOSURE GLAUCOMA OF RIGHT EYE, SEVERE STAGE: ICD-10-CM

## 2020-03-19 NOTE — TELEPHONE ENCOUNTER
Spoke with pt. Refilled her Latanoprost eye medication with [facility].    ----- Message from Marita Silvestre sent at 3/19/2020  4:50 PM CDT -----  Contact: Sister Ligia  Pt calling because she needs a refill on her meds; Latanoprost. Pt can be reached at [phone].

## 2020-03-20 DIAGNOSIS — H40.2213 CHRONIC ANGLE-CLOSURE GLAUCOMA OF RIGHT EYE, SEVERE STAGE: ICD-10-CM

## 2020-03-20 RX ORDER — LATANOPROST 50 UG/ML
1 SOLUTION/ DROPS OPHTHALMIC DAILY
Qty: 2.5 ML | Refills: 6 | Status: SHIPPED | OUTPATIENT
Start: 2020-03-20 | End: 2021-04-30

## 2020-03-20 RX ORDER — LATANOPROST 50 UG/ML
SOLUTION/ DROPS OPHTHALMIC
Qty: 2.5 ML | Refills: 4 | Status: SHIPPED | OUTPATIENT
Start: 2020-03-20 | End: 2021-04-30

## 2020-03-23 ENCOUNTER — TELEPHONE (OUTPATIENT)
Dept: FAMILY MEDICINE | Facility: CLINIC | Age: 75
End: 2020-03-23

## 2020-03-23 ENCOUNTER — HOSPITAL ENCOUNTER (OUTPATIENT)
Dept: RADIOLOGY | Facility: HOSPITAL | Age: 75
Discharge: HOME | End: 2020-03-23
Attending: FAMILY MEDICINE
Payer: MEDICARE

## 2020-03-23 DIAGNOSIS — R10.32 LLQ ABDOMINAL PAIN: ICD-10-CM

## 2020-03-23 PROCEDURE — 74181 MRI ABDOMEN PELVIS WITHOUT CONTRAST (XPD): ICD-10-PCS | Mod: 26,,, | Performed by: RADIOLOGY

## 2020-03-23 PROCEDURE — 72195 MRI ABDOMEN PELVIS WITHOUT CONTRAST (XPD): ICD-10-PCS | Mod: 26,,, | Performed by: RADIOLOGY

## 2020-03-23 PROCEDURE — 72195 MRI PELVIS W/O DYE: CPT | Mod: 26,,, | Performed by: RADIOLOGY

## 2020-03-23 PROCEDURE — 74181 MRI ABDOMEN W/O CONTRAST: CPT | Mod: TC

## 2020-03-23 PROCEDURE — 74181 MRI ABDOMEN W/O CONTRAST: CPT | Mod: 26,,, | Performed by: RADIOLOGY

## 2020-03-23 NOTE — TELEPHONE ENCOUNTER
----- Message from Destinee Louie MD sent at 3/23/2020  2:46 PM CDT -----  Her mri of the abdomen and pelvis is fine nothing acute noted

## 2020-03-23 NOTE — TELEPHONE ENCOUNTER
Patient was given MRI results and also mailed a copy for her records.   Patient would like to know if anything can be done. She states she is in a lot of pain.   What recommendations do you have for the patient?

## 2020-03-31 NOTE — TELEPHONE ENCOUNTER
Please check on [facility] I know her studies came out fine but if she is still having intermittent abd pain she may need to go to the ED as imaging is not 100%

## 2020-05-15 ENCOUNTER — TELEPHONE (OUTPATIENT)
Dept: OPHTHALMOLOGY | Facility: CLINIC | Age: 75
End: 2020-05-15

## 2020-05-15 NOTE — TELEPHONE ENCOUNTER
----- Message from Shital Gomez sent at 5/15/2020 10:14 AM CDT -----  Contact: Rach Strong would like for you to contact her. She has questions concerning her appointment on next week. She can be reached at [phone]

## 2020-06-02 ENCOUNTER — LAB VISIT (OUTPATIENT)
Dept: PRIMARY CARE CLINIC | Facility: CLINIC | Age: 75
End: 2020-06-02
Payer: MEDICARE

## 2020-06-02 DIAGNOSIS — Z11.59 ENCOUNTER FOR SCREENING FOR OTHER VIRAL DISEASES: Primary | ICD-10-CM

## 2020-06-02 PROCEDURE — U0003 INFECTIOUS AGENT DETECTION BY NUCLEIC ACID (DNA OR RNA); SEVERE ACUTE RESPIRATORY SYNDROME CORONAVIRUS 2 (SARS-COV-2) (CORONAVIRUS DISEASE [COVID-19]), AMPLIFIED PROBE TECHNIQUE, MAKING USE OF HIGH THROUGHPUT TECHNOLOGIES AS DESCRIBED BY CMS-2020-01-R: HCPCS

## 2020-06-03 LAB — SARS-COV-2 RNA RESP QL NAA+PROBE: NOT DETECTED

## 2020-06-15 ENCOUNTER — PATIENT OUTREACH (OUTPATIENT)
Dept: ADMINISTRATIVE | Facility: OTHER | Age: 75
End: 2020-06-15

## 2020-06-15 DIAGNOSIS — Z12.31 ENCOUNTER FOR SCREENING MAMMOGRAM FOR MALIGNANT NEOPLASM OF BREAST: Primary | ICD-10-CM

## 2020-06-16 NOTE — PROGRESS NOTES
Chart reviewed.   Immunizations: n/a  Orders placed: Mammogram  Upcoming appts to satisfy REGIS topics: n/a

## 2020-06-17 ENCOUNTER — OFFICE VISIT (OUTPATIENT)
Dept: OPHTHALMOLOGY | Facility: CLINIC | Age: 75
End: 2020-06-17
Payer: MEDICARE

## 2020-06-17 DIAGNOSIS — H21.41: ICD-10-CM

## 2020-06-17 DIAGNOSIS — Z94.7 CORNEA REPLACED BY TRANSPLANT: ICD-10-CM

## 2020-06-17 DIAGNOSIS — T86.8419 CORNEAL TRANSPLANT FAILURE: ICD-10-CM

## 2020-06-17 DIAGNOSIS — H54.10 BLINDNESS AND LOW VISION: ICD-10-CM

## 2020-06-17 DIAGNOSIS — H40.2213 CHRONIC ANGLE-CLOSURE GLAUCOMA OF RIGHT EYE, SEVERE STAGE: ICD-10-CM

## 2020-06-17 DIAGNOSIS — H27.02 APHAKIA OF LEFT EYE: ICD-10-CM

## 2020-06-17 DIAGNOSIS — Z98.41 STATUS POST CATARACT EXTRACTION AND INSERTION OF INTRAOCULAR LENS OF RIGHT EYE: ICD-10-CM

## 2020-06-17 DIAGNOSIS — H40.1122 PRIMARY OPEN ANGLE GLAUCOMA (POAG) OF LEFT EYE, MODERATE STAGE: Primary | ICD-10-CM

## 2020-06-17 DIAGNOSIS — Z96.1 STATUS POST CATARACT EXTRACTION AND INSERTION OF INTRAOCULAR LENS OF RIGHT EYE: ICD-10-CM

## 2020-06-17 DIAGNOSIS — H18.20 CORNEAL EDEMA: ICD-10-CM

## 2020-06-17 PROCEDURE — 92012 PR EYE EXAM, EST PATIENT,INTERMED: ICD-10-PCS | Mod: S$PBB,,, | Performed by: OPHTHALMOLOGY

## 2020-06-17 PROCEDURE — 99213 OFFICE O/P EST LOW 20 MIN: CPT | Mod: PBBFAC | Performed by: OPHTHALMOLOGY

## 2020-06-17 PROCEDURE — 99999 PR PBB SHADOW E&M-EST. PATIENT-LVL III: ICD-10-PCS | Mod: PBBFAC,,, | Performed by: OPHTHALMOLOGY

## 2020-06-17 PROCEDURE — 99999 PR PBB SHADOW E&M-EST. PATIENT-LVL III: CPT | Mod: PBBFAC,,, | Performed by: OPHTHALMOLOGY

## 2020-06-17 PROCEDURE — 92012 INTRM OPH EXAM EST PATIENT: CPT | Mod: S$PBB,,, | Performed by: OPHTHALMOLOGY

## 2020-06-17 NOTE — LETTER
June 17, 2020      Umang Maria MD  [address]  [place] [address]           [facility]  [address]  [facility] [address]  Phone: [phone]  Fax: [phone]          Patient: Ligia Curran   MR Number: [id number]   YOB: 1945   Date of Visit: 6/17/2020       Dear Dr. Umang Maria:    Thank you for referring Ligia Curran to me for evaluation. Attached you will find relevant portions of my assessment and plan of care.    If you have questions, please do not hesitate to call me. I look forward to following Ligia Curran along with you.    Sincerely,    Sagar Wilkerson MD    Enclosure  CC:  No Recipients    If you would like to receive this communication electronically, please contact [email or web address] or [phone] to request more information on [email or web address] Link access.    For providers and/or their staff who would like to refer a patient to [facility], please contact us through our one-stop-shop provider referral line, [facility], at [phone].    If you feel you have received this communication in error or would no longer like to receive these types of communications, please e-mail [email or web address]

## 2020-06-17 NOTE — PROGRESS NOTES
Assessment /Plan     For exam results, see Encounter Report.    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    Chronic angle-closure glaucoma of right eye, severe stage    Blindness and low vision - Both Eyes    Aphakia of left eye    Adhesions and disruptions of pupillary membranes of right eye          [facility]  Strong FMHx Glc / Blindness --> Justyn Creole Dz / mom's side    Complex ocular hx  multiple sx --> trabs, tubes and tube removes etc   Too numerous to count    CCT  537 // 611    IOP livable --> discussed options OD with G6/MP3 laser    Right eye  PKP - Quiet --> 10/26/2017 PKP  PC IOL  Fibrotic anterior capsule membrane  Tubes x 2 in place ST & IN --> removed ST BV 2/27/2017  Glc Shunt graft site OD  Revision 04/09/2013    B-scan No RD 7/2/2019    Right eye  PKP with Ant Cap fibrosis    Left eye  Aphakia  K-edema        Glaucoma Incisional Surgery  Patient with exposed ST Tube OD  SP OD ST BV / Amniotic membrane Removal 2/27/2017    Both eyes  Cosopt BID  Idania 128 BID    Right eye --> consider G6 Laser as re-discussed  PF1%  BID --> using  Xal q day    Left eye  Idania 128 BID + / - --> add to Right eye  Genteal Gel  Considering DSEK and Lens implant --> Lucia Calvo      MGD --> improved surface --> improved Va 10/1/2019  Dry Eye Syndrome: discussed use of warm compresses, preserved & non-preserved artificial tears, gel and PM ointment options.  Also discussed options utilizing medications.    Mucomyst 10% BID / QID OD --> BID / PRN  EES BID --> q hs    --> consider Doxy PO as discussed    RD precautions:  Discussed symptoms of RD with increased flashes, floaters, decreasing vision.  Patient/Family to call and return immediately to clinic should the symptoms  of RD occur. Voiced good understanding with Q & A.        Plan  RTC P Calvo --> K Failure OD & consider OS  RTC 4 months IOP & iritis check  RTC sooner prn with understanding

## 2020-07-13 ENCOUNTER — PATIENT OUTREACH (OUTPATIENT)
Dept: ADMINISTRATIVE | Facility: OTHER | Age: 75
End: 2020-07-13

## 2020-07-13 NOTE — PROGRESS NOTES
Requested updates within Care Everywhere.  Patient's chart was reviewed for overdue REGIS topics.  Immunizations reconciled.    Orders placed:n/a  Tasked appts:n/a  Labs Linked:n/a

## 2020-07-15 ENCOUNTER — OFFICE VISIT (OUTPATIENT)
Dept: OPHTHALMOLOGY | Facility: CLINIC | Age: 75
End: 2020-07-15
Payer: MEDICARE

## 2020-07-15 DIAGNOSIS — H04.123 BILATERAL DRY EYES: ICD-10-CM

## 2020-07-15 DIAGNOSIS — H21.41: ICD-10-CM

## 2020-07-15 DIAGNOSIS — T86.8419 CORNEAL TRANSPLANT FAILURE: Primary | ICD-10-CM

## 2020-07-15 DIAGNOSIS — T86.8419 CORNEAL TRANSPLANT FAILURE: ICD-10-CM

## 2020-07-15 DIAGNOSIS — H40.2213 CHRONIC ANGLE-CLOSURE GLAUCOMA OF RIGHT EYE, SEVERE STAGE: ICD-10-CM

## 2020-07-15 DIAGNOSIS — H18.20 CORNEAL EDEMA: ICD-10-CM

## 2020-07-15 DIAGNOSIS — H40.1122 PRIMARY OPEN ANGLE GLAUCOMA (POAG) OF LEFT EYE, MODERATE STAGE: ICD-10-CM

## 2020-07-15 DIAGNOSIS — Z98.41 STATUS POST CATARACT EXTRACTION AND INSERTION OF INTRAOCULAR LENS OF RIGHT EYE: ICD-10-CM

## 2020-07-15 DIAGNOSIS — H40.2213 CHRONIC ANGLE-CLOSURE GLAUCOMA OF RIGHT EYE, SEVERE STAGE: Primary | ICD-10-CM

## 2020-07-15 DIAGNOSIS — H27.02 APHAKIA OF LEFT EYE: ICD-10-CM

## 2020-07-15 DIAGNOSIS — Z94.7 CORNEA REPLACED BY TRANSPLANT: ICD-10-CM

## 2020-07-15 DIAGNOSIS — Z96.1 STATUS POST CATARACT EXTRACTION AND INSERTION OF INTRAOCULAR LENS OF RIGHT EYE: ICD-10-CM

## 2020-07-15 DIAGNOSIS — H54.10 BLINDNESS AND LOW VISION: ICD-10-CM

## 2020-07-15 PROCEDURE — 99499 NO LOS: ICD-10-PCS | Mod: S$PBB,,, | Performed by: OPHTHALMOLOGY

## 2020-07-15 PROCEDURE — 99999 PR PBB SHADOW E&M-EST. PATIENT-LVL III: ICD-10-PCS | Mod: PBBFAC,,, | Performed by: OPHTHALMOLOGY

## 2020-07-15 PROCEDURE — 92014 PR EYE EXAM, EST PATIENT,COMPREHESV: ICD-10-PCS | Mod: S$PBB,,, | Performed by: OPHTHALMOLOGY

## 2020-07-15 PROCEDURE — 99213 OFFICE O/P EST LOW 20 MIN: CPT | Mod: PBBFAC,27 | Performed by: OPHTHALMOLOGY

## 2020-07-15 PROCEDURE — 99999 PR PBB SHADOW E&M-EST. PATIENT-LVL III: CPT | Mod: PBBFAC,,, | Performed by: OPHTHALMOLOGY

## 2020-07-15 PROCEDURE — 99213 OFFICE O/P EST LOW 20 MIN: CPT | Mod: PBBFAC | Performed by: OPHTHALMOLOGY

## 2020-07-15 PROCEDURE — 92014 COMPRE OPH EXAM EST PT 1/>: CPT | Mod: S$PBB,,, | Performed by: OPHTHALMOLOGY

## 2020-07-15 PROCEDURE — 99499 UNLISTED E&M SERVICE: CPT | Mod: S$PBB,,, | Performed by: OPHTHALMOLOGY

## 2020-07-15 NOTE — PROGRESS NOTES
Assessment /Plan     For exam results, see Encounter Report.    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    Corneal transplant failure    Glaucoma shunt device of both eyes    Primary open angle glaucoma (POAG) of left eye, moderate stage    Status post cataract extraction and insertion of intraocular lens of right eye          [facility] [employer]  Strong FMHx Glc / Blindness --> Justyn Creole Dz / mom's side    Complex ocular hx  multiple sx --> trabs, tubes and tube removes etc   Too numerous to count    CCT  537 // 611    IOP livable --> consider G6/MP3 laser OD    Right eye  PKP - Quiet --> 10/26/2017 PKP  PC IOL  Fibrotic anterior capsule membrane  Tubes x 2 in place ST & IN --> removed ST BV 2/27/2017  Glc Shunt graft site OD  Revision 04/09/2013    B-scan No RD 7/2/2019    Right eye  PKP with Ant Cap fibrosis    Left eye  Aphakia  K-edema        Glaucoma Incisional Surgery  Patient with exposed ST Tube OD  SP OD ST BV / Amniotic membrane Removal 2/27/2017    Both eyes  Cosopt BID  Idania 128 BID    Right eye --> consider G6 Laser as re-discussed  PF1%  BID --> using  Xal q day    Left eye  Idania 128 BID + / - --> add to Right eye  Genteal Gel  Considering DSEK and Lens implant --> Lucia Calvo      MGD --> improved surface --> improved Va 10/1/2019  Dry Eye Syndrome: discussed use of warm compresses, preserved & non-preserved artificial tears, gel and PM ointment options.  Also discussed options utilizing medications.    Mucomyst 10% BID / QID OD --> BID / PRN  EES BID --> q hs    --> consider Doxy PO as discussed    RD precautions:  Discussed symptoms of RD with increased flashes, floaters, decreasing vision.  Patient/Family to call and return immediately to clinic should the symptoms of RD  occur. Voiced good understanding with Q & A.        Plan  RTC P Calvo --> K Failure OD & consider OS  RTC 3 months IOP & iritis check  RTC sooner prn with understanding

## 2020-07-15 NOTE — PROGRESS NOTES
HPI     75 YO female presents today for a PKP F/U OD. She states that Dr. Wilkerson recommended her to come in to follow-up because it has been a   while.     Chronic Angle Closure glaucoma of the right eye   Blindness and low vision -both eyes   Corneal Edema   POAG of the left eye   PKP in the right eye   Aphakic in the left eye   PCIOL in the right eye   Glaucoma Shunt in both eyes        Cosopt BID OU   Idania 128 BID OS   Latanoprost QHS OD   PF BID OD     Last edited by Jayleen Portillo, PCT on 7/15/2020  9:45 AM. (History)            Assessment /Plan     For exam results, see Encounter Report.    Corneal transplant failure    Corneal edema    Blindness and low vision - Both Eyes    Chronic angle-closure glaucoma of right eye, severe stage      PKP OD failed and thick, but limited potential.  OS with K edema and folds, aphakic but better potential, so may consider DSEK/Yamane IOL OS.  IOP OD elevated today, so recheck soon with MARGARETH

## 2020-07-17 DIAGNOSIS — Z71.89 COMPLEX CARE COORDINATION: ICD-10-CM

## 2020-07-27 ENCOUNTER — PATIENT OUTREACH (OUTPATIENT)
Dept: ADMINISTRATIVE | Facility: OTHER | Age: 75
End: 2020-07-27

## 2020-07-27 ENCOUNTER — TELEPHONE (OUTPATIENT)
Dept: OPHTHALMOLOGY | Facility: CLINIC | Age: 75
End: 2020-07-27

## 2020-07-27 NOTE — TELEPHONE ENCOUNTER
----- Message from Sofia Gregorio sent at 7/27/2020  2:08 PM CDT -----  Regarding: PT  Contact: PT  PT called to see if the doctor could see her tomorrow morning bc she's having pain in her left eye along with flashes of white light and the doctor has told her before that when that happens she needs to come in right away. Said she called earlier and left a message but no one has called her back. Please call back    Callback: [phone]

## 2020-07-27 NOTE — PROGRESS NOTES
Requested updates within Care Everywhere.  Patient's chart was reviewed for overdue REGIS topics.  Orders placed:n/a  Tasked appts:n/a  Labs Linked:n/a

## 2020-07-28 ENCOUNTER — OFFICE VISIT (OUTPATIENT)
Dept: OPHTHALMOLOGY | Facility: CLINIC | Age: 75
End: 2020-07-28
Payer: MEDICARE

## 2020-07-28 DIAGNOSIS — Z94.7 CORNEA REPLACED BY TRANSPLANT: ICD-10-CM

## 2020-07-28 DIAGNOSIS — H57.12 PAIN OF LEFT EYE: Primary | ICD-10-CM

## 2020-07-28 DIAGNOSIS — H40.2213 CHRONIC ANGLE-CLOSURE GLAUCOMA OF RIGHT EYE, SEVERE STAGE: ICD-10-CM

## 2020-07-28 DIAGNOSIS — Z98.41 STATUS POST CATARACT EXTRACTION AND INSERTION OF INTRAOCULAR LENS OF RIGHT EYE: ICD-10-CM

## 2020-07-28 DIAGNOSIS — H27.02 APHAKIA OF LEFT EYE: ICD-10-CM

## 2020-07-28 DIAGNOSIS — Z96.1 STATUS POST CATARACT EXTRACTION AND INSERTION OF INTRAOCULAR LENS OF RIGHT EYE: ICD-10-CM

## 2020-07-28 DIAGNOSIS — H54.10 BLINDNESS AND LOW VISION: ICD-10-CM

## 2020-07-28 DIAGNOSIS — H18.20 CORNEAL EDEMA: ICD-10-CM

## 2020-07-28 PROCEDURE — 92012 INTRM OPH EXAM EST PATIENT: CPT | Mod: S$PBB,,, | Performed by: OPHTHALMOLOGY

## 2020-07-28 PROCEDURE — 92012 PR EYE EXAM, EST PATIENT,INTERMED: ICD-10-PCS | Mod: S$PBB,,, | Performed by: OPHTHALMOLOGY

## 2020-07-28 PROCEDURE — 99213 OFFICE O/P EST LOW 20 MIN: CPT | Mod: PBBFAC | Performed by: OPHTHALMOLOGY

## 2020-07-28 PROCEDURE — 99999 PR PBB SHADOW E&M-EST. PATIENT-LVL III: ICD-10-PCS | Mod: PBBFAC,,, | Performed by: OPHTHALMOLOGY

## 2020-07-28 PROCEDURE — 99999 PR PBB SHADOW E&M-EST. PATIENT-LVL III: CPT | Mod: PBBFAC,,, | Performed by: OPHTHALMOLOGY

## 2020-07-28 NOTE — PROGRESS NOTES
Assessment /Plan     For exam results, see Encounter Report.    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    Corneal edema    Status post cataract extraction and insertion of intraocular lens of right eye          [facility] [employer]  Strong FMHx Glc / Blindness --> Justyn Creole Dz / mom's side    Complex ocular hx  multiple sx --> trabs, tubes and tube removes etc   Too numerous to count    Pain OS 07/28/2020  K-edema / PBK like  No infiltrates  EES q hs x 1 week  --> consider k-transplant as rediscussed    CCT  537 // 611    IOP livable --> consider G6/MP3 laser OD    Right eye  PKP - Quiet --> 10/26/2017 PKP  PC IOL  Fibrotic anterior capsule membrane  Tubes x 2 in place ST & IN --> removed ST BV 2/27/2017  Glc Shunt graft site OD  Revision 04/09/2013    Left eye  Aphakia  K-edema      Glaucoma Incisional Surgery  Patient with exposed ST Tube OD  SP OD ST BV / Amniotic membrane Removal 2/27/2017    Both eyes  Cosopt BID  Idania 128 BID    Right eye --> consider G6 Laser as re-discussed  PF1%  BID --> using  Xal q day    Left eye  Idania 128 BID + / - --> add to Right eye  Genteal Gel  Considering DSEK and Lens implant --> Lucia Calvo      MGD --> improved surface --> improved Va 10/1/2019  Dry Eye Syndrome: discussed use of warm compresses, preserved & non-preserved artificial tears, gel and PM ointment options.  Also discussed options utilizing medications.    Mucomyst 10% BID / QID OD --> BID / PRN  EES BID --> q hs    --> consider Doxy PO as discussed    RD precautions:  Discussed symptoms of RD with increased flashes, floaters, decreasing vision.  Patient/Family to call and return immediately to clinic should the symptoms of RD occur. Voiced good understanding with Q & A.        Plan  RTC CORDELIA Calvo --> K Failure OD & consider OS  RTC 3 months IOP & iritis  check  RTC sooner prn with understanding

## 2020-09-22 ENCOUNTER — OFFICE VISIT (OUTPATIENT)
Dept: FAMILY MEDICINE | Facility: CLINIC | Age: 75
End: 2020-09-22
Payer: MEDICARE

## 2020-09-22 VITALS
OXYGEN SATURATION: 96 % | WEIGHT: 206 LBS | HEIGHT: 60 IN | BODY MASS INDEX: 40.44 KG/M2 | DIASTOLIC BLOOD PRESSURE: 80 MMHG | HEART RATE: 94 BPM | SYSTOLIC BLOOD PRESSURE: 146 MMHG

## 2020-09-22 DIAGNOSIS — M25.511 ACUTE PAIN OF RIGHT SHOULDER: Primary | ICD-10-CM

## 2020-09-22 DIAGNOSIS — R10.32 LLQ ABDOMINAL PAIN: ICD-10-CM

## 2020-09-22 PROCEDURE — 99214 OFFICE O/P EST MOD 30 MIN: CPT | Mod: S$PBB,,, | Performed by: NURSE PRACTITIONER

## 2020-09-22 PROCEDURE — 99999 PR PBB SHADOW E&M-EST. PATIENT-LVL V: CPT | Mod: PBBFAC,,, | Performed by: NURSE PRACTITIONER

## 2020-09-22 PROCEDURE — 99214 PR OFFICE/OUTPT VISIT, EST, LEVL IV, 30-39 MIN: ICD-10-PCS | Mod: S$PBB,,, | Performed by: NURSE PRACTITIONER

## 2020-09-22 PROCEDURE — 99215 OFFICE O/P EST HI 40 MIN: CPT | Mod: PBBFAC,PO | Performed by: NURSE PRACTITIONER

## 2020-09-22 PROCEDURE — 99999 PR PBB SHADOW E&M-EST. PATIENT-LVL V: ICD-10-PCS | Mod: PBBFAC,,, | Performed by: NURSE PRACTITIONER

## 2020-09-22 RX ORDER — DEXTROMETHORPHAN HYDROBROMIDE, GUAIFENESIN 5; 100 MG/5ML; MG/5ML
650 LIQUID ORAL EVERY 8 HOURS
Qty: 30 TABLET | Refills: 0 | Status: SHIPPED | OUTPATIENT
Start: 2020-09-22

## 2020-09-22 RX ORDER — CYCLOBENZAPRINE HCL 5 MG
5 TABLET ORAL NIGHTLY
Qty: 10 TABLET | Refills: 0 | Status: SHIPPED | OUTPATIENT
Start: 2020-09-22 | End: 2020-10-02

## 2020-09-22 RX ORDER — IBUPROFEN 600 MG/1
600 TABLET ORAL EVERY 6 HOURS PRN
Qty: 30 TABLET | Refills: 1 | Status: SHIPPED | OUTPATIENT
Start: 2020-09-22

## 2020-09-22 NOTE — PROGRESS NOTES
Subjective:       Patient ID: Ligia Curran is a 74 y.o. female.    Chief Complaint: Shoulder Pain  Ligia Curran presents today for follow up of  Evaluation & management of . Previous patient of LIZBET Louie MD. Last seen in 3/17/2020. This is her first visit with me.     Shoulder Pain   The pain is present in the right arm, right shoulder and neck. This is a new problem. The current episode started in the past 7 days. The problem occurs constantly. The problem has been gradually worsening. The quality of the pain is described as aching and burning. The pain is at a severity of 9/10. The pain is severe. Associated symptoms include a limited range of motion and stiffness. Pertinent negatives include no fever, headaches, numbness or tingling. The symptoms are aggravated by activity and lying down. She has tried acetaminophen and rest for the symptoms.     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    Glaucoma shunt device of both eyes    Aphakia - Left Eye    Blindness and low vision - Both Eyes    Bilateral dry eyes    Secondary corneal edema of left eye    Sensorineural hearing loss    Otalgia, right ear    Chronic angle-closure glaucoma of right eye, severe stage    Corneal edema    Corneal transplant failure    Primary open angle glaucoma (POAG) of left eye, moderate stage    Filamentary keratitis of right eye    Meibomian gland dysfunction (MGD), bilateral, both upper and lower lids    Adhesions and disruptions of pupillary membranes of right eye    Screening for colon cancer    Status post cataract extraction and insertion of intraocular lens of right eye    Acute pain of right shoulder    LLQ abdominal pain       Current Outpatient Medications:     calcium-vitamin D 500-125 mg-unit tablet, Take 1 tablet by mouth once daily. , Disp: , Rfl:     carboxymethylcell/hypromellose (GENTEAL GEL OPHT), Place into the left eye every evening., Disp: , Rfl:      dorzolamide-timolol 2-0.5% (COSOPT) 22.3-6.8 mg/mL ophthalmic solution, Place 1 drop into both eyes 2 (two) times daily. Disp 90 day supply., Disp: 30 mL, Rfl: 4    erythromycin (ROMYCIN) ophthalmic ointment, Place 1 inch into both eyes every evening., Disp: 2 Tube, Rfl: 6    fish oil-omega-3 fatty acids 300-1,000 mg capsule, Take 1 capsule by mouth once daily., Disp: , Rfl:     fluorometholone 0.1% (FML) 0.1 % DrpS, Place 1 drop into the right eye 2 (two) times daily., Disp: 10 mL, Rfl: 6    hypromellose (SYSTANE GEL OPHT), Apply to eye., Disp: , Rfl:     latanoprost 0.005 % ophthalmic solution, INSTILL 1 DROP INTO THE RIGHT EYE DAILY, Disp: 2.5 mL, Rfl: 4    MULTIVITAMINS,THER W-MINERALS (VITAMINS & MINERALS ORAL), Take 1 tablet by mouth once daily. , Disp: , Rfl:     prednisoLONE acetate (PRED FORTE) 1 % DrpS, Place 1 drop into the right eye 4 (four) times daily., Disp: 10 mL, Rfl: 3    sodium chloride 5% () 5 % ophthalmic solution, Place 1 drop into the left eye as needed. 1 Drops Both eyes Four times a day.  Dispense 90 day supply (Patient taking differently: Place 1 drop into the left eye 2 (two) times daily. ), Disp: , Rfl:     triamcinolone acetonide 0.5% (KENALOG) 0.5 % Crea, Apply topically 3 (three) times daily., Disp: 30 g, Rfl: 1    TURMERIC ROOT EXTRACT ORAL, Take 1 capsule by mouth once daily., Disp: , Rfl:     acetaminophen (TYLENOL) 650 MG TbSR, Take 1 tablet (650 mg total) by mouth every 8 (eight) hours., Disp: 30 tablet, Rfl: 0    cyclobenzaprine (FLEXERIL) 5 MG tablet, Take 1 tablet (5 mg total) by mouth nightly. for 10 days, Disp: 10 tablet, Rfl: 0    ibuprofen (ADVIL,MOTRIN) 600 MG tablet, Take 1 tablet (600 mg total) by mouth every 6 (six) hours as needed for Pain., Disp: 30 tablet, Rfl: 1    latanoprost (XALATAN) 0.005 % ophthalmic solution, Place 1 drop into the right eye once daily., Disp: 2.5 mL, Rfl: 6    The following portions of the patient's history were  reviewed and updated as appropriate: allergies, past family history, past medical history, past social history and past surgical history.    Review of Systems   Constitutional: Negative for appetite change, fatigue, fever and unexpected weight change.   HENT: Negative for hearing loss, rhinorrhea, sneezing, sore throat and trouble swallowing.    Eyes: Negative for visual disturbance.   Respiratory: Negative for cough, shortness of breath and wheezing.    Cardiovascular: Negative for chest pain and palpitations.   Gastrointestinal: Negative for abdominal pain, constipation, diarrhea, nausea and vomiting.   Genitourinary: Negative for difficulty urinating, dysuria, frequency and hematuria.   Musculoskeletal: Positive for arthralgias, myalgias and stiffness.   Neurological: Positive for weakness. Negative for dizziness, tingling, numbness and headaches.   Psychiatric/Behavioral: Negative for sleep disturbance. The patient is not nervous/anxious.        Objective:      BP (!) 146/80 (BP Location: Left arm, Patient Position: Sitting, BP Method: Large (Manual))   Pulse 94   Ht 5' (1.524 m)   Wt 93.4 kg (206 lb)   SpO2 96%   BMI 40.23 kg/m²     Physical Exam  Constitutional:       General: She is not in acute distress.     Appearance: Normal appearance.   Cardiovascular:      Rate and Rhythm: Normal rate and regular rhythm.      Pulses: Normal pulses.      Heart sounds: Normal heart sounds.   Pulmonary:      Effort: Pulmonary effort is normal.      Breath sounds: Normal breath sounds.   Musculoskeletal:      Right shoulder: She exhibits decreased range of motion, tenderness, pain and decreased strength. She exhibits no effusion and no crepitus.        Arms:    Skin:     General: Skin is warm and dry.   Neurological:      Mental Status: She is alert and oriented to person, place, and time.   Psychiatric:         Mood and Affect: Mood normal.         Behavior: Behavior normal.         Assessment:       1. Acute pain of  right shoulder    2. LLQ abdominal pain        Plan:   Acute pain of right shoulder  -- X ray R shoulder  -- Alternate tylenol 650 mg with Ibuprofen 600 mg  -- Heat therapy as tolerated  -- Plan to rehabilitate with PT  -- Will consider Ortho referral     LLQ abdominal pain  -- Recent MRI reviewed: referral to GI per Dr. Louie previous recommendations

## 2020-09-22 NOTE — ASSESSMENT & PLAN NOTE
-- X ray R shoulder  -- Alternate tylenol 650 mg with Ibuprofen 600 mg  -- Heat therapy as tolerated  -- Plan to rehabilitate with PT  -- Will consider Ortho referral

## 2020-09-23 ENCOUNTER — TELEPHONE (OUTPATIENT)
Dept: FAMILY MEDICINE | Facility: CLINIC | Age: 75
End: 2020-09-23

## 2020-09-23 ENCOUNTER — HOSPITAL ENCOUNTER (OUTPATIENT)
Dept: RADIOLOGY | Facility: HOSPITAL | Age: 75
Discharge: HOME | End: 2020-09-23
Attending: NURSE PRACTITIONER
Payer: MEDICARE

## 2020-09-23 DIAGNOSIS — M25.511 ACUTE PAIN OF RIGHT SHOULDER: ICD-10-CM

## 2020-09-23 PROCEDURE — 73030 X-RAY EXAM OF SHOULDER: CPT | Mod: TC,PN,RT

## 2020-09-23 PROCEDURE — 73030 X-RAY EXAM OF SHOULDER: CPT | Mod: 26,RT,, | Performed by: RADIOLOGY

## 2020-09-23 PROCEDURE — 73030 XR SHOULDER COMPLETE 2 OR MORE VIEWS RIGHT: ICD-10-PCS | Mod: 26,RT,, | Performed by: RADIOLOGY

## 2020-09-23 NOTE — TELEPHONE ENCOUNTER
----- Message from Elodia Rendon sent at 9/23/2020  1:55 PM CDT -----  Name of Who is Calling: MAYANK DIAL       What is the request in detail: Would like to review her x-ray results from today. Please advise.       Can the clinic reply by [facility]: No      [facility]: [phone]

## 2020-09-25 ENCOUNTER — TELEPHONE (OUTPATIENT)
Dept: FAMILY MEDICINE | Facility: CLINIC | Age: 75
End: 2020-09-25

## 2020-09-25 NOTE — TELEPHONE ENCOUNTER
Attempted to contact pt to inform her on her xray results but no answer did lvm for her to contact office.I tried other number but line was busy.

## 2020-09-25 NOTE — TELEPHONE ENCOUNTER
----- Message from Zoila Love sent at 9/25/2020  9:33 AM CDT -----  Contact: DIALMAYANK PIMENTEL [[id number]]  Type: Patient Call Back    Who called:MAYAKN DIAL [[id number]]    What is the request in detail: Patient is requesting a call back. PT requests a call back in regards to x-rays done on WED. She wanted the results. She states she is having a lot of pain in the shoulder. She was referred to therapy and she was told that therapy doesn't have anything until October. PT states she would like to speak with Hillary or Vanessa in regards to this matter.  Please advise.    Can the clinic reply by [facility]? No    Best call back number: ..[phone]    Additional Information: N/A

## 2020-09-25 NOTE — TELEPHONE ENCOUNTER
----- Message from Zoila Love sent at 9/25/2020  1:15 PM CDT -----  Contact: [facility]ANK DIAL [[id number]]  Type:  Patient Returning Call    Who Called: MAYANK DIAL [[id number]]    Who Left Message for Patient: Marce    Does the patient know what this is regarding?: Yes    Can the clinic reply in [facility]: No    Best Call Back Number: ..[phone]    Additional Information: N/A

## 2020-09-28 ENCOUNTER — TELEPHONE (OUTPATIENT)
Dept: FAMILY MEDICINE | Facility: CLINIC | Age: 75
End: 2020-09-28

## 2020-09-28 NOTE — TELEPHONE ENCOUNTER
----- Message from Dee Fitch sent at 9/28/2020  9:30 AM CDT -----  Contact: MAYANK DIAL [[id number]]  Name of Who is Calling: MAYANK DIAL [[id number]]      What is the request in detail: Patient is returning a call. Please call       Can the clinic reply by [facility]: no      What Number to Call Back if not in [facility]: [phone]

## 2020-09-29 ENCOUNTER — TELEPHONE (OUTPATIENT)
Dept: OPHTHALMOLOGY | Facility: CLINIC | Age: 75
End: 2020-09-29

## 2020-09-29 NOTE — TELEPHONE ENCOUNTER
----- Message from Rukhsana Carty sent at 9/28/2020  4:46 PM CDT -----  Contact: Ligia @ [phone]    ----- Message -----  From: Marita Silvestre  Sent: 9/28/2020   4:39 PM CDT  To: [facility] Staff    Pt needs a call back regarding the medication she is taking for another problem is causing to have blurred vision.

## 2020-10-15 NOTE — PROGRESS NOTES
Assessment /Plan     For exam results, see Encounter Report.    Chronic angle-closure glaucoma of right eye, severe stage    Aphakia of left eye    Failure of cornea transplant, unspecified laterality    Blindness and low vision - Both Eyes    Primary open angle glaucoma (POAG) of left eye, moderate stage    Secondary corneal edema of left eye    Filamentary keratitis of right eye          [employer] [employer]  Strong FMHx Glc / Blindness --> Justyn Creole Dz / mom's side    Complex ocular hx  multiple sx --> trabs, tubes and tube removes etc   Too numerous to count    Pain OS 07/28/2020  K-edema / PBK like  No infiltrates  EES q hs x 1 week  --> consider k-transplant as rediscussed    CCT  537 // 611    IOP livable --> consider G6/MP3 laser OD    Right eye  PKP - Quiet --> 10/26/2017 PKP  PC IOL  Fibrotic anterior capsule membrane  Tubes x 2 in place ST & IN --> removed ST BV 2/27/2017  Glc Shunt graft site OD  Revision 04/09/2013      Glaucoma Incisional Surgery  Patient with exposed ST Tube OD  SP OD ST BV / Amniotic membrane Removal 2/27/2017    Left eye  Aphakia  K-edema    Both eyes  Cosopt BID  Idania 128 BID    Right eye --> consider G6 Laser as re-discussed  PF1%  BID --> using  Xal q day    Left eye  Genteal Gel  Considering DSEK and Lens implant --> Lucia Calvo      MGD --> improved surface --> improved Va 10/1/2019  Dry Eye Syndrome: discussed use of warm compresses, preserved & non-preserved artificial tears, gel and PM ointment options.  Also discussed options utilizing medications.    Mucomyst 10% BID / QID OD --> BID / PRN  EES BID --> q hs    --> consider Doxy PO as discussed    RD precautions:  Discussed symptoms of RD with increased flashes, floaters, decreasing vision.  Patient/Family to call and return immediately to clinic should the symptoms of RD occur. Voiced good understanding with Q & A.        Plan  RTC 3 months CORDELIA Calvo --> K Failure OD & consider OS  RTC 3 months IOP & iritis check  --> with CORDELIA Calvo  RTC sooner prn with understanding

## 2020-10-19 ENCOUNTER — TELEPHONE (OUTPATIENT)
Dept: ENDOSCOPY | Facility: HOSPITAL | Age: 75
End: 2020-10-19

## 2020-10-19 ENCOUNTER — PATIENT OUTREACH (OUTPATIENT)
Dept: ADMINISTRATIVE | Facility: OTHER | Age: 75
End: 2020-10-19

## 2020-10-19 NOTE — TELEPHONE ENCOUNTER
----- Message from Marita David MA sent at 10/19/2020  3:45 PM CDT -----  Contact: [phone]  Pt had a new pt appt on 10/15 for abdominal pain that she was unable to keep, she would like to reschedule but I do not seem to have access to this providers schedule.

## 2020-10-19 NOTE — PROGRESS NOTES
Requested updates within Care Everywhere.  Patient's chart was reviewed for overdue REGIS topics.  Media reviewed for outside mammogram  Immunizations reconciled.    Orders placed:n/a  Tasked appts:n/a  Labs Linked:n/a

## 2020-10-20 ENCOUNTER — OFFICE VISIT (OUTPATIENT)
Dept: OPHTHALMOLOGY | Facility: CLINIC | Age: 75
End: 2020-10-20
Payer: MEDICARE

## 2020-10-20 DIAGNOSIS — H40.2213 CHRONIC ANGLE-CLOSURE GLAUCOMA OF RIGHT EYE, SEVERE STAGE: Primary | ICD-10-CM

## 2020-10-20 DIAGNOSIS — H27.02 APHAKIA OF LEFT EYE: ICD-10-CM

## 2020-10-20 DIAGNOSIS — T86.8419 FAILURE OF CORNEA TRANSPLANT, UNSPECIFIED LATERALITY: ICD-10-CM

## 2020-10-20 DIAGNOSIS — H54.10 BLINDNESS AND LOW VISION: ICD-10-CM

## 2020-10-20 DIAGNOSIS — H18.232 SECONDARY CORNEAL EDEMA OF LEFT EYE: ICD-10-CM

## 2020-10-20 DIAGNOSIS — H40.1122 PRIMARY OPEN ANGLE GLAUCOMA (POAG) OF LEFT EYE, MODERATE STAGE: ICD-10-CM

## 2020-10-20 DIAGNOSIS — H16.121 FILAMENTARY KERATITIS OF RIGHT EYE: ICD-10-CM

## 2020-10-20 PROCEDURE — 99999 PR PBB SHADOW E&M-EST. PATIENT-LVL III: ICD-10-PCS | Mod: PBBFAC,,, | Performed by: OPHTHALMOLOGY

## 2020-10-20 PROCEDURE — 92012 PR EYE EXAM, EST PATIENT,INTERMED: ICD-10-PCS | Mod: S$PBB,,, | Performed by: OPHTHALMOLOGY

## 2020-10-20 PROCEDURE — 99999 PR PBB SHADOW E&M-EST. PATIENT-LVL III: CPT | Mod: PBBFAC,,, | Performed by: OPHTHALMOLOGY

## 2020-10-20 PROCEDURE — 92012 INTRM OPH EXAM EST PATIENT: CPT | Mod: S$PBB,,, | Performed by: OPHTHALMOLOGY

## 2020-10-20 PROCEDURE — 99213 OFFICE O/P EST LOW 20 MIN: CPT | Mod: PBBFAC | Performed by: OPHTHALMOLOGY

## 2020-11-03 ENCOUNTER — OFFICE VISIT (OUTPATIENT)
Dept: GASTROENTEROLOGY | Facility: CLINIC | Age: 75
End: 2020-11-03
Payer: MEDICARE

## 2020-11-03 VITALS
WEIGHT: 204.38 LBS | SYSTOLIC BLOOD PRESSURE: 160 MMHG | BODY MASS INDEX: 40.13 KG/M2 | HEART RATE: 89 BPM | HEIGHT: 60 IN | DIASTOLIC BLOOD PRESSURE: 90 MMHG

## 2020-11-03 DIAGNOSIS — R10.32 LEFT GROIN PAIN: Primary | ICD-10-CM

## 2020-11-03 PROCEDURE — 99215 OFFICE O/P EST HI 40 MIN: CPT | Mod: PBBFAC | Performed by: INTERNAL MEDICINE

## 2020-11-03 PROCEDURE — 99204 OFFICE O/P NEW MOD 45 MIN: CPT | Mod: S$PBB,,, | Performed by: INTERNAL MEDICINE

## 2020-11-03 PROCEDURE — 99999 PR PBB SHADOW E&M-EST. PATIENT-LVL V: ICD-10-PCS | Mod: PBBFAC,,, | Performed by: INTERNAL MEDICINE

## 2020-11-03 PROCEDURE — 99999 PR PBB SHADOW E&M-EST. PATIENT-LVL V: CPT | Mod: PBBFAC,,, | Performed by: INTERNAL MEDICINE

## 2020-11-03 PROCEDURE — 99204 PR OFFICE/OUTPT VISIT, NEW, LEVL IV, 45-59 MIN: ICD-10-PCS | Mod: S$PBB,,, | Performed by: INTERNAL MEDICINE

## 2020-11-03 NOTE — PROGRESS NOTES
[facility]    CC: groin pain    HPI 74 y.o. female who presents for evaluation of chronic, intermittent, left sided groin pain which is worsened with movement and bending over.  She denies melena, hematochezia, abdominal pain, nausea, weight loss, and changes in her bowel patterns.    She has had this pain for over a year.  If is a dull pain that happens 3-4 times per week and can lasts minutes and resolve spontaneously or persist throughout the day.  It is worsened by bending over or movement.      Chart reviewed and summarized here.  Colonoscopy 2/19/20 normal to the cecum.  Recommended repeat in 10 years    The patient is new to me.    Past Medical History  Past Medical History:   Diagnosis Date    Breast cyst     Chronic angle-closure glaucoma of both eyes, severe stage 11/18/2016    Fibrocystic breast     Gallbladder problem     Glaucoma     Glaucoma     Joint pain     Keloid cicatrix     PONV (postoperative nausea and vomiting)     Uveitis        Past Surgical History  Past Surgical History:   Procedure Laterality Date    AK POST CATARACT/PKP Right 10/26/2017    PKP     BAERVELDT GLAUCOMA SHUNT Right 12/26/2012        breast biopsy      BREAST BIOPSY      CATARACT EXTRACTION Left n/a    Aphakic    CATARACT EXTRACTION W/  INTRAOCULAR LENS IMPLANT Right 05/24/2012    WITH DSEK ()    CHOLECYSTECTOMY      COLONOSCOPY N/A 2/19/2020    Procedure: COLONOSCOPY;  Surgeon: Stewart Domingo MD;  Location: [facility] ([address] [place] [address]);  Service: Endoscopy;  Laterality: N/A;    CORNEAL TRANSPLANT Right 05/24/2012    DSEK/PHACO IOL ()    CORNEAL TRANSPLANT Right 10/26/2017    PKP ()    DISSECTION AND REMOVAL OF FIBROUS PUPILLARY MEMBRANE Right 01/26/2017    DR. BRANNON    HYSTERECTOMY      OOPHORECTOMY      REMOVAL OF BAERVELDT GLAUCOMA SHUNT Right 02/27/2017        REMOVAL OF NECROTIC TISSUE OF EYE Right 04/09/2013    REMOVAL OF TISSUE AROUND  SHUNT WITH PERICARDIAL PATCH ( AND )    STRABISMUS SURGERY         Social History  Social History     Tobacco Use    Smoking status: Never Smoker    Smokeless tobacco: Never Used   Substance Use Topics    Alcohol use: Yes     Comment: socially    Drug use: No       Family History  Family History   Problem Relation Age of Onset    Glaucoma Mother     Blindness Mother     Glaucoma Sister     Cataracts Sister     Hypertension Sister     Melanoma Neg Hx     Amblyopia Neg Hx     Macular degeneration Neg Hx     Retinal detachment Neg Hx     Strabismus Neg Hx    No pertinent family history of colon cancer or gastric cancer    Review of Systems  General ROS: negative for chills, fever or weight loss  Psychological ROS: negative for hallucination, depression or suicidal ideation  Ophthalmic ROS: negative for photophobia or eye pain.   Positive for poor vision-the patient is legally blind  ENT ROS: negative for epistaxis, sore throat or rhinorrhea  Respiratory ROS: no cough, shortness of breath, or wheezing  Cardiovascular ROS: no chest pain or dyspnea on exertion  Gastrointestinal ROS: no abdominal pain, change in bowel habits, or black/ bloody stools  Genito-Urinary ROS: no dysuria, trouble voiding, or hematuria  Musculoskeletal ROS: negative for gait disturbance or muscular weakness  Neurological ROS: no syncope or seizures; no ataxia  Dermatological ROS: negative for pruritis, rash and jaundice    Physical Examination  BP (!) 160/90   Pulse 89   Ht 5' (1.524 m)   Wt 92.7 kg (204 lb 5.9 oz)   BMI 39.91 kg/m²   General appearance: alert, cooperative, no distress  HENT: Normocephalic, atraumatic, neck symmetrical, no nasal discharge   Eyes: conjunctivae/corneas clear, PERRL, EOM's intact  Lungs: clear to auscultation bilaterally, no dullness to percussion bilaterally  Heart: regular rate and rhythm without rub; no displacement of the PMI   Abdomen: soft, non-tender; bowel sounds  normoactive; no organomegaly.  Tender to palpation over the left inguinal ligament   Extremities: extremities symmetric; no clubbing, cyanosis, or edema  Integument: Skin color, texture, turgor normal; no rashes; hair distrubution normal  Neurologic: Alert and oriented X 3, normal strength, normal coordination and gait  Psychiatric: no pressured speech; normal affect; no evidence of impaired cognition     Labs:  Lab Results   Component Value Date    WBC 8.44 03/17/2020    HGB 15.0 03/17/2020    HCT 48.5 03/17/2020    MCV 99 (H) 03/17/2020     03/17/2020         CMP  Sodium   Date Value Ref Range Status   03/17/2020 141 136 - 145 mmol/L Final     Potassium   Date Value Ref Range Status   03/17/2020 4.3 3.5 - 5.1 mmol/L Final     Chloride   Date Value Ref Range Status   03/17/2020 104 95 - 110 mmol/L Final     CO2   Date Value Ref Range Status   03/17/2020 32 (H) 23 - 29 mmol/L Final     Glucose   Date Value Ref Range Status   03/17/2020 109 70 - 110 mg/dL Final     BUN   Date Value Ref Range Status   03/17/2020 7 (L) 8 - 23 mg/dL Final     Creatinine   Date Value Ref Range Status   03/17/2020 0.7 0.5 - 1.4 mg/dL Final     Calcium   Date Value Ref Range Status   03/17/2020 9.6 8.7 - 10.5 mg/dL Final     Total Protein   Date Value Ref Range Status   10/25/2019 7.8 6.0 - 8.4 g/dL Final     Albumin   Date Value Ref Range Status   10/25/2019 4.0 3.5 - 5.2 g/dL Final     Total Bilirubin   Date Value Ref Range Status   10/25/2019 1.0 0.1 - 1.0 mg/dL Final     Comment:     For infants and newborns, interpretation of results should be based  on gestational age, weight and in agreement with clinical  observations.  Premature Infant recommended reference ranges:  Up to 24 hours.............<8.0 mg/dL  Up to 48 hours............<12.0 mg/dL  3-5 days..................<15.0 mg/dL  6-29 days.................<15.0 mg/dL       Alkaline Phosphatase   Date Value Ref Range Status   10/25/2019 94 55 - 135 U/L Final     AST   Date  Value Ref Range Status   10/25/2019 20 10 - 40 U/L Final     ALT   Date Value Ref Range Status   10/25/2019 13 10 - 44 U/L Final     Anion Gap   Date Value Ref Range Status   03/17/2020 5 (L) 8 - 16 mmol/L Final     eGFR if    Date Value Ref Range Status   03/17/2020 >60.0 >60 mL/min/1.73 m^2 Final     eGFR if non    Date Value Ref Range Status   03/17/2020 >60.0 >60 mL/min/1.73 m^2 Final     Comment:     Calculation used to obtain the estimated glomerular filtration  rate (eGFR) is the CKD-EPI equation.          Imaging: MRI abdomen and pelvis 3/17/20-no bowel wall abnormalities visualized    I have personally reviewed and interpreted these images.    Assessment:   1. Left groin pain    2.      Concern for left inguinal or femoral hernia    Plan:   Referral to General surgery for further evaluation for a possible inguinal or femoral hernia as a cause for her pain.    Jacy Diaz MD

## 2020-11-03 NOTE — LETTER
November 3, 2020      Destinee Louie MD  [address]  [address]  [facility] [address]           [facility]  [address]  [place] [address]  Phone: [phone]  Fax: [phone]          Patient: Ligia Curarn   MR Number: [id number]   YOB: 1945   Date of Visit: 11/3/2020       Dear Dr. Destinee Louie:    Thank you for referring Ligia Curran to me for evaluation. Attached you will find relevant portions of my assessment and plan of care.    If you have questions, please do not hesitate to call me. I look forward to following Ligia Curran along with you.    Sincerely,    Jacy Diaz MD    Enclosure  CC:  No Recipients    If you would like to receive this communication electronically, please contact [email or web address] or [phone] to request more information on [employer] access.    For providers and/or their staff who would like to refer a patient to [facility], please contact us through our one-stop-shop provider referral line, [facility], at [phone].    If you feel you have received this communication in error or would no longer like to receive these types of communications, please e-mail [email or web address]

## 2020-11-10 ENCOUNTER — OFFICE VISIT (OUTPATIENT)
Dept: SURGERY | Facility: CLINIC | Age: 75
End: 2020-11-10
Payer: MEDICARE

## 2020-11-10 VITALS
HEIGHT: 60 IN | DIASTOLIC BLOOD PRESSURE: 64 MMHG | WEIGHT: 206 LBS | HEART RATE: 83 BPM | OXYGEN SATURATION: 96 % | BODY MASS INDEX: 40.44 KG/M2 | TEMPERATURE: 98 F | SYSTOLIC BLOOD PRESSURE: 142 MMHG

## 2020-11-10 DIAGNOSIS — R10.32 LEFT GROIN PAIN: ICD-10-CM

## 2020-11-10 PROCEDURE — 99203 OFFICE O/P NEW LOW 30 MIN: CPT | Mod: S$PBB,,, | Performed by: SURGERY

## 2020-11-10 PROCEDURE — 99999 PR PBB SHADOW E&M-EST. PATIENT-LVL III: CPT | Mod: PBBFAC,,, | Performed by: SURGERY

## 2020-11-10 PROCEDURE — 99213 OFFICE O/P EST LOW 20 MIN: CPT | Mod: PBBFAC | Performed by: SURGERY

## 2020-11-10 PROCEDURE — 99203 PR OFFICE/OUTPT VISIT, NEW, LEVL III, 30-44 MIN: ICD-10-PCS | Mod: S$PBB,,, | Performed by: SURGERY

## 2020-11-10 PROCEDURE — 99999 PR PBB SHADOW E&M-EST. PATIENT-LVL III: ICD-10-PCS | Mod: PBBFAC,,, | Performed by: SURGERY

## 2020-11-10 NOTE — LETTER
November 24, 2020      Jacy Diaz MD  [address]  [facility] [address]           [facility]  [address]  [facility] [address]  Phone: [phone]          Patient: Ligia Curran   MR Number: [id number]   YOB: 1945   Date of Visit: 11/10/2020       Dear Dr. Jacy Diaz:    Thank you for referring Ligia Curran to me for evaluation. Attached you will find relevant portions of my assessment and plan of care.    If you have questions, please do not hesitate to call me. I look forward to following Ligia Curran along with you.    Sincerely,    Sagar Anderson MD    Enclosure  CC:  No Recipients    If you would like to receive this communication electronically, please contact [email or web address] or [phone] to request more information on [employer] Link access.    For providers and/or their staff who would like to refer a patient to [facility], please contact us through our one-stop-shop provider referral line, [facility] [place], at [phone].    If you feel you have received this communication in error or would no longer like to receive these types of communications, please e-mail [email or web address]

## 2020-11-10 NOTE — PROGRESS NOTES
History & Physical  General Surgery    SUBJECTIVE:     History of Present Illness:  Patient is a 75 y.o. female presents with left groin/flank pain.  Pain has been present for some time now.  Current workup has been negative.  She was sent here for evaluation for possible inguinal hernia.  Has not noticed all lumbar bump in the area of concern.  Area is painful to touch without other aggravating or relieving factors.  Has history of prior cholecystectomy and hysterectomy.  Denies nausea, vomiting, or obstructive symptoms.    No chief complaint on file.      Review of patient's allergies indicates:   Allergen Reactions    Augmentin [amoxicillin-pot clavulanate] Diarrhea    Sulfa (sulfonamide antibiotics) Rash       Current Outpatient Medications   Medication Sig Dispense Refill    acetaminophen (TYLENOL) 650 MG TbSR Take 1 tablet (650 mg total) by mouth every 8 (eight) hours. 30 tablet 0    calcium-vitamin D 500-125 mg-unit tablet Take 1 tablet by mouth once daily.       carboxymethylcell/hypromellose (GENTEAL GEL OPHT) Place into the left eye every evening.      dorzolamide-timolol 2-0.5% (COSOPT) 22.3-6.8 mg/mL ophthalmic solution INSTILL 1 DROP INTO BOTH EYES 2 TIMES A DAY 10 mL 0    erythromycin (ROMYCIN) ophthalmic ointment APPLY A 1 INCH RIBBON INTO BOTH EYES EVERY EVENING 7 g 0    fish oil-omega-3 fatty acids 300-1,000 mg capsule Take 1 capsule by mouth once daily.      fluorometholone 0.1% (FML) 0.1 % DrpS Place 1 drop into the right eye 2 (two) times daily. (Patient not taking: Reported on 11/3/2020) 10 mL 6    hypromellose (SYSTANE GEL OPHT) Apply to eye.      ibuprofen (ADVIL,MOTRIN) 600 MG tablet Take 1 tablet (600 mg total) by mouth every 6 (six) hours as needed for Pain. (Patient not taking: Reported on 11/3/2020) 30 tablet 1    latanoprost (XALATAN) 0.005 % ophthalmic solution Place 1 drop into the right eye once daily. 2.5 mL 6    latanoprost 0.005 % ophthalmic solution INSTILL 1 DROP  INTO THE RIGHT EYE DAILY 2.5 mL 4    MULTIVITAMINS,THER W-MINERALS (VITAMINS & MINERALS ORAL) Take 1 tablet by mouth once daily.       prednisoLONE acetate (PRED FORTE) 1 % DrpS Place 1 drop into the right eye 4 (four) times daily. 10 mL 3    sodium chloride 5% () 5 % ophthalmic solution Place 1 drop into the left eye as needed. 1 Drops Both eyes Four times a day.  Dispense 90 day supply (Patient taking differently: Place 1 drop into the left eye 2 (two) times daily. )      triamcinolone acetonide 0.5% (KENALOG) 0.5 % Crea Apply topically 3 (three) times daily. 30 g 1    TURMERIC ROOT EXTRACT ORAL Take 1 capsule by mouth once daily.       No current facility-administered medications for this visit.        Past Medical History:   Diagnosis Date    Breast cyst     Chronic angle-closure glaucoma of both eyes, severe stage 11/18/2016    Fibrocystic breast     Gallbladder problem     Glaucoma     Glaucoma     Joint pain     Keloid cicatrix     PONV (postoperative nausea and vomiting)     Uveitis      Past Surgical History:   Procedure Laterality Date    AK POST CATARACT/PKP Right 10/26/2017    PKP     BAERVELDT GLAUCOMA SHUNT Right 12/26/2012        breast biopsy      BREAST BIOPSY      CATARACT EXTRACTION Left n/a    Aphakic    CATARACT EXTRACTION W/  INTRAOCULAR LENS IMPLANT Right 05/24/2012    WITH DSEK ()    CHOLECYSTECTOMY      COLONOSCOPY N/A 2/19/2020    Procedure: COLONOSCOPY;  Surgeon: Stewart Domingo MD;  Location: [address]);  Service: Endoscopy;  Laterality: N/A;    CORNEAL TRANSPLANT Right 05/24/2012    DSEK/PHACO IOL ()    CORNEAL TRANSPLANT Right 10/26/2017    PKP ()    DISSECTION AND REMOVAL OF FIBROUS PUPILLARY MEMBRANE Right 01/26/2017    DR. BRANNON    HYSTERECTOMY      OOPHORECTOMY      REMOVAL OF BAERVELDT GLAUCOMA SHUNT Right 02/27/2017        REMOVAL OF NECROTIC TISSUE OF EYE Right 04/09/2013    REMOVAL OF  TISSUE AROUND SHUNT WITH PERICARDIAL PATCH ( AND )    STRABISMUS SURGERY       Family History   Problem Relation Age of Onset    Glaucoma Mother     Blindness Mother     Glaucoma Sister     Cataracts Sister     Hypertension Sister     Melanoma Neg Hx     Amblyopia Neg Hx     Macular degeneration Neg Hx     Retinal detachment Neg Hx     Strabismus Neg Hx      Social History     Tobacco Use    Smoking status: Never Smoker    Smokeless tobacco: Never Used   Substance Use Topics    Alcohol use: Yes     Comment: socially    Drug use: No        Review of Systems:  Review of Systems   Constitutional: Negative for chills and fever.   Respiratory: Negative for cough and shortness of breath.    Cardiovascular: Negative for chest pain and palpitations.   Gastrointestinal: Positive for abdominal pain. Negative for nausea and vomiting.   Genitourinary: Positive for flank pain. Negative for dysuria and hematuria.   Musculoskeletal: Positive for back pain. Negative for gait problem.   Skin: Negative for color change, rash and wound.   Neurological: Negative for weakness and headaches.   Hematological: Negative for adenopathy. Does not bruise/bleed easily.   Psychiatric/Behavioral: Negative for agitation and confusion.       OBJECTIVE:     Vital Signs (Most Recent)  Temp: 98.4 °F (36.9 °C) (11/10/20 1000)  Pulse: 83 (11/10/20 1000)  BP: (!) 142/64 (11/10/20 1000)  SpO2: 96 % (11/10/20 1000)  5' (1.524 m)  93.4 kg (206 lb)     Physical Exam:  Physical Exam  Constitutional:       General: She is not in acute distress.     Appearance: Normal appearance. She is not ill-appearing.   HENT:      Head: Normocephalic and atraumatic.   Eyes:      General: No scleral icterus.     Pupils: Pupils are equal, round, and reactive to light.   Neck:      Musculoskeletal: No edema or erythema.      Trachea: No tracheal deviation.   Cardiovascular:      Rate and Rhythm: Normal rate and regular rhythm.   Pulmonary:       Effort: Pulmonary effort is normal. No respiratory distress.      Breath sounds: No stridor.   Abdominal:      General: There is no distension.      Palpations: Abdomen is soft.      Tenderness: There is no abdominal tenderness.      Hernia: No hernia is present.   Musculoskeletal:         General: No deformity or signs of injury.   Skin:     General: Skin is warm and dry.      Coloration: Skin is not jaundiced.   Neurological:      General: No focal deficit present.      Mental Status: She is alert and oriented to person, place, and time.   Psychiatric:         Mood and Affect: Mood normal.         Behavior: Behavior normal.         ASSESSMENT/PLAN:     75-year-old female who presents with left groin/flank pain.  She has no evidence of inguinal hernia on exam.    PLAN:Plan   Will obtain provocative ultrasound to evaluate possible left inguinal hernia.  If no hernia is present, will likely refer for pain management.    Sagar Anderson MD  Acute Care Surgery  [facility] Department of Surgery

## 2020-11-10 NOTE — MEDICAL/APP STUDENT
Subjective:       Patient ID: Ligia Curran is a 75 y.o. female.    Chief Complaint: No chief complaint on file.    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 She presents to clinic due to worsening pain in her left abdomen/groin since Jan of 2019. She was referred by Dr. Diaz for a possible hernia. The pt's pain is intermittent and feels like a burning sensation with soreness. Her pain is reproduced when she bends over and worse with movement. She has noticed a bulge on her left lower abdomen which she believes is enlarging. She endorses occasional constipation. Denies diarrhea, blood in her stool, difficulty urinating, nausea, vomiting, fevers, chills, weight loss.    Review of Systems   Constitutional: Negative for chills, fever and unexpected weight change.   HENT: Negative for sore throat and trouble swallowing.    Respiratory: Negative for cough and shortness of breath.    Cardiovascular: Negative for chest pain and palpitations.   Gastrointestinal: Positive for abdominal pain. Negative for abdominal distention, anal bleeding, diarrhea, nausea and vomiting.   Genitourinary: Negative for difficulty urinating and dysuria.   Musculoskeletal: Positive for arthralgias. Negative for myalgias.   Integumentary:  Negative for pallor and rash.   Neurological: Negative for light-headedness and headaches.   Psychiatric/Behavioral: Negative for agitation. The patient is not nervous/anxious.          Past Medical History:   Diagnosis Date    Breast cyst     Chronic angle-closure glaucoma of both eyes, severe stage 11/18/2016    Fibrocystic breast     Gallbladder problem     Glaucoma     Glaucoma     Joint pain     Keloid cicatrix     PONV (postoperative nausea and vomiting)     Uveitis      Past Surgical History:   Procedure Laterality Date    AK POST CATARACT/PKP Right 10/26/2017    PKP     BAERVELDT GLAUCOMA SHUNT Right 12/26/2012        breast biopsy       BREAST BIOPSY      CATARACT EXTRACTION Left n/a    Aphakic    CATARACT EXTRACTION W/  INTRAOCULAR LENS IMPLANT Right 05/24/2012    WITH DSEK ()    CHOLECYSTECTOMY      COLONOSCOPY N/A 2/19/2020    Procedure: COLONOSCOPY;  Surgeon: Stewart Domingo MD;  Location: [address]);  Service: Endoscopy;  Laterality: N/A;    CORNEAL TRANSPLANT Right 05/24/2012    DSEK/PHACO IOL ()    CORNEAL TRANSPLANT Right 10/26/2017    PKP ()    DISSECTION AND REMOVAL OF FIBROUS PUPILLARY MEMBRANE Right 01/26/2017    DR. BRANNON    HYSTERECTOMY      OOPHORECTOMY      REMOVAL OF BAERVELDT GLAUCOMA SHUNT Right 02/27/2017        REMOVAL OF NECROTIC TISSUE OF EYE Right 04/09/2013    REMOVAL OF TISSUE AROUND SHUNT WITH PERICARDIAL PATCH ( AND )    STRABISMUS SURGERY       Family History   Problem Relation Age of Onset    Glaucoma Mother     Blindness Mother     Glaucoma Sister     Cataracts Sister     Hypertension Sister     Melanoma Neg Hx     Amblyopia Neg Hx     Macular degeneration Neg Hx     Retinal detachment Neg Hx     Strabismus Neg Hx      Review of patient's allergies indicates:   Allergen Reactions    Augmentin [amoxicillin-pot clavulanate] Diarrhea    Sulfa (sulfonamide antibiotics) Rash     Social History     Socioeconomic History    Marital status: Single     Spouse name: Not on file    Number of children: Not on file    Years of education: Not on file    Highest education level: Not on file   Occupational History    Not on file   Social Needs    Financial resource strain: Not on file    Food insecurity     Worry: Not on file     Inability: Not on file    Transportation needs     Medical: Not on file     Non-medical: Not on file   Tobacco Use    Smoking status: Never Smoker    Smokeless tobacco: Never Used   Substance and Sexual Activity    Alcohol use: Yes     Comment: socially    Drug use: No    Sexual activity: Not on file    Lifestyle    Physical activity     Days per week: Not on file     Minutes per session: Not on file    Stress: Not on file   Relationships    Social connections     Talks on phone: Not on file     Gets together: Not on file     Attends Spiritism service: Not on file     Active member of club or organization: Not on file     Attends meetings of clubs or organizations: Not on file     Relationship status: Not on file   Other Topics Concern    Are you pregnant or think you may be? Not Asked    Breast-feeding Not Asked   Social History Narrative    Not on file       Current Outpatient Medications:     acetaminophen (TYLENOL) 650 MG TbSR, Take 1 tablet (650 mg total) by mouth every 8 (eight) hours., Disp: 30 tablet, Rfl: 0    calcium-vitamin D 500-125 mg-unit tablet, Take 1 tablet by mouth once daily. , Disp: , Rfl:     carboxymethylcell/hypromellose (GENTEAL GEL OPHT), Place into the left eye every evening., Disp: , Rfl:     dorzolamide-timolol 2-0.5% (COSOPT) 22.3-6.8 mg/mL ophthalmic solution, INSTILL 1 DROP INTO BOTH EYES 2 TIMES A DAY, Disp: 10 mL, Rfl: 0    erythromycin (ROMYCIN) ophthalmic ointment, APPLY A 1 INCH RIBBON INTO BOTH EYES EVERY EVENING, Disp: 7 g, Rfl: 0    fish oil-omega-3 fatty acids 300-1,000 mg capsule, Take 1 capsule by mouth once daily., Disp: , Rfl:     fluorometholone 0.1% (FML) 0.1 % DrpS, Place 1 drop into the right eye 2 (two) times daily. (Patient not taking: Reported on 11/3/2020), Disp: 10 mL, Rfl: 6    hypromellose (SYSTANE GEL OPHT), Apply to eye., Disp: , Rfl:     ibuprofen (ADVIL,MOTRIN) 600 MG tablet, Take 1 tablet (600 mg total) by mouth every 6 (six) hours as needed for Pain. (Patient not taking: Reported on 11/3/2020), Disp: 30 tablet, Rfl: 1    latanoprost (XALATAN) 0.005 % ophthalmic solution, Place 1 drop into the right eye once daily., Disp: 2.5 mL, Rfl: 6    latanoprost 0.005 % ophthalmic solution, INSTILL 1 DROP INTO THE RIGHT EYE DAILY, Disp: 2.5 mL,  Rfl: 4    MULTIVITAMINS,THER W-MINERALS (VITAMINS & MINERALS ORAL), Take 1 tablet by mouth once daily. , Disp: , Rfl:     prednisoLONE acetate (PRED FORTE) 1 % DrpS, Place 1 drop into the right eye 4 (four) times daily., Disp: 10 mL, Rfl: 3    sodium chloride 5% () 5 % ophthalmic solution, Place 1 drop into the left eye as needed. 1 Drops Both eyes Four times a day.  Dispense 90 day supply (Patient taking differently: Place 1 drop into the left eye 2 (two) times daily. ), Disp: , Rfl:     triamcinolone acetonide 0.5% (KENALOG) 0.5 % Crea, Apply topically 3 (three) times daily., Disp: 30 g, Rfl: 1    TURMERIC ROOT EXTRACT ORAL, Take 1 capsule by mouth once daily., Disp: , Rfl:     Objective:       Vitals:    11/10/20 1000   BP: (!) 142/64   Pulse: 83   Temp: 98.4 °F (36.9 °C)       Physical Exam  Constitutional:       Appearance: Normal appearance.   HENT:      Head: Normocephalic and atraumatic.   Eyes:      Extraocular Movements: Extraocular movements intact.      Conjunctiva/sclera: Conjunctivae normal.   Cardiovascular:      Rate and Rhythm: Normal rate and regular rhythm.   Pulmonary:      Effort: Pulmonary effort is normal.      Breath sounds: Normal breath sounds.   Abdominal:      General: Abdomen is flat. Bowel sounds are normal. There is no distension.      Palpations: Abdomen is soft. There is no mass.      Tenderness: There is no abdominal tenderness.      Hernia: No hernia is present.   Skin:     General: Skin is warm and dry.   Neurological:      General: No focal deficit present.      Mental Status: She is alert and oriented to person, place, and time.   Psychiatric:         Mood and Affect: Mood normal.         Behavior: Behavior normal.     Pertinent Imaging  MRI abd/pelvis 3/2020 - No specific etiology to explain patient's left lower quadrant abdominal pain.    Assessment:       1. 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    -No hernia palpated on exam  -Obtain provocative hernia ultrasound      Jennifer Johns, MS3  11/20/20

## 2020-11-16 ENCOUNTER — HOSPITAL ENCOUNTER (OUTPATIENT)
Dept: RADIOLOGY | Facility: HOSPITAL | Age: 75
Discharge: HOME | End: 2020-11-16
Attending: SURGERY
Payer: MEDICARE

## 2020-11-16 DIAGNOSIS — R10.32 LEFT GROIN PAIN: ICD-10-CM

## 2020-11-16 PROCEDURE — 76705 ECHO EXAM OF ABDOMEN: CPT | Mod: TC

## 2020-11-16 PROCEDURE — 76705 ECHO EXAM OF ABDOMEN: CPT | Mod: 26,,, | Performed by: RADIOLOGY

## 2020-11-16 PROCEDURE — 76705 US ABDOMEN LIMITED: ICD-10-PCS | Mod: 26,,, | Performed by: RADIOLOGY

## 2020-11-17 ENCOUNTER — TELEPHONE (OUTPATIENT)
Dept: OPHTHALMOLOGY | Facility: CLINIC | Age: 75
End: 2020-11-17

## 2020-11-17 NOTE — TELEPHONE ENCOUNTER
Called and authorized patients prednisolone to [facility].      ----- Message from Beka Young sent at 11/17/2020 11:07 AM CST -----  Regarding: Rx refill  Contact: Theresa Cardenas with [facility] calling for rx refill of the following: prednisoLONE acetate (PRED FORTE) 1 % DrpS    [facility] [address], [facility] [address] [address]  [address] [facility] [address]  Phone: [phone] Fax: [phone]

## 2020-11-18 ENCOUNTER — TELEPHONE (OUTPATIENT)
Dept: SURGERY | Facility: CLINIC | Age: 75
End: 2020-11-18

## 2020-11-18 NOTE — TELEPHONE ENCOUNTER
Left message on patient's voicemail after Dr. Anderson's review of Groin US:  No hernia noted bilaterally and no surgery indicated at this time.  Direct phone number given for her to call with any questions or concerns.

## 2020-11-18 NOTE — TELEPHONE ENCOUNTER
----- Message from Sagar Anderson MD sent at 11/17/2020  1:12 PM CST -----  No evidence of hernia. 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

## 2020-11-30 ENCOUNTER — EXTERNAL CHRONIC CARE MANAGEMENT (OUTPATIENT)
Dept: PRIMARY CARE CLINIC | Facility: CLINIC | Age: 75
End: 2020-11-30
Payer: MEDICARE

## 2020-11-30 PROCEDURE — 99490 PR CHRONIC CARE MGMT, 1ST 20 MIN: ICD-10-PCS | Mod: S$PBB,,, | Performed by: FAMILY MEDICINE

## 2020-11-30 PROCEDURE — 99490 CHRNC CARE MGMT STAFF 1ST 20: CPT | Mod: S$PBB,,, | Performed by: FAMILY MEDICINE

## 2020-11-30 PROCEDURE — 99490 CHRNC CARE MGMT STAFF 1ST 20: CPT | Mod: PBBFAC,PO | Performed by: FAMILY MEDICINE

## 2020-12-31 ENCOUNTER — EXTERNAL CHRONIC CARE MANAGEMENT (OUTPATIENT)
Dept: PRIMARY CARE CLINIC | Facility: CLINIC | Age: 75
End: 2020-12-31
Payer: MEDICARE

## 2020-12-31 PROCEDURE — 99490 CHRNC CARE MGMT STAFF 1ST 20: CPT | Mod: PBBFAC,PO | Performed by: FAMILY MEDICINE

## 2020-12-31 PROCEDURE — 99490 CHRNC CARE MGMT STAFF 1ST 20: CPT | Mod: S$PBB,,, | Performed by: FAMILY MEDICINE

## 2020-12-31 PROCEDURE — 99490 PR CHRONIC CARE MGMT, 1ST 20 MIN: ICD-10-PCS | Mod: S$PBB,,, | Performed by: FAMILY MEDICINE

## 2021-01-17 ENCOUNTER — PATIENT OUTREACH (OUTPATIENT)
Dept: ADMINISTRATIVE | Facility: OTHER | Age: 76
End: 2021-01-17

## 2021-01-20 ENCOUNTER — OFFICE VISIT (OUTPATIENT)
Dept: OPHTHALMOLOGY | Facility: CLINIC | Age: 76
End: 2021-01-20
Payer: MEDICARE

## 2021-01-20 DIAGNOSIS — H18.20 CORNEAL EDEMA: ICD-10-CM

## 2021-01-20 DIAGNOSIS — H27.02 APHAKIA OF LEFT EYE: Primary | ICD-10-CM

## 2021-01-20 DIAGNOSIS — T86.8411 FAILURE OF CORNEA TRANSPLANT OF RIGHT EYE: ICD-10-CM

## 2021-01-20 DIAGNOSIS — H54.10 BLINDNESS AND LOW VISION: ICD-10-CM

## 2021-01-20 DIAGNOSIS — Z01.818 PRE-OP TESTING: ICD-10-CM

## 2021-01-20 PROCEDURE — 99999 PR PBB SHADOW E&M-EST. PATIENT-LVL III: ICD-10-PCS | Mod: PBBFAC,,, | Performed by: OPHTHALMOLOGY

## 2021-01-20 PROCEDURE — 92136 IOL MASTER - OU - BOTH EYES: ICD-10-PCS | Mod: 26,S$PBB,LT, | Performed by: OPHTHALMOLOGY

## 2021-01-20 PROCEDURE — 99999 PR PBB SHADOW E&M-EST. PATIENT-LVL III: CPT | Mod: PBBFAC,,, | Performed by: OPHTHALMOLOGY

## 2021-01-20 PROCEDURE — 92014 COMPRE OPH EXAM EST PT 1/>: CPT | Mod: S$PBB,,, | Performed by: OPHTHALMOLOGY

## 2021-01-20 PROCEDURE — 92136 OPHTHALMIC BIOMETRY: CPT | Mod: PBBFAC | Performed by: OPHTHALMOLOGY

## 2021-01-20 PROCEDURE — 92014 PR EYE EXAM, EST PATIENT,COMPREHESV: ICD-10-PCS | Mod: S$PBB,,, | Performed by: OPHTHALMOLOGY

## 2021-01-20 PROCEDURE — 99213 OFFICE O/P EST LOW 20 MIN: CPT | Mod: PBBFAC | Performed by: OPHTHALMOLOGY

## 2021-01-20 RX ORDER — PHENYLEPHRINE HYDROCHLORIDE 25 MG/ML
1 SOLUTION/ DROPS OPHTHALMIC
Status: CANCELLED | OUTPATIENT
Start: 2021-01-20

## 2021-01-20 RX ORDER — TETRACAINE HYDROCHLORIDE 5 MG/ML
1 SOLUTION OPHTHALMIC
Status: CANCELLED | OUTPATIENT
Start: 2021-01-20

## 2021-01-20 RX ORDER — TROPICAMIDE 10 MG/ML
1 SOLUTION/ DROPS OPHTHALMIC
Status: CANCELLED | OUTPATIENT
Start: 2021-01-20

## 2021-01-20 RX ORDER — MOXIFLOXACIN 5 MG/ML
1 SOLUTION/ DROPS OPHTHALMIC
Status: CANCELLED | OUTPATIENT
Start: 2021-01-20

## 2021-01-20 RX ORDER — SODIUM CHLORIDE 0.9 % (FLUSH) 0.9 %
10 SYRINGE (ML) INJECTION
Status: DISCONTINUED | OUTPATIENT
Start: 2021-01-20 | End: 2022-11-10 | Stop reason: ALTCHOICE

## 2021-01-31 ENCOUNTER — EXTERNAL CHRONIC CARE MANAGEMENT (OUTPATIENT)
Dept: PRIMARY CARE CLINIC | Facility: CLINIC | Age: 76
End: 2021-01-31
Payer: MEDICARE

## 2021-01-31 PROCEDURE — 99490 CHRNC CARE MGMT STAFF 1ST 20: CPT | Mod: S$PBB,,, | Performed by: FAMILY MEDICINE

## 2021-01-31 PROCEDURE — 99490 PR CHRONIC CARE MGMT, 1ST 20 MIN: ICD-10-PCS | Mod: S$PBB,,, | Performed by: FAMILY MEDICINE

## 2021-01-31 PROCEDURE — 99490 CHRNC CARE MGMT STAFF 1ST 20: CPT | Mod: PBBFAC,PO | Performed by: FAMILY MEDICINE

## 2021-02-10 ENCOUNTER — HOSPITAL ENCOUNTER (OUTPATIENT)
Dept: RADIOLOGY | Facility: HOSPITAL | Age: 76
Discharge: HOME | End: 2021-02-10
Attending: FAMILY MEDICINE
Payer: MEDICARE

## 2021-02-10 VITALS — WEIGHT: 205 LBS | BODY MASS INDEX: 40.25 KG/M2 | HEIGHT: 60 IN

## 2021-02-10 DIAGNOSIS — Z12.31 ENCOUNTER FOR SCREENING MAMMOGRAM FOR MALIGNANT NEOPLASM OF BREAST: ICD-10-CM

## 2021-02-10 PROCEDURE — 77063 BREAST TOMOSYNTHESIS BI: CPT | Mod: 26,,, | Performed by: RADIOLOGY

## 2021-02-10 PROCEDURE — 77067 MAMMO DIGITAL SCREENING BILAT WITH TOMO: ICD-10-PCS | Mod: 26,,, | Performed by: RADIOLOGY

## 2021-02-10 PROCEDURE — 77067 SCR MAMMO BI INCL CAD: CPT | Mod: 26,,, | Performed by: RADIOLOGY

## 2021-02-10 PROCEDURE — 77067 SCR MAMMO BI INCL CAD: CPT | Mod: TC

## 2021-02-10 PROCEDURE — 77063 MAMMO DIGITAL SCREENING BILAT WITH TOMO: ICD-10-PCS | Mod: 26,,, | Performed by: RADIOLOGY

## 2021-02-28 ENCOUNTER — EXTERNAL CHRONIC CARE MANAGEMENT (OUTPATIENT)
Dept: PRIMARY CARE CLINIC | Facility: CLINIC | Age: 76
End: 2021-02-28
Payer: MEDICARE

## 2021-02-28 PROCEDURE — 99490 PR CHRONIC CARE MGMT, 1ST 20 MIN: ICD-10-PCS | Mod: S$PBB,,, | Performed by: FAMILY MEDICINE

## 2021-02-28 PROCEDURE — 99490 CHRNC CARE MGMT STAFF 1ST 20: CPT | Mod: PBBFAC,PO | Performed by: FAMILY MEDICINE

## 2021-02-28 PROCEDURE — 99490 CHRNC CARE MGMT STAFF 1ST 20: CPT | Mod: S$PBB,,, | Performed by: FAMILY MEDICINE

## 2021-03-18 ENCOUNTER — LAB VISIT (OUTPATIENT)
Dept: LAB | Facility: HOSPITAL | Age: 76
End: 2021-03-18
Attending: FAMILY MEDICINE
Payer: MEDICARE

## 2021-03-18 ENCOUNTER — OFFICE VISIT (OUTPATIENT)
Dept: FAMILY MEDICINE | Facility: CLINIC | Age: 76
End: 2021-03-18
Attending: FAMILY MEDICINE
Payer: MEDICARE

## 2021-03-18 VITALS
SYSTOLIC BLOOD PRESSURE: 135 MMHG | HEART RATE: 77 BPM | BODY MASS INDEX: 40.52 KG/M2 | RESPIRATION RATE: 16 BRPM | HEIGHT: 60 IN | DIASTOLIC BLOOD PRESSURE: 70 MMHG | WEIGHT: 206.38 LBS

## 2021-03-18 DIAGNOSIS — Z00.00 ANNUAL PHYSICAL EXAM: ICD-10-CM

## 2021-03-18 DIAGNOSIS — E66.01 OBESITY, MORBID, BMI 40.0-49.9: ICD-10-CM

## 2021-03-18 DIAGNOSIS — Z00.00 ANNUAL PHYSICAL EXAM: Primary | ICD-10-CM

## 2021-03-18 LAB
ALBUMIN SERPL BCP-MCNC: 3.6 G/DL (ref 3.5–5.2)
ALP SERPL-CCNC: 95 U/L (ref 55–135)
ALT SERPL W/O P-5'-P-CCNC: 16 U/L (ref 10–44)
ANION GAP SERPL CALC-SCNC: 5 MMOL/L (ref 8–16)
AST SERPL-CCNC: 19 U/L (ref 10–40)
BACTERIA #/AREA URNS AUTO: ABNORMAL /HPF
BASOPHILS # BLD AUTO: 0.04 K/UL (ref 0–0.2)
BASOPHILS NFR BLD: 0.5 % (ref 0–1.9)
BILIRUB SERPL-MCNC: 0.9 MG/DL (ref 0.1–1)
BILIRUB UR QL STRIP: NEGATIVE
BUN SERPL-MCNC: 11 MG/DL (ref 8–23)
CALCIUM SERPL-MCNC: 9.2 MG/DL (ref 8.7–10.5)
CHLORIDE SERPL-SCNC: 107 MMOL/L (ref 95–110)
CHOLEST SERPL-MCNC: 156 MG/DL (ref 120–199)
CHOLEST/HDLC SERPL: 3.3 {RATIO} (ref 2–5)
CLARITY UR REFRACT.AUTO: ABNORMAL
CO2 SERPL-SCNC: 30 MMOL/L (ref 23–29)
COLOR UR AUTO: YELLOW
CREAT SERPL-MCNC: 0.7 MG/DL (ref 0.5–1.4)
DIFFERENTIAL METHOD: ABNORMAL
EOSINOPHIL # BLD AUTO: 0.1 K/UL (ref 0–0.5)
EOSINOPHIL NFR BLD: 0.8 % (ref 0–8)
ERYTHROCYTE [DISTWIDTH] IN BLOOD BY AUTOMATED COUNT: 12.8 % (ref 11.5–14.5)
EST. GFR  (AFRICAN AMERICAN): >60 ML/MIN/1.73 M^2
EST. GFR  (NON AFRICAN AMERICAN): >60 ML/MIN/1.73 M^2
GLUCOSE SERPL-MCNC: 111 MG/DL (ref 70–110)
GLUCOSE UR QL STRIP: NEGATIVE
HCT VFR BLD AUTO: 46 % (ref 37–48.5)
HDLC SERPL-MCNC: 48 MG/DL (ref 40–75)
HDLC SERPL: 30.8 % (ref 20–50)
HGB BLD-MCNC: 14.6 G/DL (ref 12–16)
HGB UR QL STRIP: NEGATIVE
HYALINE CASTS UR QL AUTO: 2 /LPF
IMM GRANULOCYTES # BLD AUTO: 0.02 K/UL (ref 0–0.04)
IMM GRANULOCYTES NFR BLD AUTO: 0.2 % (ref 0–0.5)
KETONES UR QL STRIP: NEGATIVE
LDLC SERPL CALC-MCNC: 84.8 MG/DL (ref 63–159)
LEUKOCYTE ESTERASE UR QL STRIP: ABNORMAL
LYMPHOCYTES # BLD AUTO: 2.1 K/UL (ref 1–4.8)
LYMPHOCYTES NFR BLD: 23.7 % (ref 18–48)
MCH RBC QN AUTO: 30.7 PG (ref 27–31)
MCHC RBC AUTO-ENTMCNC: 31.7 G/DL (ref 32–36)
MCV RBC AUTO: 97 FL (ref 82–98)
MICROSCOPIC COMMENT: ABNORMAL
MONOCYTES # BLD AUTO: 0.6 K/UL (ref 0.3–1)
MONOCYTES NFR BLD: 6.6 % (ref 4–15)
NEUTROPHILS # BLD AUTO: 6 K/UL (ref 1.8–7.7)
NEUTROPHILS NFR BLD: 68.2 % (ref 38–73)
NITRITE UR QL STRIP: NEGATIVE
NONHDLC SERPL-MCNC: 108 MG/DL
NRBC BLD-RTO: 0 /100 WBC
PH UR STRIP: 5 [PH] (ref 5–8)
PLATELET # BLD AUTO: 187 K/UL (ref 150–350)
PMV BLD AUTO: 11.9 FL (ref 9.2–12.9)
POTASSIUM SERPL-SCNC: 3.9 MMOL/L (ref 3.5–5.1)
PROT SERPL-MCNC: 7.4 G/DL (ref 6–8.4)
PROT UR QL STRIP: NEGATIVE
RBC # BLD AUTO: 4.75 M/UL (ref 4–5.4)
RBC #/AREA URNS AUTO: 2 /HPF (ref 0–4)
SODIUM SERPL-SCNC: 142 MMOL/L (ref 136–145)
SP GR UR STRIP: 1.02 (ref 1–1.03)
SQUAMOUS #/AREA URNS AUTO: 16 /HPF
TRIGL SERPL-MCNC: 116 MG/DL (ref 30–150)
URN SPEC COLLECT METH UR: ABNORMAL
WBC # BLD AUTO: 8.77 K/UL (ref 3.9–12.7)
WBC #/AREA URNS AUTO: 8 /HPF (ref 0–5)

## 2021-03-18 PROCEDURE — 81001 URINALYSIS AUTO W/SCOPE: CPT | Performed by: FAMILY MEDICINE

## 2021-03-18 PROCEDURE — 99213 PR OFFICE/OUTPT VISIT, EST, LEVL III, 20-29 MIN: ICD-10-PCS | Mod: S$PBB,,, | Performed by: FAMILY MEDICINE

## 2021-03-18 PROCEDURE — 99214 OFFICE O/P EST MOD 30 MIN: CPT | Mod: PBBFAC,PO | Performed by: FAMILY MEDICINE

## 2021-03-18 PROCEDURE — 36415 COLL VENOUS BLD VENIPUNCTURE: CPT | Mod: PO | Performed by: FAMILY MEDICINE

## 2021-03-18 PROCEDURE — 99213 OFFICE O/P EST LOW 20 MIN: CPT | Mod: S$PBB,,, | Performed by: FAMILY MEDICINE

## 2021-03-18 PROCEDURE — 80061 LIPID PANEL: CPT | Performed by: FAMILY MEDICINE

## 2021-03-18 PROCEDURE — 99999 PR PBB SHADOW E&M-EST. PATIENT-LVL IV: ICD-10-PCS | Mod: PBBFAC,,, | Performed by: FAMILY MEDICINE

## 2021-03-18 PROCEDURE — 99999 PR PBB SHADOW E&M-EST. PATIENT-LVL IV: CPT | Mod: PBBFAC,,, | Performed by: FAMILY MEDICINE

## 2021-03-18 PROCEDURE — 85025 COMPLETE CBC W/AUTO DIFF WBC: CPT | Performed by: FAMILY MEDICINE

## 2021-03-18 PROCEDURE — 80053 COMPREHEN METABOLIC PANEL: CPT | Performed by: FAMILY MEDICINE

## 2021-03-24 ENCOUNTER — TELEPHONE (OUTPATIENT)
Dept: FAMILY MEDICINE | Facility: CLINIC | Age: 76
End: 2021-03-24

## 2021-03-31 ENCOUNTER — EXTERNAL CHRONIC CARE MANAGEMENT (OUTPATIENT)
Dept: PRIMARY CARE CLINIC | Facility: CLINIC | Age: 76
End: 2021-03-31
Payer: MEDICARE

## 2021-03-31 PROCEDURE — 99490 PR CHRONIC CARE MGMT, 1ST 20 MIN: ICD-10-PCS | Mod: S$PBB,,, | Performed by: FAMILY MEDICINE

## 2021-03-31 PROCEDURE — 99490 CHRNC CARE MGMT STAFF 1ST 20: CPT | Mod: PBBFAC,PO | Performed by: FAMILY MEDICINE

## 2021-03-31 PROCEDURE — 99490 CHRNC CARE MGMT STAFF 1ST 20: CPT | Mod: S$PBB,,, | Performed by: FAMILY MEDICINE

## 2021-04-01 ENCOUNTER — TELEPHONE (OUTPATIENT)
Dept: OPHTHALMOLOGY | Facility: CLINIC | Age: 76
End: 2021-04-01

## 2021-04-06 ENCOUNTER — TELEPHONE (OUTPATIENT)
Dept: DERMATOLOGY | Facility: CLINIC | Age: 76
End: 2021-04-06

## 2021-04-13 ENCOUNTER — OFFICE VISIT (OUTPATIENT)
Dept: DERMATOLOGY | Facility: CLINIC | Age: 76
End: 2021-04-13
Payer: MEDICARE

## 2021-04-13 ENCOUNTER — OFFICE VISIT (OUTPATIENT)
Dept: OPHTHALMOLOGY | Facility: CLINIC | Age: 76
End: 2021-04-13
Payer: MEDICARE

## 2021-04-13 DIAGNOSIS — T86.8411 FAILURE OF CORNEA TRANSPLANT OF RIGHT EYE: ICD-10-CM

## 2021-04-13 DIAGNOSIS — H54.10 BLINDNESS AND LOW VISION: ICD-10-CM

## 2021-04-13 DIAGNOSIS — H40.2213 CHRONIC ANGLE-CLOSURE GLAUCOMA OF RIGHT EYE, SEVERE STAGE: Primary | ICD-10-CM

## 2021-04-13 DIAGNOSIS — H27.02 APHAKIA OF LEFT EYE: ICD-10-CM

## 2021-04-13 DIAGNOSIS — H04.123 BILATERAL DRY EYES: ICD-10-CM

## 2021-04-13 DIAGNOSIS — L30.2 ID REACTION: Primary | ICD-10-CM

## 2021-04-13 DIAGNOSIS — Z94.7 CORNEA REPLACED BY TRANSPLANT: ICD-10-CM

## 2021-04-13 DIAGNOSIS — Z96.1 STATUS POST CATARACT EXTRACTION AND INSERTION OF INTRAOCULAR LENS OF RIGHT EYE: ICD-10-CM

## 2021-04-13 DIAGNOSIS — B35.3 TINEA PEDIS OF BOTH FEET: ICD-10-CM

## 2021-04-13 DIAGNOSIS — Z98.41 STATUS POST CATARACT EXTRACTION AND INSERTION OF INTRAOCULAR LENS OF RIGHT EYE: ICD-10-CM

## 2021-04-13 DIAGNOSIS — H40.1122 PRIMARY OPEN ANGLE GLAUCOMA (POAG) OF LEFT EYE, MODERATE STAGE: ICD-10-CM

## 2021-04-13 DIAGNOSIS — H18.232 SECONDARY CORNEAL EDEMA OF LEFT EYE: ICD-10-CM

## 2021-04-13 PROCEDURE — 99214 PR OFFICE/OUTPT VISIT, EST, LEVL IV, 30-39 MIN: ICD-10-PCS | Mod: S$PBB,,, | Performed by: OPHTHALMOLOGY

## 2021-04-13 PROCEDURE — 99999 PR PBB SHADOW E&M-EST. PATIENT-LVL II: ICD-10-PCS | Mod: PBBFAC,,, | Performed by: OPHTHALMOLOGY

## 2021-04-13 PROCEDURE — 87220 TISSUE EXAM FOR FUNGI: CPT | Mod: PBBFAC | Performed by: INTERNAL MEDICINE

## 2021-04-13 PROCEDURE — 99999 PR PBB SHADOW E&M-EST. PATIENT-LVL III: CPT | Mod: PBBFAC,,,

## 2021-04-13 PROCEDURE — 99214 PR OFFICE/OUTPT VISIT, EST, LEVL IV, 30-39 MIN: ICD-10-PCS | Mod: S$PBB,GC,, | Performed by: DERMATOLOGY

## 2021-04-13 PROCEDURE — 99214 OFFICE O/P EST MOD 30 MIN: CPT | Mod: S$PBB,GC,, | Performed by: DERMATOLOGY

## 2021-04-13 PROCEDURE — 99214 OFFICE O/P EST MOD 30 MIN: CPT | Mod: S$PBB,,, | Performed by: OPHTHALMOLOGY

## 2021-04-13 PROCEDURE — 99213 OFFICE O/P EST LOW 20 MIN: CPT | Mod: PBBFAC

## 2021-04-13 PROCEDURE — 99212 OFFICE O/P EST SF 10 MIN: CPT | Mod: PBBFAC,27 | Performed by: OPHTHALMOLOGY

## 2021-04-13 PROCEDURE — 99999 PR PBB SHADOW E&M-EST. PATIENT-LVL II: CPT | Mod: PBBFAC,,, | Performed by: OPHTHALMOLOGY

## 2021-04-13 PROCEDURE — 99999 PR PBB SHADOW E&M-EST. PATIENT-LVL III: ICD-10-PCS | Mod: PBBFAC,,,

## 2021-04-13 RX ORDER — TRIAMCINOLONE ACETONIDE 1 MG/G
OINTMENT TOPICAL 2 TIMES DAILY
Qty: 453.6 G | Refills: 0 | Status: SHIPPED | OUTPATIENT
Start: 2021-04-13 | End: 2021-06-14

## 2021-04-13 RX ORDER — KETOCONAZOLE 20 MG/G
CREAM TOPICAL 2 TIMES DAILY
Qty: 30 G | Refills: 1 | Status: SHIPPED | OUTPATIENT
Start: 2021-04-13 | End: 2022-04-04

## 2021-04-21 ENCOUNTER — TELEPHONE (OUTPATIENT)
Dept: OPHTHALMOLOGY | Facility: CLINIC | Age: 76
End: 2021-04-21

## 2021-04-21 RX ORDER — MOXIFLOXACIN 5 MG/ML
1 SOLUTION/ DROPS OPHTHALMIC 3 TIMES DAILY
Qty: 3 ML | Refills: 0 | Status: SHIPPED | OUTPATIENT
Start: 2021-04-21 | End: 2021-04-28

## 2021-04-21 RX ORDER — PREDNISOLONE ACETATE 10 MG/ML
1 SUSPENSION/ DROPS OPHTHALMIC 4 TIMES DAILY
Qty: 10 ML | Refills: 3 | Status: SHIPPED | OUTPATIENT
Start: 2021-04-21 | End: 2021-05-07

## 2021-04-30 ENCOUNTER — EXTERNAL CHRONIC CARE MANAGEMENT (OUTPATIENT)
Dept: PRIMARY CARE CLINIC | Facility: CLINIC | Age: 76
End: 2021-04-30
Payer: MEDICARE

## 2021-04-30 PROCEDURE — 99490 PR CHRONIC CARE MGMT, 1ST 20 MIN: ICD-10-PCS | Mod: S$PBB,,, | Performed by: FAMILY MEDICINE

## 2021-04-30 PROCEDURE — 99490 CHRNC CARE MGMT STAFF 1ST 20: CPT | Mod: PBBFAC,PO | Performed by: FAMILY MEDICINE

## 2021-04-30 PROCEDURE — 99490 CHRNC CARE MGMT STAFF 1ST 20: CPT | Mod: S$PBB,,, | Performed by: FAMILY MEDICINE

## 2021-05-05 DIAGNOSIS — H18.20 CORNEAL EDEMA: Primary | ICD-10-CM

## 2021-05-05 DIAGNOSIS — H27.00 APHAKIA, UNSPECIFIED LATERALITY: ICD-10-CM

## 2021-05-06 ENCOUNTER — TELEPHONE (OUTPATIENT)
Dept: OPHTHALMOLOGY | Facility: CLINIC | Age: 76
End: 2021-05-06

## 2021-05-07 ENCOUNTER — TELEPHONE (OUTPATIENT)
Dept: OPHTHALMOLOGY | Facility: CLINIC | Age: 76
End: 2021-05-07

## 2021-05-07 ENCOUNTER — HOSPITAL ENCOUNTER (OUTPATIENT)
Dept: PREADMISSION TESTING | Facility: OTHER | Age: 76
Discharge: HOME | End: 2021-05-07
Attending: OPHTHALMOLOGY
Payer: MEDICARE

## 2021-05-07 DIAGNOSIS — Z01.818 PRE-OP TESTING: ICD-10-CM

## 2021-05-07 PROCEDURE — U0005 INFEC AGEN DETEC AMPLI PROBE: HCPCS | Performed by: OPHTHALMOLOGY

## 2021-05-07 PROCEDURE — U0003 INFECTIOUS AGENT DETECTION BY NUCLEIC ACID (DNA OR RNA); SEVERE ACUTE RESPIRATORY SYNDROME CORONAVIRUS 2 (SARS-COV-2) (CORONAVIRUS DISEASE [COVID-19]), AMPLIFIED PROBE TECHNIQUE, MAKING USE OF HIGH THROUGHPUT TECHNOLOGIES AS DESCRIBED BY CMS-2020-01-R: HCPCS | Performed by: OPHTHALMOLOGY

## 2021-05-08 LAB — SARS-COV-2 RNA RESP QL NAA+PROBE: NOT DETECTED

## 2021-05-10 ENCOUNTER — HOSPITAL ENCOUNTER (OUTPATIENT)
Facility: OTHER | Age: 76
Discharge: HOME | End: 2021-05-10
Attending: OPHTHALMOLOGY | Admitting: OPHTHALMOLOGY
Payer: MEDICARE

## 2021-05-10 ENCOUNTER — ANESTHESIA (OUTPATIENT)
Dept: SURGERY | Facility: OTHER | Age: 76
End: 2021-05-10
Payer: MEDICARE

## 2021-05-10 ENCOUNTER — ANESTHESIA EVENT (OUTPATIENT)
Dept: SURGERY | Facility: OTHER | Age: 76
End: 2021-05-10
Payer: MEDICARE

## 2021-05-10 VITALS
BODY MASS INDEX: 39.07 KG/M2 | WEIGHT: 199 LBS | HEIGHT: 60 IN | TEMPERATURE: 98 F | HEART RATE: 80 BPM | RESPIRATION RATE: 18 BRPM | SYSTOLIC BLOOD PRESSURE: 127 MMHG | DIASTOLIC BLOOD PRESSURE: 59 MMHG | OXYGEN SATURATION: 98 %

## 2021-05-10 DIAGNOSIS — H18.20 CORNEAL EDEMA: ICD-10-CM

## 2021-05-10 DIAGNOSIS — H27.02 APHAKIA OF LEFT EYE: ICD-10-CM

## 2021-05-10 PROCEDURE — 27201423 OPTIME MED/SURG SUP & DEVICES STERILE SUPPLY: Performed by: OPHTHALMOLOGY

## 2021-05-10 PROCEDURE — 88304 PR  SURG PATH,LEVEL III: ICD-10-PCS | Mod: 26,,, | Performed by: STUDENT IN AN ORGANIZED HEALTH CARE EDUCATION/TRAINING PROGRAM

## 2021-05-10 PROCEDURE — 65730 PR CORNEAL TRANSPLANT,PENETRATING: ICD-10-PCS | Mod: LT,,, | Performed by: OPHTHALMOLOGY

## 2021-05-10 PROCEDURE — 37000008 HC ANESTHESIA 1ST 15 MINUTES: Performed by: OPHTHALMOLOGY

## 2021-05-10 PROCEDURE — 88304 TISSUE EXAM BY PATHOLOGIST: CPT | Mod: 26,,, | Performed by: STUDENT IN AN ORGANIZED HEALTH CARE EDUCATION/TRAINING PROGRAM

## 2021-05-10 PROCEDURE — 88313 SPECIAL STAINS GROUP 2: CPT | Mod: 26,,, | Performed by: STUDENT IN AN ORGANIZED HEALTH CARE EDUCATION/TRAINING PROGRAM

## 2021-05-10 PROCEDURE — V2785 CORNEAL TISSUE PROCESSING: HCPCS | Performed by: OPHTHALMOLOGY

## 2021-05-10 PROCEDURE — 63600175 PHARM REV CODE 636 W HCPCS: Performed by: OPHTHALMOLOGY

## 2021-05-10 PROCEDURE — 36000707: Performed by: OPHTHALMOLOGY

## 2021-05-10 PROCEDURE — 25000003 PHARM REV CODE 250: Performed by: OPHTHALMOLOGY

## 2021-05-10 PROCEDURE — 88313 PR  SPECIAL STAINS,GROUP II: ICD-10-PCS | Mod: 26,,, | Performed by: STUDENT IN AN ORGANIZED HEALTH CARE EDUCATION/TRAINING PROGRAM

## 2021-05-10 PROCEDURE — 66985 INSERT LENS PROSTHESIS: CPT | Mod: 51,LT,, | Performed by: OPHTHALMOLOGY

## 2021-05-10 PROCEDURE — 37000009 HC ANESTHESIA EA ADD 15 MINS: Performed by: OPHTHALMOLOGY

## 2021-05-10 PROCEDURE — 36000706: Performed by: OPHTHALMOLOGY

## 2021-05-10 PROCEDURE — 88313 SPECIAL STAINS GROUP 2: CPT | Performed by: STUDENT IN AN ORGANIZED HEALTH CARE EDUCATION/TRAINING PROGRAM

## 2021-05-10 PROCEDURE — 71000015 HC POSTOP RECOV 1ST HR: Performed by: OPHTHALMOLOGY

## 2021-05-10 PROCEDURE — 66985 PR INSERT LENS PROSTHESIS ONLY: ICD-10-PCS | Mod: 51,LT,, | Performed by: OPHTHALMOLOGY

## 2021-05-10 PROCEDURE — 63600175 PHARM REV CODE 636 W HCPCS: Performed by: NURSE ANESTHETIST, CERTIFIED REGISTERED

## 2021-05-10 PROCEDURE — 88304 TISSUE EXAM BY PATHOLOGIST: CPT | Performed by: STUDENT IN AN ORGANIZED HEALTH CARE EDUCATION/TRAINING PROGRAM

## 2021-05-10 PROCEDURE — V2630 ANTER CHAMBER INTRAOCUL LENS: HCPCS | Performed by: OPHTHALMOLOGY

## 2021-05-10 PROCEDURE — 65730 CORNEAL TRANSPLANT: CPT | Mod: LT,,, | Performed by: OPHTHALMOLOGY

## 2021-05-10 DEVICE — IMPLANTABLE DEVICE: Type: IMPLANTABLE DEVICE | Site: EYE | Status: FUNCTIONAL

## 2021-05-10 RX ORDER — PHENYLEPHRINE HYDROCHLORIDE 25 MG/ML
1 SOLUTION/ DROPS OPHTHALMIC
Status: COMPLETED | OUTPATIENT
Start: 2021-05-10 | End: 2021-05-10

## 2021-05-10 RX ORDER — FENTANYL CITRATE 50 UG/ML
INJECTION, SOLUTION INTRAMUSCULAR; INTRAVENOUS
Status: DISCONTINUED | OUTPATIENT
Start: 2021-05-10 | End: 2021-05-10

## 2021-05-10 RX ORDER — HYDROCODONE BITARTRATE AND ACETAMINOPHEN 5; 325 MG/1; MG/1
1 TABLET ORAL EVERY 4 HOURS PRN
Status: DISCONTINUED | OUTPATIENT
Start: 2021-05-10 | End: 2021-05-10 | Stop reason: HOSPADM

## 2021-05-10 RX ORDER — TETRACAINE HYDROCHLORIDE 5 MG/ML
1 SOLUTION OPHTHALMIC
Status: COMPLETED | OUTPATIENT
Start: 2021-05-10 | End: 2021-05-10

## 2021-05-10 RX ORDER — CEFAZOLIN SODIUM 1 G/3ML
INJECTION, POWDER, FOR SOLUTION INTRAMUSCULAR; INTRAVENOUS
Status: DISCONTINUED | OUTPATIENT
Start: 2021-05-10 | End: 2021-05-10 | Stop reason: HOSPADM

## 2021-05-10 RX ORDER — ACETAMINOPHEN 325 MG/1
650 TABLET ORAL EVERY 4 HOURS PRN
Status: DISCONTINUED | OUTPATIENT
Start: 2021-05-10 | End: 2021-05-10 | Stop reason: HOSPADM

## 2021-05-10 RX ORDER — LIDOCAINE HYDROCHLORIDE 20 MG/ML
INJECTION, SOLUTION EPIDURAL; INFILTRATION; INTRACAUDAL; PERINEURAL
Status: DISCONTINUED | OUTPATIENT
Start: 2021-05-10 | End: 2021-05-10 | Stop reason: HOSPADM

## 2021-05-10 RX ORDER — GENTAMICIN SULFATE 40 MG/ML
INJECTION, SOLUTION INTRAMUSCULAR; INTRAVENOUS
Status: DISCONTINUED | OUTPATIENT
Start: 2021-05-10 | End: 2021-05-10 | Stop reason: HOSPADM

## 2021-05-10 RX ORDER — MIDAZOLAM HYDROCHLORIDE 1 MG/ML
INJECTION INTRAMUSCULAR; INTRAVENOUS
Status: DISCONTINUED | OUTPATIENT
Start: 2021-05-10 | End: 2021-05-10

## 2021-05-10 RX ORDER — TROPICAMIDE 10 MG/ML
1 SOLUTION/ DROPS OPHTHALMIC
Status: COMPLETED | OUTPATIENT
Start: 2021-05-10 | End: 2021-05-10

## 2021-05-10 RX ORDER — MOXIFLOXACIN 5 MG/ML
1 SOLUTION/ DROPS OPHTHALMIC
Status: COMPLETED | OUTPATIENT
Start: 2021-05-10 | End: 2021-05-10

## 2021-05-10 RX ADMIN — TETRACAINE HYDROCHLORIDE 1 DROP: 5 SOLUTION OPHTHALMIC at 11:05

## 2021-05-10 RX ADMIN — MOXIFLOXACIN 1 DROP: 5 SOLUTION/ DROPS OPHTHALMIC at 11:05

## 2021-05-10 RX ADMIN — MIDAZOLAM HYDROCHLORIDE 1 MG: 1 INJECTION, SOLUTION INTRAMUSCULAR; INTRAVENOUS at 12:05

## 2021-05-10 RX ADMIN — TROPICAMIDE 1 DROP: 10 SOLUTION/ DROPS OPHTHALMIC at 11:05

## 2021-05-10 RX ADMIN — PHENYLEPHRINE HYDROCHLORIDE 1 DROP: 25 SOLUTION/ DROPS OPHTHALMIC at 11:05

## 2021-05-10 RX ADMIN — FENTANYL CITRATE 50 MCG: 50 INJECTION, SOLUTION INTRAMUSCULAR; INTRAVENOUS at 12:05

## 2021-05-11 ENCOUNTER — OFFICE VISIT (OUTPATIENT)
Dept: OPHTHALMOLOGY | Facility: CLINIC | Age: 76
End: 2021-05-11
Attending: OPHTHALMOLOGY
Payer: MEDICARE

## 2021-05-11 ENCOUNTER — TELEPHONE (OUTPATIENT)
Dept: OPHTHALMOLOGY | Facility: CLINIC | Age: 76
End: 2021-05-11

## 2021-05-11 DIAGNOSIS — Z98.890 POST-OPERATIVE STATE: Primary | ICD-10-CM

## 2021-05-11 DIAGNOSIS — Z94.7 STATUS POST CORNEAL TRANSPLANT: ICD-10-CM

## 2021-05-11 PROCEDURE — 99999 PR PBB SHADOW E&M-EST. PATIENT-LVL III: ICD-10-PCS | Mod: PBBFAC,,, | Performed by: OPHTHALMOLOGY

## 2021-05-11 PROCEDURE — 99213 OFFICE O/P EST LOW 20 MIN: CPT | Mod: PBBFAC | Performed by: OPHTHALMOLOGY

## 2021-05-11 PROCEDURE — 99024 POSTOP FOLLOW-UP VISIT: CPT | Mod: POP,,, | Performed by: OPHTHALMOLOGY

## 2021-05-11 PROCEDURE — 99999 PR PBB SHADOW E&M-EST. PATIENT-LVL III: CPT | Mod: PBBFAC,,, | Performed by: OPHTHALMOLOGY

## 2021-05-11 PROCEDURE — 99024 PR POST-OP FOLLOW-UP VISIT: ICD-10-PCS | Mod: POP,,, | Performed by: OPHTHALMOLOGY

## 2021-05-13 ENCOUNTER — TELEPHONE (OUTPATIENT)
Dept: OPHTHALMOLOGY | Facility: CLINIC | Age: 76
End: 2021-05-13

## 2021-05-14 ENCOUNTER — TELEPHONE (OUTPATIENT)
Dept: OPHTHALMOLOGY | Facility: CLINIC | Age: 76
End: 2021-05-14

## 2021-05-17 ENCOUNTER — PES CALL (OUTPATIENT)
Dept: ADMINISTRATIVE | Facility: CLINIC | Age: 76
End: 2021-05-17

## 2021-05-18 ENCOUNTER — OFFICE VISIT (OUTPATIENT)
Dept: OPHTHALMOLOGY | Facility: CLINIC | Age: 76
End: 2021-05-18
Attending: OPHTHALMOLOGY
Payer: MEDICARE

## 2021-05-18 DIAGNOSIS — Z94.7 STATUS POST CORNEAL TRANSPLANT: ICD-10-CM

## 2021-05-18 DIAGNOSIS — Z98.890 POST-OPERATIVE STATE: Primary | ICD-10-CM

## 2021-05-18 LAB
FINAL PATHOLOGIC DIAGNOSIS: NORMAL
GROSS: NORMAL
Lab: NORMAL
MICROSCOPIC EXAM: NORMAL

## 2021-05-18 PROCEDURE — 99024 PR POST-OP FOLLOW-UP VISIT: ICD-10-PCS | Mod: POP,,, | Performed by: OPHTHALMOLOGY

## 2021-05-18 PROCEDURE — 99024 POSTOP FOLLOW-UP VISIT: CPT | Mod: POP,,, | Performed by: OPHTHALMOLOGY

## 2021-05-18 PROCEDURE — 99999 PR PBB SHADOW E&M-EST. PATIENT-LVL III: ICD-10-PCS | Mod: PBBFAC,,, | Performed by: OPHTHALMOLOGY

## 2021-05-18 PROCEDURE — 99213 OFFICE O/P EST LOW 20 MIN: CPT | Mod: PBBFAC | Performed by: OPHTHALMOLOGY

## 2021-05-18 PROCEDURE — 99999 PR PBB SHADOW E&M-EST. PATIENT-LVL III: CPT | Mod: PBBFAC,,, | Performed by: OPHTHALMOLOGY

## 2021-05-18 RX ORDER — PREDNISOLONE ACETATE 10 MG/ML
SUSPENSION/ DROPS OPHTHALMIC
COMMUNITY
Start: 2021-05-07 | End: 2022-07-27 | Stop reason: SDUPTHER

## 2021-05-27 ENCOUNTER — TELEPHONE (OUTPATIENT)
Dept: OPHTHALMOLOGY | Facility: CLINIC | Age: 76
End: 2021-05-27

## 2021-05-28 ENCOUNTER — OFFICE VISIT (OUTPATIENT)
Dept: OPHTHALMOLOGY | Facility: CLINIC | Age: 76
End: 2021-05-28
Attending: OPHTHALMOLOGY
Payer: MEDICARE

## 2021-05-28 ENCOUNTER — TELEPHONE (OUTPATIENT)
Dept: OPHTHALMOLOGY | Facility: CLINIC | Age: 76
End: 2021-05-28

## 2021-05-28 DIAGNOSIS — Z94.7 STATUS POST CORNEAL TRANSPLANT: Primary | ICD-10-CM

## 2021-05-28 PROCEDURE — 99024 PR POST-OP FOLLOW-UP VISIT: ICD-10-PCS | Mod: POP,,, | Performed by: OPHTHALMOLOGY

## 2021-05-28 PROCEDURE — 99999 PR PBB SHADOW E&M-EST. PATIENT-LVL III: ICD-10-PCS | Mod: PBBFAC,,, | Performed by: OPHTHALMOLOGY

## 2021-05-28 PROCEDURE — 99213 OFFICE O/P EST LOW 20 MIN: CPT | Mod: PBBFAC | Performed by: OPHTHALMOLOGY

## 2021-05-28 PROCEDURE — 99999 PR PBB SHADOW E&M-EST. PATIENT-LVL III: CPT | Mod: PBBFAC,,, | Performed by: OPHTHALMOLOGY

## 2021-05-28 PROCEDURE — 99024 POSTOP FOLLOW-UP VISIT: CPT | Mod: POP,,, | Performed by: OPHTHALMOLOGY

## 2021-05-31 ENCOUNTER — EXTERNAL CHRONIC CARE MANAGEMENT (OUTPATIENT)
Dept: PRIMARY CARE CLINIC | Facility: CLINIC | Age: 76
End: 2021-05-31
Payer: MEDICARE

## 2021-05-31 PROCEDURE — 99490 CHRNC CARE MGMT STAFF 1ST 20: CPT | Mod: PBBFAC,PO | Performed by: FAMILY MEDICINE

## 2021-05-31 PROCEDURE — 99490 PR CHRONIC CARE MGMT, 1ST 20 MIN: ICD-10-PCS | Mod: S$PBB,,, | Performed by: FAMILY MEDICINE

## 2021-05-31 PROCEDURE — 99490 CHRNC CARE MGMT STAFF 1ST 20: CPT | Mod: S$PBB,,, | Performed by: FAMILY MEDICINE

## 2021-06-09 ENCOUNTER — OFFICE VISIT (OUTPATIENT)
Dept: OPHTHALMOLOGY | Facility: CLINIC | Age: 76
End: 2021-06-09
Payer: MEDICARE

## 2021-06-09 DIAGNOSIS — Z94.7 STATUS POST CORNEAL TRANSPLANT: ICD-10-CM

## 2021-06-09 DIAGNOSIS — H35.3134 NONEXUDATIVE AGE-RELATED MACULAR DEGENERATION, BILATERAL, ADVANCED ATROPHIC WITH SUBFOVEAL INVOLVEMENT: ICD-10-CM

## 2021-06-09 DIAGNOSIS — Z98.890 POST-OPERATIVE STATE: Primary | ICD-10-CM

## 2021-06-09 PROCEDURE — 99213 OFFICE O/P EST LOW 20 MIN: CPT | Mod: PBBFAC | Performed by: OPHTHALMOLOGY

## 2021-06-09 PROCEDURE — 99999 PR PBB SHADOW E&M-EST. PATIENT-LVL III: ICD-10-PCS | Mod: PBBFAC,,, | Performed by: OPHTHALMOLOGY

## 2021-06-09 PROCEDURE — 99999 PR PBB SHADOW E&M-EST. PATIENT-LVL III: CPT | Mod: PBBFAC,,, | Performed by: OPHTHALMOLOGY

## 2021-06-09 PROCEDURE — 99024 PR POST-OP FOLLOW-UP VISIT: ICD-10-PCS | Mod: POP,,, | Performed by: OPHTHALMOLOGY

## 2021-06-09 PROCEDURE — 99024 POSTOP FOLLOW-UP VISIT: CPT | Mod: POP,,, | Performed by: OPHTHALMOLOGY

## 2021-06-14 ENCOUNTER — OFFICE VISIT (OUTPATIENT)
Dept: INTERNAL MEDICINE | Facility: CLINIC | Age: 76
End: 2021-06-14
Payer: MEDICARE

## 2021-06-14 VITALS
BODY MASS INDEX: 40.3 KG/M2 | HEART RATE: 64 BPM | HEIGHT: 60 IN | DIASTOLIC BLOOD PRESSURE: 82 MMHG | SYSTOLIC BLOOD PRESSURE: 144 MMHG | WEIGHT: 205.25 LBS

## 2021-06-14 DIAGNOSIS — E66.01 MORBID OBESITY WITH BMI OF 40.0-44.9, ADULT: ICD-10-CM

## 2021-06-14 DIAGNOSIS — Z94.7 CORNEA REPLACED BY TRANSPLANT: ICD-10-CM

## 2021-06-14 DIAGNOSIS — H54.10 BLINDNESS AND LOW VISION: ICD-10-CM

## 2021-06-14 DIAGNOSIS — H40.1122 PRIMARY OPEN ANGLE GLAUCOMA (POAG) OF LEFT EYE, MODERATE STAGE: ICD-10-CM

## 2021-06-14 DIAGNOSIS — Z00.00 ENCOUNTER FOR PREVENTIVE HEALTH EXAMINATION: Primary | ICD-10-CM

## 2021-06-14 DIAGNOSIS — H16.121 FILAMENTARY KERATITIS OF RIGHT EYE: ICD-10-CM

## 2021-06-14 DIAGNOSIS — R26.9 ABNORMALITY OF GAIT AND MOBILITY: ICD-10-CM

## 2021-06-14 PROCEDURE — G0439 PPPS, SUBSEQ VISIT: HCPCS | Mod: ,,, | Performed by: NURSE PRACTITIONER

## 2021-06-14 PROCEDURE — 99999 PR PBB SHADOW E&M-EST. PATIENT-LVL V: ICD-10-PCS | Mod: PBBFAC,,, | Performed by: NURSE PRACTITIONER

## 2021-06-14 PROCEDURE — 99215 OFFICE O/P EST HI 40 MIN: CPT | Mod: PBBFAC | Performed by: NURSE PRACTITIONER

## 2021-06-14 PROCEDURE — G0439 PR MEDICARE ANNUAL WELLNESS SUBSEQUENT VISIT: ICD-10-PCS | Mod: ,,, | Performed by: NURSE PRACTITIONER

## 2021-06-14 PROCEDURE — 99999 PR PBB SHADOW E&M-EST. PATIENT-LVL V: CPT | Mod: PBBFAC,,, | Performed by: NURSE PRACTITIONER

## 2021-06-22 ENCOUNTER — HOSPITAL ENCOUNTER (EMERGENCY)
Facility: OTHER | Age: 76
Discharge: HOME | End: 2021-06-22
Attending: EMERGENCY MEDICINE
Payer: MEDICARE

## 2021-06-22 VITALS
SYSTOLIC BLOOD PRESSURE: 145 MMHG | HEIGHT: 60 IN | TEMPERATURE: 98 F | RESPIRATION RATE: 18 BRPM | OXYGEN SATURATION: 98 % | BODY MASS INDEX: 40.25 KG/M2 | HEART RATE: 73 BPM | WEIGHT: 205 LBS | DIASTOLIC BLOOD PRESSURE: 72 MMHG

## 2021-06-22 DIAGNOSIS — M25.469 KNEE SWELLING: ICD-10-CM
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
## 2021-06-22 DIAGNOSIS — S83.92XA SPRAIN OF LEFT KNEE, UNSPECIFIED LIGAMENT, INITIAL ENCOUNTER: ICD-10-CM

## 2021-06-22 DIAGNOSIS — R52 PAIN: ICD-10-CM

## 2021-06-22 LAB — HCV AB SERPL QL IA: NEGATIVE

## 2021-06-22 PROCEDURE — 29505 APPLICATION LONG LEG SPLINT: CPT | Mod: LT

## 2021-06-22 PROCEDURE — 86803 HEPATITIS C AB TEST: CPT | Performed by: EMERGENCY MEDICINE

## 2021-06-22 PROCEDURE — 99284 EMERGENCY DEPT VISIT MOD MDM: CPT | Mod: 25

## 2021-06-22 RX ORDER — NAPROXEN 375 MG/1
375 TABLET ORAL 2 TIMES DAILY WITH MEALS
Qty: 60 TABLET | Refills: 0 | Status: SHIPPED | OUTPATIENT
Start: 2021-06-22 | End: 2022-11-09

## 2021-06-24 ENCOUNTER — HOSPITAL ENCOUNTER (OUTPATIENT)
Dept: RADIOLOGY | Facility: HOSPITAL | Age: 76
Discharge: HOME | End: 2021-06-24
Attending: ORTHOPAEDIC SURGERY
Payer: MEDICARE

## 2021-06-24 ENCOUNTER — OFFICE VISIT (OUTPATIENT)
Dept: ORTHOPEDICS | Facility: CLINIC | Age: 76
End: 2021-06-24
Payer: MEDICARE

## 2021-06-24 VITALS — HEIGHT: 60 IN | BODY MASS INDEX: 40.25 KG/M2 | WEIGHT: 205 LBS

## 2021-06-24 DIAGNOSIS — M25.562 LEFT KNEE PAIN, UNSPECIFIED CHRONICITY: ICD-10-CM

## 2021-06-24 DIAGNOSIS — M25.562 LEFT KNEE PAIN, UNSPECIFIED CHRONICITY: Primary | ICD-10-CM

## 2021-06-24 DIAGNOSIS — S83.92XA SPRAIN OF LEFT KNEE, UNSPECIFIED LIGAMENT, INITIAL ENCOUNTER: ICD-10-CM

## 2021-06-24 PROCEDURE — 73560 XR KNEE 1 OR 2 VIEW BILATERAL: ICD-10-PCS | Mod: 26,50,, | Performed by: RADIOLOGY

## 2021-06-24 PROCEDURE — 99999 PR PBB SHADOW E&M-EST. PATIENT-LVL III: CPT | Mod: PBBFAC,,, | Performed by: ORTHOPAEDIC SURGERY

## 2021-06-24 PROCEDURE — 99203 OFFICE O/P NEW LOW 30 MIN: CPT | Mod: S$PBB,,, | Performed by: ORTHOPAEDIC SURGERY

## 2021-06-24 PROCEDURE — 73560 X-RAY EXAM OF KNEE 1 OR 2: CPT | Mod: TC,50,PN

## 2021-06-24 PROCEDURE — 99203 PR OFFICE/OUTPT VISIT, NEW, LEVL III, 30-44 MIN: ICD-10-PCS | Mod: S$PBB,,, | Performed by: ORTHOPAEDIC SURGERY

## 2021-06-24 PROCEDURE — 99999 PR PBB SHADOW E&M-EST. PATIENT-LVL III: ICD-10-PCS | Mod: PBBFAC,,, | Performed by: ORTHOPAEDIC SURGERY

## 2021-06-24 PROCEDURE — 73560 X-RAY EXAM OF KNEE 1 OR 2: CPT | Mod: 26,50,, | Performed by: RADIOLOGY

## 2021-06-24 PROCEDURE — 99213 OFFICE O/P EST LOW 20 MIN: CPT | Mod: PBBFAC,25,PN | Performed by: ORTHOPAEDIC SURGERY

## 2021-06-30 ENCOUNTER — EXTERNAL CHRONIC CARE MANAGEMENT (OUTPATIENT)
Dept: PRIMARY CARE CLINIC | Facility: CLINIC | Age: 76
End: 2021-06-30
Payer: MEDICARE

## 2021-06-30 PROCEDURE — 99490 CHRNC CARE MGMT STAFF 1ST 20: CPT | Mod: PBBFAC,PO | Performed by: FAMILY MEDICINE

## 2021-06-30 PROCEDURE — 99490 CHRNC CARE MGMT STAFF 1ST 20: CPT | Mod: S$PBB,,, | Performed by: FAMILY MEDICINE

## 2021-06-30 PROCEDURE — 99490 PR CHRONIC CARE MGMT, 1ST 20 MIN: ICD-10-PCS | Mod: S$PBB,,, | Performed by: FAMILY MEDICINE

## 2021-07-31 ENCOUNTER — EXTERNAL CHRONIC CARE MANAGEMENT (OUTPATIENT)
Dept: PRIMARY CARE CLINIC | Facility: CLINIC | Age: 76
End: 2021-07-31
Payer: MEDICARE

## 2021-07-31 PROCEDURE — 99490 CHRNC CARE MGMT STAFF 1ST 20: CPT | Mod: S$PBB,,, | Performed by: FAMILY MEDICINE

## 2021-07-31 PROCEDURE — 99490 PR CHRONIC CARE MGMT, 1ST 20 MIN: ICD-10-PCS | Mod: S$PBB,,, | Performed by: FAMILY MEDICINE

## 2021-07-31 PROCEDURE — 99490 CHRNC CARE MGMT STAFF 1ST 20: CPT | Mod: PBBFAC,PO | Performed by: FAMILY MEDICINE

## 2021-08-15 ENCOUNTER — PATIENT OUTREACH (OUTPATIENT)
Dept: ADMINISTRATIVE | Facility: OTHER | Age: 76
End: 2021-08-15

## 2021-08-16 ENCOUNTER — TELEPHONE (OUTPATIENT)
Dept: OPHTHALMOLOGY | Facility: CLINIC | Age: 76
End: 2021-08-16

## 2021-08-16 ENCOUNTER — OFFICE VISIT (OUTPATIENT)
Dept: OPTOMETRY | Facility: CLINIC | Age: 76
End: 2021-08-16
Payer: MEDICARE

## 2021-08-16 DIAGNOSIS — H52.13 MYOPIA OF BOTH EYES WITH ASTIGMATISM AND PRESBYOPIA: ICD-10-CM

## 2021-08-16 DIAGNOSIS — H54.10 BLINDNESS AND LOW VISION: Primary | ICD-10-CM

## 2021-08-16 DIAGNOSIS — H52.203 MYOPIA OF BOTH EYES WITH ASTIGMATISM AND PRESBYOPIA: ICD-10-CM

## 2021-08-16 DIAGNOSIS — H52.4 MYOPIA OF BOTH EYES WITH ASTIGMATISM AND PRESBYOPIA: ICD-10-CM

## 2021-08-16 PROCEDURE — 99499 UNLISTED E&M SERVICE: CPT | Mod: S$PBB,,, | Performed by: OPTOMETRIST

## 2021-08-16 PROCEDURE — 92015 PR REFRACTION: ICD-10-PCS | Mod: ,,, | Performed by: OPTOMETRIST

## 2021-08-16 PROCEDURE — 99499 NO LOS: ICD-10-PCS | Mod: S$PBB,,, | Performed by: OPTOMETRIST

## 2021-08-16 PROCEDURE — 99213 OFFICE O/P EST LOW 20 MIN: CPT | Mod: PBBFAC,PO | Performed by: OPTOMETRIST

## 2021-08-16 PROCEDURE — 92015 DETERMINE REFRACTIVE STATE: CPT | Mod: ,,, | Performed by: OPTOMETRIST

## 2021-08-16 PROCEDURE — 99999 PR PBB SHADOW E&M-EST. PATIENT-LVL III: CPT | Mod: PBBFAC,,, | Performed by: OPTOMETRIST

## 2021-08-16 PROCEDURE — 99999 PR PBB SHADOW E&M-EST. PATIENT-LVL III: ICD-10-PCS | Mod: PBBFAC,,, | Performed by: OPTOMETRIST

## 2021-08-16 RX ORDER — DICLOFENAC SODIUM 10 MG/G
GEL TOPICAL
COMMUNITY
Start: 2021-08-06

## 2021-08-16 RX ORDER — DIPHENHYDRAMINE HYDROCHLORIDE 20.5 MG/ML
GEL TOPICAL
COMMUNITY
Start: 2021-08-02 | End: 2022-11-09

## 2021-08-16 RX ORDER — CARBOXYMETHYLCELLULOSE SODIUM AND GLYCERIN 5; 9 MG/ML; MG/ML
SOLUTION/ DROPS OPHTHALMIC
COMMUNITY
Start: 2021-08-06

## 2021-08-24 ENCOUNTER — OFFICE VISIT (OUTPATIENT)
Dept: OPHTHALMOLOGY | Facility: CLINIC | Age: 76
End: 2021-08-24
Payer: MEDICARE

## 2021-08-24 DIAGNOSIS — Z94.7 HX OF CORNEA TRANSPLANT: ICD-10-CM

## 2021-08-24 DIAGNOSIS — Z96.1 STATUS POST CATARACT EXTRACTION AND INSERTION OF INTRAOCULAR LENS, UNSPECIFIED LATERALITY: ICD-10-CM

## 2021-08-24 DIAGNOSIS — H40.1122 PRIMARY OPEN ANGLE GLAUCOMA (POAG) OF LEFT EYE, MODERATE STAGE: Primary | ICD-10-CM

## 2021-08-24 DIAGNOSIS — H40.2213 CHRONIC ANGLE-CLOSURE GLAUCOMA OF RIGHT EYE, SEVERE STAGE: ICD-10-CM

## 2021-08-24 DIAGNOSIS — Z98.49 STATUS POST CATARACT EXTRACTION AND INSERTION OF INTRAOCULAR LENS, UNSPECIFIED LATERALITY: ICD-10-CM

## 2021-08-24 PROCEDURE — 99214 PR OFFICE/OUTPT VISIT, EST, LEVL IV, 30-39 MIN: ICD-10-PCS | Mod: 25,S$PBB,, | Performed by: OPHTHALMOLOGY

## 2021-08-24 PROCEDURE — 99214 OFFICE O/P EST MOD 30 MIN: CPT | Mod: 25,S$PBB,, | Performed by: OPHTHALMOLOGY

## 2021-08-24 PROCEDURE — 99213 OFFICE O/P EST LOW 20 MIN: CPT | Mod: PBBFAC | Performed by: OPHTHALMOLOGY

## 2021-08-24 PROCEDURE — 99999 PR PBB SHADOW E&M-EST. PATIENT-LVL III: CPT | Mod: PBBFAC,,, | Performed by: OPHTHALMOLOGY

## 2021-08-24 PROCEDURE — 99999 PR PBB SHADOW E&M-EST. PATIENT-LVL III: ICD-10-PCS | Mod: PBBFAC,,, | Performed by: OPHTHALMOLOGY

## 2021-08-24 PROCEDURE — 92133 CPTRZD OPH DX IMG PST SGM ON: CPT | Mod: PBBFAC | Performed by: OPHTHALMOLOGY

## 2021-08-24 PROCEDURE — 92133 POSTERIOR SEGMENT OCT OPTIC NERVE(OCULAR COHERENCE TOMOGRAPHY) - OU - BOTH EYES: ICD-10-PCS | Mod: 26,S$PBB,, | Performed by: OPHTHALMOLOGY

## 2021-08-31 ENCOUNTER — EXTERNAL CHRONIC CARE MANAGEMENT (OUTPATIENT)
Dept: PRIMARY CARE CLINIC | Facility: CLINIC | Age: 76
End: 2021-08-31
Payer: MEDICARE

## 2021-08-31 PROCEDURE — 99490 CHRNC CARE MGMT STAFF 1ST 20: CPT | Mod: S$PBB,,, | Performed by: FAMILY MEDICINE

## 2021-08-31 PROCEDURE — 99490 CHRNC CARE MGMT STAFF 1ST 20: CPT | Mod: PBBFAC,PO | Performed by: FAMILY MEDICINE

## 2021-08-31 PROCEDURE — 99490 PR CHRONIC CARE MGMT, 1ST 20 MIN: ICD-10-PCS | Mod: S$PBB,,, | Performed by: FAMILY MEDICINE

## 2021-09-22 ENCOUNTER — OFFICE VISIT (OUTPATIENT)
Dept: OPHTHALMOLOGY | Facility: CLINIC | Age: 76
End: 2021-09-22
Attending: OPHTHALMOLOGY
Payer: MEDICARE

## 2021-09-22 DIAGNOSIS — H35.3134 NONEXUDATIVE AGE-RELATED MACULAR DEGENERATION, BILATERAL, ADVANCED ATROPHIC WITH SUBFOVEAL INVOLVEMENT: ICD-10-CM

## 2021-09-22 DIAGNOSIS — H40.2213 CHRONIC ANGLE-CLOSURE GLAUCOMA OF RIGHT EYE, SEVERE STAGE: ICD-10-CM

## 2021-09-22 DIAGNOSIS — Z94.7 STATUS POST CORNEAL TRANSPLANT: ICD-10-CM

## 2021-09-22 DIAGNOSIS — H40.20X3 ANGLE-CLOSURE GLAUCOMA, SEVERE STAGE: ICD-10-CM

## 2021-09-22 PROCEDURE — 99213 OFFICE O/P EST LOW 20 MIN: CPT | Mod: PBBFAC | Performed by: OPHTHALMOLOGY

## 2021-09-22 PROCEDURE — 92014 COMPRE OPH EXAM EST PT 1/>: CPT | Mod: S$PBB,,, | Performed by: OPHTHALMOLOGY

## 2021-09-22 PROCEDURE — 99999 PR PBB SHADOW E&M-EST. PATIENT-LVL III: ICD-10-PCS | Mod: PBBFAC,,, | Performed by: OPHTHALMOLOGY

## 2021-09-22 PROCEDURE — 99999 PR PBB SHADOW E&M-EST. PATIENT-LVL III: CPT | Mod: PBBFAC,,, | Performed by: OPHTHALMOLOGY

## 2021-09-22 PROCEDURE — 92014 PR EYE EXAM, EST PATIENT,COMPREHESV: ICD-10-PCS | Mod: S$PBB,,, | Performed by: OPHTHALMOLOGY

## 2021-09-22 RX ORDER — DORZOLAMIDE HYDROCHLORIDE AND TIMOLOL MALEATE 20; 5 MG/ML; MG/ML
SOLUTION/ DROPS OPHTHALMIC
Qty: 10 ML | Refills: 4 | Status: SHIPPED | OUTPATIENT
Start: 2021-09-22 | End: 2022-10-03

## 2021-09-22 RX ORDER — PREDNISOLONE ACETATE 10 MG/ML
1 SUSPENSION/ DROPS OPHTHALMIC 4 TIMES DAILY
Qty: 10 ML | Refills: 6 | Status: SHIPPED | OUTPATIENT
Start: 2021-09-22 | End: 2021-10-02

## 2021-09-30 ENCOUNTER — EXTERNAL CHRONIC CARE MANAGEMENT (OUTPATIENT)
Dept: PRIMARY CARE CLINIC | Facility: CLINIC | Age: 76
End: 2021-09-30
Payer: MEDICARE

## 2021-09-30 PROCEDURE — 99490 PR CHRONIC CARE MGMT, 1ST 20 MIN: ICD-10-PCS | Mod: S$PBB,,, | Performed by: FAMILY MEDICINE

## 2021-09-30 PROCEDURE — 99490 CHRNC CARE MGMT STAFF 1ST 20: CPT | Mod: PBBFAC,PO | Performed by: FAMILY MEDICINE

## 2021-09-30 PROCEDURE — 99490 CHRNC CARE MGMT STAFF 1ST 20: CPT | Mod: S$PBB,,, | Performed by: FAMILY MEDICINE

## 2021-10-27 ENCOUNTER — TELEPHONE (OUTPATIENT)
Dept: OPHTHALMOLOGY | Facility: CLINIC | Age: 76
End: 2021-10-27
Payer: MEDICARE

## 2021-10-31 ENCOUNTER — EXTERNAL CHRONIC CARE MANAGEMENT (OUTPATIENT)
Dept: PRIMARY CARE CLINIC | Facility: CLINIC | Age: 76
End: 2021-10-31
Payer: MEDICARE

## 2021-10-31 PROCEDURE — 99490 PR CHRONIC CARE MGMT, 1ST 20 MIN: ICD-10-PCS | Mod: S$PBB,,, | Performed by: FAMILY MEDICINE

## 2021-10-31 PROCEDURE — 99439 CHRNC CARE MGMT STAF EA ADDL: CPT | Mod: S$PBB,,, | Performed by: FAMILY MEDICINE

## 2021-10-31 PROCEDURE — 99490 CHRNC CARE MGMT STAFF 1ST 20: CPT | Mod: PBBFAC,25,PO | Performed by: FAMILY MEDICINE

## 2021-10-31 PROCEDURE — 99490 CHRNC CARE MGMT STAFF 1ST 20: CPT | Mod: S$PBB,,, | Performed by: FAMILY MEDICINE

## 2021-10-31 PROCEDURE — 99439 PR CHRONIC CARE MGMT, EA ADDTL 20 MIN: ICD-10-PCS | Mod: S$PBB,,, | Performed by: FAMILY MEDICINE

## 2021-10-31 PROCEDURE — 99439 CHRNC CARE MGMT STAF EA ADDL: CPT | Mod: PBBFAC,25,27,PO | Performed by: FAMILY MEDICINE

## 2021-11-22 ENCOUNTER — PATIENT OUTREACH (OUTPATIENT)
Dept: ADMINISTRATIVE | Facility: OTHER | Age: 76
End: 2021-11-22
Payer: MEDICARE

## 2021-11-23 ENCOUNTER — OFFICE VISIT (OUTPATIENT)
Dept: OPHTHALMOLOGY | Facility: CLINIC | Age: 76
End: 2021-11-23
Payer: MEDICARE

## 2021-11-23 DIAGNOSIS — H40.1122 PRIMARY OPEN ANGLE GLAUCOMA (POAG) OF LEFT EYE, MODERATE STAGE: Primary | ICD-10-CM

## 2021-11-23 DIAGNOSIS — Z96.89 HISTORY OF GLAUCOMA TUBE SHUNT PROCEDURE: ICD-10-CM

## 2021-11-23 DIAGNOSIS — H40.2213 CHRONIC ANGLE-CLOSURE GLAUCOMA OF RIGHT EYE, SEVERE STAGE: ICD-10-CM

## 2021-11-23 DIAGNOSIS — Z96.1 STATUS POST CATARACT EXTRACTION AND INSERTION OF INTRAOCULAR LENS, UNSPECIFIED LATERALITY: ICD-10-CM

## 2021-11-23 DIAGNOSIS — H04.123 BILATERAL DRY EYES: ICD-10-CM

## 2021-11-23 DIAGNOSIS — Z98.49 STATUS POST CATARACT EXTRACTION AND INSERTION OF INTRAOCULAR LENS, UNSPECIFIED LATERALITY: ICD-10-CM

## 2021-11-23 DIAGNOSIS — Z94.7 HX OF CORNEA TRANSPLANT: ICD-10-CM

## 2021-11-23 PROCEDURE — 92250 FUNDUS PHOTOGRAPHY W/I&R: CPT | Mod: PBBFAC | Performed by: OPHTHALMOLOGY

## 2021-11-23 PROCEDURE — 99214 OFFICE O/P EST MOD 30 MIN: CPT | Mod: S$PBB,,, | Performed by: OPHTHALMOLOGY

## 2021-11-23 PROCEDURE — 99999 PR PBB SHADOW E&M-EST. PATIENT-LVL III: ICD-10-PCS | Mod: PBBFAC,,, | Performed by: OPHTHALMOLOGY

## 2021-11-23 PROCEDURE — 92250 COLOR FUNDUS PHOTOGRAPHY - OU - BOTH EYES: ICD-10-PCS | Mod: 26,S$PBB,, | Performed by: OPHTHALMOLOGY

## 2021-11-23 PROCEDURE — 99214 PR OFFICE/OUTPT VISIT, EST, LEVL IV, 30-39 MIN: ICD-10-PCS | Mod: S$PBB,,, | Performed by: OPHTHALMOLOGY

## 2021-11-23 PROCEDURE — 99213 OFFICE O/P EST LOW 20 MIN: CPT | Mod: PBBFAC | Performed by: OPHTHALMOLOGY

## 2021-11-23 PROCEDURE — 99999 PR PBB SHADOW E&M-EST. PATIENT-LVL III: CPT | Mod: PBBFAC,,, | Performed by: OPHTHALMOLOGY

## 2021-11-30 ENCOUNTER — EXTERNAL CHRONIC CARE MANAGEMENT (OUTPATIENT)
Dept: PRIMARY CARE CLINIC | Facility: CLINIC | Age: 76
End: 2021-11-30
Payer: MEDICARE

## 2021-11-30 PROCEDURE — 99490 PR CHRONIC CARE MGMT, 1ST 20 MIN: ICD-10-PCS | Mod: S$PBB,,, | Performed by: FAMILY MEDICINE

## 2021-11-30 PROCEDURE — 99490 CHRNC CARE MGMT STAFF 1ST 20: CPT | Mod: S$PBB,,, | Performed by: FAMILY MEDICINE

## 2021-11-30 PROCEDURE — 99490 CHRNC CARE MGMT STAFF 1ST 20: CPT | Mod: PBBFAC,PO | Performed by: FAMILY MEDICINE

## 2021-12-31 ENCOUNTER — EXTERNAL CHRONIC CARE MANAGEMENT (OUTPATIENT)
Dept: PRIMARY CARE CLINIC | Facility: CLINIC | Age: 76
End: 2021-12-31
Payer: MEDICARE

## 2021-12-31 PROCEDURE — 99490 PR CHRONIC CARE MGMT, 1ST 20 MIN: ICD-10-PCS | Mod: S$PBB,,, | Performed by: FAMILY MEDICINE

## 2021-12-31 PROCEDURE — 99490 CHRNC CARE MGMT STAFF 1ST 20: CPT | Mod: S$PBB,,, | Performed by: FAMILY MEDICINE

## 2021-12-31 PROCEDURE — 99490 CHRNC CARE MGMT STAFF 1ST 20: CPT | Mod: PBBFAC,PO | Performed by: FAMILY MEDICINE

## 2022-02-02 ENCOUNTER — OFFICE VISIT (OUTPATIENT)
Dept: OPHTHALMOLOGY | Facility: CLINIC | Age: 77
End: 2022-02-02
Payer: MEDICARE

## 2022-02-02 DIAGNOSIS — H35.3134 NONEXUDATIVE AGE-RELATED MACULAR DEGENERATION, BILATERAL, ADVANCED ATROPHIC WITH SUBFOVEAL INVOLVEMENT: Primary | ICD-10-CM

## 2022-02-02 DIAGNOSIS — Z94.7 HX OF CORNEA TRANSPLANT: ICD-10-CM

## 2022-02-02 DIAGNOSIS — H40.2213 CHRONIC ANGLE-CLOSURE GLAUCOMA OF RIGHT EYE, SEVERE STAGE: ICD-10-CM

## 2022-02-02 PROCEDURE — 99999 PR PBB SHADOW E&M-EST. PATIENT-LVL III: CPT | Mod: PBBFAC,,, | Performed by: OPHTHALMOLOGY

## 2022-02-02 PROCEDURE — 92014 COMPRE OPH EXAM EST PT 1/>: CPT | Mod: S$PBB,,, | Performed by: OPHTHALMOLOGY

## 2022-02-02 PROCEDURE — 99213 OFFICE O/P EST LOW 20 MIN: CPT | Mod: PBBFAC | Performed by: OPHTHALMOLOGY

## 2022-02-02 PROCEDURE — 99999 PR PBB SHADOW E&M-EST. PATIENT-LVL III: ICD-10-PCS | Mod: PBBFAC,,, | Performed by: OPHTHALMOLOGY

## 2022-02-02 PROCEDURE — 92014 PR EYE EXAM, EST PATIENT,COMPREHESV: ICD-10-PCS | Mod: S$PBB,,, | Performed by: OPHTHALMOLOGY

## 2022-02-02 NOTE — PROGRESS NOTES
HPI     77 y/o female presents to clinic for PKP f/u    Pt states having a lot of mucus in the left eye. Some days its worse than   others. Has a lot of dryness that bothers her     Pred BID OU  Cosopt BID OU  Latanoprost qhs OD   Refresh Gel BID OU PRN  Genteal gel qpm PRN    Last edited by Maddi Nettles on 2/2/2022 10:11 AM. (History)            Assessment /Plan     For exam results, see Encounter Report.    Nonexudative age-related macular degeneration, bilateral, advanced atrophic with subfoveal involvement     Chronic angle-closure glaucoma of right eye, severe stage    Hx of cornea transplant      PKP stable and clear. Signs and symptoms of graft rejection reviewed.  ACIOL with synechia  IOP good, Graft clear...

## 2022-02-11 DIAGNOSIS — M25.562 PAIN IN BOTH KNEES, UNSPECIFIED CHRONICITY: Primary | ICD-10-CM

## 2022-02-11 DIAGNOSIS — M25.561 PAIN IN BOTH KNEES, UNSPECIFIED CHRONICITY: Primary | ICD-10-CM

## 2022-02-14 ENCOUNTER — TELEPHONE (OUTPATIENT)
Dept: FAMILY MEDICINE | Facility: CLINIC | Age: 77
End: 2022-02-14
Payer: MEDICARE

## 2022-02-14 DIAGNOSIS — Z12.31 ENCOUNTER FOR SCREENING MAMMOGRAM FOR BREAST CANCER: Primary | ICD-10-CM

## 2022-02-14 NOTE — TELEPHONE ENCOUNTER
----- Message from Ellyn Branham sent at 2/14/2022  3:12 PM CST -----  Called patient and left message to call the office to schedule a mammogram.

## 2022-02-15 ENCOUNTER — TELEPHONE (OUTPATIENT)
Dept: ORTHOPEDICS | Facility: CLINIC | Age: 77
End: 2022-02-15
Payer: MEDICARE

## 2022-02-15 NOTE — TELEPHONE ENCOUNTER
I called the patient to confirm her appointment. The patient did not answer. I left a voicemail with a call back number.

## 2022-02-17 ENCOUNTER — HOSPITAL ENCOUNTER (OUTPATIENT)
Dept: RADIOLOGY | Facility: HOSPITAL | Age: 77
Discharge: HOME | End: 2022-02-17
Attending: ORTHOPAEDIC SURGERY
Payer: MEDICARE

## 2022-02-17 ENCOUNTER — OFFICE VISIT (OUTPATIENT)
Dept: ORTHOPEDICS | Facility: CLINIC | Age: 77
End: 2022-02-17
Payer: MEDICARE

## 2022-02-17 VITALS — HEIGHT: 60 IN | WEIGHT: 211.69 LBS | BODY MASS INDEX: 41.56 KG/M2

## 2022-02-17 DIAGNOSIS — M25.562 PAIN IN BOTH KNEES, UNSPECIFIED CHRONICITY: ICD-10-CM

## 2022-02-17 DIAGNOSIS — M17.0 ARTHRITIS OF BOTH KNEES: Primary | ICD-10-CM

## 2022-02-17 DIAGNOSIS — M25.561 PAIN IN BOTH KNEES, UNSPECIFIED CHRONICITY: ICD-10-CM

## 2022-02-17 PROCEDURE — 99213 PR OFFICE/OUTPT VISIT, EST, LEVL III, 20-29 MIN: ICD-10-PCS | Mod: 25,S$PBB,, | Performed by: ORTHOPAEDIC SURGERY

## 2022-02-17 PROCEDURE — 99999 PR PBB SHADOW E&M-EST. PATIENT-LVL III: CPT | Mod: PBBFAC,,, | Performed by: ORTHOPAEDIC SURGERY

## 2022-02-17 PROCEDURE — 20610 DRAIN/INJ JOINT/BURSA W/O US: CPT | Mod: PBBFAC,LT | Performed by: ORTHOPAEDIC SURGERY

## 2022-02-17 PROCEDURE — 73564 X-RAY EXAM KNEE 4 OR MORE: CPT | Mod: 26,50,, | Performed by: RADIOLOGY

## 2022-02-17 PROCEDURE — 99213 OFFICE O/P EST LOW 20 MIN: CPT | Mod: PBBFAC,25 | Performed by: ORTHOPAEDIC SURGERY

## 2022-02-17 PROCEDURE — 73564 XR KNEE ORTHO BILAT WITH FLEXION: ICD-10-PCS | Mod: 26,50,, | Performed by: RADIOLOGY

## 2022-02-17 PROCEDURE — 99999 PR PBB SHADOW E&M-EST. PATIENT-LVL III: ICD-10-PCS | Mod: PBBFAC,,, | Performed by: ORTHOPAEDIC SURGERY

## 2022-02-17 PROCEDURE — 99213 OFFICE O/P EST LOW 20 MIN: CPT | Mod: 25,S$PBB,, | Performed by: ORTHOPAEDIC SURGERY

## 2022-02-17 PROCEDURE — 73564 X-RAY EXAM KNEE 4 OR MORE: CPT | Mod: TC,50

## 2022-02-17 PROCEDURE — 20610 LARGE JOINT ASPIRATION/INJECTION: L KNEE: ICD-10-PCS | Mod: S$PBB,LT,, | Performed by: ORTHOPAEDIC SURGERY

## 2022-02-17 RX ADMIN — TRIAMCINOLONE ACETONIDE 40 MG: 40 INJECTION, SUSPENSION INTRA-ARTICULAR; INTRAMUSCULAR at 08:02

## 2022-02-24 NOTE — PROGRESS NOTES
Injection Information    Triamcinolone Acetonide Injectable Suspension (40mg/mL)  Lot Number: [id number]   Expiration Date: 6/30/2023

## 2022-03-04 ENCOUNTER — HOSPITAL ENCOUNTER (OUTPATIENT)
Dept: RADIOLOGY | Facility: HOSPITAL | Age: 77
Discharge: HOME | End: 2022-03-04
Attending: FAMILY MEDICINE
Payer: MEDICARE

## 2022-03-04 VITALS — WEIGHT: 205 LBS | BODY MASS INDEX: 40.25 KG/M2 | HEIGHT: 60 IN

## 2022-03-04 DIAGNOSIS — Z12.31 ENCOUNTER FOR SCREENING MAMMOGRAM FOR BREAST CANCER: ICD-10-CM

## 2022-03-04 PROCEDURE — 77063 BREAST TOMOSYNTHESIS BI: CPT | Mod: TC,PN

## 2022-03-04 PROCEDURE — 77063 MAMMO DIGITAL SCREENING BILAT WITH TOMO: ICD-10-PCS | Mod: 26,,, | Performed by: RADIOLOGY

## 2022-03-04 PROCEDURE — 77063 BREAST TOMOSYNTHESIS BI: CPT | Mod: 26,,, | Performed by: RADIOLOGY

## 2022-03-04 PROCEDURE — 77067 SCR MAMMO BI INCL CAD: CPT | Mod: 26,,, | Performed by: RADIOLOGY

## 2022-03-04 PROCEDURE — 77067 MAMMO DIGITAL SCREENING BILAT WITH TOMO: ICD-10-PCS | Mod: 26,,, | Performed by: RADIOLOGY

## 2022-03-15 ENCOUNTER — OFFICE VISIT (OUTPATIENT)
Dept: OPHTHALMOLOGY | Facility: CLINIC | Age: 77
End: 2022-03-15
Payer: MEDICARE

## 2022-03-15 DIAGNOSIS — H21.41: ICD-10-CM

## 2022-03-15 DIAGNOSIS — Z98.49 STATUS POST CATARACT EXTRACTION AND INSERTION OF INTRAOCULAR LENS, UNSPECIFIED LATERALITY: ICD-10-CM

## 2022-03-15 DIAGNOSIS — H04.123 BILATERAL DRY EYES: ICD-10-CM

## 2022-03-15 DIAGNOSIS — H54.10 BLINDNESS AND LOW VISION: ICD-10-CM

## 2022-03-15 DIAGNOSIS — Z96.89 HISTORY OF GLAUCOMA TUBE SHUNT PROCEDURE: ICD-10-CM

## 2022-03-15 DIAGNOSIS — Z96.1 STATUS POST CATARACT EXTRACTION AND INSERTION OF INTRAOCULAR LENS, UNSPECIFIED LATERALITY: ICD-10-CM

## 2022-03-15 DIAGNOSIS — H40.2213 CHRONIC ANGLE-CLOSURE GLAUCOMA OF RIGHT EYE, SEVERE STAGE: ICD-10-CM

## 2022-03-15 DIAGNOSIS — H40.1122 PRIMARY OPEN ANGLE GLAUCOMA (POAG) OF LEFT EYE, MODERATE STAGE: Primary | ICD-10-CM

## 2022-03-15 PROCEDURE — 99999 PR PBB SHADOW E&M-EST. PATIENT-LVL III: ICD-10-PCS | Mod: PBBFAC,,, | Performed by: OPHTHALMOLOGY

## 2022-03-15 PROCEDURE — 99213 OFFICE O/P EST LOW 20 MIN: CPT | Mod: PBBFAC | Performed by: OPHTHALMOLOGY

## 2022-03-15 PROCEDURE — 99999 PR PBB SHADOW E&M-EST. PATIENT-LVL III: CPT | Mod: PBBFAC,,, | Performed by: OPHTHALMOLOGY

## 2022-03-15 PROCEDURE — 99214 OFFICE O/P EST MOD 30 MIN: CPT | Mod: S$PBB,,, | Performed by: OPHTHALMOLOGY

## 2022-03-15 PROCEDURE — 99214 PR OFFICE/OUTPT VISIT, EST, LEVL IV, 30-39 MIN: ICD-10-PCS | Mod: S$PBB,,, | Performed by: OPHTHALMOLOGY

## 2022-03-17 ENCOUNTER — CLINICAL SUPPORT (OUTPATIENT)
Dept: REHABILITATION | Facility: HOSPITAL | Age: 77
End: 2022-03-17
Attending: ORTHOPAEDIC SURGERY
Payer: MEDICARE

## 2022-03-17 DIAGNOSIS — G89.29 CHRONIC PAIN OF BOTH KNEES: ICD-10-CM

## 2022-03-17 DIAGNOSIS — M25.562 CHRONIC PAIN OF BOTH KNEES: ICD-10-CM

## 2022-03-17 DIAGNOSIS — M17.0 ARTHRITIS OF BOTH KNEES: ICD-10-CM

## 2022-03-17 DIAGNOSIS — M25.662 KNEE STIFFNESS, LEFT: ICD-10-CM

## 2022-03-17 DIAGNOSIS — M25.561 CHRONIC PAIN OF BOTH KNEES: ICD-10-CM

## 2022-03-17 PROCEDURE — 97161 PT EVAL LOW COMPLEX 20 MIN: CPT | Mod: PO

## 2022-03-17 PROCEDURE — 97110 THERAPEUTIC EXERCISES: CPT | Mod: PO

## 2022-03-17 NOTE — PROGRESS NOTES
[facility]  Physical Therapy Initial Evaluation - Knee    Name: Ligia Curran  [facility] Number: [id number]    Therapy Diagnosis:   Encounter Diagnoses   Name Primary?    Arthritis of both knees     Knee stiffness, left     Chronic pain of both knees      Physician: Odell Lovelace III, *    Physician Orders: PT Evaluate and treat  Medical Diagnosis from Referral: M17.0 (ICD-10-CM) - Arthritis of both knees  Evaluation Date: 3/17/2022  Plan of Care Expiration: 4/28/2022 or 12th Visit  Authorization Period Expiration: 2/17/2023  Visit # / Visits authorized: 1/ 1  FOTO: Issued Visit # 1

## 2022-03-17 NOTE — PLAN OF CARE
[facility]  Physical Therapy Initial Evaluation - Knee     Name: Ligia Curran  Clinic Number: [id number]     Therapy Diagnosis:        Encounter Diagnoses   Name Primary?   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 Pt reports onset of initial symptoms occurred in June 2021 due to fall, but the symptoms subsided and reoccurred January 2022, three months prior to evaluation. Precipitating event:Injury . Current symptoms include: stiffness and discomfort. Aggravating factors: kneeling, movement after prolong rest, and walking long distances.  Evaluation to date interventions: saw physician approximately 3 weeks ago. Treatment to date: Received injection from physician for the knee. Patients presently rates pain 4/10 on pain scale.      Mechanism of Injury: Patient fell in June 2021 which caused bruising and swelling of the L knee.   Next MD Visit: TBD     Medical History:   Past Medical History:   Diagnosis Date    Arthritis     Breast cyst     Chronic angle-closure glaucoma of both eyes, severe stage 11/18/2016    Fibrocystic breast     Gallbladder problem     Glaucoma     Glaucoma     Joint pain     Keloid cicatrix     PONV (postoperative nausea and vomiting)     Uveitis        Surgical History:   Ligia  Magdy  has a past surgical history that includes Cholecystectomy; Hysterectomy; breast biopsy (Left, 1975); BAERVELDT GLAUCOMA SHUNT (Right, 12/26/2012); REMOVAL OF NECROTIC TISSUE OF EYE (Right, 04/09/2013); DISSECTION AND REMOVAL OF FIBROUS PUPILLARY MEMBRANE (Right, 01/26/2017); REMOVAL OF BAERVELDT GLAUCOMA SHUNT (Right, 02/27/2017); Breast biopsy (Right, 1980); Oophorectomy; AK Post Cataract/PKP (Right, 10/26/2017); Cataract extraction (Left, n/a); Cataract extraction w/  intraocular lens implant (Right, 05/24/2012); Strabismus surgery; Corneal transplant (Right, 05/24/2012); Corneal transplant (Right, 10/26/2017); Colonoscopy (N/A, 02/19/2020); Penetrating keratoplasty (Left, 05/10/2021); and Intraocular lens implant, secondary (Left, 05/10/2021).    Medications:   Ligia has a current medication list which includes the following prescription(s): acetaminophen, calcium-vitamin d, carboxymethylcell/hypromellose, dorzolamide-timolol 2-0.5%, epsom salt (laxative), erythromycin, fish oil-omega-3 fatty acids, ibuprofen, ketoconazole, latanoprost, multivitamin,ther and minerals, naproxen, prednisolone acetate, refresh optive, sodium chloride 5%, turmeric root extract, and voltaren arthritis pain, and the following Facility-Administered Medications: sodium chloride 0.9%.    Allergies:   Review of patient's allergies indicates:   Allergen Reactions    Augmentin [amoxicillin-pot clavulanate] Diarrhea    Sulfa (sulfonamide antibiotics) Rash        Imaging:X-Ray L Knee 1 or 2 View Bilateral (6/24/21)  Per radiology report no acute fractures.  Minimal narrowing of right and left medial tibiofemoral compartments with no significant narrowing of lateral tibiofemoral compartments.  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  Prior Level of Function: Independent  Current Level of Function: Modified Independent secondary to R knee stiffness and discomfort     Pain:  Current 3/10, worst 5/10 (movement after prolong rest increases pain), best 1/10 (cryotherapy reduces pain)  Location: Bilateral knee  Description: Slight burning or achy  Aggravating Factors: movement after prolong rest  Easing Factors: ice    Pts goals: Increase flexibility and mobility in order to complete ADL's with more ease. Improve walking endurance.     Objective     Posture: Fair  Palpation: mild general joint tenderness along the site of injection  Sensation: Intact  DTRs: Intact    Range of Motion/Strength:     Knee Left Right Pain/Dysfunction with Movement   AROM      Knee Flexion (140)  110 °   120 °     Knee Extension (0)  0 °   0 °         Knee Left Right Pain/Dysfunction with Movement   PROM         Knee Flexion (140)  120 °   130 °     Knee Extension (0)  0 °   0 °       RLE 5/5 4+/5 4/5 4-/5 3+/5 3/5 3-/5 2+/5 2/5 2-/5 1/5 0 NT   Hip Flexion   x                  Hip Extension   x                  Hip Abduction   x                  Hip Adduction  x                   Hip Internal Rotation   x                  Hip External Rotation   x                  Knee Flexion   x                  Knee Extension   x                  Ankle Dorsiflexion  x                   Ankle Plantarflexion  x                      LLE 5/5 4+/5 4/5 4-/5 3+/5 3/5 3-/5 2+/5 2/5 2-/5 1/5 0 NT   Hip Flexion     x                     Hip Extension     x                    Hip Abduction     x                    Hip Adduction    x                     Hip Internal Rotation     x                     Hip External Rotation     x                     Knee Flexion     x                     Knee Extension     x                     Ankle Dorsiflexion  x                        Ankle Plantarflexion  x                               Special Tests Left Right Comments   Flexibility       90/90 Hamstrings  50 ° 50 °         Gait Analysis   Ligia ambulated 100 feet with none device with independence assistance. Ligia displays no gait deviation during 1 gait cycle.      Other: Patient has difficulty of sight. Crepitus heard and felt with active and passive range of motion of the L knee.          CMS Impairment/Limitation/Restriction for FOTO Knee Survey    Therapist reviewed FOTO scores for Ligia Curran on 3/17/2022.   FOTO documents entered into [employer] - see Media section.    Limitation Score: See scanned media  Category: Mobility           TREATMENT   Treatment Time In: 2:15 PM  Treatment Time Out: 2:30 PM  Total Treatment time separate from Evaluation: 15 minutes    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 to develop strength, endurance, ROM and flexibility for 15 minutes including:  Straight leg raises 2x10 - bilateral   Side lying straight leg abduction 2x10 - bilateral   Standing quad stretch on chair - 4x30 sec - bilateral   Long sitting hamstring stretch - 4x30 sec - bilateral     Home Exercises and Patient Education Provided    Education provided:   Patient was provided educational information regarding: role of Physical Therapy, short and long term goals, patient/therapist expectations, scheduling, and attendance policy.    Written Home Exercises Provided: yes  Exercises were reviewed and Sister Ligia was able to demonstrate them prior to the end of the session.  Sister Ligia demonstrated fair  understanding of the education provided.     See EMR under: Media for exercises provided 3/17/2022.    Assessment     Ligia is a 76 y.o. female referred to outpatient Physical Therapy with a medical diagnosis of bilateral knee arthritis. Pt presents  with displays of weakness, impaired endurance, pain, decreased ROM and impaired muscle length, L knee>R knee. Patient's symptoms are restricting her ability to walk for long distances and completing ADL's without discomfort.       Pt prognosis is Fair.     Pt will benefit from skilled outpatient Physical Therapy to address the deficits stated above and in the chart below, provide pt/family education, and to maximize pt's level of independence.      Plan of care discussed with patient: Yes  Pt's spiritual, cultural and educational needs considered and patient is agreeable to the plan of care and goals as stated below:     Anticipated Barriers for therapy: None Identified during initial evaluation     Medical Necessity is demonstrated by the following  History  Co-morbidities and personal factors that may impact the plan of care Co-morbidities:       Past Medical History:   Diagnosis Date    Arthritis     Breast cyst     Chronic angle-closure glaucoma of both eyes, severe stage 11/18/2016    Fibrocystic breast     Gallbladder problem     Glaucoma     Glaucoma     Joint pain     Keloid cicatrix     PONV (postoperative nausea and vomiting)     Uveitis        Personal Factors:   no deficits     Moderate   Examination  Body Structures and Functions, activity limitations and participation restrictions that may impact the plan of care Body Regions:   lower extremities    Body Systems:    ROM  strength    Participation Restrictions:   N/A    Activity limitations:   Learning and applying knowledge  no deficits    General Tasks and Commands  no deficits    Communication  no deficits    Mobility  no deficits    Self care  no deficits    Domestic Life  no deficits    Interactions/Relationships  no deficits    Life Areas  no deficits    Community and Social Life  no deficits         Low   Clinical Presentation stable and uncomplicated Low   Decision Making/ Complexity Score: Low     Goals:    Short Term Goals: 3 weeks   Goal   Status    1. Pt. to report decreased bilateral knee pain  </= 3/10 at worst to increase tolerance for prolong standing, sitting, and walking.    2. Pt. to demonstrate an increase left knee PROM to 130 degrees to improve flexibility.    3. Pt. to demonstrate increased MMT for bilateral hip abductors to 4/5  to increase stability with ambulation on even surfaces.    4. Pt. to demonstrate increased MMT for bilateral quadriceps to 4/5 to increase ability to negotiate stairs    5. Pt. to demonstrate increased MMT for bilateral hamstring  to 4/5 to increase tolerance to squatting    6. Pt to be Independent with HEP to improve ROM and independence with ADL's        Long Term Goals: 6 weeks  Goal Status    1. Pt. to report decreased bilateral knee pain  </= 1/10 at worst to increase tolerance for prolong standing, sitting, and walking.    2. Pt. to demonstrate an increase left knee AROM to 120 degrees to improve ease with stair negotiation.    3. Pt. to demonstrate increased MMT for bilateral hip abductors to 4+/5  to increase stability with ambulation on even surfaces.    4. Pt. to demonstrate increased MMT for bilateral quadriceps to 4+/5 to increase ability to negotiate stairs    7. Pt. to demonstrate increased MMT for bilateral hamstring  to 4+/5 to increase tolerance to squatting    5. Pt to be Independent with HEP to improve ROM and independence with ADL's          Plan   Plan of care Certification: 3/17/2022 to 4/28/2022    Outpatient Physical Therapy 2 times weekly for 6 weeks to include the following interventions: Patient Education and Therapeutic Exercise.     Maksim Cox, PT,DPT

## 2022-03-18 NOTE — PROGRESS NOTES
Assessment /Plan     For exam results, see Encounter Report.    Primary open angle glaucoma (POAG) of left eye, moderate stage    Glaucoma shunt device of both eyes    Blindness and low vision - Both Eyes    Bilateral dry eyes    Chronic angle-closure glaucoma of right eye, severe stage    Adhesions and disruptions of pupillary membranes of right eye    Status post cataract extraction and insertion of intraocular lens, unspecified laterality          [facility]  Strong FMHx Glc / Blindness --> Justyn Creole Dz / mom's side    Complex ocular hx  multiple sx --> trabs, tubes and tube removes etc   Too numerous to count    CCT  537 // 611  --> 804 // 788 -->     IOP livable --> low - mid teens OS --> achieved    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  PC IOL  Fibrotic anterior capsule membrane  Tubes x 2 in place ST & IN --> removed ST BV 2/27/2017  Glc Shunt graft site OD  Revision 04/09/2013    Glaucoma Incisional Surgery  Patient with exposed ST Tube OD  SP OD ST BV Removal 2/27/2017        Both eyes --> good adherence --> CSM  Cosopt BID    Right eye --> CSM  PF1%  BID  Xal q day  Idania 128 BID    Left eye  SP PKP // AC IOL placement  05/10/2021  PF 1% BID --> p PKP  --> CSM    MGD --> improved surface --> improved Va 10/1/2019  Dry Eye Syndrome: discussed use of warm compresses, preserved & non-preserved artificial tears, gel and PM ointment options.  Also discussed options utilizing medications.    Right eye  Mucomyst 10% BID / PRN --> not using  EES BID --> q hs    --> consider Doxy PO as discussed    RD precautions:  Discussed symptoms of RD with increased flashes, floaters, decreasing vision.  Patient/Family to call and return immediately to clinic should the symptoms of RD occur. Voiced good understanding with Q & A.      Plan  RTC  CORDELIA Calvo  as scheduled  RTC [place] 3 months with IOP & Adherence & OCT RNFL --> consider Dx CTL fit OS  RTC sooner prn with understanding

## 2022-03-21 ENCOUNTER — CLINICAL SUPPORT (OUTPATIENT)
Dept: REHABILITATION | Facility: HOSPITAL | Age: 77
End: 2022-03-21
Payer: MEDICARE

## 2022-03-21 DIAGNOSIS — G89.29 CHRONIC PAIN OF BOTH KNEES: ICD-10-CM

## 2022-03-21 DIAGNOSIS — M25.662 KNEE STIFFNESS, LEFT: Primary | ICD-10-CM

## 2022-03-21 DIAGNOSIS — M25.562 CHRONIC PAIN OF BOTH KNEES: ICD-10-CM

## 2022-03-21 DIAGNOSIS — M25.561 CHRONIC PAIN OF BOTH KNEES: ICD-10-CM

## 2022-03-21 PROCEDURE — 97110 THERAPEUTIC EXERCISES: CPT | Mod: PO

## 2022-03-21 NOTE — PROGRESS NOTES
Physical Therapy Daily Treatment Note     Name: Ligia Curran  Clinic Number: [id number]    Therapy Diagnosis:   Encounter Diagnosis   Name Primary?    Knee stiffness, left Yes     Physician: Odell Lovelace III, *    Visit Date: 3/21/2022    Physician Orders: PT Evaluate and treat  Medical Diagnosis from Referral: M17.0 (ICD-10-CM) - Arthritis of both knees  Evaluation Date: 3/17/2022  Plan of Care Expiration: 2022 or 12th Visit  Authorization Period Expiration: 3/18/2022 - 2022  Visit # / Visits authorized:   FOTO: Issued Visit # 1      Time In: 10:45 am  Time Out: 11:30 am  Billable Time: 45 minutes  Non-Billable Time: 0 minutes    Precautions: Standard and Fall  Insurance: Payor: MEDICARE / Plan: MEDICARE PART A & B / Product Type: Government /     Subjective     Pt reports: Being sore along the adductors and medial calf muscles after the last therapy session.    She is compliant with home exercise program.  Response to previous treatment: No significant difference from the last therapy session.    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  Sister Ligia demonstrated fair  understanding of the education  provided.     See EMR under Patient Instructions for exercises provided prior visit.    Assessment     Pt currently reports displaying bilateral knee pain, stiffness, and weakness.  Pt required an increase in verbal and tactile cueing during today's treatment session.  Pt tolerated treatment session good  and will continue to benefit from skilled therapy to address deficits.  Pt performed today's therapeutic interventions without adverse events.  Pt educated to continue HEP to improve progression.     Patient presents with low exercise tolerance. Use sets and reps to allow rest breaks while working the contralateral side.     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     Pt prognosis is Good.     Pt will continue to benefit from skilled outpatient physical therapy to address the deficits listed in the problem list box on initial evaluation, provide pt/family education and to maximize pt's level of independence in the home and community environment.     Pt's spiritual, cultural and educational needs considered and pt agreeable to plan of care and goals.    Anticipated barriers to physical therapy: None    Goals:   Short Term Goals: 3 weeks  Goal   Status    1. Pt. to report decreased bilateral knee pain  </= 3/10 at worst to increase tolerance for prolong standing, sitting, and walking.     2. Pt. to demonstrate an increase left knee PROM to 130 degrees to improve flexibility.     3. Pt. to demonstrate increased MMT for bilateral hip abductors to 4/5  to increase stability with ambulation on even surfaces.     4. Pt. to demonstrate increased MMT for bilateral quadriceps to 4/5 to increase ability to negotiate stairs     5. Pt. to demonstrate increased MMT for bilateral hamstring  to 4/5 to increase tolerance to squatting     6. Pt to be Independent with HEP to improve ROM and independence with ADL's           Long Term Goals: 6 weeks  Goal Status    1. Pt. to report decreased bilateral knee pain  </= 1/10  at worst to increase tolerance for prolong standing, sitting, and walking.     2. Pt. to demonstrate an increase left knee AROM to 120 degrees to improve ease with stair negotiation.     3. Pt. to demonstrate increased MMT for bilateral hip abductors to 4+/5  to increase stability with ambulation on even surfaces.     4. Pt. to demonstrate increased MMT for bilateral quadriceps to 4+/5 to increase ability to negotiate stairs     7. Pt. to demonstrate increased MMT for bilateral hamstring  to 4+/5 to increase tolerance to squatting     5. Pt to be Independent with HEP to improve ROM and independence with ADL's          Plan     Continued with current POC       Maksim Cox, PT ,DPT  3/21/2022

## 2022-03-23 ENCOUNTER — CLINICAL SUPPORT (OUTPATIENT)
Dept: REHABILITATION | Facility: HOSPITAL | Age: 77
End: 2022-03-23
Payer: MEDICARE

## 2022-03-23 DIAGNOSIS — M25.662 KNEE STIFFNESS, LEFT: Primary | ICD-10-CM

## 2022-03-23 DIAGNOSIS — G89.29 CHRONIC PAIN OF BOTH KNEES: ICD-10-CM

## 2022-03-23 DIAGNOSIS — M25.562 CHRONIC PAIN OF BOTH KNEES: ICD-10-CM

## 2022-03-23 DIAGNOSIS — M25.561 CHRONIC PAIN OF BOTH KNEES: ICD-10-CM

## 2022-03-23 PROCEDURE — 97110 THERAPEUTIC EXERCISES: CPT | Mod: PO

## 2022-03-23 NOTE — PROGRESS NOTES
"    Physical Therapy Daily Treatment Note     Name: Ligia Curran  Clinic Number: [id number]    Therapy Diagnosis:   No diagnosis found.  Physician: Odell Lovelace III, *    Visit Date: 3/23/2022    Physician Orders: PT Evaluate and treat  Medical Diagnosis from Referral: M17.0 (ICD-10-CM) - Arthritis of both knees  Evaluation Date: 3/17/2022  Plan of Care Expiration: 2022 or 12th Visit  Authorization Period Expiration: 3/18/2022 - 2022  Visit # / Visits authorized:   FOTO: Issued Visit # 1      Time In: 10:00 am  Time Out: 10:45 am  Billable Time: 45 minutes  Non-Billable Time: 0 minutes    Precautions: Standard and Fall  Insurance: Payor: MEDICARE / Plan: MEDICARE PART A & B / Product Type: Government /     Subjective     Pt reports: she able to bend and scoot the left knee better. Not sore after the last therapy session. Patient mentions she walks approximately 5-10 minutes daily compare to 30-40 minutes prior to the symptoms.     She is compliant with home exercise program.  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 to develop strength, endurance, ROM and flexibility for 45 minutes including:    Warm-up  Bike x 5 mins    Supine    Straight leg raises 3x6 - bilateral   Side lying straight leg abduction 3x6 - bilateral   Standing quad stretch on chair - 4x30 sec - bilateral   Long sitting hamstring stretch - 4x30 sec - bilateral   MVP Shuttle - 1.5C - 3x10    Seated        Standing    Step up 3x5, bilateral (For single leg strength)      *Bold exercise performed       Home Exercises Provided and Patient Education Provided     Education provided:   - Continue with HEP    Written Home Exercises Provided: Patient instructed to cont prior HEP.  Exercises were reviewed and Sister Ligia was able to demonstrate them prior to the " end of the session.  Sister Ligia demonstrated fair  understanding of the education provided.     See EMR under Patient Instructions for exercises provided prior visit.    Assessment     Pt currently reports displaying bilateral knee pain, stiffness, and weakness.  Pt required an increase in verbal and tactile cueing during today's treatment session.  Pt tolerated treatment session good  and will continue to benefit from skilled therapy to address deficits.  Pt performed today's therapeutic interventions without adverse events.  Pt educated to continue HEP to improve progression.     Instructed patient to increase daily walking duration to around 10-15 minutes in order to slowly progress to the 30-40 minutes goal.     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     Pt prognosis is Good.     Pt will continue to benefit from skilled outpatient physical therapy to address the deficits listed in the problem list box on initial evaluation, provide pt/family education and to maximize pt's level of independence in the home and community environment.     Pt's spiritual, cultural and educational needs considered and pt agreeable to plan of care and goals.    Anticipated barriers to physical therapy: None    Goals:   Short Term Goals: 3 weeks  Goal   Status    1. Pt. to report decreased bilateral knee pain  </= 3/10 at worst to increase tolerance for prolong standing, sitting, and walking.  Progressing   2. Pt. to demonstrate an increase left knee PROM to 130 degrees to improve flexibility.  Progressing   3. Pt. to demonstrate increased MMT for bilateral hip abductors to 4/5  to increase stability with ambulation on even surfaces.  Progressing   4. Pt. to demonstrate increased MMT for bilateral quadriceps to 4/5 to increase ability to negotiate stairs  Progressing   5. Pt. to demonstrate increased MMT for bilateral hamstring  to 4/5 to increase tolerance to squatting  Progressing   6. Pt to be Independent with HEP to  improve ROM and independence with ADL's  Progressing         Long Term Goals: 6 weeks  Goal Status    1. Pt. to report decreased bilateral knee pain  </= 1/10 at worst to increase tolerance for prolong standing, sitting, and walking.     2. Pt. to demonstrate an increase left knee AROM to 120 degrees to improve ease with stair negotiation.     3. Pt. to demonstrate increased MMT for bilateral hip abductors to 4+/5  to increase stability with ambulation on even surfaces.     4. Pt. to demonstrate increased MMT for bilateral quadriceps to 4+/5 to increase ability to negotiate stairs     7. Pt. to demonstrate increased MMT for bilateral hamstring  to 4+/5 to increase tolerance to squatting     5. Pt to be Independent with HEP to improve ROM and independence with ADL's          Plan     Continued with current POC       Maksim Cox, PT ,DPT  3/23/2022

## 2022-03-28 ENCOUNTER — CLINICAL SUPPORT (OUTPATIENT)
Dept: REHABILITATION | Facility: HOSPITAL | Age: 77
End: 2022-03-28
Payer: MEDICARE

## 2022-03-28 DIAGNOSIS — M25.562 CHRONIC PAIN OF BOTH KNEES: ICD-10-CM

## 2022-03-28 DIAGNOSIS — M25.561 CHRONIC PAIN OF BOTH KNEES: ICD-10-CM

## 2022-03-28 DIAGNOSIS — G89.29 CHRONIC PAIN OF BOTH KNEES: ICD-10-CM

## 2022-03-28 DIAGNOSIS — M25.662 KNEE STIFFNESS, LEFT: Primary | ICD-10-CM

## 2022-03-28 PROCEDURE — 97110 THERAPEUTIC EXERCISES: CPT | Mod: PO

## 2022-03-28 NOTE — PROGRESS NOTES
"    Physical Therapy Daily Treatment Note     Name: Ligia Curran  Clinic Number: [id number]    Therapy Diagnosis:   No diagnosis found.  Physician: Odell Lovelace III, *    Visit Date: 3/28/2022    Physician Orders: PT Evaluate and treat  Medical Diagnosis from Referral: M17.0 (ICD-10-CM) - Arthritis of both knees  Evaluation Date: 3/17/2022  Plan of Care Expiration: 2022 or 12th Visit  Authorization Period Expiration: 3/18/2022 - 2022  Visit # / Visits authorized: 3/19  FOTO: Issued Visit # 1      Time In: 10:36 am  Time Out: 11:30 am  Billable Time: 54 minutes  Non-Billable Time: 0 minutes    Precautions: Standard and Fall  Insurance: Payor: MEDICARE / Plan: MEDICARE PART A & B / Product Type: Government /     Subjective     Pt reports: she now able to ambulate approximately 15-20 minutes for her daily exercise. Patient mentions she is experiencing more stiffness than pain.     She is compliant with home exercise program.    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 to develop strength, endurance, ROM and flexibility for 54 minutes including:      Supine    Straight leg raises 3x8 - bilateral   Side lying straight leg abduction 3x8 - bilateral   Standing quad stretch on chair - 4x30 sec - bilateral   Long sitting hamstring stretch - 4x30 sec - bilateral   MVP Shuttle - 2C - 3x10  Bridge 2x8    Seated    Bike x 5 mins      Standing    Step up 3x6, bilateral (For single leg strength)      *Bold exercise performed       Home Exercises Provided and Patient Education Provided     Education provided:   - Continue with HEP    Written Home Exercises Provided: Patient instructed to cont prior HEP.  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  Sister Ligia demonstrated " fair  understanding of the education provided.     See EMR under Patient Instructions for exercises provided prior visit.    Assessment     Pt currently reports displaying bilateral knee pain, stiffness, and weakness.  Pt required an increase in verbal and tactile cueing during today's treatment session. Instructing the patient to allow the knee to translate over the toes during step up to properly load the quadriceps muscle.  Pt tolerated treatment session good  and will continue to benefit from skilled therapy to address deficits.  Pt performed today's therapeutic interventions without adverse events.  Pt educated to continue HEP to improve progression.     Patient is now able to tolerate more activity in and out of physical therapy. Intensity of exercises are slowly increasing as patient continues to show strength improvements in the lower extremity.     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     Pt prognosis is Good.     Pt will continue to benefit from skilled outpatient physical therapy to address the deficits listed in the problem list box on initial evaluation, provide pt/family education and to maximize pt's level of independence in the home and community environment.     Pt's spiritual, cultural and educational needs considered and pt agreeable to plan of care and goals.    Anticipated barriers to physical therapy: None    Goals:   Short Term Goals: 3 weeks  Goal   Status    1. Pt. to report decreased bilateral knee pain  </= 3/10 at worst to increase tolerance for prolong standing, sitting, and walking.  Progressing   2. Pt. to demonstrate an increase left knee PROM to 130 degrees to improve flexibility.  Progressing   3. Pt. to demonstrate increased MMT for bilateral hip abductors to 4/5  to increase stability with ambulation on even surfaces.  Progressing   4. Pt. to demonstrate increased MMT for bilateral quadriceps to 4/5 to increase ability to negotiate stairs  Progressing   5. Pt. to  demonstrate increased MMT for bilateral hamstring  to 4/5 to increase tolerance to squatting  Progressing   6. Pt to be Independent with HEP to improve ROM and independence with ADL's  Progressing         Long Term Goals: 6 weeks  Goal Status    1. Pt. to report decreased bilateral knee pain  </= 1/10 at worst to increase tolerance for prolong standing, sitting, and walking.     2. Pt. to demonstrate an increase left knee AROM to 120 degrees to improve ease with stair negotiation.     3. Pt. to demonstrate increased MMT for bilateral hip abductors to 4+/5  to increase stability with ambulation on even surfaces.     4. Pt. to demonstrate increased MMT for bilateral quadriceps to 4+/5 to increase ability to negotiate stairs     7. Pt. to demonstrate increased MMT for bilateral hamstring  to 4+/5 to increase tolerance to squatting     5. Pt to be Independent with HEP to improve ROM and independence with ADL's          Plan     Continued with current POC       Maksim Cox, PT ,DPT  3/28/2022

## 2022-03-30 ENCOUNTER — CLINICAL SUPPORT (OUTPATIENT)
Dept: REHABILITATION | Facility: HOSPITAL | Age: 77
End: 2022-03-30
Payer: MEDICARE

## 2022-03-30 DIAGNOSIS — M25.662 KNEE STIFFNESS, LEFT: Primary | ICD-10-CM

## 2022-03-30 DIAGNOSIS — M25.561 CHRONIC PAIN OF BOTH KNEES: ICD-10-CM

## 2022-03-30 DIAGNOSIS — M25.562 CHRONIC PAIN OF BOTH KNEES: ICD-10-CM

## 2022-03-30 DIAGNOSIS — G89.29 CHRONIC PAIN OF BOTH KNEES: ICD-10-CM

## 2022-03-30 PROCEDURE — 97110 THERAPEUTIC EXERCISES: CPT | Mod: PO

## 2022-03-30 NOTE — PROGRESS NOTES
"    Physical Therapy Daily Treatment Note     Name: Ligia Curran  Clinic Number: [id number]    Therapy Diagnosis:   Encounter Diagnoses   Name Primary?    Knee stiffness, left Yes    Chronic pain of both knees      Physician: Odell Lovelace III, *    Visit Date: 3/30/2022    Physician Orders: PT Evaluate and treat  Medical Diagnosis from Referral: M17.0 (ICD-10-CM) - Arthritis of both knees  Evaluation Date: 3/17/2022  Plan of Care Expiration: 2022 or 12th Visit  Authorization Period Expiration: 3/18/2022 - 2022  Visit # / Visits authorized:   FOTO: Issued Visit # 1      Time In: 10:35 am  Time Out: 11:20 am  Billable Time: 45 minutes  Non-Billable Time: 0 minutes    Precautions: Standard and Fall  Insurance: Payor: MEDICARE / Plan: MEDICARE PART A & B / Product Type: Government /     Subjective     Pt reports: she has switched from icing to heat, which is helping with the stiffness. Patient mentions has increased her daily walking to approximately 20-25 minutes daily.    She is compliant with home exercise program.     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 to develop strength, endurance, ROM and flexibility for 45 minutes including:      Supine    Straight leg raises 3x8 - bilateral   Side lying straight leg abduction 3x8 - bilateral   Standing quad stretch on chair - 4x30 sec - bilateral   Long sitting hamstring stretch - 4x30 sec - bilateral   MVP Shuttle - 2C - 3x10  Bridge 3x8    Seated        Standing    Step up 3x8, bilateral (For single leg strength)      *Bold exercise performed       Home Exercises Provided and Patient Education Provided     Education provided:   - Continue with HEP    Written Home Exercises Provided: Patient instructed to cont prior HEP.  Exercises were reviewed and Sister Ligia was able to " demonstrate them prior to the end of the session.  Sister Ligia demonstrated fair  understanding of the education provided.     See EMR under Patient Instructions for exercises provided prior visit.    Assessment     Pt currently reports displaying bilateral knee pain, stiffness, and weakness.  Pt required an increase in verbal and tactile cueing during today's treatment session. Instructing the patient to allow the knee to translate over the toes during step up to properly load the quadriceps muscle.  Pt tolerated treatment session good  and will continue to benefit from skilled therapy to address deficits.  Pt performed today's therapeutic interventions without adverse events.  Pt educated to continue HEP to improve progression.     Patient is now able to tolerate more activity in and out of physical therapy. Intensity of exercises are slowly increasing as patient continues to show strength improvements in the lower extremity.     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     Pt prognosis is Good.     Pt will continue to benefit from skilled outpatient physical therapy to address the deficits listed in the problem list box on initial evaluation, provide pt/family education and to maximize pt's level of independence in the home and community environment.     Pt's spiritual, cultural and educational needs considered and pt agreeable to plan of care and goals.    Anticipated barriers to physical therapy: None    Goals:   Short Term Goals: 3 weeks  Goal   Status    1. Pt. to report decreased bilateral knee pain  </= 3/10 at worst to increase tolerance for prolong standing, sitting, and walking.  Progressing   2. Pt. to demonstrate an increase left knee PROM to 130 degrees to improve flexibility.  Progressing   3. Pt. to demonstrate increased MMT for bilateral hip abductors to 4/5  to increase stability with ambulation on even surfaces.  Progressing   4. Pt. to demonstrate increased MMT for bilateral  quadriceps to 4/5 to increase ability to negotiate stairs  Progressing   5. Pt. to demonstrate increased MMT for bilateral hamstring  to 4/5 to increase tolerance to squatting  Progressing   6. Pt to be Independent with HEP to improve ROM and independence with ADL's  Progressing         Long Term Goals: 6 weeks  Goal Status    1. Pt. to report decreased bilateral knee pain  </= 1/10 at worst to increase tolerance for prolong standing, sitting, and walking.     2. Pt. to demonstrate an increase left knee AROM to 120 degrees to improve ease with stair negotiation.     3. Pt. to demonstrate increased MMT for bilateral hip abductors to 4+/5  to increase stability with ambulation on even surfaces.     4. Pt. to demonstrate increased MMT for bilateral quadriceps to 4+/5 to increase ability to negotiate stairs     7. Pt. to demonstrate increased MMT for bilateral hamstring  to 4+/5 to increase tolerance to squatting     5. Pt to be Independent with HEP to improve ROM and independence with ADL's          Plan     Continued with current POC       Maksim Cox, PT ,DPT  3/30/2022

## 2022-04-04 ENCOUNTER — OFFICE VISIT (OUTPATIENT)
Dept: FAMILY MEDICINE | Facility: CLINIC | Age: 77
End: 2022-04-04
Attending: FAMILY MEDICINE
Payer: MEDICARE

## 2022-04-04 ENCOUNTER — LAB VISIT (OUTPATIENT)
Dept: LAB | Facility: HOSPITAL | Age: 77
End: 2022-04-04
Attending: FAMILY MEDICINE
Payer: MEDICARE

## 2022-04-04 VITALS
OXYGEN SATURATION: 97 % | BODY MASS INDEX: 40.64 KG/M2 | HEIGHT: 60 IN | SYSTOLIC BLOOD PRESSURE: 137 MMHG | WEIGHT: 207 LBS | DIASTOLIC BLOOD PRESSURE: 67 MMHG | HEART RATE: 83 BPM

## 2022-04-04 DIAGNOSIS — K76.0 HEPATIC STEATOSIS: ICD-10-CM

## 2022-04-04 DIAGNOSIS — Z00.00 ANNUAL PHYSICAL EXAM: Primary | ICD-10-CM

## 2022-04-04 DIAGNOSIS — K30 CHRONIC UPSET STOMACH: ICD-10-CM

## 2022-04-04 DIAGNOSIS — Z00.00 ANNUAL PHYSICAL EXAM: ICD-10-CM

## 2022-04-04 DIAGNOSIS — E66.01 MORBID OBESITY WITH BMI OF 40.0-44.9, ADULT: ICD-10-CM

## 2022-04-04 LAB
ALBUMIN SERPL BCP-MCNC: 3.7 G/DL (ref 3.5–5.2)
ALP SERPL-CCNC: 95 U/L (ref 55–135)
ALT SERPL W/O P-5'-P-CCNC: 16 U/L (ref 10–44)
ANION GAP SERPL CALC-SCNC: 10 MMOL/L (ref 8–16)
AST SERPL-CCNC: 19 U/L (ref 10–40)
BACTERIA #/AREA URNS AUTO: ABNORMAL /HPF
BASOPHILS # BLD AUTO: 0.03 K/UL (ref 0–0.2)
BASOPHILS NFR BLD: 0.3 % (ref 0–1.9)
BILIRUB SERPL-MCNC: 1 MG/DL (ref 0.1–1)
BILIRUB UR QL STRIP: NEGATIVE
BUN SERPL-MCNC: 7 MG/DL (ref 8–23)
CALCIUM SERPL-MCNC: 9.8 MG/DL (ref 8.7–10.5)
CHLORIDE SERPL-SCNC: 103 MMOL/L (ref 95–110)
CHOLEST SERPL-MCNC: 155 MG/DL (ref 120–199)
CHOLEST/HDLC SERPL: 3.3 {RATIO} (ref 2–5)
CLARITY UR REFRACT.AUTO: ABNORMAL
CO2 SERPL-SCNC: 28 MMOL/L (ref 23–29)
COLOR UR AUTO: YELLOW
CREAT SERPL-MCNC: 0.7 MG/DL (ref 0.5–1.4)
DIFFERENTIAL METHOD: ABNORMAL
EOSINOPHIL # BLD AUTO: 0.1 K/UL (ref 0–0.5)
EOSINOPHIL NFR BLD: 0.5 % (ref 0–8)
ERYTHROCYTE [DISTWIDTH] IN BLOOD BY AUTOMATED COUNT: 13 % (ref 11.5–14.5)
EST. GFR  (AFRICAN AMERICAN): >60 ML/MIN/1.73 M^2
EST. GFR  (NON AFRICAN AMERICAN): >60 ML/MIN/1.73 M^2
GLUCOSE SERPL-MCNC: 112 MG/DL (ref 70–110)
GLUCOSE UR QL STRIP: NEGATIVE
HCT VFR BLD AUTO: 47.2 % (ref 37–48.5)
HDLC SERPL-MCNC: 47 MG/DL (ref 40–75)
HDLC SERPL: 30.3 % (ref 20–50)
HGB BLD-MCNC: 14.8 G/DL (ref 12–16)
HGB UR QL STRIP: NEGATIVE
IMM GRANULOCYTES # BLD AUTO: 0.03 K/UL (ref 0–0.04)
IMM GRANULOCYTES NFR BLD AUTO: 0.3 % (ref 0–0.5)
KETONES UR QL STRIP: NEGATIVE
LDLC SERPL CALC-MCNC: 90.6 MG/DL (ref 63–159)
LEUKOCYTE ESTERASE UR QL STRIP: ABNORMAL
LYMPHOCYTES # BLD AUTO: 2.2 K/UL (ref 1–4.8)
LYMPHOCYTES NFR BLD: 22.2 % (ref 18–48)
MCH RBC QN AUTO: 30.4 PG (ref 27–31)
MCHC RBC AUTO-ENTMCNC: 31.4 G/DL (ref 32–36)
MCV RBC AUTO: 97 FL (ref 82–98)
MICROSCOPIC COMMENT: ABNORMAL
MONOCYTES # BLD AUTO: 0.5 K/UL (ref 0.3–1)
MONOCYTES NFR BLD: 5.1 % (ref 4–15)
NEUTROPHILS # BLD AUTO: 7.2 K/UL (ref 1.8–7.7)
NEUTROPHILS NFR BLD: 71.6 % (ref 38–73)
NITRITE UR QL STRIP: NEGATIVE
NONHDLC SERPL-MCNC: 108 MG/DL
NRBC BLD-RTO: 0 /100 WBC
PH UR STRIP: 6 [PH] (ref 5–8)
PLATELET # BLD AUTO: 203 K/UL (ref 150–450)
PMV BLD AUTO: 11.9 FL (ref 9.2–12.9)
POTASSIUM SERPL-SCNC: 4.4 MMOL/L (ref 3.5–5.1)
PROT SERPL-MCNC: 7.5 G/DL (ref 6–8.4)
PROT UR QL STRIP: NEGATIVE
RBC # BLD AUTO: 4.87 M/UL (ref 4–5.4)
RBC #/AREA URNS AUTO: 6 /HPF (ref 0–4)
SODIUM SERPL-SCNC: 141 MMOL/L (ref 136–145)
SP GR UR STRIP: 1.02 (ref 1–1.03)
SQUAMOUS #/AREA URNS AUTO: 18 /HPF
TRIGL SERPL-MCNC: 87 MG/DL (ref 30–150)
URN SPEC COLLECT METH UR: ABNORMAL
WBC # BLD AUTO: 10.02 K/UL (ref 3.9–12.7)
WBC #/AREA URNS AUTO: 13 /HPF (ref 0–5)

## 2022-04-04 PROCEDURE — 99214 PR OFFICE/OUTPT VISIT, EST, LEVL IV, 30-39 MIN: ICD-10-PCS | Mod: S$PBB,,, | Performed by: FAMILY MEDICINE

## 2022-04-04 PROCEDURE — 81001 URINALYSIS AUTO W/SCOPE: CPT | Performed by: FAMILY MEDICINE

## 2022-04-04 PROCEDURE — 99214 OFFICE O/P EST MOD 30 MIN: CPT | Mod: S$PBB,,, | Performed by: FAMILY MEDICINE

## 2022-04-04 PROCEDURE — 85025 COMPLETE CBC W/AUTO DIFF WBC: CPT | Performed by: FAMILY MEDICINE

## 2022-04-04 PROCEDURE — 99999 PR PBB SHADOW E&M-EST. PATIENT-LVL IV: CPT | Mod: PBBFAC,,, | Performed by: FAMILY MEDICINE

## 2022-04-04 PROCEDURE — 99214 OFFICE O/P EST MOD 30 MIN: CPT | Mod: PBBFAC,PO | Performed by: FAMILY MEDICINE

## 2022-04-04 PROCEDURE — 36415 COLL VENOUS BLD VENIPUNCTURE: CPT | Mod: PO | Performed by: FAMILY MEDICINE

## 2022-04-04 PROCEDURE — 80053 COMPREHEN METABOLIC PANEL: CPT | Performed by: FAMILY MEDICINE

## 2022-04-04 PROCEDURE — 99999 PR PBB SHADOW E&M-EST. PATIENT-LVL IV: ICD-10-PCS | Mod: PBBFAC,,, | Performed by: FAMILY MEDICINE

## 2022-04-04 PROCEDURE — 80061 LIPID PANEL: CPT | Performed by: FAMILY MEDICINE

## 2022-04-06 ENCOUNTER — CLINICAL SUPPORT (OUTPATIENT)
Dept: REHABILITATION | Facility: HOSPITAL | Age: 77
End: 2022-04-06
Payer: MEDICARE

## 2022-04-06 DIAGNOSIS — G89.29 CHRONIC PAIN OF BOTH KNEES: ICD-10-CM

## 2022-04-06 DIAGNOSIS — M25.562 CHRONIC PAIN OF BOTH KNEES: ICD-10-CM

## 2022-04-06 DIAGNOSIS — M25.561 CHRONIC PAIN OF BOTH KNEES: ICD-10-CM

## 2022-04-06 DIAGNOSIS — M25.662 KNEE STIFFNESS, LEFT: Primary | ICD-10-CM

## 2022-04-06 PROCEDURE — 97110 THERAPEUTIC EXERCISES: CPT | Mod: PO

## 2022-04-06 NOTE — PROGRESS NOTES
"    Physical Therapy Daily Treatment Note     Name: Ligia Curran  Clinic Number: [id number]    Therapy Diagnosis:   Encounter Diagnoses   Name Primary?    Knee stiffness, left Yes    Chronic pain of both knees      Physician: Odell Lovelace III, *    Visit Date: 2022    Physician Orders: PT Evaluate and treat  Medical Diagnosis from Referral: M17.0 (ICD-10-CM) - Arthritis of both knees  Evaluation Date: 3/17/2022  Plan of Care Expiration: 2022 or 12th Visit  Authorization Period Expiration: 3/18/2022 - 2022  Visit # / Visits authorized:   FOTO: Issued Visit # 1      Time In: 10:20 am   Time Out: 11:19 am  Billable Time: 59 minutes  Non-Billable Time: 0 minutes    Precautions: Standard and Fall  Insurance: Payor: MEDICARE / Plan: MEDICARE PART A & B / Product Type: Government /     Subjective     Pt reports: she has been continuing her 20-25 minutes of daily walking without any increase in discomfort or pain in bilateral knees.     She is compliant with home exercise program.     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 to develop strength, endurance, ROM and flexibility for 59 minutes including:      Supine    Straight leg raises 3x8 - bilateral   Side lying straight leg abduction 3x10 - bilateral   Standing quad stretch on chair - 4x30 sec - bilateral   Long sitting hamstring stretch - 4x30 sec - bilateral   MVP Shuttle - 2.5C - 3x10  Bridge 3x8    Seated    Bike x 5 minutes (cueing for proper pacing)    Standing    Step up 3x8, bilateral (For single leg strength)  Lateral walks with yellow theraband (10 steps to the Right then Left) - 2 laps      *Bold exercise performed       Home Exercises Provided and Patient Education Provided     Education provided:   - Continue with HEP    Written Home Exercises Provided: " Patient instructed to cont prior HEP.  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  Sister Ligia demonstrated fair  understanding of the education provided.     See EMR under Patient Instructions for exercises provided prior visit.    Assessment     Pt currently reports displaying bilateral knee pain, stiffness, and weakness.  Pt required an increase in verbal and tactile cueing during today's treatment session for proper form and pacing of exercises. Instructing the patient to allow the knee to translate over the toes during step up to properly load the quadriceps muscle.  Pt tolerated treatment session good  and will continue to benefit from skilled therapy to address deficits.  Pt performed today's therapeutic interventions without adverse events.  Pt educated to continue HEP to improve progression.     Patient is now able to tolerate more activities in and out of physical therapy. Intensity of exercises are slowly increasing as patient continues to show strength improvements in the lower extremity.     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     Pt prognosis is Good.     Pt will continue to benefit from skilled outpatient physical therapy to address the deficits listed in the problem list box on initial evaluation, provide pt/family education and to maximize pt's level of independence in the home and community environment.     Pt's spiritual, cultural and educational needs considered and pt agreeable to plan of care and goals.    Anticipated barriers to physical therapy: None    Goals:   Short Term Goals: 3 weeks  Goal   Status    1. Pt. to report decreased bilateral knee pain  </= 3/10 at worst to increase tolerance for prolong standing, sitting, and walking.  Progressing   2. Pt. to demonstrate an increase left knee PROM to 130 degrees to improve flexibility.  Progressing   3. Pt. to demonstrate increased MMT for bilateral hip abductors to  4/5  to increase stability with ambulation on even surfaces.  Progressing   4. Pt. to demonstrate increased MMT for bilateral quadriceps to 4/5 to increase ability to negotiate stairs  Progressing   5. Pt. to demonstrate increased MMT for bilateral hamstring  to 4/5 to increase tolerance to squatting  Progressing   6. Pt to be Independent with HEP to improve ROM and independence with ADL's  Progressing         Long Term Goals: 6 weeks  Goal Status    1. Pt. to report decreased bilateral knee pain  </= 1/10 at worst to increase tolerance for prolong standing, sitting, and walking.     2. Pt. to demonstrate an increase left knee AROM to 120 degrees to improve ease with stair negotiation.     3. Pt. to demonstrate increased MMT for bilateral hip abductors to 4+/5  to increase stability with ambulation on even surfaces.     4. Pt. to demonstrate increased MMT for bilateral quadriceps to 4+/5 to increase ability to negotiate stairs     7. Pt. to demonstrate increased MMT for bilateral hamstring  to 4+/5 to increase tolerance to squatting     5. Pt to be Independent with HEP to improve ROM and independence with ADL's          Plan     Continued with current POC       Maksim Cox, PT ,DPT  4/6/2022

## 2022-04-07 ENCOUNTER — TELEPHONE (OUTPATIENT)
Dept: FAMILY MEDICINE | Facility: CLINIC | Age: 77
End: 2022-04-07
Payer: MEDICARE

## 2022-04-07 DIAGNOSIS — R73.9 HYPERGLYCEMIA: Primary | ICD-10-CM

## 2022-04-07 NOTE — TELEPHONE ENCOUNTER
----- Message from Larry Duque sent at 4/7/2022 11:46 AM CDT -----  Name of Who is Calling: MAYANK DIAL          What is the request in detail: The patient is calling to receive her results. Please advise          Can the clinic reply by [facility]:No         What Number [facility] Back [facility]: [phone]

## 2022-04-09 NOTE — TELEPHONE ENCOUNTER
Labs look fine please remind pt to stay on a low fat low sugar weight loss diet.  Lets check a hgb a1c in 3-6 months due to fbs of 112

## 2022-04-10 NOTE — PROGRESS NOTES
Subjective:       Patient ID: Ligia Curran is a 76 y.o. female.    Chief Complaint: Annual Exam    HPI   Pt is here for annual exam pt is well no sob/cp no change in bowel habits no brbpr  Pt denies vaginal bleeding no dysuria hematuria  Pt denies n/v/f/c/d/c no cough chest congestion no sore throat  Pt has fatty liver she is not on a low fat diet she c/o upset stomach bloating poor digestion  Pt is obese not on low fat weight loss diet no regular exercise  Review of Systems   Constitutional: Negative for activity change, chills, diaphoresis, fatigue and fever.   HENT: Negative for congestion, ear discharge, ear pain, hearing loss, postnasal drip, rhinorrhea, sinus pressure, sneezing, sore throat, trouble swallowing and voice change.    Eyes: Negative for photophobia, discharge, redness, itching and visual disturbance.   Respiratory: Negative for cough, chest tightness, shortness of breath and wheezing.    Cardiovascular: Negative for chest pain, palpitations and leg swelling.   Gastrointestinal: Negative for abdominal pain, anal bleeding, blood in stool, constipation, diarrhea, nausea, rectal pain and vomiting.   Genitourinary: Negative for dyspareunia, dysuria, flank pain, frequency, hematuria, menstrual problem, pelvic pain, urgency, vaginal bleeding and vaginal discharge.   Musculoskeletal: Negative for arthralgias, back pain, joint swelling and neck pain.   Skin: Negative for color change and rash.   Neurological: Negative for dizziness, speech difficulty, weakness, light-headedness, numbness and headaches.   Hematological: Does not bruise/bleed easily.   Psychiatric/Behavioral: Negative for agitation, confusion, decreased concentration, sleep disturbance and suicidal ideas. The patient is not nervous/anxious.        Objective:     /67   Pulse 83   Ht 5' (1.524 m)   Wt 93.9 kg (207 lb)   SpO2 97%   BMI 40.43 kg/m²     Physical Exam  Constitutional:       Appearance: She is well-developed. She is  obese. She is not ill-appearing.   HENT:      Head: Normocephalic and atraumatic.      Right Ear: External ear normal.      Left Ear: External ear normal.      Nose: Nose normal.   Eyes:      General:         Right eye: No discharge.         Left eye: No discharge.      Conjunctiva/sclera: Conjunctivae normal.      Pupils: Pupils are equal, round, and reactive to light.   Neck:      Thyroid: No thyromegaly.   Cardiovascular:      Rate and Rhythm: Normal rate and regular rhythm.      Heart sounds: Normal heart sounds. No murmur heard.    No friction rub. No gallop.   Pulmonary:      Effort: Pulmonary effort is normal.      Breath sounds: Normal breath sounds. No wheezing or rales.   Abdominal:      General: Bowel sounds are normal. There is no distension.      Palpations: Abdomen is soft.      Tenderness: There is no abdominal tenderness. There is no guarding or rebound.   Genitourinary:     Vagina: Normal.   Musculoskeletal:         General: No tenderness. Normal range of motion.      Cervical back: Normal range of motion and neck supple.   Lymphadenopathy:      Cervical: No cervical adenopathy.   Skin:     General: Skin is warm and dry.      Findings: No erythema or rash.   Neurological:      Mental Status: She is alert and oriented to person, place, and time.      Cranial Nerves: No cranial nerve deficit.      Motor: No abnormal muscle tone.      Coordination: Coordination normal.   Psychiatric:         Behavior: Behavior normal.         Thought Content: Thought content normal.         Judgment: Judgment normal.         Assessment:       1. Annual physical exam    2. Hepatic steatosis    3. Chronic upset stomach    4. Morbid obesity with BMI of 40.0-44.9, adult        Plan:     orders cbc cmp lipid tsh urine  Cont meds  Low fat weight loss bland diet  Increase natural dietary fiber  ppi daily  F/u gi  Graded exercise  rtc  Months    Health maintenance  Lipid ordered  Flu in fall  Tetanus q 10 years  Mammogram  "discussed  covid discussed  bmd discussed  Colonoscopy discussed       "This note will not be shared with the patient."     "

## 2022-04-11 ENCOUNTER — CLINICAL SUPPORT (OUTPATIENT)
Dept: REHABILITATION | Facility: HOSPITAL | Age: 77
End: 2022-04-11
Payer: MEDICARE

## 2022-04-11 ENCOUNTER — TELEPHONE (OUTPATIENT)
Dept: FAMILY MEDICINE | Facility: CLINIC | Age: 77
End: 2022-04-11
Payer: MEDICARE

## 2022-04-11 DIAGNOSIS — G89.29 CHRONIC PAIN OF BOTH KNEES: ICD-10-CM

## 2022-04-11 DIAGNOSIS — M25.662 KNEE STIFFNESS, LEFT: Primary | ICD-10-CM

## 2022-04-11 DIAGNOSIS — M25.561 CHRONIC PAIN OF BOTH KNEES: ICD-10-CM

## 2022-04-11 DIAGNOSIS — M25.562 CHRONIC PAIN OF BOTH KNEES: ICD-10-CM

## 2022-04-11 PROCEDURE — 97110 THERAPEUTIC EXERCISES: CPT | Mod: PO

## 2022-04-11 NOTE — PROGRESS NOTES
"    Physical Therapy Daily Treatment Note     Name: Ligia Curran  [facility] Number: [id number]    Therapy Diagnosis:   Encounter Diagnoses   Name Primary?    Knee stiffness, left Yes    Chronic pain of both knees      Physician: Odell Lovelace III, *    Visit Date: 2022    Physician Orders: PT Evaluate and treat  Medical Diagnosis from Referral: M17.0 (ICD-10-CM) - Arthritis of both knees  Evaluation Date: 3/17/2022  Plan of Care Expiration: 2022 or 12th Visit  Authorization Period Expiration: 3/18/2022 - 2022  Visit # / Visits authorized:   FOTO: Issued Visit # 1      Time In: 10:51 am   Time Out: 11:30 am  Billable Time: 39 minutes  Non-Billable Time: 0 minutes    Precautions: Standard and Fall  Insurance: Payor: MEDICARE / Plan: MEDICARE PART A & B / Product Type: Government /     Subjective     Pt reports: bilateral knee stiffness has been improving slowly. Patient mentions she does not need to use the ice or heating pad as frequently now to relieve the discomfort along the knees.     She is compliant with home exercise program.     Response to previous treatment: "A little muscle soreness that relieved itself within a day"    Pre-Treatment Pain Ratin/10 (Right>Left knee)  Post-Treatment Pain Ratin/10   Location: Bilateral knee    Objective     Posture: Fair  Palpation: No tenderness noted with palpation  Sensation: Intact  DTRs: Intact     Range of Motion/Strength:      Knee Left Right Pain/Dysfunction with Movement   AROM         Knee Flexion (140)  110 °   110 °      Knee Extension (0)  0 °   0 °            Knee Left Right Pain/Dysfunction with Movement   PROM          Knee Flexion (140)  125 °   125 °      Knee Extension (0)  0 °   0 °         RLE 5/5 4+/5 4/5 4-/5 3+/5 3/5 3-/5 2+/5 2/5 2-/5 1/5 0 NT   Hip Flexion     x                       Hip Extension     x                       Hip Abduction     x                       Hip Adduction   x                         Hip Internal " Rotation     x                       Hip External Rotation     x                       Knee Flexion     x                       Knee Extension     x                       Ankle Dorsiflexion   x                         Ankle Plantarflexion   x                            LLE 5/5 4+/5 4/5 4-/5 3+/5 3/5 3-/5 2+/5 2/5 2-/5 1/5 0 NT   Hip Flexion      x                      Hip Extension      x                      Hip Abduction      x                      Hip Adduction      x                       Hip Internal Rotation      x                       Hip External Rotation      x                      Knee Flexion      x                       Knee Extension      x                       Ankle Dorsiflexion   x                         Ankle Plantarflexion   x                                  Special Tests Left Right Comments   Flexibility          90/90 Hamstrings  60 ° 60 °        Treatment     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 to develop strength, endurance, ROM and flexibility for 39 minutes including:      Supine    Straight leg raises 3x10 - bilateral   Side lying straight leg abduction 3x10 - bilateral   Standing quad stretch on chair - 4x30 sec - bilateral   Long sitting hamstring stretch - 4x30 sec - bilateral   MVP Shuttle - 2.5C - 3x10   Bridge 3x8    Seated    Bike x 5 minutes (cueing for proper pacing and purse lip breathing)    Standing    Step up 3x8, bilateral (For single leg strength)  Lateral walks with yellow theraband (10 steps to the Right then Left) - 2 laps      *Bold exercise performed       Home Exercises Provided and Patient Education Provided     Education provided:   - Continue with HEP    Written Home Exercises Provided: Patient instructed to cont prior HEP.  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  Sister Ligia demonstrated fair  understanding of the education provided.     See EMR under Patient Instructions for exercises  provided prior visit.    Assessment     Pt currently reports displaying bilateral knee pain, stiffness, and weakness.  Pt required an increase in verbal and tactile cueing during today's treatment session for proper form and pacing of exercises. Instructing the patient to allow the knee to translate over the toes during step up to properly load the quadriceps muscle.  Pt tolerated treatment session good  and will continue to benefit from skilled therapy to address deficits.  Pt performed today's therapeutic interventions without adverse events.  Pt educated to continue HEP to improve progression.     Patient is now able to tolerate more activities in and out of physical therapy. Intensity of exercises are slowly increasing as patient continues to show strength improvements in the lower extremity.     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     Pt prognosis is Good.     Pt will continue to benefit from skilled outpatient physical therapy to address the deficits listed in the problem list box on initial evaluation, provide pt/family education and to maximize pt's level of independence in the home and community environment.     Pt's spiritual, cultural and educational needs considered and pt agreeable to plan of care and goals.    Anticipated barriers to physical therapy: None    Goals:   Short Term Goals: 3 weeks  Goal   Status    1. Pt. to report decreased bilateral knee pain  </= 3/10 at worst to increase tolerance for prolong standing, sitting, and walking.  Goal Met - 4/11/22   2. Pt. to demonstrate an increase Bilateral knee PROM to 125 degrees to improve flexibility.  Goal Met - 4/11/22   3. Pt. to demonstrate increased MMT for bilateral hip abductors to 4/5  to increase stability with ambulation on even surfaces.  Goal Met - 4/11/22   4. Pt. to demonstrate increased MMT for bilateral quadriceps to 4/5 to increase ability to negotiate stairs  Goal Met - 4/11/22   5. Pt. to demonstrate increased MMT  for bilateral hamstring  to 4/5 to increase tolerance to squatting  Goal Met - 4/11/22   6. Pt to be Independent with HEP to improve ROM and independence with ADL's  Goal Met - 4/11/22         Long Term Goals: 6 weeks  Goal Status    1. Pt. to report decreased bilateral knee pain  </= 1/10 at worst to increase tolerance for prolong standing, sitting, and walking.     2. Pt. to demonstrate an increase bilateral knee AROM to 120 degrees to improve ease with stair negotiation.     3. Pt. to demonstrate increased MMT for bilateral hip abductors to 4+/5  to increase stability with ambulation on even surfaces.     4. Pt. to demonstrate increased MMT for bilateral quadriceps to 4+/5 to increase ability to negotiate stairs     7. Pt. to demonstrate increased MMT for bilateral hamstring  to 4+/5 to increase tolerance to squatting     5. Pt to be Independent with HEP to improve ROM and independence with ADL's          Plan     Continued with current POC       Maksim Cox, PT ,DPT  4/11/2022

## 2022-04-11 NOTE — TELEPHONE ENCOUNTER
----- Message from Munira Claire sent at 4/11/2022  1:47 PM CDT -----  Contact: MAYANK DIAL [[id number]]  Type:  Patient Returning Call    Who Called:  MAYANK DIAL [[id number]]    Who Left Message for Patient:  Vanessa Pelaez    Does the patient know what this is regarding?: Yes    Would the patient rather a call back or a response via [facility]?  Call back     Best Call Back Number: [phone] (mobile)    Additional Information: 07/27 is not a good day. She would like a Tuesday or Thursday. She is also requesting a print out of her lab results.

## 2022-04-13 ENCOUNTER — CLINICAL SUPPORT (OUTPATIENT)
Dept: REHABILITATION | Facility: HOSPITAL | Age: 77
End: 2022-04-13
Payer: MEDICARE

## 2022-04-13 DIAGNOSIS — M25.561 CHRONIC PAIN OF BOTH KNEES: ICD-10-CM

## 2022-04-13 DIAGNOSIS — G89.29 CHRONIC PAIN OF BOTH KNEES: ICD-10-CM

## 2022-04-13 DIAGNOSIS — M25.662 KNEE STIFFNESS, LEFT: Primary | ICD-10-CM

## 2022-04-13 DIAGNOSIS — M25.562 CHRONIC PAIN OF BOTH KNEES: ICD-10-CM

## 2022-04-13 PROCEDURE — 97110 THERAPEUTIC EXERCISES: CPT | Mod: PO

## 2022-04-13 NOTE — PROGRESS NOTES
Physical Therapy Daily Treatment Note     Name: Ligia Curran  Clinic Number: [id number]    Therapy Diagnosis:   Encounter Diagnoses   Name Primary?    Knee stiffness, left Yes    Chronic pain of both knees      Physician: Odell Lovelace III, *    Visit Date: 2022    Physician Orders: PT Evaluate and treat  Medical Diagnosis from Referral: M17.0 (ICD-10-CM) - Arthritis of both knees  Evaluation Date: 3/17/2022  Plan of Care Expiration: 2022 or 12th Visit  Authorization Period Expiration: 3/18/2022 - 2022  Visit # / Visits authorized:   FOTO: Issued Visit # 1      Time In: 10:45 am   Time Out: 11:30 am  Billable Time: 45 minutes  Non-Billable Time: 0 minutes     Precautions: Standard and Fall  Insurance: Payor: MEDICARE / Plan: MEDICARE PART A & B / Product Type: Government /     Subjective     Pt reports: she was experiencing some joint stiffness along the knee yesterday. Patient mentions her chief complaint is more stiffness and discomfort along bilateral knee rather than pain.    She is compliant with home exercise program.     Response to previous treatment: No muscle soreness.    Pre-Treatment Pain Ratin/10 due to stiffness.  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 Patient cued for proper pacing and purse lip breathing)     Standing    Step up 3x8, bilateral (For single leg strength)  Lateral walks with red theraband (10 steps to the Right then Left) - 2 laps       *Bold exercise  performed       Home Exercises Provided and Patient Education Provided     Education provided:   - Continue with HEP    Written Home Exercises Provided: Patient instructed to cont prior HEP.  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  Sister Ligia demonstrated fair  understanding of the education provided.     See EMR under Patient Instructions for exercises provided prior visit.    Assessment     Pt currently reports displaying bilateral knee discomfort, stiffness, and weakness.  Pt required an increase in verbal and tactile cueing during today's treatment session for proper form and pacing of exercises. Instructing the patient to allow the knee to translate over the toes during step up to properly load the quadriceps muscle.  Pt tolerated treatment session good  and will continue to benefit from skilled therapy to address deficits.  Pt performed today's therapeutic interventions without adverse events.  Pt educated to continue HEP to improve progression.     Patient is now able to tolerate more activities in and out of physical therapy. Intensity of exercises are slowly increasing as patient continues to show strength improvements in the lower extremity.     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     Pt prognosis is Good.     Pt will continue to benefit from skilled outpatient physical therapy to address the deficits listed in the problem list box on initial evaluation, provide pt/family education and to maximize pt's level of independence in the home and community environment.     Pt's spiritual, cultural and educational needs considered and pt agreeable to plan of care and goals.    Anticipated barriers to physical therapy: None    Goals:   Short Term Goals: 3 weeks  Goal   Status    1. Pt. to report decreased bilateral knee pain  </= 3/10 at worst to increase tolerance for prolong standing, sitting, and walking.  Goal Met - 4/11/22    2. Pt. to demonstrate an increase Bilateral knee PROM to 125 degrees to improve flexibility.  Goal Met - 4/11/22   3. Pt. to demonstrate increased MMT for bilateral hip abductors to 4/5  to increase stability with ambulation on even surfaces.  Goal Met - 4/11/22   4. Pt. to demonstrate increased MMT for bilateral quadriceps to 4/5 to increase ability to negotiate stairs  Goal Met - 4/11/22   5. Pt. to demonstrate increased MMT for bilateral hamstring  to 4/5 to increase tolerance to squatting  Goal Met - 4/11/22   6. Pt to be Independent with HEP to improve ROM and independence with ADL's  Goal Met - 4/11/22         Long Term Goals: 6 weeks  Goal Status    1. Pt. to report decreased bilateral knee pain  </= 1/10 at worst to increase tolerance for prolong standing, sitting, and walking.     2. Pt. to demonstrate an increase bilateral knee AROM to 120 degrees to improve ease with stair negotiation.     3. Pt. to demonstrate increased MMT for bilateral hip abductors to 4+/5  to increase stability with ambulation on even surfaces.     4. Pt. to demonstrate increased MMT for bilateral quadriceps to 4+/5 to increase ability to negotiate stairs     7. Pt. to demonstrate increased MMT for bilateral hamstring  to 4+/5 to increase tolerance to squatting     5. Pt to be Independent with HEP to improve ROM and independence with ADL's          Plan     Continued with current POC       Maksim Cox, PT ,DPT  4/13/2022

## 2022-04-18 ENCOUNTER — TELEPHONE (OUTPATIENT)
Dept: OPHTHALMOLOGY | Facility: CLINIC | Age: 77
End: 2022-04-18
Payer: MEDICARE

## 2022-04-18 ENCOUNTER — CLINICAL SUPPORT (OUTPATIENT)
Dept: REHABILITATION | Facility: HOSPITAL | Age: 77
End: 2022-04-18
Payer: MEDICARE

## 2022-04-18 DIAGNOSIS — M25.562 CHRONIC PAIN OF BOTH KNEES: ICD-10-CM

## 2022-04-18 DIAGNOSIS — G89.29 CHRONIC PAIN OF BOTH KNEES: ICD-10-CM

## 2022-04-18 DIAGNOSIS — M25.662 KNEE STIFFNESS, LEFT: Primary | ICD-10-CM

## 2022-04-18 DIAGNOSIS — M25.561 CHRONIC PAIN OF BOTH KNEES: ICD-10-CM

## 2022-04-18 PROCEDURE — 97110 THERAPEUTIC EXERCISES: CPT | Mod: PO

## 2022-04-18 NOTE — PROGRESS NOTES
Physical Therapy Daily Treatment Note     Name: Ligia Curran  Clinic Number: [id number]    Therapy Diagnosis:   Encounter Diagnoses   Name Primary?    Knee stiffness, left Yes    Chronic pain of both knees      Physician: Odell Lovelace III, *    Visit Date: 2022    Physician Orders: PT Evaluate and treat  Medical Diagnosis from Referral: M17.0 (ICD-10-CM) - Arthritis of both knees  Evaluation Date: 3/17/2022  Plan of Care Expiration: 2022 or 12th Visit  Authorization Period Expiration: 3/18/2022 - 2022  Visit # / Visits authorized:   FOTO: Issued Visit # 1      Time In: 10:45 am   Time Out: 11:30 am  Billable Time: 45 minutes  Non-Billable Time: 0 minutes     Precautions: Standard and Fall  Insurance: Payor: MEDICARE / Plan: MEDICARE PART A & B / Product Type: Government /     Subjective     Pt reports: No significant change from the last therapy session, still doing well. Chief complaint continues to be more stiffness and discomfort along bilateral knee rather than pain.    She is compliant with home exercise program.     Response to previous treatment: No muscle soreness.    Pre-Treatment Pain Ratin/10 due to Left knee stiffness, no pain.  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 Patient cued for proper pacing and purse lip breathing)     Standing    Step over 3x8, bilateral (For single leg strength)  Lateral walks with red theraband (10 steps to the Right then Left) - 2 laps       *Bold exercise performed        Home Exercises Provided and Patient Education Provided     Education provided:   - Continue with HEP    Written Home Exercises Provided: Patient instructed to cont prior HEP.  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  Sister Ligia demonstrated fair  understanding of the education provided.     See EMR under Patient Instructions for exercises provided prior visit.    Assessment     Pt currently reports displaying bilateral knee discomfort, stiffness, and weakness.  Pt required an increase in verbal and tactile cueing during today's treatment session for proper form and pacing of exercises. Instructing the patient to allow the knee to translate over the toes during step up to properly load the quadriceps muscle.  Pt tolerated treatment session good  and will continue to benefit from skilled therapy to address deficits.  Pt performed today's therapeutic interventions without adverse events.  Pt educated to continue HEP to improve progression.     Patient is now able to tolerate more activities in and out of physical therapy. Intensity of exercises are slowly increasing as patient continues to show strength improvements in the lower extremity.     Patient was able to complete 8 sit to stands within 30 seconds.    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     Pt prognosis is Good.     Pt will continue to benefit from skilled outpatient physical therapy to address the deficits listed in the problem list box on initial evaluation, provide pt/family education and to maximize pt's level of independence in the home and community environment.     Pt's spiritual, cultural and educational needs considered and pt agreeable to plan of care and goals.    Anticipated barriers to physical therapy: None    Goals:   Short Term Goals: 3 weeks  Goal   Status    1. Pt. to report decreased bilateral knee pain  </= 3/10 at worst to increase tolerance for prolong  standing, sitting, and walking.  Goal Met - 4/11/22   2. Pt. to demonstrate an increase Bilateral knee PROM to 125 degrees to improve flexibility.  Goal Met - 4/11/22   3. Pt. to demonstrate increased MMT for bilateral hip abductors to 4/5  to increase stability with ambulation on even surfaces.  Goal Met - 4/11/22   4. Pt. to demonstrate increased MMT for bilateral quadriceps to 4/5 to increase ability to negotiate stairs  Goal Met - 4/11/22   5. Pt. to demonstrate increased MMT for bilateral hamstring  to 4/5 to increase tolerance to squatting  Goal Met - 4/11/22   6. Pt to be Independent with HEP to improve ROM and independence with ADL's  Goal Met - 4/11/22         Long Term Goals: 6 weeks  Goal Status    1. Pt. to report decreased bilateral knee pain  </= 1/10 at worst to increase tolerance for prolong standing, sitting, and walking.     2. Pt. to demonstrate an increase bilateral knee AROM to 120 degrees to improve ease with stair negotiation.     3. Pt. to demonstrate increased MMT for bilateral hip abductors to 4+/5  to increase stability with ambulation on even surfaces.     4. Pt. to demonstrate increased MMT for bilateral quadriceps to 4+/5 to increase ability to negotiate stairs     7. Pt. to demonstrate increased MMT for bilateral hamstring  to 4+/5 to increase tolerance to squatting     5. Pt to be Independent with HEP to improve ROM and independence with ADL's          Plan     Continued with current POC       Maksim Cox, PT ,DPT  4/18/2022

## 2022-04-18 NOTE — TELEPHONE ENCOUNTER
----- Message from Althea Avery sent at 4/18/2022  2:06 PM CDT -----  Contact: [place]  Patient pharmacy is calling to speak with someone in the office. Patient call back number is [phone]. Pharmacy called on last week but have not gotten a call back.

## 2022-04-20 ENCOUNTER — CLINICAL SUPPORT (OUTPATIENT)
Dept: REHABILITATION | Facility: HOSPITAL | Age: 77
End: 2022-04-20
Payer: MEDICARE

## 2022-04-20 DIAGNOSIS — M25.562 PAIN IN BOTH KNEES, UNSPECIFIED CHRONICITY: ICD-10-CM

## 2022-04-20 DIAGNOSIS — M25.561 PAIN IN BOTH KNEES, UNSPECIFIED CHRONICITY: ICD-10-CM

## 2022-04-20 DIAGNOSIS — M25.662 KNEE STIFFNESS, LEFT: Primary | ICD-10-CM

## 2022-04-20 PROCEDURE — 97110 THERAPEUTIC EXERCISES: CPT | Mod: PO,CQ

## 2022-04-20 NOTE — PROGRESS NOTES
Physical Therapy Daily Treatment Note     Name: Ligia Curran  Clinic Number: [id number]    Therapy Diagnosis:   Encounter Diagnoses   Name Primary?    Knee stiffness, left Yes    Pain in both knees, unspecified chronicity      Physician: Odell Lovelace III, *    Visit Date: 2022    Physician Orders: PT Evaluate and treat  Medical Diagnosis from Referral: M17.0 (ICD-10-CM) - Arthritis of both knees  Evaluation Date: 3/17/2022  Plan of Care Expiration: 2022 or 12th Visit  Authorization Period Expiration: 3/18/2022 - 2022  Visit # / Visits authorized:   PTA Visit #:   FOTO: Issued Visit # 1      Time In: 10:50 am   Time Out: 11:30 am  Billable Time: 40 minutes  Non-Billable Time: 0 minutes     Precautions: Standard and Fall  Insurance: Payor: MEDICARE / Plan: MEDICARE PART A & B / Product Type: Government /     Subjective     Pt reports: Knees feel okay today, just a little stiff but no real pain    She is compliant with home exercise program.     Response to previous treatment: No muscle soreness.    Pre-Treatment Pain Ratin/10 due to Left knee stiffness, no pain. 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 Patient cued for proper pacing and purse lip breathing)     Standing    Step over 3x8, bilateral (For single leg strength)  Lateral walks with red theraband (10 steps to the Right then Left) - 2 laps       *Bold exercise performed       Home Exercises Provided and Patient Education Provided      Education provided:   - Continue with HEP    Written Home Exercises Provided: Patient instructed to cont prior HEP.  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  Sister Ligia demonstrated fair  understanding of the education provided.     See EMR under Patient Instructions for exercises provided prior visit.    Assessment     Patient tolerated therapy well. Verbal and tactile cues used to improve technique for sidelying abduction to prevent the hips from rolling back and the used to of the hip flexors. Patient demonstrated partial understanding. Verbal cues given to reduce external rotation of the hip with lateral resisted walk. Patient will continue to benefit from skilled therapy.     Patient was able to complete 8 sit to stands within 30 seconds.    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     Pt prognosis is Good.     Pt will continue to benefit from skilled outpatient physical therapy to address the deficits listed in the problem list box on initial evaluation, provide pt/family education and to maximize pt's level of independence in the home and community environment.     Pt's spiritual, cultural and educational needs considered and pt agreeable to plan of care and goals.    Anticipated barriers to physical therapy: None    Goals:   Short Term Goals: 3 weeks  Goal   Status    1. Pt. to report decreased bilateral knee pain  </= 3/10 at worst to increase tolerance for prolong standing, sitting, and walking.  Goal Met - 4/11/22   2. Pt. to demonstrate an increase Bilateral knee PROM to 125 degrees to improve flexibility.  Goal Met - 4/11/22   3. Pt. to demonstrate increased MMT for bilateral hip abductors to 4/5  to increase stability with ambulation on even surfaces.  Goal Met - 4/11/22   4. Pt. to demonstrate increased MMT for bilateral quadriceps to 4/5 to increase ability to negotiate stairs  Goal Met - 4/11/22   5. Pt. to demonstrate  increased MMT for bilateral hamstring  to 4/5 to increase tolerance to squatting  Goal Met - 4/11/22   6. Pt to be Independent with HEP to improve ROM and independence with ADL's  Goal Met - 4/11/22         Long Term Goals: 6 weeks  Goal Status    1. Pt. to report decreased bilateral knee pain  </= 1/10 at worst to increase tolerance for prolong standing, sitting, and walking.     2. Pt. to demonstrate an increase bilateral knee AROM to 120 degrees to improve ease with stair negotiation.     3. Pt. to demonstrate increased MMT for bilateral hip abductors to 4+/5  to increase stability with ambulation on even surfaces.     4. Pt. to demonstrate increased MMT for bilateral quadriceps to 4+/5 to increase ability to negotiate stairs     7. Pt. to demonstrate increased MMT for bilateral hamstring  to 4+/5 to increase tolerance to squatting     5. Pt to be Independent with HEP to improve ROM and independence with ADL's          Plan     Continued with current POC       Bairon Parrish, PTA   4/20/2022

## 2022-04-25 ENCOUNTER — CLINICAL SUPPORT (OUTPATIENT)
Dept: REHABILITATION | Facility: HOSPITAL | Age: 77
End: 2022-04-25
Payer: MEDICARE

## 2022-04-25 DIAGNOSIS — M25.561 CHRONIC PAIN OF BOTH KNEES: ICD-10-CM

## 2022-04-25 DIAGNOSIS — M25.562 CHRONIC PAIN OF BOTH KNEES: ICD-10-CM

## 2022-04-25 DIAGNOSIS — M25.662 KNEE STIFFNESS, LEFT: Primary | ICD-10-CM

## 2022-04-25 DIAGNOSIS — G89.29 CHRONIC PAIN OF BOTH KNEES: ICD-10-CM

## 2022-04-25 PROCEDURE — 97110 THERAPEUTIC EXERCISES: CPT | Mod: PO

## 2022-04-25 NOTE — PROGRESS NOTES
Physical Therapy Daily Treatment Note     Name: Ligia Curran  Clinic Number: [id number]    Therapy Diagnosis:    Encounter Diagnoses   Name Primary?    Knee stiffness, left Yes    Chronic pain of both knees      Physician: Odell Lovelace III, *    Visit Date: 2022    Physician Orders: PT Evaluate and treat  Medical Diagnosis from Referral: M17.0 (ICD-10-CM) - Arthritis of both knees  Evaluation Date: 3/17/2022  Plan of Care Expiration: 2022 or 12th Visit  Authorization Period Expiration: 3/18/2022 - 2022  Visit # / Visits authorized: 10/19  PTA Visit #: 0/6  FOTO: Issued Visit # 1     1st Visit FOTO - 56  5th Visit FOTO - 55  10th Visit FOTO - 49  D/C FOTO -    Time In: 10:45 am   Time Out: 11:30 am  Billable Time: 45 minutes  Non-Billable Time: 0 minutes     Precautions: Standard and Fall  Insurance: Payor: MEDICARE / Plan: MEDICARE PART A & B / Product Type: Government /     Subjective     Pt reports: experiencing a little stiffness in the Left knee.    She is compliant with home exercise program.     Response to previous treatment: No muscle soreness.  Pre-Treatment Pain Ratin/10 due to Left knee stiffness, no pain. 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 Patient cued for proper pacing and purse lip breathing)     Standing    Step over 3x8, bilateral (For single leg strength)  Lateral walks with red theraband (10 steps to the Right then Left) - 3 laps       *Bold exercise performed        Home Exercises Provided and Patient Education Provided     Education provided:   - Continue with HEP    Written Home Exercises Provided: Patient instructed to cont prior HEP.  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  Sister Ligia demonstrated fair  understanding of the education provided.     See EMR under Patient Instructions for exercises provided prior visit.    Assessment     Pt currently reports displaying Bilateral knee stiffness and weakness.  Pt required an increase in verbal and tactile cueing during today's treatment session for proper form and pacing of exercises.  Pt tolerated treatment session good  and will continue to benefit from skilled therapy to address deficits.  Pt performed today's therapeutic interventions without adverse events.  Pt educated to continue HEP to improve progression.     Patient was able to complete 8 sit to stands within 30 seconds.    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     Pt prognosis is Good.     Pt will continue to benefit from skilled outpatient physical therapy to address the deficits listed in the problem list box on initial evaluation, provide pt/family education and to maximize pt's level of independence in the home and community environment.     Pt's spiritual, cultural and educational needs considered and pt agreeable to plan of care and goals.    Anticipated barriers to physical therapy: None    Goals:   Short Term Goals: 3 weeks  Goal   Status    1. Pt. to report decreased bilateral knee pain  </= 3/10 at worst to increase tolerance for prolong standing, sitting, and walking.  Goal Met - 4/11/22   2. Pt. to demonstrate an increase Bilateral knee PROM to 125 degrees to improve flexibility.  Goal Met - 4/11/22   3. Pt. to demonstrate increased MMT for bilateral hip abductors to 4/5  to increase stability with ambulation on even surfaces.  Goal Met - 4/11/22   4. Pt. to demonstrate  increased MMT for bilateral quadriceps to 4/5 to increase ability to negotiate stairs  Goal Met - 4/11/22   5. Pt. to demonstrate increased MMT for bilateral hamstring  to 4/5 to increase tolerance to squatting  Goal Met - 4/11/22   6. Pt to be Independent with HEP to improve ROM and independence with ADL's  Goal Met - 4/11/22         Long Term Goals: 6 weeks  Goal Status    1. Pt. to report decreased bilateral knee pain  </= 1/10 at worst to increase tolerance for prolong standing, sitting, and walking.     2. Pt. to demonstrate an increase bilateral knee AROM to 120 degrees to improve ease with stair negotiation.     3. Pt. to demonstrate increased MMT for bilateral hip abductors to 4+/5  to increase stability with ambulation on even surfaces.     4. Pt. to demonstrate increased MMT for bilateral quadriceps to 4+/5 to increase ability to negotiate stairs     7. Pt. to demonstrate increased MMT for bilateral hamstring  to 4+/5 to increase tolerance to squatting     5. Pt to be Independent with HEP to improve ROM and independence with ADL's          Plan     Continued with current POC       Maksim Cox, PT   4/25/2022

## 2022-04-25 NOTE — PROGRESS NOTES
Physical Therapy Daily Treatment Note     Name: Ligia Curran  Clinic Number: [id number]    Therapy Diagnosis:   No diagnosis found.  Physician: Odell Lovelace III, *    Visit Date: 2022    Physician Orders: PT Evaluate and treat  Medical Diagnosis from Referral: M17.0 (ICD-10-CM) - Arthritis of both knees  Evaluation Date: 3/17/2022  Plan of Care Expiration: 2022 or 12th Visit  Authorization Period Expiration: 3/18/2022 - 2022  Visit # / Visits authorized:   PTA Visit #: 0/6  FOTO: Issued Visit # 1      Time In: 10:45 am   Time Out: 11:30 am  Billable Time: 45 minutes  Non-Billable Time: 0 minutes     Precautions: Standard and Fall  Insurance: Payor: MEDICARE / Plan: MEDICARE PART A & B / Product Type: Government /     Subjective     Pt reports: Knees feel okay today, just a little stiff but no real pain    She is compliant with home exercise program.     Response to previous treatment: No muscle soreness.    Pre-Treatment Pain Ratin/10 due to Left knee stiffness, no pain. 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 Patient cued for proper pacing and purse lip breathing)     Standing    Step over 3x8, bilateral (For single leg strength)  Lateral walks with red theraband (10 steps to the Right then Left) - 2 laps       *Bold exercise performed       Home Exercises Provided and Patient Education Provided     Education provided:   - Continue with HEP    Written Home Exercises Provided: Patient instructed  to cont prior HEP.  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  Sister Ligia demonstrated fair  understanding of the education provided.     See EMR under Patient Instructions for exercises provided prior visit.    Assessment     Patient tolerated therapy well. Verbal and tactile cues used to improve technique for sidelying abduction to prevent the hips from rolling back and the used to of the hip flexors. Patient demonstrated partial understanding. Verbal cues given to reduce external rotation of the hip with lateral resisted walk. Patient will continue to benefit from skilled therapy.     Patient was able to complete 8 sit to stands within 30 seconds.    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     Pt prognosis is Good.     Pt will continue to benefit from skilled outpatient physical therapy to address the deficits listed in the problem list box on initial evaluation, provide pt/family education and to maximize pt's level of independence in the home and community environment.     Pt's spiritual, cultural and educational needs considered and pt agreeable to plan of care and goals.    Anticipated barriers to physical therapy: None    Goals:   Short Term Goals: 3 weeks  Goal   Status    1. Pt. to report decreased bilateral knee pain  </= 3/10 at worst to increase tolerance for prolong standing, sitting, and walking.  Goal Met - 4/11/22   2. Pt. to demonstrate an increase Bilateral knee PROM to 125 degrees to improve flexibility.  Goal Met - 4/11/22   3. Pt. to demonstrate increased MMT for bilateral hip abductors to 4/5  to increase stability with ambulation on even surfaces.  Goal Met - 4/11/22   4. Pt. to demonstrate increased MMT for bilateral quadriceps to 4/5 to increase ability to negotiate stairs  Goal Met - 4/11/22   5. Pt. to demonstrate increased MMT for bilateral hamstring  to 4/5 to increase tolerance to squatting  Goal Met - 4/11/22    6. Pt to be Independent with HEP to improve ROM and independence with ADL's  Goal Met - 4/11/22         Long Term Goals: 6 weeks  Goal Status    1. Pt. to report decreased bilateral knee pain  </= 1/10 at worst to increase tolerance for prolong standing, sitting, and walking.     2. Pt. to demonstrate an increase bilateral knee AROM to 120 degrees to improve ease with stair negotiation.     3. Pt. to demonstrate increased MMT for bilateral hip abductors to 4+/5  to increase stability with ambulation on even surfaces.     4. Pt. to demonstrate increased MMT for bilateral quadriceps to 4+/5 to increase ability to negotiate stairs     7. Pt. to demonstrate increased MMT for bilateral hamstring  to 4+/5 to increase tolerance to squatting     5. Pt to be Independent with HEP to improve ROM and independence with ADL's          Plan     Continued with current POC       Maksim Cox, PT   4/25/2022

## 2022-04-27 ENCOUNTER — CLINICAL SUPPORT (OUTPATIENT)
Dept: REHABILITATION | Facility: HOSPITAL | Age: 77
End: 2022-04-27
Payer: MEDICARE

## 2022-04-27 DIAGNOSIS — M25.562 CHRONIC PAIN OF BOTH KNEES: ICD-10-CM

## 2022-04-27 DIAGNOSIS — M25.561 CHRONIC PAIN OF BOTH KNEES: ICD-10-CM

## 2022-04-27 DIAGNOSIS — M25.662 KNEE STIFFNESS, LEFT: Primary | ICD-10-CM

## 2022-04-27 DIAGNOSIS — G89.29 CHRONIC PAIN OF BOTH KNEES: ICD-10-CM

## 2022-04-27 PROCEDURE — 97110 THERAPEUTIC EXERCISES: CPT | Mod: PO

## 2022-04-27 NOTE — PROGRESS NOTES
Physical Therapy Daily Treatment Note     Name: Ligia Curran  Clinic Number: [id number]    Therapy Diagnosis:    Encounter Diagnoses   Name Primary?    Knee stiffness, left Yes    Chronic pain of both knees      Physician: Odell Lovelace III, *    Visit Date: 2022    Physician Orders: PT Evaluate and treat  Medical Diagnosis from Referral: M17.0 (ICD-10-CM) - Arthritis of both knees  Evaluation Date: 3/17/2022  Plan of Care Expiration: 2022 or 12th Visit  Authorization Period Expiration: 3/18/2022 - 2022  Visit # / Visits authorized:   PTA Visit #: 0/6  FOTO: Issued Visit # 1     1st Visit FOTO - 56  5th Visit FOTO - 55  10th Visit FOTO - 49  D/C FOTO -    Time In: 10:45 am   Time Out: 11:30 am  Billable Time: 45 minutes  Non-Billable Time: 0 minutes     Precautions: Standard and Fall  Insurance: Payor: MEDICARE / Plan: MEDICARE PART A & B / Product Type: Government /     Subjective     Pt reports: experiencing a little stiffness in the Left knee. No significant change from the last therapy session.    She is compliant with home exercise program.     Pre-Treatment Pain Ratin/10 due to Left knee stiffness, no pain. 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 Patient cued for proper pacing and purse lip breathing)     Standing    Step over 3x8, bilateral (For single leg strength)  Lateral walks with red theraband (10 steps to the Right then Left) - 3 laps       *Bold exercise performed        Home Exercises Provided and Patient Education Provided     Education provided:   - Continue with HEP    Written Home Exercises Provided: Patient instructed to cont prior HEP.  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  Sister Ligia demonstrated fair  understanding of the education provided.     See EMR under Patient Instructions for exercises provided prior visit.    Assessment     Pt currently reports displaying Bilateral knee stiffness and weakness.  Pt required an increase in verbal and tactile cueing during today's treatment session for proper form and pacing of exercises.  Pt tolerated treatment session good  and will continue to benefit from skilled therapy to address deficits.  Pt performed today's therapeutic interventions without adverse events.  Pt educated to continue HEP to improve progression.     Patient was able to complete 8 sit to stands within 30 seconds.    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     Pt prognosis is Good.     Pt will continue to benefit from skilled outpatient physical therapy to address the deficits listed in the problem list box on initial evaluation, provide pt/family education and to maximize pt's level of independence in the home and community environment.     Pt's spiritual, cultural and educational needs considered and pt agreeable to plan of care and goals.    Anticipated barriers to physical therapy: None    Goals:   Short Term Goals: 3 weeks  Goal   Status    1. Pt. to report decreased bilateral knee pain  </= 3/10 at worst to increase tolerance for prolong standing, sitting, and walking.  Goal Met - 4/11/22   2. Pt. to demonstrate an increase Bilateral knee PROM to 125 degrees to improve flexibility.  Goal Met - 4/11/22   3. Pt. to demonstrate increased MMT for bilateral hip abductors to 4/5  to increase stability with ambulation on even surfaces.  Goal Met - 4/11/22   4. Pt. to demonstrate  increased MMT for bilateral quadriceps to 4/5 to increase ability to negotiate stairs  Goal Met - 4/11/22   5. Pt. to demonstrate increased MMT for bilateral hamstring  to 4/5 to increase tolerance to squatting  Goal Met - 4/11/22   6. Pt to be Independent with HEP to improve ROM and independence with ADL's  Goal Met - 4/11/22         Long Term Goals: 6 weeks  Goal Status    1. Pt. to report decreased bilateral knee pain  </= 1/10 at worst to increase tolerance for prolong standing, sitting, and walking.     2. Pt. to demonstrate an increase bilateral knee AROM to 120 degrees to improve ease with stair negotiation.     3. Pt. to demonstrate increased MMT for bilateral hip abductors to 4+/5  to increase stability with ambulation on even surfaces.     4. Pt. to demonstrate increased MMT for bilateral quadriceps to 4+/5 to increase ability to negotiate stairs     7. Pt. to demonstrate increased MMT for bilateral hamstring  to 4+/5 to increase tolerance to squatting     5. Pt to be Independent with HEP to improve ROM and independence with ADL's          Plan     Continued with current POC       Maksim Cox, PT     4/27/2022

## 2022-05-04 ENCOUNTER — CLINICAL SUPPORT (OUTPATIENT)
Dept: REHABILITATION | Facility: HOSPITAL | Age: 77
End: 2022-05-04
Payer: MEDICARE

## 2022-05-04 ENCOUNTER — DOCUMENTATION ONLY (OUTPATIENT)
Dept: REHABILITATION | Facility: HOSPITAL | Age: 77
End: 2022-05-04
Payer: MEDICARE

## 2022-05-04 DIAGNOSIS — M25.562 CHRONIC PAIN OF BOTH KNEES: ICD-10-CM

## 2022-05-04 DIAGNOSIS — M25.561 CHRONIC PAIN OF BOTH KNEES: ICD-10-CM

## 2022-05-04 DIAGNOSIS — G89.29 CHRONIC PAIN OF BOTH KNEES: ICD-10-CM

## 2022-05-04 DIAGNOSIS — M25.662 KNEE STIFFNESS, LEFT: Primary | ICD-10-CM

## 2022-05-04 PROCEDURE — 97110 THERAPEUTIC EXERCISES: CPT | Mod: PO

## 2022-05-04 NOTE — PROGRESS NOTES
Physical Therapy Daily Treatment Note     Name: Ligia Curran  [facility] Number: [id number]    Therapy Diagnosis:    Encounter Diagnoses   Name Primary?    Knee stiffness, left Yes    Chronic pain of both knees      Physician: Odell Lovelace III, *    Visit Date: 2022    Physician Orders: PT Evaluate and treat  Medical Diagnosis from Referral: M17.0 (ICD-10-CM) - Arthritis of both knees  Evaluation Date: 3/17/2022  Plan of Care Expiration: 2022 or 12th Visit  Authorization Period Expiration: 3/18/2022 - 2022  Visit # / Visits authorized:   PTA Visit #: 0/6    1st Visit FOTO - 56  5th Visit FOTO - 55  10th Visit FOTO - 49  D/C FOTO - 67    Time In: 10:06 am   Time Out: 10:45 am  Billable Time: 39 minutes  Non-Billable Time: 0 minutes     Precautions: Standard and Fall  Insurance: Payor: MEDICARE / Plan: MEDICARE PART A & B / Product Type: Government /     Subjective     Pt reports: she is doing well without any complications of bilateral knees.    She is compliant with home exercise program.     Pre-Treatment Pain Ratin/10 due to Left knee stiffness, no pain. Right knee is a 1/10  Post-Treatment Pain Ratin/10    Location: Bilateral knee    Objective     Posture: Fair  Palpation: No tenderness noted with palpation  Sensation: Intact  DTRs: Intact     Range of Motion/Strength:      Knee Left Right Pain/Dysfunction with Movement   AROM         Knee Flexion (140)  115°   115°      Knee Extension (0)  0 °   0 °            Knee Left Right Pain/Dysfunction with Movement   PROM          Knee Flexion (140)  125 °   125 °      Knee Extension (0)  0 °   0 °         RLE 5/5 4+/5 4/5 4-/5 3+/5 3/5 3-/5 2+/5 2/5 2-/5 1/5 0 NT   Hip Flexion   x                        Hip Extension   x                        Hip Abduction   x                        Hip Adduction   x                        Hip Internal Rotation   x                        Hip External Rotation   x                        Knee Flexion   x                         Knee Extension   x                        Ankle Dorsiflexion   x                        Ankle Plantarflexion   x                            LLE 5/5 4+/5 4/5 4-/5 3+/5 3/5 3-/5 2+/5 2/5 2-/5 1/5 0 NT   Hip Flexion   x                         Hip Extension   x                         Hip Abduction   x                         Hip Adduction   x                         Hip Internal Rotation   x                         Hip External Rotation   x                         Knee Flexion   x                        Knee Extension   x                         Ankle Dorsiflexion   x                         Ankle Plantarflexion   x                              Special Tests Left Right Comments   Flexibility          90/90 Hamstrings  65° 65°          Treatment      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 Patient cued for proper pacing and purse lip breathing)     Standing    Step over 3x8, bilateral (For single leg strength)  Lateral walks with red theraband (10 steps to the Right then Left) - 3 laps       *Bold exercise performed       Home Exercises Provided and Patient Education Provided     Education provided:   - home exercise program updated on 5/4/2022    Written Home Exercises Provided: Patient instructed to cont prior HEP.  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  Sister Ligia demonstrated fair  understanding of the education provided.     See EMR under Patient Instructions for exercises provided prior visit.    Assessment     Patient  started physical therapy due to bilateral knee arthritis causing pain, stiffness, and weakness. Pt required an increase in verbal and tactile cueing during today's treatment session for proper form and pacing of exercises.  Pt tolerated treatment session good  without adverse events.  Pt educated to continue HEP to improve progression. Patient has achieved the majority of her physical therapy goals. Patient is discharged from physical therapy.      Pt's spiritual, cultural and educational needs considered and pt agreeable to plan of care and goals.    Anticipated barriers to physical therapy: None    Goals:   Short Term Goals: 3 weeks  Goal   Status    1. Pt. to report decreased bilateral knee pain  </= 3/10 at worst to increase tolerance for prolong standing, sitting, and walking.  Goal Met - 4/11/22   2. Pt. to demonstrate an increase Bilateral knee PROM to 125 degrees to improve flexibility.  Goal Met - 4/11/22   3. Pt. to demonstrate increased MMT for bilateral hip abductors to 4/5  to increase stability with ambulation on even surfaces.  Goal Met - 4/11/22   4. Pt. to demonstrate increased MMT for bilateral quadriceps to 4/5 to increase ability to negotiate stairs  Goal Met - 4/11/22   5. Pt. to demonstrate increased MMT for bilateral hamstring  to 4/5 to increase tolerance to squatting  Goal Met - 4/11/22   6. Pt to be Independent with HEP to improve ROM and independence with ADL's  Goal Met - 4/11/22         Long Term Goals: 6 weeks  Goal Status    1. Pt. to report decreased bilateral knee pain  </= 1/10 at worst to increase tolerance for prolong standing, sitting, and walking.  Goal Discharge - 5/4/2022   2. Pt. to demonstrate an increase bilateral knee AROM to 120 degrees to improve ease with stair negotiation.  Goal Discharge - 5/4/2022   3. Pt. to demonstrate increased MMT for bilateral hip abductors to 4+/5  to increase stability with ambulation on even surfaces.  Goal Met - 5/4/2022   4. Pt. to  demonstrate increased MMT for bilateral quadriceps to 4+/5 to increase ability to negotiate stairs  Goal Met - 5/4/2022   7. Pt. to demonstrate increased MMT for bilateral hamstring  to 4+/5 to increase tolerance to squatting  Goal Met - 5/4/2022   5. Pt to be Independent with HEP to improve ROM and independence with ADL's Goal Met - 5/4/2022         Plan     Continued with current POC       Maksim Cox, PT     5/4/2022

## 2022-05-04 NOTE — PROGRESS NOTES
30 day PT-PTA face-face discussion with Maksim Cox DPT re:Name: Ligia Curran [facility] Number: [id number] patient status, POC, and plan for progression done

## 2022-05-19 RX ORDER — TRIAMCINOLONE ACETONIDE 40 MG/ML
40 INJECTION, SUSPENSION INTRA-ARTICULAR; INTRAMUSCULAR
Status: DISCONTINUED | OUTPATIENT
Start: 2022-02-17 | End: 2022-05-19 | Stop reason: HOSPADM

## 2022-05-19 NOTE — PROGRESS NOTES
Subjective:     HPI:   Ligia Curran is a 76 y.o. female who presents for eval B knee pain    For seen June of 2021 is and he ER follow-up after a fall onto the left knee.    She says things have gotten better but her knee was never back to 100%.    Her left knee has been worse over the past 6 weeks more stiffness more anterior lateral pain and swelling.  On the right side she has intermittent symptoms    She takes occasional ibuprofen and Tylenol.  She put some Voltaren gel on the right knee and uses ice     Objective:   Body mass index is 41.34 kg/m².  Exam:  No limp nonantalgic gait negative Trendelenburg.  Both knees 0-115 degree knee range of motion 5° valgus alignment.  She is tender palpation at the lateral joint line on the left side nontender on the right side.  Mild effusions.  Knees are stable anterior-posterior varus valgus stresses with no flexion contracture or extensor lag.  She has negative Lorie's bilaterally      Imaging:  B knee XR: Mild-mod grade 2/3 medial compartment changes subtle narrowing bilaterally      Assessment:       ICD-10-CM ICD-9-CM   1. Arthritis of both knees  M17.0 716.96           Plan:       The above findings were discussed with patient length. We discussed the risks of conservative versus surgical management knee arthritis. Conservative management consisting of anti-inflammatory medications, glucosamine/chondroitin sulfate, weight loss, physical therapy, activity modification, as well as injections (lubricant versus corticosteroid) was discussed at length. At this point considering the patient's level of activity, pain, and radiographic findings I recommend continued conservative management of the knee arthritis.     The patient was given a handout with treatment strategies for hip and knee joint care prior to surgery from [employer], the [employer].   This included information regarding medications, injections, weight loss, exercise,  braces, physical therapy, and alternative therapies.     I explained the potentially adverse gastric, cardiac, and renal effects of NSAIDs and explained that if the patient wishes to take it for longer that the patient should discuss this with their primary care physician to determine if it is safe to do so.    x Tylenol   X   IBU Aleve   X B knees Voltaren Gel   x [employer] non-op arthritis info    Bursitis info    Total Joint Info    HEP: [employer] home exercise conditioning program    x PT   X L knee CSI: intra-articular steroid injection    CSI: greater trochanter bursitis    HA: hyaluronic acid injection    Brace:     Referral:      F/u PRN    Orders Placed This Encounter   Procedures    Ambulatory referral/consult to Physical/Occupational Therapy     Standing Status:   Future     Number of Occurrences:   1     Standing Expiration Date:   3/17/2023     Referral Priority:   Routine     Referral Type:   Physical Medicine     Referral Reason:   Specialty Services Required     Number of Visits Requested:   1             Past Medical History:   Diagnosis Date    Arthritis     Breast cyst     Chronic angle-closure glaucoma of both eyes, severe stage 11/18/2016    Fibrocystic breast     Gallbladder problem     Glaucoma     Glaucoma     Joint pain     Keloid cicatrix     PONV (postoperative nausea and vomiting)     Uveitis        Past Surgical History:   Procedure Laterality Date    AK POST CATARACT/PKP Right 10/26/2017    PKP     BAERVELDT GLAUCOMA SHUNT Right 12/26/2012        breast biopsy Left 1975    benign    BREAST BIOPSY Right 1980    benign    CATARACT EXTRACTION Left n/a    Aphakic    CATARACT EXTRACTION W/  INTRAOCULAR LENS IMPLANT Right 05/24/2012    WITH DSEK ()    CHOLECYSTECTOMY      COLONOSCOPY N/A 02/19/2020    Procedure: COLONOSCOPY;  Surgeon: Stewart Domingo MD;  Location: [facility] ([address] [place] [address]);  Service: Endoscopy;  Laterality: N/A;    CORNEAL TRANSPLANT  Right 05/24/2012    DSEK/PHACO IOL ()    CORNEAL TRANSPLANT Right 10/26/2017    PKP ()    DISSECTION AND REMOVAL OF FIBROUS PUPILLARY MEMBRANE Right 01/26/2017    DR. CALVO    HYSTERECTOMY      INTRAOCULAR LENS IMPLANT, SECONDARY Left 05/10/2021    Procedure: INSERTION, IOL PROSTHESIS, SECONDARY;  Surgeon: Lucia Calvo MD;  Location: [facility] OR;  Service: Ophthalmology;  Laterality: Left;    OOPHORECTOMY      PENETRATING KERATOPLASTY Left 05/10/2021    Procedure: KERATOPLASTY, PENETRATING;  Surgeon: Lucia Calvo MD;  Location: [facility] OR;  Service: Ophthalmology;  Laterality: Left;    REMOVAL OF BAERVELDT GLAUCOMA SHUNT Right 02/27/2017        REMOVAL OF NECROTIC TISSUE OF EYE Right 04/09/2013    REMOVAL OF TISSUE AROUND SHUNT WITH PERICARDIAL PATCH ( AND )    STRABISMUS SURGERY         Family History   Problem Relation Age of Onset    Glaucoma Mother     Blindness Mother     Cholecystitis Mother     Arthritis Mother     Glaucoma Sister     Cataracts Sister     Hypertension Sister     Arthritis Father     Cancer Brother         prostate    Melanoma Neg Hx     Amblyopia Neg Hx     Macular degeneration Neg Hx     Retinal detachment Neg Hx     Strabismus Neg Hx        Social History     Socioeconomic History    Marital status: Single   Tobacco Use    Smoking status: Never Smoker    Smokeless tobacco: Never Used   Substance and Sexual Activity    Alcohol use: Not Currently     Comment: socially    Drug use: No     Social Determinants of Health     Financial Resource Strain: Low Risk     Difficulty of Paying Living Expenses: Not hard at all   Food Insecurity: No Food Insecurity    Worried About Running Out of Food in the Last Year: Never true    Ran Out of Food in the Last Year: Never true   Transportation Needs: No Transportation Needs    Lack of Transportation (Medical): No    Lack of Transportation (Non-Medical): No   Housing Stability:  Unknown    Unable to Pay for Housing in the Last Year: No    Unstable Housing in the Last Year: No

## 2022-05-19 NOTE — PROCEDURES
Large Joint Aspiration/Injection: L knee    Date/Time: 2/17/2022 8:15 AM  Performed by: Odell Lovelace III, MD  Authorized by: Odell Lovelace III, MD     Consent Done?:  Yes (Verbal)  Indications:  Pain  Timeout: prior to procedure the correct patient, procedure, and site was verified    Prep: patient was prepped and draped in usual sterile fashion      Local anesthesia used?: Yes    Local anesthetic:  Lidocaine 1% without epinephrine  Anesthetic total (ml):  5      Details:  Needle Size:  21 G  Location:  Knee  Site:  L knee  Medications:  40 mg triamcinolone acetonide 40 mg/mL  Patient tolerance:  Patient tolerated the procedure well with no immediate complications

## 2022-05-24 ENCOUNTER — HOSPITAL ENCOUNTER (OUTPATIENT)
Facility: HOSPITAL | Age: 77
Discharge: HOME | End: 2022-05-24
Attending: INTERNAL MEDICINE | Admitting: INTERNAL MEDICINE
Payer: MEDICARE

## 2022-05-24 ENCOUNTER — ANESTHESIA (OUTPATIENT)
Dept: ENDOSCOPY | Facility: HOSPITAL | Age: 77
End: 2022-05-24
Payer: MEDICARE

## 2022-05-24 ENCOUNTER — ANESTHESIA EVENT (OUTPATIENT)
Dept: ENDOSCOPY | Facility: HOSPITAL | Age: 77
End: 2022-05-24

## 2022-05-24 VITALS
DIASTOLIC BLOOD PRESSURE: 65 MMHG | SYSTOLIC BLOOD PRESSURE: 142 MMHG | HEIGHT: 60 IN | HEART RATE: 73 BPM | RESPIRATION RATE: 16 BRPM | BODY MASS INDEX: 40.64 KG/M2 | WEIGHT: 207 LBS | TEMPERATURE: 98 F | OXYGEN SATURATION: 96 %

## 2022-05-24 DIAGNOSIS — R10.9 ABDOMINAL PAIN: ICD-10-CM

## 2022-05-24 DIAGNOSIS — R10.9 ABDOMINAL PAIN, UNSPECIFIED ABDOMINAL LOCATION: Primary | ICD-10-CM

## 2022-05-24 PROCEDURE — 37000008 HC ANESTHESIA 1ST 15 MINUTES: Performed by: INTERNAL MEDICINE

## 2022-05-24 PROCEDURE — 63600175 PHARM REV CODE 636 W HCPCS: Performed by: NURSE ANESTHETIST, CERTIFIED REGISTERED

## 2022-05-24 PROCEDURE — 43251 EGD REMOVE LESION SNARE: CPT | Mod: ,,, | Performed by: INTERNAL MEDICINE

## 2022-05-24 PROCEDURE — E9220 PRA ENDO ANESTHESIA: ICD-10-PCS | Mod: ,,, | Performed by: NURSE ANESTHETIST, CERTIFIED REGISTERED

## 2022-05-24 PROCEDURE — 43251 PR EGD, FLEX, W/REMOVAL, TUMOR/POLYP/LESION(S), SNARE: ICD-10-PCS | Mod: ,,, | Performed by: INTERNAL MEDICINE

## 2022-05-24 PROCEDURE — 88305 TISSUE EXAM BY PATHOLOGIST: CPT | Mod: 26,,, | Performed by: PATHOLOGY

## 2022-05-24 PROCEDURE — 43251 EGD REMOVE LESION SNARE: CPT | Performed by: INTERNAL MEDICINE

## 2022-05-24 PROCEDURE — E9220 PRA ENDO ANESTHESIA: HCPCS | Mod: ,,, | Performed by: NURSE ANESTHETIST, CERTIFIED REGISTERED

## 2022-05-24 PROCEDURE — 25000003 PHARM REV CODE 250: Performed by: INTERNAL MEDICINE

## 2022-05-24 PROCEDURE — 88305 TISSUE EXAM BY PATHOLOGIST: CPT | Performed by: PATHOLOGY

## 2022-05-24 PROCEDURE — 37000009 HC ANESTHESIA EA ADD 15 MINS: Performed by: INTERNAL MEDICINE

## 2022-05-24 PROCEDURE — 25000003 PHARM REV CODE 250: Performed by: NURSE ANESTHETIST, CERTIFIED REGISTERED

## 2022-05-24 PROCEDURE — 27201089 HC SNARE, DISP (ANY): Performed by: INTERNAL MEDICINE

## 2022-05-24 PROCEDURE — 88305 TISSUE EXAM BY PATHOLOGIST: ICD-10-PCS | Mod: 26,,, | Performed by: PATHOLOGY

## 2022-05-24 RX ORDER — PROPOFOL 10 MG/ML
VIAL (ML) INTRAVENOUS
Status: DISCONTINUED | OUTPATIENT
Start: 2022-05-24 | End: 2022-05-24

## 2022-05-24 RX ORDER — SODIUM CHLORIDE 9 MG/ML
INJECTION, SOLUTION INTRAVENOUS CONTINUOUS
Status: DISCONTINUED | OUTPATIENT
Start: 2022-05-24 | End: 2022-05-24 | Stop reason: HOSPADM

## 2022-05-24 RX ORDER — LIDOCAINE HYDROCHLORIDE 20 MG/ML
INJECTION INTRAVENOUS
Status: DISCONTINUED | OUTPATIENT
Start: 2022-05-24 | End: 2022-05-24

## 2022-05-24 RX ADMIN — PROPOFOL 150 MCG/KG/MIN: 10 INJECTION, EMULSION INTRAVENOUS at 11:05

## 2022-05-24 RX ADMIN — SODIUM CHLORIDE: 0.9 INJECTION, SOLUTION INTRAVENOUS at 10:05

## 2022-05-24 RX ADMIN — LIDOCAINE HYDROCHLORIDE 100 MG: 20 INJECTION, SOLUTION INTRAVENOUS at 11:05

## 2022-05-24 RX ADMIN — PROPOFOL 50 MG: 10 INJECTION, EMULSION INTRAVENOUS at 11:05

## 2022-05-24 NOTE — H&P
Short Stay Endoscopy History and Physical    PCP - Destinee Louie MD    Procedure - EGD  ASA - per anesthesia  Mallampati - per anesthesia  History of Anesthesia problems - no  Family history Anesthesia problems -  no   Plan of anesthesia - MAC    HPI:  This is a 76 y.o. female here for evaluation of left lower abdominal pain.  Seen by Dr. Diaz in the past with concern for hernia, but no hernia was seen by ultrasound.  She denies nausea, vomiting, heartburn, regurgitation, or relationship to eating or food.  She has constipation a couple of times per month.  She eats prunes and takes metamucil every day.  She experiences some relief after BM.  The pain is exacerbated by movement and sitting for prolonged periods of time.          ROS:  Constitutional: No fevers, chills  CV: No chest pain  Pulm: No cough, No shortness of breath  Ophtho: No vision changes  GI: see HPI    Medical History:  has a past medical history of Arthritis, Breast cyst, Chronic angle-closure glaucoma of both eyes, severe stage (11/18/2016), Fibrocystic breast, Gallbladder problem, Glaucoma, Glaucoma, Joint pain, Keloid cicatrix, PONV (postoperative nausea and vomiting), and Uveitis.    Surgical History:  has a past surgical history that includes Cholecystectomy; Hysterectomy; breast biopsy (Left, 1975); BAERVELDT GLAUCOMA SHUNT (Right, 12/26/2012); REMOVAL OF NECROTIC TISSUE OF EYE (Right, 04/09/2013); DISSECTION AND REMOVAL OF FIBROUS PUPILLARY MEMBRANE (Right, 01/26/2017); REMOVAL OF BAERVELDT GLAUCOMA SHUNT (Right, 02/27/2017); Breast biopsy (Right, 1980); Oophorectomy; AK Post Cataract/PKP (Right, 10/26/2017); Cataract extraction (Left, n/a); Cataract extraction w/  intraocular lens implant (Right, 05/24/2012); Strabismus surgery; Corneal transplant (Right, 05/24/2012); Corneal transplant (Right, 10/26/2017); Colonoscopy (N/A, 02/19/2020); Penetrating keratoplasty (Left, 05/10/2021); and Intraocular lens implant, secondary (Left,  05/10/2021).    Family History: family history includes Arthritis in her father and mother; Blindness in her mother; Cancer in her brother; Cataracts in her sister; Cholecystitis in her mother; Glaucoma in her mother and sister; Hypertension in her sister.. Otherwise no colon cancer, inflammatory bowel disease, or GI malignancies.    Social History:  reports that she has never smoked. She has never used smokeless tobacco. She reports previous alcohol use. She reports that she does not use drugs.    Review of patient's allergies indicates:   Allergen Reactions    Augmentin [amoxicillin-pot clavulanate] Diarrhea    Sulfa (sulfonamide antibiotics) Rash       Medications:   Facility-Administered Medications Prior to Admission   Medication Dose Route Frequency Provider Last Rate Last Admin    sodium chloride 0.9% flush 10 mL  10 mL Intravenous PRN Lucia Calvo MD         Medications Prior to Admission   Medication Sig Dispense Refill Last Dose    acetaminophen (TYLENOL) 650 MG TbSR Take 1 tablet (650 mg total) by mouth every 8 (eight) hours. 30 tablet 0 5/23/2022 at Unknown time    calcium-vitamin D 500-125 mg-unit tablet Take 1 tablet by mouth once daily.   Past Week at Unknown time    dorzolamide-timolol 2-0.5% (COSOPT) 22.3-6.8 mg/mL ophthalmic solution INSTILL 1 DROP INTO BOTH EYES 2 TIMES A DAY 10 mL 4 5/24/2022 at Unknown time    fish oil-omega-3 fatty acids 300-1,000 mg capsule Take 1 capsule by mouth once daily.   5/23/2022 at Unknown time    ibuprofen (ADVIL,MOTRIN) 600 MG tablet Take 1 tablet (600 mg total) by mouth every 6 (six) hours as needed for Pain. 30 tablet 1 Past Month at Unknown time    latanoprost 0.005 % ophthalmic solution Place 1 drop into the right eye every evening. 2.5 mL 6 5/23/2022 at Unknown time    MULTIVITAMINS,THER W-MINERALS (VITAMINS & MINERALS ORAL) Take 1 tablet by mouth once daily.    5/23/2022 at Unknown time    prednisoLONE acetate (PRED FORTE) 1 % DrpS    Past Month  at Unknown time    REFRESH OPTIVE 0.5-0.9 % Drop    5/24/2022 at Unknown time    TURMERIC ROOT EXTRACT ORAL Take 1 capsule by mouth once daily.   5/23/2022 at Unknown time    VOLTAREN ARTHRITIS PAIN 1 % Gel Apply topically.   5/23/2022 at Unknown time    carboxymethylcell/hypromellose (GENTEAL GEL OPHT) Place into the left eye every evening.   More than a month at Unknown time    EPSOM SALT, LAXATIVE, 495 mg/5 gram SMARTSIG:By Mouth   More than a month at Unknown time    naproxen (NAPROSYN) 375 MG tablet Take 1 tablet (375 mg total) by mouth 2 (two) times daily with meals. 60 tablet 0 More than a month at Unknown time       Physical Exam:    Vital Signs:   Vitals:    05/24/22 1041   BP: (!) 148/66   Pulse: 77   Resp: 16   Temp: 97.7 °F (36.5 °C)       General Appearance: Well appearing in no acute distress  Eyes:    No scleral icterus  Lungs: CTA anteriorly  Heart:  Regular rate and rhythm   Abdomen: Soft, non tender, non distended with normal bowel sounds.      Labs:  Lab Results   Component Value Date    WBC 10.02 04/04/2022    HGB 14.8 04/04/2022    HCT 47.2 04/04/2022    MCV 97 04/04/2022     04/04/2022        BMP  Lab Results   Component Value Date     04/04/2022    K 4.4 04/04/2022     04/04/2022    CO2 28 04/04/2022    BUN 7 (L) 04/04/2022    CREATININE 0.7 04/04/2022    CALCIUM 9.8 04/04/2022    ANIONGAP 10 04/04/2022    ESTGFRAFRICA >60.0 04/04/2022    EGFRNONAA >60.0 04/04/2022     Lab Results   Component Value Date    INR 1.1 10/03/2012    INR 1.0 05/21/2012    INR 1.0 02/13/2009          Assessment:  76 y.o. female with lower abdominal pain, unclear if GI origin or not.    Plan:  Proceed with EGD today.  I have explained the risks and benefits of endoscopy procedures to the patient including but not limited to bleeding, perforation, infection, and death.  All questions answered.      Stewart Cassidy MD

## 2022-05-24 NOTE — TRANSFER OF CARE
Anesthesia Transfer of Care Note    Patient: Ligia Curran    Procedure(s) Performed: Procedure(s) (LRB):  EGD (ESOPHAGOGASTRODUODENOSCOPY) (N/A)    Patient location: PACU    Anesthesia Type: general    Transport from OR: Transported from OR on room air with adequate spontaneous ventilation    Post pain: adequate analgesia    Post assessment: no apparent anesthetic complications and tolerated procedure well    Post vital signs: stable    Level of consciousness: awake and alert    Nausea/Vomiting: no nausea/vomiting    Complications: none    Transfer of care protocol was followed      Last vitals:   Visit Vitals  /65 (BP Location: Left arm, Patient Position: Lying)   Pulse 80   Temp 36.6 °C (97.9 °F) (Temporal)   Resp 17   Ht 5' (1.524 m)   Wt 93.9 kg (207 lb)   SpO2 97%   Breastfeeding No   BMI 40.43 kg/m²

## 2022-05-24 NOTE — PROVATION PATIENT INSTRUCTIONS
Discharge Summary/Instructions after an Endoscopic Procedure  Patient Name: Ligia Curran  Patient MRN: [id number]  Patient YOB: 1945  Tuesday, May 24, 2022  Stewart Cassidy MD  Dear patient,  As a result of recent federal legislation (The Federal Cures Act), you may   receive lab or pathology results from your procedure in your [facility]   account before your physician is able to contact you. Your physician or   their representative will relay the results to you with their   recommendations at their soonest availability.  Thank you,  RESTRICTIONS:  During your procedure today, you received medications for sedation.  These   medications may affect your judgment, balance and coordination.  Therefore,   for 24 hours, you have the following restrictions:   - DO NOT drive a car, operate machinery, make legal/financial decisions,   sign important papers or drink alcohol.    ACTIVITY:  Today: no heavy lifting, straining or running due to procedural   sedation/anesthesia.  The following day: return to full activity including work.  DIET:  Eat and drink normally unless instructed otherwise.     TREATMENT FOR COMMON SIDE EFFECTS:  - Mild abdominal pain, nausea, belching, bloating or excessive gas:  rest,   eat lightly and use a heating pad.  - Sore Throat: treat with throat lozenges and/or gargle with warm salt   water.  - Because air was used during the procedure, expelling large amounts of air   from your rectum or belching is normal.  - If a bowel prep was taken, you may not have a bowel movement for 1-3 days.    This is normal.  SYMPTOMS TO WATCH FOR AND REPORT TO YOUR PHYSICIAN:  1. Abdominal pain or bloating, other than gas cramps.  2. Chest pain.  3. Back pain.  4. Signs of infection such as: chills or fever occurring within 24 hours   after the procedure.  5. Rectal bleeding, which would show as bright red, maroon, or black stools.   (A tablespoon of blood from the rectum is not serious, especially if    hemorrhoids are present.)  6. Vomiting.  7. Weakness or dizziness.  GO DIRECTLY TO THE NEAREST EMERGENCY ROOM IF YOU HAVE ANY OF THE FOLLOWING:      Difficulty breathing              Chills and/or fever over 101 F   Persistent vomiting and/or vomiting blood   Severe abdominal pain   Severe chest pain   Black, tarry stools   Bleeding- more than one tablespoon   Any other symptom or condition that you feel may need urgent attention  Your doctor recommends these additional instructions:  If any biopsies were taken, your doctors clinic will contact you in 1 to 2   weeks with any results.  - Discharge patient to home.   - Resume previous diet.   - Continue present medications.   - Await pathology results.   - Consider referral to a physical medicine specialist.  For questions, problems or results please call your physician - Stewart Cassidy MD at Work:  [phone].  [facility], EMERGENCY ROOM PHONE NUMBER: [phone]  IF A COMPLICATION OR EMERGENCY SITUATION ARISES AND YOU ARE UNABLE TO REACH   YOUR PHYSICIAN - GO DIRECTLY TO THE EMERGENCY ROOM.  Stewart Cassidy MD  5/24/2022 11:23:09 AM  This report has been verified and signed electronically.  Dear patient,  As a result of recent federal legislation (The Federal Cures Act), you may   receive lab or pathology results from your procedure in your [facility]   account before your physician is able to contact you. Your physician or   their representative will relay the results to you with their   recommendations at their soonest availability.  Thank you,  PROVATION

## 2022-05-24 NOTE — ANESTHESIA POSTPROCEDURE EVALUATION
Anesthesia Post Evaluation    Patient: Ligia Curran    Procedure(s) Performed: Procedure(s) (LRB):  EGD (ESOPHAGOGASTRODUODENOSCOPY) (N/A)    Final Anesthesia Type: general      Patient location during evaluation: GI PACU  Patient participation: Yes- Able to Participate  Level of consciousness: awake and alert  Post-procedure vital signs: reviewed and stable  Pain management: adequate  Airway patency: patent    PONV status at discharge: No PONV  Anesthetic complications: no      Cardiovascular status: stable  Respiratory status: unassisted and spontaneous ventilation  Hydration status: euvolemic  Follow-up not needed.          Vitals Value Taken Time   /65 05/24/22 1157   Temp 36.6 °C (97.9 °F) 05/24/22 1125   Pulse 73 05/24/22 1157   Resp 16 05/24/22 1157   SpO2 96 % 05/24/22 1157         Event Time   Out of Recovery 12:06:45         Pain/Ammon Score: Ammon Score: 10 (5/24/2022 11:40 AM)

## 2022-05-24 NOTE — ANESTHESIA PREPROCEDURE EVALUATION
05/24/2022  Ligia Curran is a 76 y.o., female.    Patient Active Problem List   Diagnosis    Glaucoma shunt device of both eyes    Blindness and low vision - Both Eyes    Bilateral dry eyes    Sensorineural hearing loss    Otalgia, right ear    Chronic angle-closure glaucoma of right eye, severe stage    Corneal edema    Corneal transplant failure    Primary open angle glaucoma (POAG) of left eye, moderate stage    Filamentary keratitis of right eye    Meibomian gland dysfunction (MGD), bilateral, both upper and lower lids    Adhesions and disruptions of pupillary membranes of right eye    Screening for colon cancer    Acute pain of right shoulder    LLQ abdominal pain    Morbid obesity with BMI of 40.0-44.9, adult    Hx of cornea transplant    Status post cataract extraction and insertion of intraocular lens    Knee stiffness, left    Bilateral knee pain     Past Medical History:   Diagnosis Date    Arthritis     Breast cyst     Chronic angle-closure glaucoma of both eyes, severe stage 11/18/2016    Fibrocystic breast     Gallbladder problem     Glaucoma     Glaucoma     Joint pain     Keloid cicatrix     PONV (postoperative nausea and vomiting)     Uveitis      Past Surgical History:   Procedure Laterality Date    AK POST CATARACT/PKP Right 10/26/2017    PKP     BAERVELDT GLAUCOMA SHUNT Right 12/26/2012        breast biopsy Left 1975    benign    BREAST BIOPSY Right 1980    benign    CATARACT EXTRACTION Left n/a    Aphakic    CATARACT EXTRACTION W/  INTRAOCULAR LENS IMPLANT Right 05/24/2012    WITH DSEK ()    CHOLECYSTECTOMY      COLONOSCOPY N/A 02/19/2020    Procedure: COLONOSCOPY;  Surgeon: Stewart Domingo MD;  Location: [facility] ([address] [place] [address]);  Service: Endoscopy;  Laterality: N/A;    CORNEAL TRANSPLANT Right 05/24/2012    DSEK/PHACO IOL  ()    CORNEAL TRANSPLANT Right 10/26/2017    PKP ()    DISSECTION AND REMOVAL OF FIBROUS PUPILLARY MEMBRANE Right 01/26/2017    DR. CALVO    HYSTERECTOMY      INTRAOCULAR LENS IMPLANT, SECONDARY Left 05/10/2021    Procedure: INSERTION, IOL PROSTHESIS, SECONDARY;  Surgeon: Lucia Calvo MD;  Location: [facility] OR;  Service: Ophthalmology;  Laterality: Left;    OOPHORECTOMY      PENETRATING KERATOPLASTY Left 05/10/2021    Procedure: KERATOPLASTY, PENETRATING;  Surgeon: Lucia Calvo MD;  Location: [facility] OR;  Service: Ophthalmology;  Laterality: Left;    REMOVAL OF BAERVELDT GLAUCOMA SHUNT Right 02/27/2017        REMOVAL OF NECROTIC TISSUE OF EYE Right 04/09/2013    REMOVAL OF TISSUE AROUND SHUNT WITH PERICARDIAL PATCH ( AND )    STRABISMUS SURGERY             Pre-op Assessment    I have reviewed the Patient Summary Reports.       I have reviewed the Medications.     Review of Systems  Anesthesia Hx:  No problems with previous Anesthesia   Denies Personal Hx of Anesthesia complications.   Cardiovascular:   ECG has been reviewed.  Functional Capacity good / => 4 METS        Physical Exam  General: Well nourished, Cooperative, Alert and Oriented    Airway:  Mallampati: II / I  Mouth Opening: Normal  TM Distance: Normal  Tongue: Normal  Neck ROM: Normal ROM    Dental:  Intact        Anesthesia Plan  Type of Anesthesia, risks & benefits discussed:    Anesthesia Type: Gen Natural Airway  Intra-op Monitoring Plan: Standard ASA Monitors  Post Op Pain Control Plan: multimodal analgesia  Induction:  IV  Informed Consent: Informed consent signed with the Patient and all parties understand the risks and agree with anesthesia plan.  All questions answered. Patient consented to blood products? No  ASA Score: 2  Day of Surgery Review of History & Physical: H&P Update referred to the surgeon/provider.    Ready For Surgery From Anesthesia Perspective.     .

## 2022-05-26 ENCOUNTER — TELEPHONE (OUTPATIENT)
Dept: OPTOMETRY | Facility: CLINIC | Age: 77
End: 2022-05-26
Payer: MEDICARE

## 2022-05-26 NOTE — TELEPHONE ENCOUNTER
----- Message from Kassy Silvestre sent at 5/26/2022 11:04 AM CDT -----  Regarding: appt  Pt states that the appt scheduled for 07/15, she knows nothing about.  She wants to make sure that she is supposed to see Dr. Macedo because she already sees Dr. Calvo and Dr. Rock Strong  @  [phone]

## 2022-05-31 ENCOUNTER — EXTERNAL CHRONIC CARE MANAGEMENT (OUTPATIENT)
Dept: PRIMARY CARE CLINIC | Facility: CLINIC | Age: 77
End: 2022-05-31
Payer: MEDICARE

## 2022-05-31 LAB
FINAL PATHOLOGIC DIAGNOSIS: NORMAL
Lab: NORMAL

## 2022-05-31 PROCEDURE — 99490 CHRNC CARE MGMT STAFF 1ST 20: CPT | Mod: S$PBB,,, | Performed by: FAMILY MEDICINE

## 2022-05-31 PROCEDURE — 99490 PR CHRONIC CARE MGMT, 1ST 20 MIN: ICD-10-PCS | Mod: S$PBB,,, | Performed by: FAMILY MEDICINE

## 2022-05-31 PROCEDURE — 99490 CHRNC CARE MGMT STAFF 1ST 20: CPT | Mod: PBBFAC,PO,27 | Performed by: FAMILY MEDICINE

## 2022-05-31 PROCEDURE — 99439 CHRNC CARE MGMT STAF EA ADDL: CPT | Mod: S$PBB,,, | Performed by: FAMILY MEDICINE

## 2022-05-31 PROCEDURE — 99439 CHRNC CARE MGMT STAF EA ADDL: CPT | Mod: PBBFAC,PO | Performed by: FAMILY MEDICINE

## 2022-05-31 PROCEDURE — 99439 PR CHRONIC CARE MGMT, EA ADDTL 20 MIN: ICD-10-PCS | Mod: S$PBB,,, | Performed by: FAMILY MEDICINE

## 2022-06-15 ENCOUNTER — TELEPHONE (OUTPATIENT)
Dept: OPHTHALMOLOGY | Facility: CLINIC | Age: 77
End: 2022-06-15
Payer: MEDICARE

## 2022-06-15 ENCOUNTER — OFFICE VISIT (OUTPATIENT)
Dept: OPHTHALMOLOGY | Facility: CLINIC | Age: 77
End: 2022-06-15
Payer: MEDICARE

## 2022-06-15 DIAGNOSIS — Z94.7 STATUS POST CORNEAL TRANSPLANT: ICD-10-CM

## 2022-06-15 DIAGNOSIS — Z96.89 HISTORY OF GLAUCOMA TUBE SHUNT PROCEDURE: ICD-10-CM

## 2022-06-15 DIAGNOSIS — T86.8411 FAILURE OF CORNEA TRANSPLANT OF RIGHT EYE: Primary | ICD-10-CM

## 2022-06-15 PROCEDURE — 92014 PR EYE EXAM, EST PATIENT,COMPREHESV: ICD-10-PCS | Mod: S$PBB,,, | Performed by: OPHTHALMOLOGY

## 2022-06-15 PROCEDURE — 99999 PR PBB SHADOW E&M-EST. PATIENT-LVL III: CPT | Mod: PBBFAC,,, | Performed by: OPHTHALMOLOGY

## 2022-06-15 PROCEDURE — 99999 PR PBB SHADOW E&M-EST. PATIENT-LVL III: ICD-10-PCS | Mod: PBBFAC,,, | Performed by: OPHTHALMOLOGY

## 2022-06-15 PROCEDURE — 92014 COMPRE OPH EXAM EST PT 1/>: CPT | Mod: S$PBB,,, | Performed by: OPHTHALMOLOGY

## 2022-06-15 PROCEDURE — 99213 OFFICE O/P EST LOW 20 MIN: CPT | Mod: PBBFAC | Performed by: OPHTHALMOLOGY

## 2022-06-15 NOTE — PROGRESS NOTES
HPI     77 y/o female presents to clinic for PKP f/u    Pt has complaint of having a dull ache in her Right eye towards the top   and blurry vision in her Left eye. Pt denies having any redness or   discharge. Pt states her eyes still get dry.     EYE MEDS:   Pred BID OU  Cosopt BID OU  Latanoprost qhs OD   Refresh Gel BID OU PRN  Genteal gel qpm PRN  Refresh gtts 1 gtt TID PRN OU     Last edited by Munira Branham on 6/15/2022  2:10 PM. (History)            Assessment /Plan     For exam results, see Encounter Report.    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 Tobra antonio for abrasion...    PKP OS clear, sl thick, ACIOL stable but shallow vault. Endo count low. 1 year post op. Use Idania...    IOP elevated today, monitor for now.

## 2022-06-15 NOTE — TELEPHONE ENCOUNTER
----- Message from Althea Avery sent at 6/15/2022  3:28 PM CDT -----  Contact: [facility] is calling to let Dr. Calvo know pt insurance don't cover the (Tobramycin) that was called in. Kathy from [place] [employer] call back number is [phone].

## 2022-06-22 ENCOUNTER — PATIENT OUTREACH (OUTPATIENT)
Dept: FAMILY MEDICINE | Facility: CLINIC | Age: 77
End: 2022-06-22
Payer: MEDICARE

## 2022-06-23 ENCOUNTER — TELEPHONE (OUTPATIENT)
Dept: ENDOSCOPY | Facility: HOSPITAL | Age: 77
End: 2022-06-23
Payer: MEDICARE

## 2022-06-23 NOTE — TELEPHONE ENCOUNTER
----- Message from Stewart Cassidy MD sent at 6/22/2022  5:36 PM CDT -----  Please let her know that the polyp removed from her stomach was completely benign and does not need further follow-up.  Nothing concerning seen. MA to call patient with results.

## 2022-06-24 ENCOUNTER — PES CALL (OUTPATIENT)
Dept: ADMINISTRATIVE | Facility: CLINIC | Age: 77
End: 2022-06-24
Payer: MEDICARE

## 2022-06-29 ENCOUNTER — OFFICE VISIT (OUTPATIENT)
Dept: OPHTHALMOLOGY | Facility: CLINIC | Age: 77
End: 2022-06-29
Payer: MEDICARE

## 2022-06-29 DIAGNOSIS — H35.3134 NONEXUDATIVE AGE-RELATED MACULAR DEGENERATION, BILATERAL, ADVANCED ATROPHIC WITH SUBFOVEAL INVOLVEMENT: ICD-10-CM

## 2022-06-29 DIAGNOSIS — T86.8411 FAILURE OF CORNEA TRANSPLANT OF RIGHT EYE: Primary | ICD-10-CM

## 2022-06-29 DIAGNOSIS — Z94.7 HX OF CORNEA TRANSPLANT: ICD-10-CM

## 2022-06-29 DIAGNOSIS — Z96.89 HISTORY OF GLAUCOMA TUBE SHUNT PROCEDURE: ICD-10-CM

## 2022-06-29 PROCEDURE — 99999 PR PBB SHADOW E&M-EST. PATIENT-LVL III: ICD-10-PCS | Mod: PBBFAC,,, | Performed by: OPHTHALMOLOGY

## 2022-06-29 PROCEDURE — 92012 PR EYE EXAM, EST PATIENT,INTERMED: ICD-10-PCS | Mod: S$PBB,,, | Performed by: OPHTHALMOLOGY

## 2022-06-29 PROCEDURE — 92012 INTRM OPH EXAM EST PATIENT: CPT | Mod: S$PBB,,, | Performed by: OPHTHALMOLOGY

## 2022-06-29 PROCEDURE — 99213 OFFICE O/P EST LOW 20 MIN: CPT | Mod: PBBFAC | Performed by: OPHTHALMOLOGY

## 2022-06-29 PROCEDURE — 99999 PR PBB SHADOW E&M-EST. PATIENT-LVL III: CPT | Mod: PBBFAC,,, | Performed by: OPHTHALMOLOGY

## 2022-06-29 NOTE — PROGRESS NOTES
HPI     77 y/o female presents to clinic for IOP Check OU    Pt has complaint of having a dull ache in her Right eye towards the top   and blurry vision in her Left eye. Pt denies having any redness or   discharge. Pt states her eyes still get dry.     EYE MEDS:   Pred BID OU  Cosopt BID OU  Latanoprost qhs OD   Refresh Gel BID OU PRN  Genteal gel qpm PRN  Refresh gtts 1 gtt TID PRN OU     Last edited by Mike Wang on 6/29/2022  9:13 AM. (History)            Assessment /Plan     For exam results, see Encounter Report.    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 Tobra antonio for abrasion...    PKP OS clear, sl thick, ACIOL stable but shallow vault. Endo count low. 1 year post op. Use Idania...    IOP better today, monitor for now.

## 2022-06-30 ENCOUNTER — EXTERNAL CHRONIC CARE MANAGEMENT (OUTPATIENT)
Dept: PRIMARY CARE CLINIC | Facility: CLINIC | Age: 77
End: 2022-06-30
Payer: MEDICARE

## 2022-06-30 PROCEDURE — 99490 CHRNC CARE MGMT STAFF 1ST 20: CPT | Mod: S$PBB,,, | Performed by: FAMILY MEDICINE

## 2022-06-30 PROCEDURE — 99490 PR CHRONIC CARE MGMT, 1ST 20 MIN: ICD-10-PCS | Mod: S$PBB,,, | Performed by: FAMILY MEDICINE

## 2022-06-30 PROCEDURE — 99490 CHRNC CARE MGMT STAFF 1ST 20: CPT | Mod: PBBFAC,PO | Performed by: FAMILY MEDICINE

## 2022-07-21 ENCOUNTER — TELEPHONE (OUTPATIENT)
Dept: FAMILY MEDICINE | Facility: CLINIC | Age: 77
End: 2022-07-21
Payer: MEDICARE

## 2022-07-21 NOTE — TELEPHONE ENCOUNTER
----- Message from Hesham Singleton sent at 7/21/2022  9:12 AM CDT -----  Regarding: Return Call  Contact: [facility] [[id number]]  Type:  Patient Returning Call    Who Called: [facility] [[id number]]    Who Left Message for Patient: Melania    Does the patient know what this is regarding?: yes    Would the patient rather a call back or a response via [facility]? call    Best Call Back Number: [phone]    Additional Information:

## 2022-07-21 NOTE — TELEPHONE ENCOUNTER
----- Message from Destinee Louie MD sent at 7/21/2022  6:14 AM CDT -----  Regarding: egd  Nothing acute on egd her pathology was benign please verify if pt is taking a ppi like prilosec

## 2022-07-21 NOTE — TELEPHONE ENCOUNTER
Gave pt results; states she is not taking a PPI. Also requesting you look at previous drs notes, he told her looks like it could be a muscular problem. Please advise.

## 2022-07-26 ENCOUNTER — LAB VISIT (OUTPATIENT)
Dept: LAB | Facility: HOSPITAL | Age: 77
End: 2022-07-26
Attending: FAMILY MEDICINE
Payer: MEDICARE

## 2022-07-26 DIAGNOSIS — R73.9 HYPERGLYCEMIA: ICD-10-CM

## 2022-07-26 LAB
ESTIMATED AVG GLUCOSE: 120 MG/DL (ref 68–131)
HBA1C MFR BLD: 5.8 % (ref 4–5.6)

## 2022-07-26 PROCEDURE — 83036 HEMOGLOBIN GLYCOSYLATED A1C: CPT | Performed by: FAMILY MEDICINE

## 2022-07-26 PROCEDURE — 36415 COLL VENOUS BLD VENIPUNCTURE: CPT | Mod: PO | Performed by: FAMILY MEDICINE

## 2022-07-27 ENCOUNTER — OFFICE VISIT (OUTPATIENT)
Dept: OPHTHALMOLOGY | Facility: CLINIC | Age: 77
End: 2022-07-27
Payer: MEDICARE

## 2022-07-27 ENCOUNTER — TELEPHONE (OUTPATIENT)
Dept: FAMILY MEDICINE | Facility: CLINIC | Age: 77
End: 2022-07-27
Payer: MEDICARE

## 2022-07-27 DIAGNOSIS — H54.10 BLINDNESS AND LOW VISION: ICD-10-CM

## 2022-07-27 DIAGNOSIS — Z98.49 STATUS POST CATARACT EXTRACTION AND INSERTION OF INTRAOCULAR LENS, UNSPECIFIED LATERALITY: ICD-10-CM

## 2022-07-27 DIAGNOSIS — H40.1122 PRIMARY OPEN ANGLE GLAUCOMA (POAG) OF LEFT EYE, MODERATE STAGE: ICD-10-CM

## 2022-07-27 DIAGNOSIS — Z96.1 STATUS POST CATARACT EXTRACTION AND INSERTION OF INTRAOCULAR LENS, UNSPECIFIED LATERALITY: ICD-10-CM

## 2022-07-27 DIAGNOSIS — H04.123 BILATERAL DRY EYES: ICD-10-CM

## 2022-07-27 DIAGNOSIS — T86.8413 FAILURE OF CORNEA TRANSPLANT OF BOTH EYES: ICD-10-CM

## 2022-07-27 DIAGNOSIS — H40.2213 CHRONIC ANGLE-CLOSURE GLAUCOMA OF RIGHT EYE, SEVERE STAGE: ICD-10-CM

## 2022-07-27 DIAGNOSIS — Z96.89 HISTORY OF GLAUCOMA TUBE SHUNT PROCEDURE: ICD-10-CM

## 2022-07-27 DIAGNOSIS — Z94.7 HX OF CORNEA TRANSPLANT: ICD-10-CM

## 2022-07-27 DIAGNOSIS — H40.33X3 GLAUCOMA OF BOTH EYES ASSOCIATED WITH OCULAR TRAUMA, SEVERE STAGE: Primary | ICD-10-CM

## 2022-07-27 PROCEDURE — 99214 PR OFFICE/OUTPT VISIT, EST, LEVL IV, 30-39 MIN: ICD-10-PCS | Mod: S$PBB,,, | Performed by: OPHTHALMOLOGY

## 2022-07-27 PROCEDURE — 99214 OFFICE O/P EST MOD 30 MIN: CPT | Mod: S$PBB,,, | Performed by: OPHTHALMOLOGY

## 2022-07-27 PROCEDURE — 99213 OFFICE O/P EST LOW 20 MIN: CPT | Mod: PBBFAC | Performed by: OPHTHALMOLOGY

## 2022-07-27 PROCEDURE — 99999 PR PBB SHADOW E&M-EST. PATIENT-LVL III: ICD-10-PCS | Mod: PBBFAC,,, | Performed by: OPHTHALMOLOGY

## 2022-07-27 PROCEDURE — 99999 PR PBB SHADOW E&M-EST. PATIENT-LVL III: CPT | Mod: PBBFAC,,, | Performed by: OPHTHALMOLOGY

## 2022-07-27 RX ORDER — PREDNISOLONE ACETATE 10 MG/ML
1 SUSPENSION/ DROPS OPHTHALMIC 2 TIMES DAILY
Qty: 10 ML | Refills: 4 | Status: SHIPPED | OUTPATIENT
Start: 2022-07-27

## 2022-07-27 RX ORDER — LATANOPROST 50 UG/ML
1 SOLUTION/ DROPS OPHTHALMIC NIGHTLY
Qty: 7.5 ML | Refills: 4 | Status: SHIPPED | OUTPATIENT
Start: 2022-07-27

## 2022-07-27 RX ORDER — ERYTHROMYCIN 5 MG/G
OINTMENT OPHTHALMIC
COMMUNITY
Start: 2022-07-15 | End: 2022-10-31 | Stop reason: SDUPTHER

## 2022-07-27 NOTE — TELEPHONE ENCOUNTER
----- Message from Vanessa Pelaez sent at 7/27/2022 10:41 AM CDT -----  Regarding: Test Results  Please call pt with test results.

## 2022-07-27 NOTE — PROGRESS NOTES
Assessment /Plan     For exam results, see Encounter Report.    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    Hx of cornea transplant    Status post cataract extraction and insertion of intraocular lens, unspecified laterality          [facility]  Strong FMHx Glc / Blindness --> Justyn Creole Dz / mom's side    Complex ocular hx  multiple sx --> trabs, tubes and tube removes etc   Too numerous to count    CCT  537 // 611  --> 804 // 788 -->     Target --> diff assessment with k-failure OU    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  PC IOL  Fibrotic anterior capsule membrane  Tubes x 2 in place ST & IN --> removed ST BV 2/27/2017  Glc Shunt graft site OD  Revision 04/09/2013    Glaucoma Incisional Surgery  Patient with exposed ST Tube OD  SP OD ST BV Removal 2/27/2017    Left eye  SP PKP // AC IOL placement  05/10/2021    Both eyes --> good adherence --> CSM  Cosopt BID  Xal q day  PF1%  BID  Idania 128 BID      MGD --> improved surface --> improved Va 10/1/2019  Dry Eye Syndrome: discussed use of warm compresses, preserved & non-preserved artificial tears, gel and PM ointment options.  Also discussed options utilizing medications.    Right eye  Mucomyst 10% BID / PRN --> not using  EES BID --> q hs    --> consider Doxy PO as discussed    RD precautions:  Discussed symptoms of RD with increased flashes, floaters, decreasing vision.  Patient/Family to call and return immediately to clinic should the symptoms of RD occur. Voiced good understanding with Q & A.      Plan  RTC  CORDELIA Calvo  as scheduled  RTC Rock 3 months with IOP // Tonopen & Adherence & OCT RNFL  RTC sooner prn with understanding

## 2022-07-28 ENCOUNTER — OFFICE VISIT (OUTPATIENT)
Dept: FAMILY MEDICINE | Facility: CLINIC | Age: 77
End: 2022-07-28
Attending: FAMILY MEDICINE
Payer: MEDICARE

## 2022-07-28 DIAGNOSIS — R03.0 ELEVATED BP WITHOUT DIAGNOSIS OF HYPERTENSION: ICD-10-CM

## 2022-07-28 DIAGNOSIS — E66.01 MORBID OBESITY WITH BMI OF 40.0-44.9, ADULT: ICD-10-CM

## 2022-07-28 DIAGNOSIS — E11.9 DIABETES MELLITUS TYPE 2, DIET-CONTROLLED: Primary | ICD-10-CM

## 2022-07-28 PROCEDURE — 99443 PR PHYSICIAN TELEPHONE EVALUATION 21-30 MIN: CPT | Mod: 95,,, | Performed by: FAMILY MEDICINE

## 2022-07-28 PROCEDURE — 99443 PR PHYSICIAN TELEPHONE EVALUATION 21-30 MIN: ICD-10-PCS | Mod: 95,,, | Performed by: FAMILY MEDICINE

## 2022-07-29 NOTE — PROGRESS NOTES
Established Patient - Audio Only Telehealth Visit     The patient location is: home  The chief complaint leading to consultation is: diabetes  Visit type: Virtual visit with audio only (telephone)  Total time spent with patient: 20       The reason for the audio only service rather than synchronous audio and video virtual visit was related to technical difficulties or patient preference/necessity.     Each patient to whom I provide medical services by telemedicine is:  (1) informed of the relationship between the physician and patient and the respective role of any other health care provider with respect to management of the patient; and (2) notified that they may decline to receive medical services by telemedicine and may withdraw from such care at any time. Patient verbally consented to receive this service via voice-only telephone call.       HPI: pt in audio visit for follow up of hgb a1c mildly elevated pt is not on an ada diet consistently but is willing to start she is overweight she is not on a weight loss diet consistently but is willing to start.  No regular exercise but is starting a graded program   pt bp was elevated at last bp check pt is not on a low salt diet she will start exercising       Assessment and plan:  Dm - start ada diet   Obese - weight loss diet graded exercise   elevated bp - start low salt diet increase water intake graded exercise   rtc 3-6 months                   This service was not originating from a related E/M service provided within the previous 7 days nor will  to an E/M service or procedure within the next 24 hours or my soonest available appointment.  Prevailing standard of care was able to be met in this audio-only visit.

## 2022-07-31 ENCOUNTER — EXTERNAL CHRONIC CARE MANAGEMENT (OUTPATIENT)
Dept: PRIMARY CARE CLINIC | Facility: CLINIC | Age: 77
End: 2022-07-31
Payer: MEDICARE

## 2022-07-31 PROCEDURE — 99490 CHRNC CARE MGMT STAFF 1ST 20: CPT | Mod: S$PBB,,, | Performed by: FAMILY MEDICINE

## 2022-07-31 PROCEDURE — 99490 PR CHRONIC CARE MGMT, 1ST 20 MIN: ICD-10-PCS | Mod: S$PBB,,, | Performed by: FAMILY MEDICINE

## 2022-07-31 PROCEDURE — 99439 CHRNC CARE MGMT STAF EA ADDL: CPT | Mod: PBBFAC,PO | Performed by: FAMILY MEDICINE

## 2022-07-31 PROCEDURE — 99439 CHRNC CARE MGMT STAF EA ADDL: CPT | Mod: S$PBB,,, | Performed by: FAMILY MEDICINE

## 2022-07-31 PROCEDURE — 99439 PR CHRONIC CARE MGMT, EA ADDTL 20 MIN: ICD-10-PCS | Mod: S$PBB,,, | Performed by: FAMILY MEDICINE

## 2022-07-31 PROCEDURE — 99490 CHRNC CARE MGMT STAFF 1ST 20: CPT | Mod: PBBFAC,PO | Performed by: FAMILY MEDICINE

## 2022-08-31 ENCOUNTER — EXTERNAL CHRONIC CARE MANAGEMENT (OUTPATIENT)
Dept: PRIMARY CARE CLINIC | Facility: CLINIC | Age: 77
End: 2022-08-31
Payer: MEDICARE

## 2022-08-31 PROCEDURE — 99490 CHRNC CARE MGMT STAFF 1ST 20: CPT | Mod: S$PBB,,, | Performed by: FAMILY MEDICINE

## 2022-08-31 PROCEDURE — 99490 CHRNC CARE MGMT STAFF 1ST 20: CPT | Mod: PBBFAC,PO | Performed by: FAMILY MEDICINE

## 2022-08-31 PROCEDURE — 99490 PR CHRONIC CARE MGMT, 1ST 20 MIN: ICD-10-PCS | Mod: S$PBB,,, | Performed by: FAMILY MEDICINE

## 2022-09-07 ENCOUNTER — OFFICE VISIT (OUTPATIENT)
Dept: OPHTHALMOLOGY | Facility: CLINIC | Age: 77
End: 2022-09-07
Payer: MEDICARE

## 2022-09-07 DIAGNOSIS — H40.2213 CHRONIC ANGLE-CLOSURE GLAUCOMA OF RIGHT EYE, SEVERE STAGE: ICD-10-CM

## 2022-09-07 DIAGNOSIS — T86.8413 FAILURE OF CORNEA TRANSPLANT OF BOTH EYES: Primary | ICD-10-CM

## 2022-09-07 DIAGNOSIS — H35.3134 NONEXUDATIVE AGE-RELATED MACULAR DEGENERATION, BILATERAL, ADVANCED ATROPHIC WITH SUBFOVEAL INVOLVEMENT: ICD-10-CM

## 2022-09-07 PROCEDURE — 99999 PR PBB SHADOW E&M-EST. PATIENT-LVL II: CPT | Mod: PBBFAC,,, | Performed by: OPHTHALMOLOGY

## 2022-09-07 PROCEDURE — 99214 PR OFFICE/OUTPT VISIT, EST, LEVL IV, 30-39 MIN: ICD-10-PCS | Mod: S$PBB,,, | Performed by: OPHTHALMOLOGY

## 2022-09-07 PROCEDURE — 99214 OFFICE O/P EST MOD 30 MIN: CPT | Mod: S$PBB,,, | Performed by: OPHTHALMOLOGY

## 2022-09-07 PROCEDURE — 99212 OFFICE O/P EST SF 10 MIN: CPT | Mod: PBBFAC | Performed by: OPHTHALMOLOGY

## 2022-09-07 PROCEDURE — 99999 PR PBB SHADOW E&M-EST. PATIENT-LVL II: ICD-10-PCS | Mod: PBBFAC,,, | Performed by: OPHTHALMOLOGY

## 2022-09-07 RX ORDER — SODIUM CHLORIDE 0.9 % (FLUSH) 0.9 %
10 SYRINGE (ML) INJECTION
Status: DISCONTINUED | OUTPATIENT
Start: 2022-09-07 | End: 2022-11-10 | Stop reason: ALTCHOICE

## 2022-09-07 RX ORDER — TETRACAINE HYDROCHLORIDE 5 MG/ML
1 SOLUTION OPHTHALMIC
Status: CANCELLED | OUTPATIENT
Start: 2022-09-07

## 2022-09-07 RX ORDER — MOXIFLOXACIN 5 MG/ML
1 SOLUTION/ DROPS OPHTHALMIC
Status: CANCELLED | OUTPATIENT
Start: 2022-09-07

## 2022-09-07 NOTE — PROGRESS NOTES
HPI    77 y/o female presents to clinic for IOP Check OU    Pt has complaint of having burning Right eye towards the top on yesterday   feels much better today  Pt denies having any redness or discharge. Pt   states her eyes still get dry.     EYE MEDS:   Pred BID OU  Cosopt BID OU  Latanoprost qhs OD   Refresh Gel BID OU PRN  Genteal gel qpm PRN  Refresh gtts 1 gtt TID PRN OU   Last edited by Dennis Esparza, PCT on 9/7/2022 11:03 AM.            Assessment /Plan     For exam results, see Encounter Report.    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 Patient voices understanding and wishes to proceed.

## 2022-09-26 ENCOUNTER — TELEPHONE (OUTPATIENT)
Dept: OPHTHALMOLOGY | Facility: CLINIC | Age: 77
End: 2022-09-26
Payer: MEDICARE

## 2022-09-26 DIAGNOSIS — T86.8413 FAILURE OF CORNEA TRANSPLANT OF BOTH EYES: Primary | ICD-10-CM

## 2022-10-14 ENCOUNTER — TELEPHONE (OUTPATIENT)
Dept: OPHTHALMOLOGY | Facility: CLINIC | Age: 77
End: 2022-10-14
Payer: MEDICARE

## 2022-10-14 NOTE — TELEPHONE ENCOUNTER
----- Message from Rukhsana Carty MA sent at 10/13/2022  4:55 PM CDT -----    ----- Message -----  From: Mahogany Vargas  Sent: 10/13/2022   4:34 PM CDT  To: [facility] Staff    Patient is calling stating that she needs to reschedule appt.  She stated that she needs to be seen before her sx.  Please contact patient to reschedule iop chk/oct at [phone]

## 2022-10-20 ENCOUNTER — PES CALL (OUTPATIENT)
Dept: ADMINISTRATIVE | Facility: CLINIC | Age: 77
End: 2022-10-20
Payer: MEDICARE

## 2022-10-31 ENCOUNTER — OFFICE VISIT (OUTPATIENT)
Dept: OPHTHALMOLOGY | Facility: CLINIC | Age: 77
End: 2022-10-31
Payer: MEDICARE

## 2022-10-31 DIAGNOSIS — H04.123 BILATERAL DRY EYES: ICD-10-CM

## 2022-10-31 DIAGNOSIS — H40.33X3 GLAUCOMA OF BOTH EYES ASSOCIATED WITH OCULAR TRAUMA, SEVERE STAGE: ICD-10-CM

## 2022-10-31 DIAGNOSIS — H40.2213 CHRONIC ANGLE-CLOSURE GLAUCOMA OF RIGHT EYE, SEVERE STAGE: Primary | ICD-10-CM

## 2022-10-31 DIAGNOSIS — H40.20X3 ANGLE-CLOSURE GLAUCOMA, SEVERE STAGE: ICD-10-CM

## 2022-10-31 DIAGNOSIS — H54.10 BLINDNESS AND LOW VISION: ICD-10-CM

## 2022-10-31 DIAGNOSIS — Z96.1 STATUS POST CATARACT EXTRACTION AND INSERTION OF INTRAOCULAR LENS, UNSPECIFIED LATERALITY: ICD-10-CM

## 2022-10-31 DIAGNOSIS — H18.20 CORNEAL EDEMA: ICD-10-CM

## 2022-10-31 DIAGNOSIS — H40.1122 PRIMARY OPEN ANGLE GLAUCOMA (POAG) OF LEFT EYE, MODERATE STAGE: ICD-10-CM

## 2022-10-31 DIAGNOSIS — Z98.49 STATUS POST CATARACT EXTRACTION AND INSERTION OF INTRAOCULAR LENS, UNSPECIFIED LATERALITY: ICD-10-CM

## 2022-10-31 DIAGNOSIS — Z96.89 HISTORY OF GLAUCOMA TUBE SHUNT PROCEDURE: ICD-10-CM

## 2022-10-31 DIAGNOSIS — T86.8411 FAILURE OF CORNEA TRANSPLANT OF RIGHT EYE: ICD-10-CM

## 2022-10-31 PROCEDURE — 99999 PR PBB SHADOW E&M-EST. PATIENT-LVL I: ICD-10-PCS | Mod: PBBFAC,,, | Performed by: OPHTHALMOLOGY

## 2022-10-31 PROCEDURE — 99999 PR PBB SHADOW E&M-EST. PATIENT-LVL I: CPT | Mod: PBBFAC,,, | Performed by: OPHTHALMOLOGY

## 2022-10-31 PROCEDURE — 99211 OFF/OP EST MAY X REQ PHY/QHP: CPT | Mod: PBBFAC | Performed by: OPHTHALMOLOGY

## 2022-10-31 PROCEDURE — 99214 PR OFFICE/OUTPT VISIT, EST, LEVL IV, 30-39 MIN: ICD-10-PCS | Mod: S$PBB,,, | Performed by: OPHTHALMOLOGY

## 2022-10-31 PROCEDURE — 99214 OFFICE O/P EST MOD 30 MIN: CPT | Mod: S$PBB,,, | Performed by: OPHTHALMOLOGY

## 2022-10-31 RX ORDER — ERYTHROMYCIN 5 MG/G
OINTMENT OPHTHALMIC 2 TIMES DAILY
Qty: 3.5 G | Refills: 4 | Status: SHIPPED | OUTPATIENT
Start: 2022-10-31 | End: 2022-11-09

## 2022-10-31 RX ORDER — DORZOLAMIDE HYDROCHLORIDE AND TIMOLOL MALEATE 20; 5 MG/ML; MG/ML
1 SOLUTION/ DROPS OPHTHALMIC 2 TIMES DAILY
Qty: 10 ML | Refills: 12 | Status: SHIPPED | OUTPATIENT
Start: 2022-10-31 | End: 2023-11-02

## 2022-10-31 NOTE — PROGRESS NOTES
Assessment /Plan     For exam results, see Encounter Report.    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, severe stage    Status post cataract extraction and insertion of intraocular lens, unspecified laterality        [facility]  Strong FMHx Glc / Blindness --> Justyn Creole Dz / mom's side    Complex ocular hx  multiple sx --> trabs, tubes and tube removes etc   Too numerous to count    CCT  537 // 611  --> 804 // 788 -->     Target --> diff assessment with k-failure OD --> thick CCT with PKPs    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  PC IOL  Fibrotic anterior capsule membrane  Tubes x 2 in place ST & IN --> removed ST BV 2/27/2017  Glc Shunt graft site OD  Revision 04/09/2013    Glaucoma Incisional Surgery  Patient with exposed ST Tube OD  SP OD ST BV Removal 2/27/2017    Left eye  SP PKP // AC IOL placement  05/10/2021    Both eyes --> good adherence --> CSM as re-discussed  Cosopt BID  Xal q day  PF1%  BID  Idania 128 BID      MGD --> improved surface  Dry Eye Syndrome: discussed use of warm compresses, preserved & non-preserved artificial tears, gel and PM ointment options.  Also discussed options utilizing medications.    Right eye  Mucomyst 10% BID / PRN --> not using  EES BID --> q hs    --> consider Doxy PO as discussed    RD precautions:  Discussed symptoms of RD with increased flashes, floaters, decreasing vision.  Patient/Family to call and return immediately to clinic should the symptoms of RD occur. Voiced good understanding with Q & A.      Plan  RTC  CORDELIA Calvo  as scheduled --> planning PKP OD  RTC Rock 3 months with  CCT, IOP // Tonopen & Adherence & OCT RNFL --> p PKP OD  RTC sooner prn with understanding

## 2022-11-07 ENCOUNTER — TELEPHONE (OUTPATIENT)
Dept: OPHTHALMOLOGY | Facility: CLINIC | Age: 77
End: 2022-11-07
Payer: MEDICARE

## 2022-11-07 NOTE — TELEPHONE ENCOUNTER
Patient given arrival time of 11:00 am on Thursday November 10. Nothing to eat or drink after 9 pm.  Water, Gatorade after 9 pm until leaves home.  Start drops into the operative eye today. [address]

## 2022-11-10 ENCOUNTER — HOSPITAL ENCOUNTER (OUTPATIENT)
Facility: OTHER | Age: 77
Discharge: HOME | End: 2022-11-10
Attending: OPHTHALMOLOGY | Admitting: OPHTHALMOLOGY
Payer: MEDICARE

## 2022-11-10 ENCOUNTER — ANESTHESIA (OUTPATIENT)
Dept: SURGERY | Facility: OTHER | Age: 77
End: 2022-11-10
Payer: MEDICARE

## 2022-11-10 ENCOUNTER — ANESTHESIA EVENT (OUTPATIENT)
Dept: SURGERY | Facility: OTHER | Age: 77
End: 2022-11-10
Payer: MEDICARE

## 2022-11-10 VITALS
TEMPERATURE: 98 F | DIASTOLIC BLOOD PRESSURE: 63 MMHG | RESPIRATION RATE: 18 BRPM | HEIGHT: 60 IN | OXYGEN SATURATION: 97 % | WEIGHT: 207 LBS | SYSTOLIC BLOOD PRESSURE: 134 MMHG | HEART RATE: 77 BPM | BODY MASS INDEX: 40.64 KG/M2

## 2022-11-10 DIAGNOSIS — T86.8413 FAILURE OF CORNEA TRANSPLANT OF BOTH EYES: ICD-10-CM

## 2022-11-10 PROCEDURE — 65755 PR CORNEAL TRANSPLANT,PEN,PSEUDOAPHAK: ICD-10-PCS | Mod: RT,,, | Performed by: OPHTHALMOLOGY

## 2022-11-10 PROCEDURE — 37000008 HC ANESTHESIA 1ST 15 MINUTES: Performed by: OPHTHALMOLOGY

## 2022-11-10 PROCEDURE — 71000016 HC POSTOP RECOV ADDL HR: Performed by: OPHTHALMOLOGY

## 2022-11-10 PROCEDURE — 88313 SPECIAL STAINS GROUP 2: CPT | Performed by: STUDENT IN AN ORGANIZED HEALTH CARE EDUCATION/TRAINING PROGRAM

## 2022-11-10 PROCEDURE — 88313 SPECIAL STAINS GROUP 2: CPT | Mod: 26,,, | Performed by: STUDENT IN AN ORGANIZED HEALTH CARE EDUCATION/TRAINING PROGRAM

## 2022-11-10 PROCEDURE — 36000707: Performed by: OPHTHALMOLOGY

## 2022-11-10 PROCEDURE — V2785 CORNEAL TISSUE PROCESSING: HCPCS | Performed by: OPHTHALMOLOGY

## 2022-11-10 PROCEDURE — 63600175 PHARM REV CODE 636 W HCPCS: Performed by: OPHTHALMOLOGY

## 2022-11-10 PROCEDURE — 88313 PR  SPECIAL STAINS,GROUP II: ICD-10-PCS | Mod: 26,,, | Performed by: STUDENT IN AN ORGANIZED HEALTH CARE EDUCATION/TRAINING PROGRAM

## 2022-11-10 PROCEDURE — 88304 PR  SURG PATH,LEVEL III: ICD-10-PCS | Mod: 26,,, | Performed by: STUDENT IN AN ORGANIZED HEALTH CARE EDUCATION/TRAINING PROGRAM

## 2022-11-10 PROCEDURE — 27201423 OPTIME MED/SURG SUP & DEVICES STERILE SUPPLY: Performed by: OPHTHALMOLOGY

## 2022-11-10 PROCEDURE — 37000009 HC ANESTHESIA EA ADD 15 MINS: Performed by: OPHTHALMOLOGY

## 2022-11-10 PROCEDURE — 25000003 PHARM REV CODE 250: Performed by: OPHTHALMOLOGY

## 2022-11-10 PROCEDURE — 65755 CORNEAL TRANSPLANT: CPT | Mod: RT,,, | Performed by: OPHTHALMOLOGY

## 2022-11-10 PROCEDURE — 88304 TISSUE EXAM BY PATHOLOGIST: CPT | Mod: 26,,, | Performed by: STUDENT IN AN ORGANIZED HEALTH CARE EDUCATION/TRAINING PROGRAM

## 2022-11-10 PROCEDURE — 88304 TISSUE EXAM BY PATHOLOGIST: CPT | Performed by: STUDENT IN AN ORGANIZED HEALTH CARE EDUCATION/TRAINING PROGRAM

## 2022-11-10 PROCEDURE — 63600175 PHARM REV CODE 636 W HCPCS: Performed by: NURSE ANESTHETIST, CERTIFIED REGISTERED

## 2022-11-10 PROCEDURE — 36000706: Performed by: OPHTHALMOLOGY

## 2022-11-10 PROCEDURE — 71000015 HC POSTOP RECOV 1ST HR: Performed by: OPHTHALMOLOGY

## 2022-11-10 RX ORDER — MIDAZOLAM HYDROCHLORIDE 1 MG/ML
INJECTION INTRAMUSCULAR; INTRAVENOUS
Status: DISCONTINUED | OUTPATIENT
Start: 2022-11-10 | End: 2022-11-10

## 2022-11-10 RX ORDER — CEFAZOLIN SODIUM 1 G/3ML
INJECTION, POWDER, FOR SOLUTION INTRAMUSCULAR; INTRAVENOUS
Status: DISCONTINUED | OUTPATIENT
Start: 2022-11-10 | End: 2022-11-10 | Stop reason: HOSPADM

## 2022-11-10 RX ORDER — DEXAMETHASONE SODIUM PHOSPHATE 4 MG/ML
INJECTION, SOLUTION INTRA-ARTICULAR; INTRALESIONAL; INTRAMUSCULAR; INTRAVENOUS; SOFT TISSUE
Status: DISCONTINUED | OUTPATIENT
Start: 2022-11-10 | End: 2022-11-10 | Stop reason: HOSPADM

## 2022-11-10 RX ORDER — MOXIFLOXACIN 5 MG/ML
SOLUTION/ DROPS OPHTHALMIC
Status: DISCONTINUED | OUTPATIENT
Start: 2022-11-10 | End: 2022-11-10 | Stop reason: HOSPADM

## 2022-11-10 RX ORDER — FENTANYL CITRATE 50 UG/ML
INJECTION, SOLUTION INTRAMUSCULAR; INTRAVENOUS
Status: DISCONTINUED | OUTPATIENT
Start: 2022-11-10 | End: 2022-11-10

## 2022-11-10 RX ORDER — HYDROCODONE BITARTRATE AND ACETAMINOPHEN 5; 325 MG/1; MG/1
1 TABLET ORAL EVERY 4 HOURS PRN
Status: DISCONTINUED | OUTPATIENT
Start: 2022-11-10 | End: 2022-11-10 | Stop reason: HOSPADM

## 2022-11-10 RX ORDER — LIDOCAINE HYDROCHLORIDE 20 MG/ML
INJECTION, SOLUTION INFILTRATION; PERINEURAL
Status: DISCONTINUED | OUTPATIENT
Start: 2022-11-10 | End: 2022-11-10 | Stop reason: HOSPADM

## 2022-11-10 RX ORDER — BUPIVACAINE HYDROCHLORIDE 7.5 MG/ML
INJECTION, SOLUTION EPIDURAL; RETROBULBAR
Status: DISCONTINUED | OUTPATIENT
Start: 2022-11-10 | End: 2022-11-10 | Stop reason: HOSPADM

## 2022-11-10 RX ORDER — MOXIFLOXACIN 5 MG/ML
1 SOLUTION/ DROPS OPHTHALMIC
Status: COMPLETED | OUTPATIENT
Start: 2022-11-10 | End: 2022-11-10

## 2022-11-10 RX ORDER — TETRACAINE HYDROCHLORIDE 5 MG/ML
1 SOLUTION OPHTHALMIC
Status: COMPLETED | OUTPATIENT
Start: 2022-11-10 | End: 2022-11-10

## 2022-11-10 RX ORDER — ACETAMINOPHEN 325 MG/1
650 TABLET ORAL EVERY 4 HOURS PRN
Status: DISCONTINUED | OUTPATIENT
Start: 2022-11-10 | End: 2022-11-10 | Stop reason: HOSPADM

## 2022-11-10 RX ADMIN — MOXIFLOXACIN 1 DROP: 5 SOLUTION/ DROPS OPHTHALMIC at 12:11

## 2022-11-10 RX ADMIN — TETRACAINE HYDROCHLORIDE 1 DROP: 5 SOLUTION OPHTHALMIC at 12:11

## 2022-11-10 RX ADMIN — FENTANYL CITRATE 100 MCG: 50 INJECTION, SOLUTION INTRAMUSCULAR; INTRAVENOUS at 01:11

## 2022-11-10 RX ADMIN — MIDAZOLAM HYDROCHLORIDE 1 MG: 1 INJECTION, SOLUTION INTRAMUSCULAR; INTRAVENOUS at 01:11

## 2022-11-10 NOTE — ANESTHESIA PREPROCEDURE EVALUATION
11/10/2022  Ligia Curran is a 77 y.o., female.    Pre-op Assessment    I have reviewed the Patient Summary Reports.    I have reviewed the Nursing Notes. I have reviewed the NPO Status.   I have reviewed the Medications.     Review of Systems  Anesthesia Hx:  History of prior surgery of interest to airway management or planning: Previous anesthesia: MAC  5/21 PKP, rec'd versed 3 mg, fent 50 prema, did well with MAC.  Denies Family Hx of Anesthesia complications.    Social:  Non-Smoker    Hematology/Oncology:  Hematology Normal   Oncology Normal     EENT/Dental:  EENT/Dental Normal glaucoma   Cardiovascular:  Cardiovascular Normal Exercise tolerance: good     Pulmonary:  Pulmonary Normal    Renal/:  Renal/ Normal     Hepatic/GI:  Hepatic/GI Normal    Musculoskeletal:  Musculoskeletal Normal    Neurological:  Neurology Normal    Endocrine:  Endocrine Normal  Morbid Obesity / BMI > 40  Dermatological:  Skin Normal    Psych:  Psychiatric Normal           Physical Exam  General:  Obesity      Airway/Jaw/Neck:  Airway Findings: Mouth Opening: Normal   Tongue: Normal   General Airway Assessment: Adult Mallampati: II  TM Distance: Normal, at least 6 cm   Jaw/Neck Findings:     Neck ROM: Normal ROM       Dental:  Dental Findings: In tact          Mental Status:  Mental Status Findings:  Cooperative, Alert and Oriented         Anesthesia Plan  Type of Anesthesia, risks & benefits discussed:  Anesthesia Type:  MAC    Patient's Preference:   Plan Factors:          Intra-op Monitoring Plan: standard ASA monitors  Intra-op Monitoring Plan Comments:   Post Op Pain Control Plan: multimodal analgesia  Post Op Pain Control Plan Comments:     Induction:   IV  Beta Blocker:         Informed Consent: Informed consent signed with the Patient and all parties understand the risks and agree with anesthesia plan.  All questions  answered.  Anesthesia consent signed with patient.  ASA Score: 3     Day of Surgery Review of History & Physical:              Ready For Surgery From Anesthesia Perspective.           Physical Exam  General: Obesity    Airway:  Mallampati: II   Mouth Opening: Normal  TM Distance: Normal, at least 6 cm  Tongue: Normal  Neck ROM: Normal ROM    Dental:  In tact          Anesthesia Plan  Type of Anesthesia, risks & benefits discussed:    Anesthesia Type: MAC  Intra-op Monitoring Plan: standard ASA monitors  Post Op Pain Control Plan: multimodal analgesia  Induction:  IV  Informed Consent: Informed consent signed with the Patient and all parties understand the risks and agree with anesthesia plan.  All questions answered.   ASA Score: 3    Ready For Surgery From Anesthesia Perspective.       .

## 2022-11-10 NOTE — DISCHARGE SUMMARY
Outcome: Successful outpatient ophthalmic surgical procedure  Preprinted Instructions given to patient.  Regular diet.  Activity: No restrictions  Meds: see Med Rec  Condition: stable  Follow up: 1 day with Dr Calvo  Disposition: Home  Diagnosis: s/p eye surgery  Date of discharge: 11/10/2022

## 2022-11-10 NOTE — ANESTHESIA POSTPROCEDURE EVALUATION
Anesthesia Post Evaluation    Patient: Ligia Curran    Procedure(s) Performed: Procedure(s) (LRB):  KERATOPLASTY, PENETRATING (Right)    Final Anesthesia Type: MAC      Patient location during evaluation: [facility]  Patient participation: Yes- Able to Participate  Level of consciousness: awake and alert  Post-procedure vital signs: reviewed and stable  Pain management: adequate  Airway patency: patent    PONV status at discharge: No PONV  Anesthetic complications: no      Cardiovascular status: blood pressure returned to baseline  Respiratory status: unassisted and spontaneous ventilation  Hydration status: euvolemic  Follow-up not needed.          Vitals Value Taken Time   /77 11/10/22 1244   Temp 36.8 °C (98.2 °F) 11/10/22 1244   Pulse 74 11/10/22 1244   Resp 16 11/10/22 1244   SpO2 98 % 11/10/22 1244         No case tracking events are documented in the log.      Pain/Ammon Score: No data recorded

## 2022-11-10 NOTE — OP NOTE
SURGEON:  Lucia Calvo M.D.    PREOPERATIVE DIAGNOSIS:    1) Failed PKP OD  2) PCIOL    POSTOPERATIVE DIAGNOSIS:     1) Failed PKP OD  2) PCIOL    PROCEDURE:    Penetrating keratoplasty, right eye  2.     ANESTHESIA:  rbb      DATE OF SURGERY:  11/10/2022      GRAFT SIZE:  8.75 mm donor button into a   8.25 mm host trephination      COMPLICATIONS:  None.    BLOOD LOSS: Less than 5 cc    SPECIMENS:   1) Cornea     INDICATIONS FOR PROCEDURE :       After a thorough discussion of risks, benefits and alternatives, including, but not limited to, infection, severe hemorrhage, graft failure, need for repeat transplantation, loss of vision, and loss of the eye,  the patient voices understanding and desires to proceed with corneal transplantation.    PROCEDURE IN DETAIL:    The patient was brought to the operating room in supine position where anesthesia was achieved without complication.  Next, the eye was prepped and draped in standard sterile fashion with 5% Betadine and a lid speculum placed in the eye.  The procedure was begun by marking the center of the cornea and sizers  used to determine the necessary size of the grafts.    Attention was then directed to the side table where a donor punch was used to cut the donor tissue.  Attention was then redirected to the patient's eye where a  trephine and an Wellington PKP tess blade were used to excise the patient's cornea.  The donor button was then sewn into place with 10-0 nylon using 8 interrupted sutures and a 16 x running.  All remaining viscoelastics were removed from the anterior chamber.  The eye was reformed with BSS and wound integrity verified.  Subconjunctival injections of antibiotic and steroid were administered and a patch placed over the eye. The patient will follow up in the eye clinic tomorrow with Dr. Calvo.

## 2022-11-10 NOTE — PLAN OF CARE
Ligia Curran has met all discharge criteria from Phase II. Vital Signs are stable, ambulating  without difficulty. Discharge instructions given, patient verbalized understanding. Discharged from facility via wheelchair in stable condition.

## 2022-11-11 ENCOUNTER — OFFICE VISIT (OUTPATIENT)
Dept: OPHTHALMOLOGY | Facility: CLINIC | Age: 77
End: 2022-11-11
Attending: OPHTHALMOLOGY
Payer: MEDICARE

## 2022-11-11 DIAGNOSIS — Z94.7 STATUS POST CORNEAL TRANSPLANT: Primary | ICD-10-CM

## 2022-11-11 PROCEDURE — 99213 OFFICE O/P EST LOW 20 MIN: CPT | Mod: PBBFAC | Performed by: OPHTHALMOLOGY

## 2022-11-11 PROCEDURE — 99024 PR POST-OP FOLLOW-UP VISIT: ICD-10-PCS | Mod: POP,,, | Performed by: OPHTHALMOLOGY

## 2022-11-11 PROCEDURE — 99024 POSTOP FOLLOW-UP VISIT: CPT | Mod: POP,,, | Performed by: OPHTHALMOLOGY

## 2022-11-11 PROCEDURE — 99999 PR PBB SHADOW E&M-EST. PATIENT-LVL III: ICD-10-PCS | Mod: PBBFAC,,, | Performed by: OPHTHALMOLOGY

## 2022-11-11 PROCEDURE — 99999 PR PBB SHADOW E&M-EST. PATIENT-LVL III: CPT | Mod: PBBFAC,,, | Performed by: OPHTHALMOLOGY

## 2022-11-11 RX ORDER — OFLOXACIN 3 MG/ML
SOLUTION/ DROPS OPHTHALMIC
COMMUNITY
Start: 2022-11-02

## 2022-11-11 RX ORDER — SODIUM CHLORIDE 50 MG/ML
SOLUTION OPHTHALMIC
COMMUNITY
Start: 2022-10-13

## 2022-11-11 NOTE — PROGRESS NOTES
HPI     Post-op Evaluation            Comments: 1 day PKP OD          Comments    Patient here for 1 day PKP OD    Cosopt BID OU  Latanoprost QHS OU  Ocuflox QID OD  PF QID OD          Last edited by Shy Guerrero MA on 11/11/2022  9:03 AM.            Assessment /Plan     For exam results, see Encounter Report.    Status post corneal transplant      POD1 PKP: Wound stable. Graft with normal edema, folds. Post operative precautions reviewed.  Fibrin/tr heme  Tube cleared

## 2022-11-18 ENCOUNTER — OFFICE VISIT (OUTPATIENT)
Dept: OPHTHALMOLOGY | Facility: CLINIC | Age: 77
End: 2022-11-18
Attending: OPHTHALMOLOGY
Payer: MEDICARE

## 2022-11-18 DIAGNOSIS — Z94.7 STATUS POST CORNEAL TRANSPLANT: Primary | ICD-10-CM

## 2022-11-18 PROCEDURE — 99999 PR PBB SHADOW E&M-EST. PATIENT-LVL III: ICD-10-PCS | Mod: PBBFAC,,, | Performed by: OPHTHALMOLOGY

## 2022-11-18 PROCEDURE — 99213 OFFICE O/P EST LOW 20 MIN: CPT | Mod: PBBFAC | Performed by: OPHTHALMOLOGY

## 2022-11-18 PROCEDURE — 99999 PR PBB SHADOW E&M-EST. PATIENT-LVL III: CPT | Mod: PBBFAC,,, | Performed by: OPHTHALMOLOGY

## 2022-11-18 PROCEDURE — 99024 PR POST-OP FOLLOW-UP VISIT: ICD-10-PCS | Mod: POP,,, | Performed by: OPHTHALMOLOGY

## 2022-11-18 PROCEDURE — 99024 POSTOP FOLLOW-UP VISIT: CPT | Mod: POP,,, | Performed by: OPHTHALMOLOGY

## 2022-11-18 NOTE — PROGRESS NOTES
HPI    Patient here for 1 week PKP OD. VA stable ou, ,mild pain after   accidentally touching eye OD and no f/f.      Cosopt BID OU  Latanoprost QHS OU  Ocuflox QID OD  PF QID OD  Last edited by Janis Allison on 11/18/2022 10:33 AM.            Assessment /Plan     For exam results, see Encounter Report.    Status post corneal transplant      POW1 PKP OD with few folds  Heme/fibrin cleared  PF qid

## 2022-11-21 LAB
FINAL PATHOLOGIC DIAGNOSIS: NORMAL
GROSS: NORMAL
Lab: NORMAL
MICROSCOPIC EXAM: NORMAL

## 2022-11-30 ENCOUNTER — OFFICE VISIT (OUTPATIENT)
Dept: OPHTHALMOLOGY | Facility: CLINIC | Age: 77
End: 2022-11-30
Payer: MEDICARE

## 2022-11-30 DIAGNOSIS — Z94.7 STATUS POST CORNEAL TRANSPLANT: ICD-10-CM

## 2022-11-30 DIAGNOSIS — H16.121 FILAMENTARY KERATITIS OF RIGHT EYE: Primary | ICD-10-CM

## 2022-11-30 PROCEDURE — 99999 PR PBB SHADOW E&M-EST. PATIENT-LVL III: CPT | Mod: PBBFAC,,, | Performed by: OPHTHALMOLOGY

## 2022-11-30 PROCEDURE — 99024 PR POST-OP FOLLOW-UP VISIT: ICD-10-PCS | Mod: POP,,, | Performed by: OPHTHALMOLOGY

## 2022-11-30 PROCEDURE — 99999 PR PBB SHADOW E&M-EST. PATIENT-LVL III: ICD-10-PCS | Mod: PBBFAC,,, | Performed by: OPHTHALMOLOGY

## 2022-11-30 PROCEDURE — 99024 POSTOP FOLLOW-UP VISIT: CPT | Mod: POP,,, | Performed by: OPHTHALMOLOGY

## 2022-11-30 PROCEDURE — 99213 OFFICE O/P EST LOW 20 MIN: CPT | Mod: PBBFAC | Performed by: OPHTHALMOLOGY

## 2022-11-30 NOTE — PROGRESS NOTES
HPI    DLS: 11/18/2022 Dr. Calvo  Urgent visit today    Patient states eyes right eye has been tearing and burning for the past   week and been getting progressively worse for the past few days.   (+)photophobia (+)pain 6/10 OD    Cosopt BID OU  Latanoprost QHS OU  PF QID OD  Last edited by Mireya Willis on 11/30/2022 11:38 AM.            Assessment /Plan     For exam results, see Encounter Report.    Filamentary keratitis of right eye    Status post corneal transplant      PKP epithelialized and healing well, but now with filamentary keratitis, so increase tears.  ATs PF qid ABx

## 2022-12-14 ENCOUNTER — OFFICE VISIT (OUTPATIENT)
Dept: OPHTHALMOLOGY | Facility: CLINIC | Age: 77
End: 2022-12-14
Payer: MEDICARE

## 2022-12-14 DIAGNOSIS — Z94.7 STATUS POST CORNEAL TRANSPLANT: Primary | ICD-10-CM

## 2022-12-14 DIAGNOSIS — Z96.89 HISTORY OF GLAUCOMA TUBE SHUNT PROCEDURE: ICD-10-CM

## 2022-12-14 DIAGNOSIS — H40.2213 CHRONIC ANGLE-CLOSURE GLAUCOMA OF RIGHT EYE, SEVERE STAGE: ICD-10-CM

## 2022-12-14 PROCEDURE — 99999 PR PBB SHADOW E&M-EST. PATIENT-LVL II: ICD-10-PCS | Mod: PBBFAC,,, | Performed by: OPHTHALMOLOGY

## 2022-12-14 PROCEDURE — 99999 PR PBB SHADOW E&M-EST. PATIENT-LVL II: CPT | Mod: PBBFAC,,, | Performed by: OPHTHALMOLOGY

## 2022-12-14 PROCEDURE — 99212 OFFICE O/P EST SF 10 MIN: CPT | Mod: PBBFAC | Performed by: OPHTHALMOLOGY

## 2022-12-14 PROCEDURE — 99024 POSTOP FOLLOW-UP VISIT: CPT | Mod: POP,,, | Performed by: OPHTHALMOLOGY

## 2022-12-14 PROCEDURE — 99024 PR POST-OP FOLLOW-UP VISIT: ICD-10-PCS | Mod: POP,,, | Performed by: OPHTHALMOLOGY

## 2022-12-14 RX ORDER — FLUOROMETHOLONE 1 MG/ML
1 SUSPENSION/ DROPS OPHTHALMIC 4 TIMES DAILY
Qty: 10 ML | Refills: 3 | Status: SHIPPED | OUTPATIENT
Start: 2022-12-14 | End: 2022-12-24

## 2022-12-14 NOTE — PROGRESS NOTES
HPI     Post-op Evaluation            Comments: Patient Sister Ligia Curran is a 77 year old female.          Comments    Pt here for 2 week post-op visit.  Pt states that burning OD has decreased.  Pt states that VA OD is the same with continued watering and light   sensitivity.  Pt states some blurry VA OS.  Pt denies any eye pain.    DLS: 11/30/2022  S/p PKP OD: 11/10/2022  S/p PKP OS: 05/10/2021    Gtts:  1. Cosopt BID OU  2. Latanoprost qhs OU  3. Pred Forte QID OD and BID OS  4. AT's QID OU    POHx:  1. Filamentary keratitis of right eye  2. Status post corneal transplant  3. Chronic angle-closure glaucoma of right eye, severe stage  4. Glaucoma shunt device of both eyes  5. Blindness and low vision - Both Eyes  6. Primary open angle glaucoma (POAG) of left eye, moderate stage          Last edited by Renetta Arias on 12/14/2022 11:13 AM.            Assessment /Plan     For exam results, see Encounter Report.    Status post corneal transplant    Chronic angle-closure glaucoma of right eye, severe stage    Glaucoma shunt device of both eyes      PKP OD 1 month still with 2+ folds, irreg epi, and high IOP today, healing slowly...  PKP OS with mild MCE due to high IOP today.    On same drops as before, but both eyes with elevated IOP today  Poss steroid response, though on PF for a long time prior.    Had a rxn to Diamox before...    CHange PF to FML and monitor IOP with JN next visit.

## 2022-12-22 ENCOUNTER — TELEPHONE (OUTPATIENT)
Dept: OPTOMETRY | Facility: CLINIC | Age: 77
End: 2022-12-22
Payer: MEDICARE

## 2022-12-22 NOTE — TELEPHONE ENCOUNTER
----- Message from Ruth Fajardo sent at 12/22/2022  7:49 AM CST -----  Can you please call this patient and find out what's going on and if she needs to see someone before the weekend?  Thanks  Ruth  ----- Message -----  From: Kimberly Ashley  Sent: 12/21/2022   4:07 PM CST  To: Ruth Fajardo      ----- Message -----  From: Mahogany aVrgas  Sent: 12/21/2022   1:50 PM CST  To: Umesh Myers A Staff    Patient is calling stating that her vision is dimmer since taking the FML drops. She is asking if that is a side effect.   She also is asking if she only needs to us the drops for 10 days as instructed on the box, or if she needs to use them longer? If she does need to stop after 10 days, she is asking if she should go back to using the prednisoLONE.     Please contact patient at [phone]

## 2022-12-30 ENCOUNTER — TELEPHONE (OUTPATIENT)
Dept: FAMILY MEDICINE | Facility: CLINIC | Age: 77
End: 2022-12-30
Payer: MEDICARE

## 2022-12-30 DIAGNOSIS — U07.1 COVID-19: Primary | ICD-10-CM

## 2022-12-30 NOTE — TELEPHONE ENCOUNTER
LM stating that nirmatrelvir-ritonavir 300 mg (150 mg x 2)-100 mg copackaged tablets was sent in.

## 2022-12-30 NOTE — TELEPHONE ENCOUNTER
----- Message from Pia Conroy sent at 12/30/2022  8:52 AM CST -----  Regarding: sore throat rx  Name of Who is Calling:[facility] [[id number]]          What is the request in detail: Pt has a bad sore throat and says that it is hard to swallow, she also has a runny nose. She is asking if you could call her in a rx for her symptoms     [facility] - [place] [address] [address]   Phone:  [phone]  Fax:  [phone]            Can the clinic reply by [facility]:no           What Number to Call Back if not in [facility]:[phone]

## 2022-12-30 NOTE — TELEPHONE ENCOUNTER
Pt states that she has tested positive on the Covid test today. The people she was with over the weekend has also tested positive for Covid. Pt c/o of 97.8 F, sore throat, sinus issues and feeling fatigued. Pt would like to know if Paxlovid can be sent in? [facility]

## 2022-12-30 NOTE — TELEPHONE ENCOUNTER
----- Message from Pia Conroy sent at 12/30/2022  8:52 AM CST -----  Regarding: sore throat rx  Name of Who is Calling:[facility] [[id number]]          What is the request in detail: Pt has a bad sore throat and says that it is hard to swallow, she also has a runny nose. She is asking if you could call her in a rx for her symptoms     [facility] ([facility]) - [place] [address] [address]   Phone:  [phone]  Fax:  [phone]            Can the clinic reply by [facility]:no           What Number to Call Back if not in [facility]:[phone]

## 2023-01-03 DIAGNOSIS — Z94.7 STATUS POST CORNEAL TRANSPLANT: Primary | ICD-10-CM

## 2023-01-03 RX ORDER — FLUOROMETHOLONE 1 MG/ML
1 SUSPENSION/ DROPS OPHTHALMIC 4 TIMES DAILY
Qty: 10 ML | Refills: 2 | Status: SHIPPED | OUTPATIENT
Start: 2023-01-03 | End: 2023-03-09

## 2023-01-03 RX ORDER — FLUOROMETHOLONE 1 MG/ML
1 SUSPENSION/ DROPS OPHTHALMIC 4 TIMES DAILY
COMMUNITY
End: 2023-01-03 | Stop reason: SDUPTHER

## 2023-01-03 NOTE — TELEPHONE ENCOUNTER
----- Message from Mahogany Vargas sent at 1/3/2023  1:33 PM CST -----  Pt is calling stating that she is having problems with her medication and is needing to reschedule appt for 16.     Please contact her to help at [phone].

## 2023-01-06 ENCOUNTER — TELEPHONE (OUTPATIENT)
Dept: OPHTHALMOLOGY | Facility: CLINIC | Age: 78
End: 2023-01-06
Payer: MEDICARE

## 2023-01-06 ENCOUNTER — OFFICE VISIT (OUTPATIENT)
Dept: OPHTHALMOLOGY | Facility: CLINIC | Age: 78
End: 2023-01-06
Payer: MEDICARE

## 2023-01-06 DIAGNOSIS — H54.10 BLINDNESS AND LOW VISION: ICD-10-CM

## 2023-01-06 DIAGNOSIS — H04.123 BILATERAL DRY EYES: ICD-10-CM

## 2023-01-06 DIAGNOSIS — H40.2213 CHRONIC ANGLE-CLOSURE GLAUCOMA OF RIGHT EYE, SEVERE STAGE: ICD-10-CM

## 2023-01-06 DIAGNOSIS — H40.2213 CHRONIC ANGLE-CLOSURE GLAUCOMA OF RIGHT EYE, SEVERE STAGE: Primary | ICD-10-CM

## 2023-01-06 DIAGNOSIS — Z98.49 STATUS POST CATARACT EXTRACTION AND INSERTION OF INTRAOCULAR LENS, UNSPECIFIED LATERALITY: ICD-10-CM

## 2023-01-06 DIAGNOSIS — H40.33X3 GLAUCOMA OF BOTH EYES ASSOCIATED WITH OCULAR TRAUMA, SEVERE STAGE: Primary | ICD-10-CM

## 2023-01-06 DIAGNOSIS — H40.1122 PRIMARY OPEN ANGLE GLAUCOMA (POAG) OF LEFT EYE, MODERATE STAGE: ICD-10-CM

## 2023-01-06 DIAGNOSIS — T85.398D CORNEAL GRAFT MALFUNCTION, SUBSEQUENT ENCOUNTER: ICD-10-CM

## 2023-01-06 DIAGNOSIS — Z96.1 STATUS POST CATARACT EXTRACTION AND INSERTION OF INTRAOCULAR LENS, UNSPECIFIED LATERALITY: ICD-10-CM

## 2023-01-06 DIAGNOSIS — Z96.89 HISTORY OF GLAUCOMA TUBE SHUNT PROCEDURE: ICD-10-CM

## 2023-01-06 PROCEDURE — 99999 PR PBB SHADOW E&M-EST. PATIENT-LVL III: ICD-10-PCS | Mod: PBBFAC,,, | Performed by: OPHTHALMOLOGY

## 2023-01-06 PROCEDURE — 99999 PR PBB SHADOW E&M-EST. PATIENT-LVL III: CPT | Mod: PBBFAC,,, | Performed by: OPHTHALMOLOGY

## 2023-01-06 PROCEDURE — 99214 OFFICE O/P EST MOD 30 MIN: CPT | Mod: 24,S$PBB,, | Performed by: OPHTHALMOLOGY

## 2023-01-06 PROCEDURE — 99213 OFFICE O/P EST LOW 20 MIN: CPT | Mod: PBBFAC | Performed by: OPHTHALMOLOGY

## 2023-01-06 PROCEDURE — 99214 PR OFFICE/OUTPT VISIT, EST, LEVL IV, 30-39 MIN: ICD-10-PCS | Mod: 24,S$PBB,, | Performed by: OPHTHALMOLOGY

## 2023-01-06 RX ORDER — PHENOL 1.4 %
AEROSOL, SPRAY (ML) MUCOUS MEMBRANE
COMMUNITY
Start: 2022-12-30

## 2023-01-06 RX ORDER — TAFLUPROST OPTHALMIC 0 MG/.3ML
1 SOLUTION/ DROPS OPHTHALMIC DAILY
COMMUNITY
End: 2023-01-06 | Stop reason: SDUPTHER

## 2023-01-06 RX ORDER — TAFLUPROST OPTHALMIC 0 MG/.3ML
1 SOLUTION/ DROPS OPHTHALMIC DAILY
Qty: 30 EACH | Refills: 2 | Status: SHIPPED | OUTPATIENT
Start: 2023-01-06

## 2023-01-06 NOTE — TELEPHONE ENCOUNTER
Left message on voicemail- per Dr. Wilkerson to continue with FML as directed and not use Zioptan.    ----- Message from Marita Silvestre sent at 1/6/2023  1:23 PM CST -----  Contact: Kathy @ [phone]  Kathy with [facility] state the medication (tafluprost, PF, (ZIOPTAN, PF,) 0.0015 % Dpet) is not covered under the pt insurance. Please give her a call back to further assist.

## 2023-01-06 NOTE — PROGRESS NOTES
Assessment /Plan     For exam results, see Encounter Report.    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, severe stage    Blindness and low vision - Both Eyes    Glaucoma shunt device of both eyes    Bilateral dry eyes    Chronic angle-closure glaucoma of right eye, severe stage    Primary open angle glaucoma (POAG) of left eye, moderate stage    Status post cataract extraction and insertion of intraocular lens, unspecified laterality    Corneal graft malfunction, subsequent encounter          [facility]  Strong FMHx Glc / Blindness --> Justyn Creole Dz / mom's side    Complex ocular hx  multiple sx --> trabs, tubes and tube removes etc   Too numerous to count    CCT  537 // 611  --> 804 // 788 -->     Target --> diff assessment with k-failure OD --> thick CCT with PKPs    Right eye  PKP OD 11/10/2022 --> slow healing  PKP failure  --> 10/26/2017 PKP  PC IOL  Fibrotic anterior capsule membrane  Tubes x 2 in place ST & IN --> removed ST BV 2/27/2017  Glc Shunt graft site OD  Revision 04/09/2013    Glaucoma Incisional Surgery  Patient with exposed ST Tube OD  SP OD ST BV Removal 2/27/2017    Left eye  SP PKP // AC IOL placement  05/10/2021    Both eyes --> good adherence --> adjusting  Cosopt BID  Xal q day  FML QID OD // BID OS  PF1%  BID --> steroid response --> Holding  Idania 128 BID    Generic Zioptan not covered      MGD --> improved surface  Dry Eye Syndrome: discussed use of warm compresses, preserved & non-preserved artificial tears, gel and PM ointment options.  Also discussed options utilizing medications.    Right eye  Mucomyst 10% BID / PRN --> not using  EES BID --> q hs    --> consider Doxy PO as discussed    RD precautions:  Discussed symptoms of RD with increased flashes, floaters, decreasing vision.  Patient/Family to call and return immediately to clinic should the symptoms of RD occur. Voiced good understanding with Q & A.      Plan  RTC  CORDELIA Calvo  as scheduled -->  planning PKP OD  RTC [place] 3 months with CCT, IOP // Tonopen & Adherence & OCT RNFL --> p PKP OD  RTC sooner prn with understanding

## 2023-01-11 ENCOUNTER — TELEPHONE (OUTPATIENT)
Dept: FAMILY MEDICINE | Facility: CLINIC | Age: 78
End: 2023-01-11
Payer: MEDICARE

## 2023-01-11 NOTE — TELEPHONE ENCOUNTER
Please inform patient that Dr Louie doesn't work on Monday's.  She can see Hillary if she would like. Thanks

## 2023-01-31 ENCOUNTER — EXTERNAL CHRONIC CARE MANAGEMENT (OUTPATIENT)
Dept: PRIMARY CARE CLINIC | Facility: CLINIC | Age: 78
End: 2023-01-31
Payer: MEDICARE

## 2023-01-31 PROCEDURE — 99439 PR CHRONIC CARE MGMT, EA ADDTL 20 MIN: ICD-10-PCS | Mod: S$PBB,,, | Performed by: FAMILY MEDICINE

## 2023-01-31 PROCEDURE — 99490 CHRNC CARE MGMT STAFF 1ST 20: CPT | Mod: PBBFAC,PO | Performed by: FAMILY MEDICINE

## 2023-01-31 PROCEDURE — 99439 CHRNC CARE MGMT STAF EA ADDL: CPT | Mod: PBBFAC,PO | Performed by: FAMILY MEDICINE

## 2023-01-31 PROCEDURE — 99490 PR CHRONIC CARE MGMT, 1ST 20 MIN: ICD-10-PCS | Mod: S$PBB,,, | Performed by: FAMILY MEDICINE

## 2023-01-31 PROCEDURE — 99490 CHRNC CARE MGMT STAFF 1ST 20: CPT | Mod: S$PBB,,, | Performed by: FAMILY MEDICINE

## 2023-01-31 PROCEDURE — 99439 CHRNC CARE MGMT STAF EA ADDL: CPT | Mod: S$PBB,,, | Performed by: FAMILY MEDICINE

## 2023-02-28 ENCOUNTER — EXTERNAL CHRONIC CARE MANAGEMENT (OUTPATIENT)
Dept: PRIMARY CARE CLINIC | Facility: CLINIC | Age: 78
End: 2023-02-28
Payer: MEDICARE

## 2023-02-28 PROCEDURE — 99490 CHRNC CARE MGMT STAFF 1ST 20: CPT | Mod: PBBFAC,PO | Performed by: FAMILY MEDICINE

## 2023-02-28 PROCEDURE — 99490 PR CHRONIC CARE MGMT, 1ST 20 MIN: ICD-10-PCS | Mod: S$PBB,,, | Performed by: FAMILY MEDICINE

## 2023-02-28 PROCEDURE — 99490 CHRNC CARE MGMT STAFF 1ST 20: CPT | Mod: S$PBB,,, | Performed by: FAMILY MEDICINE

## 2023-03-06 ENCOUNTER — TELEPHONE (OUTPATIENT)
Dept: FAMILY MEDICINE | Facility: CLINIC | Age: 78
End: 2023-03-06
Payer: MEDICARE

## 2023-03-06 DIAGNOSIS — Z12.31 ENCOUNTER FOR SCREENING MAMMOGRAM FOR BREAST CANCER: Primary | ICD-10-CM

## 2023-03-06 NOTE — TELEPHONE ENCOUNTER
Please call patient to schedule her mammogram.  Please offer next available for her physical with Dr. Louie or she can schedule with Hillary. Order is in. thanks

## 2023-03-06 NOTE — TELEPHONE ENCOUNTER
----- Message from Amarilys Ojeda sent at 3/6/2023 11:28 AM CST -----  Regarding: Orders needed  Contact: Pt @ [phone]  Patient Requesting Order     Order Needed: Mammogram     Communication Preference:  Call back     Additional Information:  Pt is requesting orders for a mammo and to be scheduled for her physical as well; please call to advise. Thank you

## 2023-03-10 ENCOUNTER — OFFICE VISIT (OUTPATIENT)
Dept: OPHTHALMOLOGY | Facility: CLINIC | Age: 78
End: 2023-03-10
Payer: MEDICARE

## 2023-03-10 DIAGNOSIS — H04.123 BILATERAL DRY EYES: ICD-10-CM

## 2023-03-10 DIAGNOSIS — Z96.89 HISTORY OF GLAUCOMA TUBE SHUNT PROCEDURE: ICD-10-CM

## 2023-03-10 DIAGNOSIS — Z94.7 HX OF CORNEA TRANSPLANT: ICD-10-CM

## 2023-03-10 DIAGNOSIS — H54.10 BLINDNESS AND LOW VISION: ICD-10-CM

## 2023-03-10 DIAGNOSIS — H40.1122 PRIMARY OPEN ANGLE GLAUCOMA (POAG) OF LEFT EYE, MODERATE STAGE: Primary | ICD-10-CM

## 2023-03-10 DIAGNOSIS — H40.2213 CHRONIC ANGLE-CLOSURE GLAUCOMA OF RIGHT EYE, SEVERE STAGE: ICD-10-CM

## 2023-03-10 DIAGNOSIS — H40.33X3 GLAUCOMA OF BOTH EYES ASSOCIATED WITH OCULAR TRAUMA, SEVERE STAGE: ICD-10-CM

## 2023-03-10 PROCEDURE — 99999 PR PBB SHADOW E&M-EST. PATIENT-LVL III: CPT | Mod: PBBFAC,,, | Performed by: OPHTHALMOLOGY

## 2023-03-10 PROCEDURE — 99214 OFFICE O/P EST MOD 30 MIN: CPT | Mod: S$PBB,,, | Performed by: OPHTHALMOLOGY

## 2023-03-10 PROCEDURE — 99213 OFFICE O/P EST LOW 20 MIN: CPT | Mod: PBBFAC | Performed by: OPHTHALMOLOGY

## 2023-03-10 PROCEDURE — 99999 PR PBB SHADOW E&M-EST. PATIENT-LVL III: ICD-10-PCS | Mod: PBBFAC,,, | Performed by: OPHTHALMOLOGY

## 2023-03-10 PROCEDURE — 99214 PR OFFICE/OUTPT VISIT, EST, LEVL IV, 30-39 MIN: ICD-10-PCS | Mod: S$PBB,,, | Performed by: OPHTHALMOLOGY

## 2023-03-10 NOTE — PROGRESS NOTES
Assessment /Plan     For exam results, see Encounter Report.    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, severe stage    Glaucoma shunt device of both eyes    Bilateral dry eyes    Hx of cornea transplant          [facility]  Strong FMHx Glc / Blindness --> Justyn Creole Dz / mom's side    Complex ocular hx  multiple sx --> trabs, tubes and tube removes etc   Too numerous to count    CCT  537 // 611  --> 804 // 788    Target --> diff assessment with k-failure OD --> thick CCT with PKPs    Right eye  PKP OD 11/10/2022 --> thick / edema  PKP failure  --> 10/26/2017 PKP  PC IOL  Fibrotic anterior capsule membrane  Tubes x 2 in place ST & IN --> removed ST BV 2/27/2017  Glc Shunt graft site OD  Revision 04/09/2013    Glaucoma Incisional Surgery  Patient with exposed ST Tube OD  SP OD ST BV Removal 2/27/2017    Left eye  SP PKP // AC IOL placement  05/10/2021    Both eyes --> good adherence // Tolerating well --> CSM  Cosopt BID  Xal q day  FML QID OD // BID OS  PF1%  BID --> steroid response --> Holding  Idania 128 BID    Generic Zioptan not covered      MGD --> improved surface  Dry Eye Syndrome: discussed use of warm compresses, preserved & non-preserved artificial tears, gel and PM ointment options.  Also discussed options utilizing medications.    Right eye  Mucomyst 10% BID / PRN --> not using  EES BID --> q hs      RD precautions:  Discussed symptoms of RD with increased flashes, floaters, decreasing vision.  Patient/Family to call and return immediately to clinic should the symptoms of RD occur. Voiced good understanding with Q & A.      Plan  RTC  CORDELIA Calvo  as scheduled --> planning PKP OD  RTC [place] 3 months with IOP // Tonopen & Adherence & OCT RNFL  RTC sooner prn with understanding

## 2023-03-31 ENCOUNTER — EXTERNAL CHRONIC CARE MANAGEMENT (OUTPATIENT)
Dept: PRIMARY CARE CLINIC | Facility: CLINIC | Age: 78
End: 2023-03-31
Payer: MEDICARE

## 2023-03-31 PROCEDURE — 99490 CHRNC CARE MGMT STAFF 1ST 20: CPT | Mod: PBBFAC,PO | Performed by: FAMILY MEDICINE

## 2023-03-31 PROCEDURE — 99490 PR CHRONIC CARE MGMT, 1ST 20 MIN: ICD-10-PCS | Mod: S$PBB,,, | Performed by: FAMILY MEDICINE

## 2023-03-31 PROCEDURE — 99490 CHRNC CARE MGMT STAFF 1ST 20: CPT | Mod: S$PBB,,, | Performed by: FAMILY MEDICINE

## 2023-04-13 ENCOUNTER — PATIENT OUTREACH (OUTPATIENT)
Dept: ADMINISTRATIVE | Facility: HOSPITAL | Age: 78
End: 2023-04-13
Payer: MEDICARE

## 2023-04-13 DIAGNOSIS — Z78.0 ASYMPTOMATIC MENOPAUSE: Primary | ICD-10-CM

## 2023-04-13 DIAGNOSIS — Z00.00 ANNUAL PHYSICAL EXAM: ICD-10-CM

## 2023-04-13 DIAGNOSIS — K76.0 HEPATIC STEATOSIS: ICD-10-CM

## 2023-04-20 ENCOUNTER — HOSPITAL ENCOUNTER (OUTPATIENT)
Dept: RADIOLOGY | Facility: HOSPITAL | Age: 78
Discharge: HOME | End: 2023-04-20
Attending: FAMILY MEDICINE
Payer: MEDICARE

## 2023-04-20 DIAGNOSIS — Z12.31 ENCOUNTER FOR SCREENING MAMMOGRAM FOR BREAST CANCER: ICD-10-CM

## 2023-04-20 PROCEDURE — 77063 MAMMO DIGITAL SCREENING BILAT WITH TOMO: ICD-10-PCS | Mod: 26,,, | Performed by: RADIOLOGY

## 2023-04-20 PROCEDURE — 77067 SCR MAMMO BI INCL CAD: CPT | Mod: TC

## 2023-04-20 PROCEDURE — 77067 SCR MAMMO BI INCL CAD: CPT | Mod: 26,,, | Performed by: RADIOLOGY

## 2023-04-20 PROCEDURE — 77067 MAMMO DIGITAL SCREENING BILAT WITH TOMO: ICD-10-PCS | Mod: 26,,, | Performed by: RADIOLOGY

## 2023-04-20 PROCEDURE — 77063 BREAST TOMOSYNTHESIS BI: CPT | Mod: 26,,, | Performed by: RADIOLOGY

## 2023-04-27 ENCOUNTER — TELEPHONE (OUTPATIENT)
Dept: FAMILY MEDICINE | Facility: CLINIC | Age: 78
End: 2023-04-27

## 2023-04-27 ENCOUNTER — OFFICE VISIT (OUTPATIENT)
Dept: FAMILY MEDICINE | Facility: CLINIC | Age: 78
End: 2023-04-27
Attending: FAMILY MEDICINE
Payer: MEDICARE

## 2023-04-27 VITALS
OXYGEN SATURATION: 96 % | BODY MASS INDEX: 39.83 KG/M2 | SYSTOLIC BLOOD PRESSURE: 130 MMHG | HEIGHT: 60 IN | WEIGHT: 202.88 LBS | DIASTOLIC BLOOD PRESSURE: 70 MMHG | HEART RATE: 80 BPM

## 2023-04-27 DIAGNOSIS — E66.01 MORBID OBESITY WITH BMI OF 40.0-44.9, ADULT: ICD-10-CM

## 2023-04-27 DIAGNOSIS — E11.9 DIABETES MELLITUS TYPE 2, DIET-CONTROLLED: ICD-10-CM

## 2023-04-27 DIAGNOSIS — Z00.00 ANNUAL PHYSICAL EXAM: Primary | ICD-10-CM

## 2023-04-27 LAB
AMORPH CRY UR QL COMP ASSIST: NORMAL
BILIRUB UR QL STRIP: NEGATIVE
CLARITY UR REFRACT.AUTO: ABNORMAL
COLOR UR AUTO: YELLOW
GLUCOSE UR QL STRIP: NEGATIVE
HGB UR QL STRIP: NEGATIVE
KETONES UR QL STRIP: NEGATIVE
LEUKOCYTE ESTERASE UR QL STRIP: ABNORMAL
MICROSCOPIC COMMENT: NORMAL
NITRITE UR QL STRIP: NEGATIVE
PH UR STRIP: 7 [PH] (ref 5–8)
PROT UR QL STRIP: NEGATIVE
RBC #/AREA URNS AUTO: 3 /HPF (ref 0–4)
SP GR UR STRIP: 1.01 (ref 1–1.03)
SQUAMOUS #/AREA URNS AUTO: 17 /HPF
URN SPEC COLLECT METH UR: ABNORMAL
WBC #/AREA URNS AUTO: 5 /HPF (ref 0–5)

## 2023-04-27 PROCEDURE — 99999 PR PBB SHADOW E&M-EST. PATIENT-LVL IV: CPT | Mod: PBBFAC,,, | Performed by: FAMILY MEDICINE

## 2023-04-27 PROCEDURE — 90732 PPSV23 VACC 2 YRS+ SUBQ/IM: CPT | Mod: PBBFAC,PO

## 2023-04-27 PROCEDURE — 99214 PR OFFICE/OUTPT VISIT, EST, LEVL IV, 30-39 MIN: ICD-10-PCS | Mod: S$PBB,,, | Performed by: FAMILY MEDICINE

## 2023-04-27 PROCEDURE — 99214 OFFICE O/P EST MOD 30 MIN: CPT | Mod: S$PBB,,, | Performed by: FAMILY MEDICINE

## 2023-04-27 PROCEDURE — 99999 PR PBB SHADOW E&M-EST. PATIENT-LVL IV: ICD-10-PCS | Mod: PBBFAC,,, | Performed by: FAMILY MEDICINE

## 2023-04-27 PROCEDURE — 81001 URINALYSIS AUTO W/SCOPE: CPT | Performed by: FAMILY MEDICINE

## 2023-04-27 PROCEDURE — G0009 ADMIN PNEUMOCOCCAL VACCINE: HCPCS | Mod: PBBFAC,PO

## 2023-04-27 PROCEDURE — 99214 OFFICE O/P EST MOD 30 MIN: CPT | Mod: PBBFAC,PO,25 | Performed by: FAMILY MEDICINE

## 2023-04-27 NOTE — TELEPHONE ENCOUNTER
----- Message from Destinee Louie MD sent at 4/27/2023  1:49 PM CDT -----  Regarding: hgb a1c  Please let pt know id like her to repeat her hgb a1c in july

## 2023-04-27 NOTE — PROGRESS NOTES
Subjective:       Patient ID: Ligia Curran is a 77 y.o. female.    Chief Complaint: Annual Exam    HPI  Pt is here for annual exam pt is generally well no sob/cp no cough chest congestion no sore throat uri symptoms  Pt denies dysuria hematuria no vaginal bleeding  Pt denies n/v/f/c/d/c no change in bowel habits no brbpr  Pt has diet controlled dm normal fbs hgb a1c in the upper 5's in 7/2022 repeat in July  Pt is obese not on weight loss diet consistently but has lost a few pounds  Review of Systems   Constitutional:  Negative for activity change, chills, diaphoresis, fatigue and fever.   HENT:  Negative for congestion, ear discharge, ear pain, hearing loss, postnasal drip, rhinorrhea, sinus pressure, sneezing, sore throat, trouble swallowing and voice change.    Eyes:  Positive for visual disturbance. Negative for photophobia, discharge, redness and itching.   Respiratory:  Negative for cough, chest tightness, shortness of breath and wheezing.    Cardiovascular:  Negative for chest pain, palpitations and leg swelling.   Gastrointestinal:  Negative for abdominal pain, anal bleeding, blood in stool, constipation, diarrhea, nausea, rectal pain and vomiting.   Genitourinary:  Negative for dyspareunia, dysuria, flank pain, frequency, hematuria, menstrual problem, pelvic pain, urgency, vaginal bleeding and vaginal discharge.   Musculoskeletal:  Negative for arthralgias, back pain, joint swelling and neck pain.   Skin:  Negative for color change and rash.   Neurological:  Negative for dizziness, speech difficulty, weakness, light-headedness, numbness and headaches.   Hematological:  Does not bruise/bleed easily.   Psychiatric/Behavioral:  Negative for agitation, confusion, decreased concentration, sleep disturbance and suicidal ideas. The patient is not nervous/anxious.      Objective:    /70   Pulse 80   Ht 5' (1.524 m)   Wt 92 kg (202 lb 14.4 oz)   SpO2 96%   BMI 39.63 kg/m²     Physical  "Exam  Constitutional:       Appearance: She is well-developed. She is obese. She is not ill-appearing.   HENT:      Head: Normocephalic and atraumatic.      Right Ear: External ear normal.      Left Ear: External ear normal.      Nose: Nose normal.   Eyes:      General:         Right eye: No discharge.         Left eye: No discharge.      Conjunctiva/sclera: Conjunctivae normal.   Neck:      Thyroid: No thyromegaly.   Cardiovascular:      Rate and Rhythm: Normal rate and regular rhythm.      Heart sounds: Normal heart sounds. No murmur heard.    No friction rub. No gallop.   Pulmonary:      Effort: Pulmonary effort is normal.      Breath sounds: Normal breath sounds. No wheezing or rales.   Abdominal:      General: Bowel sounds are normal. There is no distension.      Palpations: Abdomen is soft.      Tenderness: There is no abdominal tenderness. There is no guarding or rebound.   Genitourinary:     Vagina: Normal.   Musculoskeletal:         General: No tenderness. Normal range of motion.      Cervical back: Normal range of motion and neck supple.   Lymphadenopathy:      Cervical: No cervical adenopathy.   Skin:     General: Skin is warm and dry.      Findings: No erythema or rash.   Neurological:      General: No focal deficit present.      Mental Status: She is alert and oriented to person, place, and time.      Cranial Nerves: No cranial nerve deficit.      Motor: No abnormal muscle tone.      Coordination: Coordination normal.   Psychiatric:         Behavior: Behavior normal.         Thought Content: Thought content normal.         Judgment: Judgment normal.       Assessment:       1. Annual physical exam    2. Diabetes mellitus type 2, diet-controlled    3. Morbid obesity with BMI of 40.0-44.9, adult        Plan:     Orders cmp cbc lipid tsh urine (hgb a1c in July)  Cont meds  Ada weight loss diet  Graded exercise  Rtc 3-6 months    Health maintenance  Care gaps discussed       "This note will not be shared " "with the patient."   "

## 2023-04-28 DIAGNOSIS — E11.9 TYPE 2 DIABETES MELLITUS WITHOUT COMPLICATION: ICD-10-CM

## 2023-04-30 ENCOUNTER — EXTERNAL CHRONIC CARE MANAGEMENT (OUTPATIENT)
Dept: PRIMARY CARE CLINIC | Facility: CLINIC | Age: 78
End: 2023-04-30
Payer: MEDICARE

## 2023-04-30 PROCEDURE — 99490 PR CHRONIC CARE MGMT, 1ST 20 MIN: ICD-10-PCS | Mod: S$PBB,,, | Performed by: FAMILY MEDICINE

## 2023-04-30 PROCEDURE — 99490 CHRNC CARE MGMT STAFF 1ST 20: CPT | Mod: S$PBB,,, | Performed by: FAMILY MEDICINE

## 2023-04-30 PROCEDURE — 99490 CHRNC CARE MGMT STAFF 1ST 20: CPT | Mod: PBBFAC,PO | Performed by: FAMILY MEDICINE

## 2023-05-23 ENCOUNTER — TELEPHONE (OUTPATIENT)
Dept: OPHTHALMOLOGY | Facility: CLINIC | Age: 78
End: 2023-05-23
Payer: MEDICARE

## 2023-05-23 NOTE — TELEPHONE ENCOUNTER
Contacted pt- rescheduled appointment with Dr. Wilkerson    ----- Message from Yaritza Up sent at 5/23/2023 10:25 AM CDT -----  Consult/Advisory    Name Of Caller:Ligia      Contact Preference:[phone]    Nature of call: Pt returning a missed call

## 2023-05-31 ENCOUNTER — EXTERNAL CHRONIC CARE MANAGEMENT (OUTPATIENT)
Dept: PRIMARY CARE CLINIC | Facility: CLINIC | Age: 78
End: 2023-05-31
Payer: MEDICARE

## 2023-05-31 PROCEDURE — 99490 CHRNC CARE MGMT STAFF 1ST 20: CPT | Mod: S$PBB,,, | Performed by: FAMILY MEDICINE

## 2023-05-31 PROCEDURE — 99490 PR CHRONIC CARE MGMT, 1ST 20 MIN: ICD-10-PCS | Mod: S$PBB,,, | Performed by: FAMILY MEDICINE

## 2023-05-31 PROCEDURE — 99490 CHRNC CARE MGMT STAFF 1ST 20: CPT | Mod: PBBFAC,PO | Performed by: FAMILY MEDICINE

## 2023-06-16 NOTE — PROGRESS NOTES
Two patient identifiers verified. Allergies reviewed.  Pneumococcal 23 IM administered to Right deltoid per MD order. Patient tolerated injection well: no redness, bleeding, or bruising noted to injection site. Patient instructed to remain in clinic setting for 15 minutes.        Wound Care Provider Visit     Patient seen in Wound Care Clinic by: VICTOR MANUEL Pisano NP RN assisted by: Rox LAMBERT    Wound care orders and/or updates:   Wound Care Instructions : breast and right knee  Cleanse wounds with gentle non-scented soap and water, pat dry.   Apply Iodosorb to wounds and cover with dry sterile dressing.   Change dressing 3x/week.     Follow up in 3 weeks.     Lab(s)/additional orders placed this visit: None  Wound(s) debrided by provider: Yes Consent obtained/verified: Obtained  Dermal Applied: No Next Dermal Placement on: n/a    Wound care being completed by: Facility  Supplies provided/ordered: tube of Iodosorb    Patient education complete: Yes  Patient verbalized understanding of education/patient questions answered: Yes    Follow up in 3 weeks.

## 2023-06-23 ENCOUNTER — OFFICE VISIT (OUTPATIENT)
Dept: OPHTHALMOLOGY | Facility: CLINIC | Age: 78
End: 2023-06-23
Payer: MEDICARE

## 2023-06-23 DIAGNOSIS — Z96.1 STATUS POST CATARACT EXTRACTION AND INSERTION OF INTRAOCULAR LENS, UNSPECIFIED LATERALITY: ICD-10-CM

## 2023-06-23 DIAGNOSIS — H40.1122 PRIMARY OPEN ANGLE GLAUCOMA (POAG) OF LEFT EYE, MODERATE STAGE: Primary | ICD-10-CM

## 2023-06-23 DIAGNOSIS — Z94.7 HX OF CORNEA TRANSPLANT: ICD-10-CM

## 2023-06-23 DIAGNOSIS — H40.33X3 GLAUCOMA OF BOTH EYES ASSOCIATED WITH OCULAR TRAUMA, SEVERE STAGE: ICD-10-CM

## 2023-06-23 DIAGNOSIS — Z96.89 HISTORY OF GLAUCOMA TUBE SHUNT PROCEDURE: ICD-10-CM

## 2023-06-23 DIAGNOSIS — H54.10 BLINDNESS AND LOW VISION: ICD-10-CM

## 2023-06-23 DIAGNOSIS — H40.2213 CHRONIC ANGLE-CLOSURE GLAUCOMA OF RIGHT EYE, SEVERE STAGE: ICD-10-CM

## 2023-06-23 DIAGNOSIS — Z98.49 STATUS POST CATARACT EXTRACTION AND INSERTION OF INTRAOCULAR LENS, UNSPECIFIED LATERALITY: ICD-10-CM

## 2023-06-23 PROCEDURE — 92133 CPTRZD OPH DX IMG PST SGM ON: CPT | Mod: PBBFAC | Performed by: OPHTHALMOLOGY

## 2023-06-23 PROCEDURE — 99999 PR PBB SHADOW E&M-EST. PATIENT-LVL III: CPT | Mod: PBBFAC,,, | Performed by: OPHTHALMOLOGY

## 2023-06-23 PROCEDURE — 99999 PR PBB SHADOW E&M-EST. PATIENT-LVL III: ICD-10-PCS | Mod: PBBFAC,,, | Performed by: OPHTHALMOLOGY

## 2023-06-23 PROCEDURE — 92133 POSTERIOR SEGMENT OCT OPTIC NERVE(OCULAR COHERENCE TOMOGRAPHY) - OU - BOTH EYES: ICD-10-PCS | Mod: 26,S$PBB,, | Performed by: OPHTHALMOLOGY

## 2023-06-23 PROCEDURE — 99214 PR OFFICE/OUTPT VISIT, EST, LEVL IV, 30-39 MIN: ICD-10-PCS | Mod: S$PBB,,, | Performed by: OPHTHALMOLOGY

## 2023-06-23 PROCEDURE — 99214 OFFICE O/P EST MOD 30 MIN: CPT | Mod: S$PBB,,, | Performed by: OPHTHALMOLOGY

## 2023-06-23 PROCEDURE — 99213 OFFICE O/P EST LOW 20 MIN: CPT | Mod: PBBFAC | Performed by: OPHTHALMOLOGY

## 2023-06-23 RX ORDER — COVID-19 ANTIGEN TEST
KIT MISCELLANEOUS
COMMUNITY
Start: 2023-03-10

## 2023-06-23 NOTE — PROGRESS NOTES
Assessment /Plan     For exam results, see Encounter Report.    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, severe stage    Glaucoma shunt device of both eyes    Hx of cornea transplant    Status post cataract extraction and insertion of intraocular lens, unspecified laterality          [facility]  Strong FMHx Glc / Blindness --> Justyn Creole Dz / mom's side    Complex ocular hx  multiple sx --> trabs, tubes and tube removes etc   Too numerous to count    CCT  537 // 611    ==>  06/23/2023 --> clear PKP OS    Target --> diff assessment with k-failure OD --> thick CCT with PKPs --> mid teens    Right eye  PKP OD 11/10/2022 --> thick / edema  PKP failure  --> 10/26/2017 PKP  PC IOL  Fibrotic anterior capsule membrane  Tubes x 2 in place ST & IN --> removed ST BV 2/27/2017  Glc Shunt graft site OD  Revision 04/09/2013    Glaucoma Incisional Surgery  Patient with exposed ST Tube OD  SP OD ST BV Removal 2/27/2017    Left eye  SP PKP // AC IOL placement  05/10/2021 & 11/10/2022    Both eyes --> good adherence // Tolerating well -->Adjust  Cosopt BID  Xal q day --> off x 4 days --> patient c/o increased blurred Va OS with use --> trial off  FML QID OD // BID OS  PF1%  BID --> steroid response --> Holding  Idania 128 BID    Generic Zioptan not covered      MGD --> improved surface  Dry Eye Syndrome: discussed use of warm compresses, preserved & non-preserved artificial tears, gel and PM ointment options.  Also discussed options utilizing medications.    Right eye  Mucomyst 10% BID / PRN --> not using  EES q hs --> PRN // Holding      RD precautions:  Discussed symptoms of RD with increased flashes, floaters, decreasing vision.  Patient/Family to call and return immediately to  clinic should the symptoms of RD occur. Voiced good understanding with Q & A.      Plan  LV call for LED large hand held magnifier  RTC  P Umesh  as scheduled --> p PKP OD  RTC [place] 1 months with IOP --> off Xal q day  RTC sooner prn with understanding

## 2023-06-30 ENCOUNTER — EXTERNAL CHRONIC CARE MANAGEMENT (OUTPATIENT)
Dept: PRIMARY CARE CLINIC | Facility: CLINIC | Age: 78
End: 2023-06-30
Payer: MEDICARE

## 2023-06-30 DIAGNOSIS — Z94.7 STATUS POST CORNEAL TRANSPLANT: ICD-10-CM

## 2023-06-30 PROCEDURE — 99490 CHRNC CARE MGMT STAFF 1ST 20: CPT | Mod: PBBFAC,PO | Performed by: FAMILY MEDICINE

## 2023-06-30 PROCEDURE — 99490 PR CHRONIC CARE MGMT, 1ST 20 MIN: ICD-10-PCS | Mod: S$PBB,,, | Performed by: FAMILY MEDICINE

## 2023-06-30 PROCEDURE — 99490 CHRNC CARE MGMT STAFF 1ST 20: CPT | Mod: S$PBB,,, | Performed by: FAMILY MEDICINE

## 2023-06-30 RX ORDER — FLUOROMETHOLONE 1 MG/ML
SUSPENSION/ DROPS OPHTHALMIC
Qty: 10 ML | Refills: 4 | Status: SHIPPED | OUTPATIENT
Start: 2023-06-30 | End: 2023-11-02

## 2023-07-11 ENCOUNTER — OFFICE VISIT (OUTPATIENT)
Dept: OPTOMETRY | Facility: CLINIC | Age: 78
End: 2023-07-11
Payer: MEDICARE

## 2023-07-11 DIAGNOSIS — H54.10 BLINDNESS AND LOW VISION: Primary | ICD-10-CM

## 2023-07-11 DIAGNOSIS — T86.8413 FAILURE OF CORNEA TRANSPLANT OF BOTH EYES: ICD-10-CM

## 2023-07-11 DIAGNOSIS — H04.123 BILATERAL DRY EYES: ICD-10-CM

## 2023-07-11 DIAGNOSIS — H40.2213 CHRONIC ANGLE-CLOSURE GLAUCOMA OF RIGHT EYE, SEVERE STAGE: ICD-10-CM

## 2023-07-11 PROCEDURE — 99999 PR PBB SHADOW E&M-EST. PATIENT-LVL III: CPT | Mod: PBBFAC,,, | Performed by: OPTOMETRIST

## 2023-07-11 PROCEDURE — 99213 OFFICE O/P EST LOW 20 MIN: CPT | Mod: PBBFAC,PO | Performed by: OPTOMETRIST

## 2023-07-11 PROCEDURE — 99213 OFFICE O/P EST LOW 20 MIN: CPT | Mod: S$PBB,,, | Performed by: OPTOMETRIST

## 2023-07-11 PROCEDURE — 99999 PR PBB SHADOW E&M-EST. PATIENT-LVL III: ICD-10-PCS | Mod: PBBFAC,,, | Performed by: OPTOMETRIST

## 2023-07-11 PROCEDURE — 92015 DETERMINE REFRACTIVE STATE: CPT | Mod: ,,, | Performed by: OPTOMETRIST

## 2023-07-11 PROCEDURE — 99213 PR OFFICE/OUTPT VISIT, EST, LEVL III, 20-29 MIN: ICD-10-PCS | Mod: S$PBB,,, | Performed by: OPTOMETRIST

## 2023-07-11 PROCEDURE — 92015 PR REFRACTION: ICD-10-PCS | Mod: ,,, | Performed by: OPTOMETRIST

## 2023-07-11 NOTE — PROGRESS NOTES
HPI     Blurred Vision    In both eyes.  Onset was gradual.  Vision is blurred.  Severity is severe.    Occurring constantly.  Context:  near vision.  Since onset it is stable.    Treatments tried include no treatments.  Response to treatment was no   improvement. Additional comments: Pt wants vision check and see if new   stronger magnifier will help  Has dry eyes, used drops with relief, preserv free OTC           Comments    GLAUCOMA  OU       DRYNESS OU   NO PAIN     EYE SURGERIES   CORNEAL TRANSPLANT  2X   OS   5/2021  DR BRANNON   11/2022  BLISTERS REMOVED   NO HEADACHES  EYES BURN AND TEAR AT TIMES despite using drops            Last edited by Sea Owusu, OD on 7/11/2023  4:07 PM.            Assessment /Plan     For exam results, see Encounter Report.    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  Recommend continue artificial tears. OK to increase freq 1 drop 2-4x per day. Chronicity of disease and treatment discussed.   3. Cont all drops, keep appt with [place]  4. Keep appt with Steve for cornea eval

## 2023-07-21 ENCOUNTER — OFFICE VISIT (OUTPATIENT)
Dept: OPHTHALMOLOGY | Facility: CLINIC | Age: 78
End: 2023-07-21
Payer: MEDICARE

## 2023-07-21 DIAGNOSIS — Z96.1 STATUS POST CATARACT EXTRACTION AND INSERTION OF INTRAOCULAR LENS, UNSPECIFIED LATERALITY: ICD-10-CM

## 2023-07-21 DIAGNOSIS — H40.33X3 GLAUCOMA OF BOTH EYES ASSOCIATED WITH OCULAR TRAUMA, SEVERE STAGE: ICD-10-CM

## 2023-07-21 DIAGNOSIS — H54.10 BLINDNESS AND LOW VISION: ICD-10-CM

## 2023-07-21 DIAGNOSIS — H40.2213 CHRONIC ANGLE-CLOSURE GLAUCOMA OF RIGHT EYE, SEVERE STAGE: Primary | ICD-10-CM

## 2023-07-21 DIAGNOSIS — H04.123 BILATERAL DRY EYES: ICD-10-CM

## 2023-07-21 DIAGNOSIS — Z96.89 HISTORY OF GLAUCOMA TUBE SHUNT PROCEDURE: ICD-10-CM

## 2023-07-21 DIAGNOSIS — Z98.49 STATUS POST CATARACT EXTRACTION AND INSERTION OF INTRAOCULAR LENS, UNSPECIFIED LATERALITY: ICD-10-CM

## 2023-07-21 PROBLEM — H40.1122 PRIMARY OPEN ANGLE GLAUCOMA (POAG) OF LEFT EYE, MODERATE STAGE: Status: RESOLVED | Noted: 2018-08-31 | Resolved: 2023-07-21

## 2023-07-21 PROCEDURE — 99214 PR OFFICE/OUTPT VISIT, EST, LEVL IV, 30-39 MIN: ICD-10-PCS | Mod: 24,S$PBB,, | Performed by: OPHTHALMOLOGY

## 2023-07-21 PROCEDURE — 99999 PR PBB SHADOW E&M-EST. PATIENT-LVL III: CPT | Mod: PBBFAC,,, | Performed by: OPHTHALMOLOGY

## 2023-07-21 PROCEDURE — 99214 OFFICE O/P EST MOD 30 MIN: CPT | Mod: 24,S$PBB,, | Performed by: OPHTHALMOLOGY

## 2023-07-21 PROCEDURE — 99999 PR PBB SHADOW E&M-EST. PATIENT-LVL III: ICD-10-PCS | Mod: PBBFAC,,, | Performed by: OPHTHALMOLOGY

## 2023-07-21 PROCEDURE — 99213 OFFICE O/P EST LOW 20 MIN: CPT | Mod: PBBFAC | Performed by: OPHTHALMOLOGY

## 2023-07-31 ENCOUNTER — EXTERNAL CHRONIC CARE MANAGEMENT (OUTPATIENT)
Dept: PRIMARY CARE CLINIC | Facility: CLINIC | Age: 78
End: 2023-07-31
Payer: MEDICARE

## 2023-07-31 PROCEDURE — 99490 CHRNC CARE MGMT STAFF 1ST 20: CPT | Mod: PBBFAC,PO | Performed by: FAMILY MEDICINE

## 2023-07-31 PROCEDURE — 99490 PR CHRONIC CARE MGMT, 1ST 20 MIN: ICD-10-PCS | Mod: S$PBB,,, | Performed by: FAMILY MEDICINE

## 2023-07-31 PROCEDURE — 99490 CHRNC CARE MGMT STAFF 1ST 20: CPT | Mod: S$PBB,,, | Performed by: FAMILY MEDICINE

## 2023-08-02 ENCOUNTER — OFFICE VISIT (OUTPATIENT)
Dept: OPHTHALMOLOGY | Facility: CLINIC | Age: 78
End: 2023-08-02
Payer: MEDICARE

## 2023-08-02 DIAGNOSIS — T85.398D CORNEAL GRAFT MALFUNCTION, SUBSEQUENT ENCOUNTER: Primary | ICD-10-CM

## 2023-08-02 DIAGNOSIS — Z94.7 STATUS POST CORNEAL TRANSPLANT: ICD-10-CM

## 2023-08-02 DIAGNOSIS — H54.10 BLINDNESS AND LOW VISION: ICD-10-CM

## 2023-08-02 DIAGNOSIS — H40.2213 CHRONIC ANGLE-CLOSURE GLAUCOMA OF RIGHT EYE, SEVERE STAGE: ICD-10-CM

## 2023-08-02 DIAGNOSIS — Z94.7 HX OF CORNEA TRANSPLANT: ICD-10-CM

## 2023-08-02 PROCEDURE — 99999 PR PBB SHADOW E&M-EST. PATIENT-LVL III: ICD-10-PCS | Mod: PBBFAC,,, | Performed by: OPHTHALMOLOGY

## 2023-08-02 PROCEDURE — 92014 COMPRE OPH EXAM EST PT 1/>: CPT | Mod: S$PBB,,, | Performed by: OPHTHALMOLOGY

## 2023-08-02 PROCEDURE — 92014 PR EYE EXAM, EST PATIENT,COMPREHESV: ICD-10-PCS | Mod: S$PBB,,, | Performed by: OPHTHALMOLOGY

## 2023-08-02 PROCEDURE — 99999 PR PBB SHADOW E&M-EST. PATIENT-LVL III: CPT | Mod: PBBFAC,,, | Performed by: OPHTHALMOLOGY

## 2023-08-02 PROCEDURE — 99213 OFFICE O/P EST LOW 20 MIN: CPT | Mod: PBBFAC | Performed by: OPHTHALMOLOGY

## 2023-08-02 NOTE — PROGRESS NOTES
HPI    Pt presents today for cornea check OD. Pt notes no more eye pain. Pt notes   vision as stable.  Last edited by Mike Wang on 8/2/2023 10:28 AM.            Assessment /Plan     For exam results, see Encounter Report.    Corneal graft malfunction, subsequent encounter    Hx of cornea transplant    Status post corneal transplant    Blindness and low vision - Both Eyes    Chronic angle-closure glaucoma of right eye, severe stage      S/p PKP x3 OD, opaque with fibrous ingrowth, poor potential    PKP OS 2021, now thick with ACIOL touch.  May consider repeat PKP (#2) in future with oversized graft to vault ACIOL

## 2023-08-31 ENCOUNTER — EXTERNAL CHRONIC CARE MANAGEMENT (OUTPATIENT)
Dept: PRIMARY CARE CLINIC | Facility: CLINIC | Age: 78
End: 2023-08-31
Payer: MEDICARE

## 2023-08-31 PROCEDURE — 99490 CHRNC CARE MGMT STAFF 1ST 20: CPT | Mod: PBBFAC,PO | Performed by: FAMILY MEDICINE

## 2023-08-31 PROCEDURE — 99490 PR CHRONIC CARE MGMT, 1ST 20 MIN: ICD-10-PCS | Mod: S$PBB,,, | Performed by: FAMILY MEDICINE

## 2023-08-31 PROCEDURE — 99490 CHRNC CARE MGMT STAFF 1ST 20: CPT | Mod: S$PBB,,, | Performed by: FAMILY MEDICINE

## 2023-09-05 ENCOUNTER — TELEPHONE (OUTPATIENT)
Dept: OPHTHALMOLOGY | Facility: CLINIC | Age: 78
End: 2023-09-05
Payer: MEDICARE

## 2023-09-05 NOTE — TELEPHONE ENCOUNTER
Contacted ptVolodymyr March c/o burning sensation w/ increased flashes in OS(monocular) for 1 week now. Pt to use artificial tears and will see Dr. Calvo tomorrow.    ----- Message from Claritza Peralta sent at 9/5/2023  2:19 PM CDT -----  Contact: pt @ [phone]  Ligia Curran calling regarding Appointment Access  (message) for #pt is calling to see if possible can be seen on 9/6 due to burning in left eye with flashes, asking for call back

## 2023-09-06 ENCOUNTER — OFFICE VISIT (OUTPATIENT)
Dept: OPHTHALMOLOGY | Facility: CLINIC | Age: 78
End: 2023-09-06
Payer: MEDICARE

## 2023-09-06 DIAGNOSIS — Z94.7 HX OF CORNEA TRANSPLANT: ICD-10-CM

## 2023-09-06 DIAGNOSIS — H40.2213 CHRONIC ANGLE-CLOSURE GLAUCOMA OF RIGHT EYE, SEVERE STAGE: ICD-10-CM

## 2023-09-06 DIAGNOSIS — H16.121 FILAMENTARY KERATITIS OF RIGHT EYE: Primary | ICD-10-CM

## 2023-09-06 DIAGNOSIS — T85.398D CORNEAL GRAFT MALFUNCTION, SUBSEQUENT ENCOUNTER: ICD-10-CM

## 2023-09-06 DIAGNOSIS — Z96.89 HISTORY OF GLAUCOMA TUBE SHUNT PROCEDURE: ICD-10-CM

## 2023-09-06 PROCEDURE — 99999 PR PBB SHADOW E&M-EST. PATIENT-LVL III: CPT | Mod: PBBFAC,,, | Performed by: OPHTHALMOLOGY

## 2023-09-06 PROCEDURE — 99999 PR PBB SHADOW E&M-EST. PATIENT-LVL III: ICD-10-PCS | Mod: PBBFAC,,, | Performed by: OPHTHALMOLOGY

## 2023-09-06 PROCEDURE — 92014 PR EYE EXAM, EST PATIENT,COMPREHESV: ICD-10-PCS | Mod: S$PBB,,, | Performed by: OPHTHALMOLOGY

## 2023-09-06 PROCEDURE — 99213 OFFICE O/P EST LOW 20 MIN: CPT | Mod: PBBFAC | Performed by: OPHTHALMOLOGY

## 2023-09-06 PROCEDURE — 92014 COMPRE OPH EXAM EST PT 1/>: CPT | Mod: S$PBB,,, | Performed by: OPHTHALMOLOGY

## 2023-09-06 RX ORDER — ERYTHROMYCIN 5 MG/G
OINTMENT OPHTHALMIC
COMMUNITY
Start: 2023-09-01

## 2023-09-06 NOTE — PROGRESS NOTES
HPI    Patient states starting on 08/31, she is experiencing a burning sensation   in OS. She is also having flashes frequently at night in OS. Patient does   have dry eyes and using OTC artificial tears as needed for relief. Patient   is experiencing light sensitivity with ceiling lights and sun light.   Patient denies having pain in her eyes today.    Cosopt 2x a day OU  FML 2x a day OU    Last edited by Dee Rowell on 9/6/2023 10:40 AM.            Assessment /Plan     For exam results, see Encounter Report.    Filamentary keratitis of right eye    Glaucoma shunt device of both eyes    Chronic angle-closure glaucoma of right eye, severe stage    Corneal graft malfunction, subsequent encounter    Hx of cornea transplant      Etiology of PVD discussed. Signs/symptoms of retinal tear and detachment reviewed.  Patient advised to return urgently if signs of retinal detachment occur.  No RT/RD seen on exam at this time.      S/p PKP x3 OD, opaque with fibrous ingrowth, poor potential    PKP OS 2021, now thick with ACIOL touch.  May consider repeat PKP (#2) in future with oversized graft to vault ACIOL

## 2023-09-13 ENCOUNTER — TELEPHONE (OUTPATIENT)
Dept: ORTHOPEDICS | Facility: CLINIC | Age: 78
End: 2023-09-13
Payer: MEDICARE

## 2023-09-13 NOTE — TELEPHONE ENCOUNTER
I called the patient regarding the message below. The patient did not answer. I left a voicemail with a call back number.    ----- Message from Kendal Win sent at 2023  2:56 PM CDT -----  Regarding: Schedule appt  Contact: pt @[phone]  Pt called in to schedule an appt; no available appts in Epic. Pt is asking for a call back soon to schedule. Thanks.         Reason appt not schedule: no availability          Patient's DX: right knee to possibly start pt         Patient requesting call back or [facility] [phone]

## 2023-09-30 ENCOUNTER — EXTERNAL CHRONIC CARE MANAGEMENT (OUTPATIENT)
Dept: PRIMARY CARE CLINIC | Facility: CLINIC | Age: 78
End: 2023-09-30
Payer: MEDICARE

## 2023-09-30 PROCEDURE — 99439 CHRNC CARE MGMT STAF EA ADDL: CPT | Mod: S$PBB,,, | Performed by: FAMILY MEDICINE

## 2023-09-30 PROCEDURE — 99439 CHRNC CARE MGMT STAF EA ADDL: CPT | Mod: PBBFAC,PO | Performed by: FAMILY MEDICINE

## 2023-09-30 PROCEDURE — 99439 PR CHRONIC CARE MGMT, EA ADDTL 20 MIN: ICD-10-PCS | Mod: S$PBB,,, | Performed by: FAMILY MEDICINE

## 2023-09-30 PROCEDURE — 99490 CHRNC CARE MGMT STAFF 1ST 20: CPT | Mod: S$PBB,,, | Performed by: FAMILY MEDICINE

## 2023-09-30 PROCEDURE — 99490 CHRNC CARE MGMT STAFF 1ST 20: CPT | Mod: PBBFAC,PO | Performed by: FAMILY MEDICINE

## 2023-09-30 PROCEDURE — 99490 PR CHRONIC CARE MGMT, 1ST 20 MIN: ICD-10-PCS | Mod: S$PBB,,, | Performed by: FAMILY MEDICINE

## 2023-10-17 ENCOUNTER — OFFICE VISIT (OUTPATIENT)
Dept: OPHTHALMOLOGY | Facility: CLINIC | Age: 78
End: 2023-10-17
Payer: MEDICARE

## 2023-10-17 DIAGNOSIS — H40.33X3 GLAUCOMA OF BOTH EYES ASSOCIATED WITH OCULAR TRAUMA, SEVERE STAGE: Primary | ICD-10-CM

## 2023-10-17 DIAGNOSIS — H04.123 BILATERAL DRY EYES: ICD-10-CM

## 2023-10-17 DIAGNOSIS — Z96.89 HISTORY OF GLAUCOMA TUBE SHUNT PROCEDURE: ICD-10-CM

## 2023-10-17 DIAGNOSIS — Z96.1 STATUS POST CATARACT EXTRACTION AND INSERTION OF INTRAOCULAR LENS, UNSPECIFIED LATERALITY: ICD-10-CM

## 2023-10-17 DIAGNOSIS — Z94.7 HX OF CORNEA TRANSPLANT: ICD-10-CM

## 2023-10-17 DIAGNOSIS — Z98.49 STATUS POST CATARACT EXTRACTION AND INSERTION OF INTRAOCULAR LENS, UNSPECIFIED LATERALITY: ICD-10-CM

## 2023-10-17 DIAGNOSIS — H18.20 CORNEAL EDEMA: ICD-10-CM

## 2023-10-17 DIAGNOSIS — H54.10 BLINDNESS AND LOW VISION: ICD-10-CM

## 2023-10-17 PROCEDURE — 99999 PR PBB SHADOW E&M-EST. PATIENT-LVL III: CPT | Mod: PBBFAC,,, | Performed by: OPHTHALMOLOGY

## 2023-10-17 PROCEDURE — 99214 PR OFFICE/OUTPT VISIT, EST, LEVL IV, 30-39 MIN: ICD-10-PCS | Mod: S$PBB,,, | Performed by: OPHTHALMOLOGY

## 2023-10-17 PROCEDURE — 99999 PR PBB SHADOW E&M-EST. PATIENT-LVL III: ICD-10-PCS | Mod: PBBFAC,,, | Performed by: OPHTHALMOLOGY

## 2023-10-17 PROCEDURE — 99214 OFFICE O/P EST MOD 30 MIN: CPT | Mod: S$PBB,,, | Performed by: OPHTHALMOLOGY

## 2023-10-17 PROCEDURE — 99213 OFFICE O/P EST LOW 20 MIN: CPT | Mod: PBBFAC | Performed by: OPHTHALMOLOGY

## 2023-10-17 NOTE — PROGRESS NOTES
Assessment /Plan     For exam results, see Encounter Report.    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, severe stage    Hx of cornea transplant    Corneal edema    Bilateral dry eyes    Glaucoma shunt device of both eyes    Blindness and low vision - Both Eyes    Status post cataract extraction and insertion of intraocular lens, unspecified laterality            [employer]  Joined Order @ 17 yo  Strong FMHx Glc / Blindness --> Justyn Creole Dz / mom's side    Complex ocular hx  multiple sx --> trabs, tubes and tube removes etc   Too numerous to count      POAG  2/2 CACG OD  Traumatic Glaucoma severe OU      CCT  ==>  06/23/2023 --> clear PKP OS    Target  --> mid teens    Right eye  PKP OD   11/10/2022 --> thick / edema  PKP failure  -->  10/26/2017   PKP  PC IOL  Fibrotic anterior capsule membrane  Tubes x 2 in place ST & IN --> removed ST BV 2/27/2017  Glc Shunt graft site OD  Revision 04/09/2013    Glaucoma Incisional Surgery  Patient with exposed ST Tube OD  SP OD ST BV Removal 2/27/2017    Left eye  SP PKP // AC IOL placement  05/10/2021 & 11/10/2022    Both eyes --> Tolerating well & good adherence -->CSM  Cosopt BID  FML using TID OD // BID OS  Idania 128 BID    PF1%  BID --> steroid response --> Holding    Xal q day -->  intolerant with blurring  Generic Zioptan not covered      MGD --> improved surface  Dry Eye Syndrome: discussed use of warm compresses, preserved & non-preserved artificial tears, gel and PM ointment options.  Also discussed options utilizing medications.    Right eye  Mucomyst 10% BID / PRN --> not using  EES q hs --> PRN // Holding      RD precautions:  Discussed symptoms of RD with increased flashes, floaters, decreasing vision.  Patient/Family to call and return immediately to clinic should the symptoms of RD occur. Voiced good understanding with Q & A.      Plan  Keep fu with Dr CORDELIA Calvo  [facility] 4 months with IOP & adherence --> recheck CCT  OS  RTC sooner prn with understanding

## 2023-10-31 ENCOUNTER — EXTERNAL CHRONIC CARE MANAGEMENT (OUTPATIENT)
Dept: PRIMARY CARE CLINIC | Facility: CLINIC | Age: 78
End: 2023-10-31
Payer: MEDICARE

## 2023-10-31 PROCEDURE — 99490 CHRNC CARE MGMT STAFF 1ST 20: CPT | Mod: PBBFAC,PO | Performed by: FAMILY MEDICINE

## 2023-10-31 PROCEDURE — 99490 CHRNC CARE MGMT STAFF 1ST 20: CPT | Mod: S$PBB,,, | Performed by: FAMILY MEDICINE

## 2023-10-31 PROCEDURE — 99490 PR CHRONIC CARE MGMT, 1ST 20 MIN: ICD-10-PCS | Mod: S$PBB,,, | Performed by: FAMILY MEDICINE

## 2023-11-01 DIAGNOSIS — Z94.7 STATUS POST CORNEAL TRANSPLANT: ICD-10-CM

## 2023-11-01 DIAGNOSIS — H40.20X3 ANGLE-CLOSURE GLAUCOMA, SEVERE STAGE: ICD-10-CM

## 2023-11-02 RX ORDER — FLUOROMETHOLONE 1 MG/ML
SUSPENSION/ DROPS OPHTHALMIC
Qty: 10 ML | Refills: 4 | Status: SHIPPED | OUTPATIENT
Start: 2023-11-02

## 2023-11-02 RX ORDER — DORZOLAMIDE HYDROCHLORIDE AND TIMOLOL MALEATE 20; 5 MG/ML; MG/ML
SOLUTION/ DROPS OPHTHALMIC
Qty: 10 ML | Refills: 12 | Status: SHIPPED | OUTPATIENT
Start: 2023-11-02

## 2023-11-15 ENCOUNTER — OFFICE VISIT (OUTPATIENT)
Dept: OPHTHALMOLOGY | Facility: CLINIC | Age: 78
End: 2023-11-15
Payer: MEDICARE

## 2023-11-15 DIAGNOSIS — H40.33X3 GLAUCOMA OF BOTH EYES ASSOCIATED WITH OCULAR TRAUMA, SEVERE STAGE: ICD-10-CM

## 2023-11-15 DIAGNOSIS — H18.20 CORNEAL EDEMA: ICD-10-CM

## 2023-11-15 DIAGNOSIS — H54.10 BLINDNESS AND LOW VISION: Primary | ICD-10-CM

## 2023-11-15 PROCEDURE — 99999 PR PBB SHADOW E&M-EST. PATIENT-LVL III: CPT | Mod: PBBFAC,,, | Performed by: OPHTHALMOLOGY

## 2023-11-15 PROCEDURE — 99213 OFFICE O/P EST LOW 20 MIN: CPT | Mod: PBBFAC | Performed by: OPHTHALMOLOGY

## 2023-11-15 PROCEDURE — 99999 PR PBB SHADOW E&M-EST. PATIENT-LVL III: ICD-10-PCS | Mod: PBBFAC,,, | Performed by: OPHTHALMOLOGY

## 2023-11-15 PROCEDURE — 92014 PR EYE EXAM, EST PATIENT,COMPREHESV: ICD-10-PCS | Mod: S$PBB,,, | Performed by: OPHTHALMOLOGY

## 2023-11-15 PROCEDURE — 92014 COMPRE OPH EXAM EST PT 1/>: CPT | Mod: S$PBB,,, | Performed by: OPHTHALMOLOGY

## 2023-11-15 NOTE — PROGRESS NOTES
HPI     1 month  follow up keratitis  OD             Comments: KERATITIS OD   CORNEAL GRAFT  MALFUNCTION    ANGLE CLOSURE  GLAUCOMA  OD     POAG  OU           Comments    DLS  10/17/23  S/p PKP OD: 11/10/2022  S/p PKP OS: 05/10/2021    Gtts:  Cosopt BID OU  FML BID OU  AT's PRN  OU  Idania 128 PRN OU  EES antonio PRN OU    POHx:  1. Filamentary keratitis of right eye  2. Status post corneal transplant  3. Chronic angle-closure glaucoma of right eye, severe stage  4. Glaucoma shunt device of both eyes  5. Blindness and low vision - Both Eyes  6. Primary open angle glaucoma (POAG) of left eye, moderate stage          Last edited by Ewa Dunbar on 11/15/2023 10:31 AM.            Assessment /Plan     For exam results, see Encounter Report.    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

## 2023-11-30 ENCOUNTER — EXTERNAL CHRONIC CARE MANAGEMENT (OUTPATIENT)
Dept: PRIMARY CARE CLINIC | Facility: CLINIC | Age: 78
End: 2023-11-30
Payer: MEDICARE

## 2023-11-30 PROCEDURE — 99490 CHRNC CARE MGMT STAFF 1ST 20: CPT | Mod: PBBFAC,PO | Performed by: FAMILY MEDICINE

## 2023-11-30 PROCEDURE — 99490 PR CHRONIC CARE MGMT, 1ST 20 MIN: ICD-10-PCS | Mod: S$PBB,,, | Performed by: FAMILY MEDICINE

## 2023-11-30 PROCEDURE — 99490 CHRNC CARE MGMT STAFF 1ST 20: CPT | Mod: S$PBB,,, | Performed by: FAMILY MEDICINE

## 2023-12-31 ENCOUNTER — EXTERNAL CHRONIC CARE MANAGEMENT (OUTPATIENT)
Dept: PRIMARY CARE CLINIC | Facility: CLINIC | Age: 78
End: 2023-12-31
Payer: MEDICARE

## 2023-12-31 PROCEDURE — 99490 CHRNC CARE MGMT STAFF 1ST 20: CPT | Mod: PBBFAC,PO | Performed by: FAMILY MEDICINE

## 2023-12-31 PROCEDURE — 99490 CHRNC CARE MGMT STAFF 1ST 20: CPT | Mod: S$PBB,,, | Performed by: FAMILY MEDICINE

## 2024-01-09 ENCOUNTER — OFFICE VISIT (OUTPATIENT)
Dept: OPHTHALMOLOGY | Facility: CLINIC | Age: 79
End: 2024-01-09
Payer: MEDICARE

## 2024-01-09 DIAGNOSIS — H54.10 BLINDNESS AND LOW VISION: ICD-10-CM

## 2024-01-09 DIAGNOSIS — Z96.89 HISTORY OF GLAUCOMA TUBE SHUNT PROCEDURE: ICD-10-CM

## 2024-01-09 DIAGNOSIS — H21.41: ICD-10-CM

## 2024-01-09 DIAGNOSIS — Z96.1 STATUS POST CATARACT EXTRACTION AND INSERTION OF INTRAOCULAR LENS, UNSPECIFIED LATERALITY: ICD-10-CM

## 2024-01-09 DIAGNOSIS — Z98.49 STATUS POST CATARACT EXTRACTION AND INSERTION OF INTRAOCULAR LENS, UNSPECIFIED LATERALITY: ICD-10-CM

## 2024-01-09 DIAGNOSIS — H40.2213 CHRONIC ANGLE-CLOSURE GLAUCOMA OF RIGHT EYE, SEVERE STAGE: Primary | ICD-10-CM

## 2024-01-09 DIAGNOSIS — H40.33X3 GLAUCOMA OF BOTH EYES ASSOCIATED WITH OCULAR TRAUMA, SEVERE STAGE: ICD-10-CM

## 2024-01-09 PROCEDURE — 99213 OFFICE O/P EST LOW 20 MIN: CPT | Mod: PBBFAC | Performed by: OPHTHALMOLOGY

## 2024-01-09 PROCEDURE — 99214 OFFICE O/P EST MOD 30 MIN: CPT | Mod: S$PBB,,, | Performed by: OPHTHALMOLOGY

## 2024-01-09 PROCEDURE — 99999 PR PBB SHADOW E&M-EST. PATIENT-LVL III: CPT | Mod: PBBFAC,,, | Performed by: OPHTHALMOLOGY

## 2024-01-10 NOTE — PROGRESS NOTES
Assessment /Plan     For exam results, see Encounter Report.    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, severe stage    Glaucoma shunt device of both eyes    Status post cataract extraction and insertion of intraocular lens, unspecified laterality            [employer]  Joined Order @ 17 yo  Strong FMHx Glc / Blindness --> Justyn Creole Dz / mom's side    Complex ocular hx  multiple sx --> trabs, tubes and tube removes etc   Too numerous to count      POAG  2/2 CACG OD  Traumatic Glaucoma severe OU      CCT  ==>  01/09/2024 --> clear PKP OS    Target  --> mid teens --> not achieved OS --> but thick CCT --> LWIT    Right eye  PKP OD   11/10/2022 --> thick / edema  PKP failure  -->  10/26/2017   PKP  PC IOL  Fibrotic anterior capsule membrane  Tubes x 2 in place ST & IN --> removed ST BV 2/27/2017  Glc Shunt graft site OD  Revision 04/09/2013    Glaucoma Incisional Surgery  Patient with exposed ST Tube OD  SP OD ST BV Removal 2/27/2017    Left eye  SP PKP // AC IOL placement  05/10/2021 & 11/10/2022    Both eyes --> Tolerating well & good adherence -->CSM  Cosopt BID  FML using TID OD // BID OS  Idania 128 BID    PF1%  BID --> steroid response --> Holding    Xal q day -->  intolerant with blurring  Generic Zioptan not covered      MGD --> improved surface  Dry Eye Syndrome: discussed use of warm compresses, preserved & non-preserved artificial tears, gel and PM ointment options.  Also discussed options utilizing medications.    Right eye  Mucomyst 10% BID / PRN --> not using  EES q hs --> PRN // Holding      RD precautions:  Discussed symptoms of RD with increased flashes, floaters, decreasing vision.  Patient/Family to call and return immediately to clinic should the symptoms of RD occur. Voiced good understanding with  Q & A.      Plan  RTC [place] 4 months with IOP & adherence & try OCT RNFL  RTC sooner prn with understanding

## 2024-01-31 ENCOUNTER — EXTERNAL CHRONIC CARE MANAGEMENT (OUTPATIENT)
Dept: PRIMARY CARE CLINIC | Facility: CLINIC | Age: 79
End: 2024-01-31
Payer: MEDICARE

## 2024-01-31 PROCEDURE — 99490 CHRNC CARE MGMT STAFF 1ST 20: CPT | Mod: S$PBB,,, | Performed by: FAMILY MEDICINE

## 2024-01-31 PROCEDURE — 99490 CHRNC CARE MGMT STAFF 1ST 20: CPT | Mod: PBBFAC,PO | Performed by: FAMILY MEDICINE

## 2024-02-29 ENCOUNTER — EXTERNAL CHRONIC CARE MANAGEMENT (OUTPATIENT)
Dept: PRIMARY CARE CLINIC | Facility: CLINIC | Age: 79
End: 2024-02-29
Payer: MEDICARE

## 2024-02-29 PROCEDURE — 99490 CHRNC CARE MGMT STAFF 1ST 20: CPT | Mod: S$PBB,,, | Performed by: FAMILY MEDICINE

## 2024-02-29 PROCEDURE — 99490 CHRNC CARE MGMT STAFF 1ST 20: CPT | Mod: PBBFAC,PO | Performed by: FAMILY MEDICINE

## 2024-03-11 ENCOUNTER — TELEPHONE (OUTPATIENT)
Dept: OPHTHALMOLOGY | Facility: CLINIC | Age: 79
End: 2024-03-11
Payer: MEDICARE

## 2024-03-11 NOTE — TELEPHONE ENCOUNTER
----- Message from Yaritza Up sent at 3/11/2024 10:36 AM CDT -----  Consult/Advisory    Name Of Caller:Ligia       Contact Preference:[phone]    Nature of call: Ptn called stating she is having eye pain and she is asking to come in sooner if she can come in on wed.

## 2024-03-12 ENCOUNTER — OFFICE VISIT (OUTPATIENT)
Dept: OPHTHALMOLOGY | Facility: CLINIC | Age: 79
End: 2024-03-12
Payer: MEDICARE

## 2024-03-12 DIAGNOSIS — Z96.1 STATUS POST CATARACT EXTRACTION AND INSERTION OF INTRAOCULAR LENS, UNSPECIFIED LATERALITY: ICD-10-CM

## 2024-03-12 DIAGNOSIS — Z98.49 STATUS POST CATARACT EXTRACTION AND INSERTION OF INTRAOCULAR LENS, UNSPECIFIED LATERALITY: ICD-10-CM

## 2024-03-12 DIAGNOSIS — Z96.89 HISTORY OF GLAUCOMA TUBE SHUNT PROCEDURE: ICD-10-CM

## 2024-03-12 DIAGNOSIS — H40.33X3 GLAUCOMA OF BOTH EYES ASSOCIATED WITH OCULAR TRAUMA, SEVERE STAGE: ICD-10-CM

## 2024-03-12 DIAGNOSIS — T85.398D CORNEAL GRAFT MALFUNCTION, SUBSEQUENT ENCOUNTER: Primary | ICD-10-CM

## 2024-03-12 DIAGNOSIS — H21.41: ICD-10-CM

## 2024-03-12 DIAGNOSIS — H54.10 BLINDNESS AND LOW VISION: ICD-10-CM

## 2024-03-12 DIAGNOSIS — H40.2213 CHRONIC ANGLE-CLOSURE GLAUCOMA OF RIGHT EYE, SEVERE STAGE: ICD-10-CM

## 2024-03-12 PROCEDURE — 99214 OFFICE O/P EST MOD 30 MIN: CPT | Mod: S$PBB,,, | Performed by: OPHTHALMOLOGY

## 2024-03-12 PROCEDURE — G2211 COMPLEX E/M VISIT ADD ON: HCPCS | Mod: S$PBB,,, | Performed by: OPHTHALMOLOGY

## 2024-03-12 PROCEDURE — 99999 PR PBB SHADOW E&M-EST. PATIENT-LVL III: CPT | Mod: PBBFAC,,, | Performed by: OPHTHALMOLOGY

## 2024-03-12 PROCEDURE — 99213 OFFICE O/P EST LOW 20 MIN: CPT | Mod: PBBFAC | Performed by: OPHTHALMOLOGY

## 2024-03-12 NOTE — PROGRESS NOTES
Assessment /Plan     For exam results, see Encounter Report.    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, severe stage    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    Status post cataract extraction and insertion of intraocular lens, unspecified laterality    Glaucoma shunt device of both eyes            [employer]  Joined Order @ 19 yo  Strong FMHx Glc / Blindness --> Justyn Creole Dz / mom's side    Complex ocular hx  multiple sx --> trabs, tubes and tube removes etc   Too numerous to count      POAG  2/2 CACG OD  Traumatic Glaucoma severe OU      CCT  ==>  01/09/2024 --> clear PKP OS    Target  --> mid teens --> not achieved OS --> push lower discussed    Right eye  PKP OD   11/10/2022 --> thick / edema  PKP failure  -->  10/26/2017   PKP  PC IOL  Fibrotic anterior capsule membrane  Tubes x 2 in place ST & IN --> removed ST BV 2/27/2017  Glc Shunt graft site OD  Revision 04/09/2013    Glaucoma Incisional Surgery  Patient with exposed ST Tube OD  SP OD ST BV Removal 2/27/2017    Left eye  SP PKP // AC IOL placement  05/10/2021 & 11/10/2022  PKP edema --> discussed   03/12/2024    Both eyes --> Tolerating well & good adherence -->adjust  Cosopt BID  Alphagan BID --> start and discussed SE, use and expectations with Q & A  FML using TID OD // BID OS  Idania 128 BID --> try restart    PF1%  BID --> steroid response --> Holding    Xal q day -->  intolerant with blurring  Generic Zioptan not covered      MGD --> improved surface  Dry Eye Syndrome: discussed use of warm compresses, preserved & non-preserved artificial tears, gel and PM ointment options.  Also discussed options utilizing medications.    Right eye  Mucomyst 10% BID / PRN --> not using  EES q hs --> PRN // Holding      RD precautions:  Discussed symptoms  of RD with increased flashes, floaters, decreasing vision.  Patient/Family to call and return immediately to clinic should the symptoms of RD occur. Voiced good understanding with Q & A.      Plan  RTC CORDELIA Wilkerson 1 months with IOP & adherence --> start Alphagan BID OU  RTC sooner prn with understanding

## 2024-03-25 ENCOUNTER — PATIENT OUTREACH (OUTPATIENT)
Dept: ADMINISTRATIVE | Facility: HOSPITAL | Age: 79
End: 2024-03-25
Payer: MEDICARE

## 2024-03-25 NOTE — PROGRESS NOTES
Non-compliant report chart audits for CMS/MSSP, FLU VACCINE Chart review completed for  test overdue (mammograms, Colonoscopies, pap smears, DM labs, and/or EYE EXAMs)      Care Everywhere and media, updates requested and reviewed.     No documentation of last flu shot.      If the patient does not wish to have a Flu vaccine, please document as follows-per MEDICARE guidelines:

## 2024-03-31 ENCOUNTER — EXTERNAL CHRONIC CARE MANAGEMENT (OUTPATIENT)
Dept: PRIMARY CARE CLINIC | Facility: CLINIC | Age: 79
End: 2024-03-31
Payer: MEDICARE

## 2024-03-31 PROCEDURE — 99490 CHRNC CARE MGMT STAFF 1ST 20: CPT | Mod: S$PBB,,, | Performed by: FAMILY MEDICINE

## 2024-03-31 PROCEDURE — 99490 CHRNC CARE MGMT STAFF 1ST 20: CPT | Mod: PBBFAC,27,PO | Performed by: FAMILY MEDICINE

## 2024-03-31 PROCEDURE — 99439 CHRNC CARE MGMT STAF EA ADDL: CPT | Mod: PBBFAC,PO | Performed by: FAMILY MEDICINE

## 2024-03-31 PROCEDURE — 99439 CHRNC CARE MGMT STAF EA ADDL: CPT | Mod: S$PBB,,, | Performed by: FAMILY MEDICINE

## 2024-04-05 DIAGNOSIS — H40.33X3 GLAUCOMA OF BOTH EYES ASSOCIATED WITH OCULAR TRAUMA, SEVERE STAGE: Primary | ICD-10-CM

## 2024-04-05 NOTE — TELEPHONE ENCOUNTER
Contacted pt- sent new rx for Alphagan eye medication to [facility].    ----- Message from Christianne Go sent at 4/5/2024 10:11 AM CDT -----  Regarding: . Requesting Call Back  Patient called in regards to appt that was to be scheduled with  and  on same date and also getting a rx for Alphagan BID .    Please call back to further assist- [phone]

## 2024-04-10 RX ORDER — BRIMONIDINE TARTRATE 2 MG/ML
1 SOLUTION/ DROPS OPHTHALMIC 2 TIMES DAILY
Qty: 15 ML | Refills: 11 | Status: SHIPPED | OUTPATIENT
Start: 2024-04-10 | End: 2025-04-10

## 2024-04-15 ENCOUNTER — TELEPHONE (OUTPATIENT)
Dept: FAMILY MEDICINE | Facility: CLINIC | Age: 79
End: 2024-04-15
Payer: MEDICARE

## 2024-04-15 DIAGNOSIS — Z12.31 ENCOUNTER FOR SCREENING MAMMOGRAM FOR BREAST CANCER: Primary | ICD-10-CM

## 2024-04-15 NOTE — TELEPHONE ENCOUNTER
----- Message from Clarissa Duenas sent at 4/15/2024  2:19 PM CDT -----  Regarding: orders needed  Contact: [phone]    Patient requesting orders:Ligia    Orders needed:Mammo    Communication preference: [phone]

## 2024-04-23 ENCOUNTER — OFFICE VISIT (OUTPATIENT)
Dept: OPHTHALMOLOGY | Facility: CLINIC | Age: 79
End: 2024-04-23
Payer: MEDICARE

## 2024-04-23 DIAGNOSIS — Z98.49 STATUS POST CATARACT EXTRACTION AND INSERTION OF INTRAOCULAR LENS, UNSPECIFIED LATERALITY: ICD-10-CM

## 2024-04-23 DIAGNOSIS — H40.2213 CHRONIC ANGLE-CLOSURE GLAUCOMA OF RIGHT EYE, SEVERE STAGE: ICD-10-CM

## 2024-04-23 DIAGNOSIS — H54.10 BLINDNESS AND LOW VISION: ICD-10-CM

## 2024-04-23 DIAGNOSIS — Z96.1 STATUS POST CATARACT EXTRACTION AND INSERTION OF INTRAOCULAR LENS, UNSPECIFIED LATERALITY: ICD-10-CM

## 2024-04-23 DIAGNOSIS — H40.33X3 GLAUCOMA OF BOTH EYES ASSOCIATED WITH OCULAR TRAUMA, SEVERE STAGE: Primary | ICD-10-CM

## 2024-04-23 PROCEDURE — 99999 PR PBB SHADOW E&M-EST. PATIENT-LVL III: CPT | Mod: PBBFAC,,, | Performed by: OPHTHALMOLOGY

## 2024-04-23 PROCEDURE — G2211 COMPLEX E/M VISIT ADD ON: HCPCS | Mod: S$PBB,,, | Performed by: OPHTHALMOLOGY

## 2024-04-23 PROCEDURE — 99213 OFFICE O/P EST LOW 20 MIN: CPT | Mod: S$PBB,,, | Performed by: OPHTHALMOLOGY

## 2024-04-23 PROCEDURE — 99213 OFFICE O/P EST LOW 20 MIN: CPT | Mod: PBBFAC | Performed by: OPHTHALMOLOGY

## 2024-04-23 NOTE — PROGRESS NOTES
Assessment /Plan     For exam results, see Encounter Report.    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, severe stage    Chronic angle-closure glaucoma of right eye, severe stage    Blindness and low vision - Both Eyes    Status post cataract extraction and insertion of intraocular lens, unspecified laterality            [employer]  Joined Order @ 17 yo  Strong FMHx Glc / Blindness --> Justyn Creole Dz / mom's side    Complex ocular hx  multiple sx --> trabs, tubes and tube removes etc   Too numerous to count      POAG  2/2 CACG OD  Traumatic Glaucoma severe OU    ==> Follow OCT RNFL      CCT  ==>  01/09/2024 --> clear PKP OS    Target  --> mid teens --> not achieved OS --> Thick CCT & difficult assessment    Right eye  PKP OD   11/10/2022 --> thick / edema  PKP failure  -->  10/26/2017   PKP  PC IOL  Fibrotic anterior capsule membrane  Tubes x 2 in place ST & IN --> removed ST BV 2/27/2017  Glc Shunt graft site OD  Revision 04/09/2013    Glaucoma Incisional Surgery  Patient with exposed ST Tube OD  SP OD ST BV Removal 2/27/2017    Left eye  SP PKP // AC IOL placement  05/10/2021 & 11/10/2022  PKP edema --> discussed   03/12/2024    Both eyes --> Tolerating well & good adherence -->CSM  Cosopt BID  Alphagan BID  FML using TID OD // BID OS  Idania 128 BID --> feels improved Va OS    PF1%  BID --> steroid response --> Holding    Xal q day -->  intolerant with blurring  Generic Zioptan not covered      MGD --> improved surface  Dry Eye Syndrome: discussed use of warm compresses, preserved & non-preserved artificial tears, gel and PM ointment options.  Also discussed options utilizing medications.    Right eye  Mucomyst 10% BID / PRN --> not using  EES q hs --> PRN // Holding      RD precautions:  Discussed symptoms of RD with increased flashes, floaters, decreasing vision.  Patient/Family to call and return immediately to clinic should the symptoms of RD occur. Voiced good  understanding with Q & A.      Plan  RTC CORDELIA Calvo cornea check OU  RTC [place] 2 months with IOP & adherence & OCT RNFL // photos  RTC sooner prn with understanding

## 2024-04-30 ENCOUNTER — EXTERNAL CHRONIC CARE MANAGEMENT (OUTPATIENT)
Dept: PRIMARY CARE CLINIC | Facility: CLINIC | Age: 79
End: 2024-04-30
Payer: MEDICARE

## 2024-04-30 PROCEDURE — 99490 CHRNC CARE MGMT STAFF 1ST 20: CPT | Mod: PBBFAC,PO | Performed by: FAMILY MEDICINE

## 2024-04-30 PROCEDURE — 99490 CHRNC CARE MGMT STAFF 1ST 20: CPT | Mod: S$PBB,,, | Performed by: FAMILY MEDICINE

## 2024-05-09 ENCOUNTER — OFFICE VISIT (OUTPATIENT)
Dept: INTERNAL MEDICINE | Facility: CLINIC | Age: 79
End: 2024-05-09
Payer: MEDICARE

## 2024-05-09 VITALS
HEART RATE: 87 BPM | BODY MASS INDEX: 39.82 KG/M2 | SYSTOLIC BLOOD PRESSURE: 114 MMHG | HEIGHT: 60 IN | WEIGHT: 202.81 LBS | DIASTOLIC BLOOD PRESSURE: 70 MMHG | OXYGEN SATURATION: 96 %

## 2024-05-09 DIAGNOSIS — Z12.31 SCREENING MAMMOGRAM, ENCOUNTER FOR: ICD-10-CM

## 2024-05-09 DIAGNOSIS — Z96.89 HISTORY OF GLAUCOMA TUBE SHUNT PROCEDURE: ICD-10-CM

## 2024-05-09 DIAGNOSIS — Z00.00 ENCOUNTER FOR PREVENTIVE HEALTH EXAMINATION: Primary | ICD-10-CM

## 2024-05-09 DIAGNOSIS — E11.9 DIABETES MELLITUS TYPE 2, DIET-CONTROLLED: ICD-10-CM

## 2024-05-09 DIAGNOSIS — H54.10 BLINDNESS AND LOW VISION: ICD-10-CM

## 2024-05-09 PROBLEM — E66.01 MORBID OBESITY WITH BMI OF 40.0-44.9, ADULT: Status: RESOLVED | Noted: 2021-06-14 | Resolved: 2024-05-09

## 2024-05-09 PROCEDURE — G0439 PPPS, SUBSEQ VISIT: HCPCS | Mod: S$PBB,,, | Performed by: PHYSICIAN ASSISTANT

## 2024-05-09 PROCEDURE — 99213 OFFICE O/P EST LOW 20 MIN: CPT | Mod: PBBFAC | Performed by: PHYSICIAN ASSISTANT

## 2024-05-09 PROCEDURE — 99999 PR PBB SHADOW E&M-EST. PATIENT-LVL III: CPT | Mod: PBBFAC,,, | Performed by: PHYSICIAN ASSISTANT

## 2024-05-09 NOTE — PROGRESS NOTES
Ligia Curran presented for a  Medicare AWV and comprehensive Health Risk Assessment today. The following components were reviewed and updated:    Medical history  Family History  Social history  Allergies and Current Medications  Health Risk Assessment  Health Maintenance  Care Team         ** See Completed Assessments for Annual Wellness Visit within the encounter summary.**         The following assessments were completed:  Living Situation  CAGE  Depression Screening  Timed Get Up and Go  Whisper Test  Cognitive Function Screening  Nutrition Screening  ADL Screening  PAQ Screening      Opioid documentation:      Patient does not have a current opioid prescription.        Vitals:    05/09/24 1015   BP: 114/70   BP Location: Right arm   Patient Position: Sitting   BP Method: Medium (Manual)   Pulse: 87   SpO2: 96%   Weight: 92 kg (202 lb 13.2 oz)   Height: 5' (1.524 m)     Body mass index is 39.61 kg/m².  Physical Exam  Vitals and nursing note reviewed.   Constitutional:       Appearance: Normal appearance. She is well-developed.   HENT:      Head: Normocephalic.      Right Ear: External ear normal.      Left Ear: External ear normal.   Eyes:      Pupils: Pupils are equal, round, and reactive to light.   Cardiovascular:      Rate and Rhythm: Normal rate and regular rhythm.      Heart sounds: Normal heart sounds. No murmur heard.     No friction rub. No gallop.   Pulmonary:      Effort: Pulmonary effort is normal. No respiratory distress.      Breath sounds: Normal breath sounds.   Abdominal:      Palpations: Abdomen is soft.      Tenderness: There is no abdominal tenderness.   Skin:     General: Skin is warm and dry.   Neurological:      Mental Status: She is alert.               Diagnoses and health risks identified today and associated recommendations/orders:    1. Encounter for preventive health examination  Assessments completed.  Preventative health recommendations reviewed.     2. Screening mammogram,  encounter for    - Mammo Digital Screening Bilat w/ Lopez; Future    3. Glaucoma shunt device of both eyes  Stable, controlled on current medical therapy, followed by ophthalmology.      4. Blindness and low vision - Both Eyes  Stable, controlled on current medical therapy, followed by ophthalmology.      5. Diabetes mellitus type 2, diet-controlled  Stable, controlled on current medical therapy, followed by ophthalmology.      Schedule annual with PCP    Provided Ligia with a 5-10 year written screening schedule and personal prevention plan. Recommendations were developed using the USPSTF age appropriate recommendations. Education, counseling, and referrals were provided as needed. After Visit Summary printed and given to patient which includes a list of additional screenings\tests needed.    Follow up if symptoms worsen or fail to improve.    Elle Slade PA-C  I offered to discuss advanced care planning, including how to pick a person who would make decisions for you if you were unable to make them for yourself, called a health care power of , and what kind of decisions you might make such as use of life sustaining treatments such as ventilators and tube feeding when faced with a life limiting illness recorded on a living will that they will need to know. (How you want to be cared for as you near the end of your natural life)     X Patient is interested in learning more about how to make advanced directives.  I provided them paperwork and offered to discuss this with them.

## 2024-05-09 NOTE — PATIENT INSTRUCTIONS
Counseling and Referral of Other Preventative  (Italic type indicates deductible and co-insurance are waived)    Patient Name: Ligia Curran  Today's Date: 5/9/2024    Health Maintenance       Date Due Completion Date    Diabetes Urine Screening Never done ---    Shingles Vaccine (1 of 2) Never done ---    DEXA Scan 11/26/2022 11/26/2019    Override on 8/30/2016: Declined    Hemoglobin A1c 01/26/2023 7/26/2022    COVID-19 Vaccine (7 - 2023-24 season) 02/19/2024 10/19/2023    Mammogram 04/20/2024 4/20/2023    Lipid Panel 04/20/2024 4/20/2023    Pneumococcal Vaccines (Age 65+) (2 of 2 - PCV) 04/27/2024 4/27/2023    Influenza Vaccine (Season Ended) 09/01/2024 10/25/2019 (Declined)    Override on 10/25/2019: Declined    Eye Exam 04/23/2025 4/23/2024    TETANUS VACCINE 08/30/2026 8/30/2016 (Done)    Override on 8/30/2016: Done        Orders Placed This Encounter   Procedures    Mammo Digital Screening Bilat w/ Lopez     The following information is provided to all patients.  This information is to help you find resources for any of the problems found today that may be affecting your health:                  Living healthy guide: www.[facility].[place].gov      Understanding Diabetes: www.diabetes.org      Eating healthy: www.cdc.gov/healthyweight      CDC home safety checklist: www.cdc.gov/steadi/patient.html      Agency on Aging: www.goea.[place].gov      Alcoholics anonymous (AA): www.aa.org      Physical Activity: www.selene.nih.gov/av9vkiv      Tobacco use: www.quitwithusla.org

## 2024-05-24 ENCOUNTER — OFFICE VISIT (OUTPATIENT)
Dept: URGENT CARE | Facility: CLINIC | Age: 79
End: 2024-05-24
Payer: MEDICARE

## 2024-05-24 VITALS
HEIGHT: 60 IN | OXYGEN SATURATION: 97 % | SYSTOLIC BLOOD PRESSURE: 143 MMHG | RESPIRATION RATE: 16 BRPM | HEART RATE: 94 BPM | WEIGHT: 202 LBS | BODY MASS INDEX: 39.66 KG/M2 | TEMPERATURE: 99 F | DIASTOLIC BLOOD PRESSURE: 86 MMHG

## 2024-05-24 DIAGNOSIS — J32.9 RHINOSINUSITIS: Primary | ICD-10-CM

## 2024-05-24 LAB
CTP QC/QA: YES
CTP QC/QA: YES
MOLECULAR STREP A: NEGATIVE
SARS-COV-2 AG RESP QL IA.RAPID: NEGATIVE

## 2024-05-24 PROCEDURE — 99213 OFFICE O/P EST LOW 20 MIN: CPT | Mod: S$GLB,,, | Performed by: NURSE PRACTITIONER

## 2024-05-24 PROCEDURE — 87651 STREP A DNA AMP PROBE: CPT | Mod: QW,S$GLB,, | Performed by: NURSE PRACTITIONER

## 2024-05-24 PROCEDURE — 87811 SARS-COV-2 COVID19 W/OPTIC: CPT | Mod: QW,S$GLB,, | Performed by: NURSE PRACTITIONER

## 2024-05-24 RX ORDER — MONTELUKAST SODIUM 10 MG/1
10 TABLET ORAL NIGHTLY
Qty: 30 TABLET | Refills: 0 | Status: SHIPPED | OUTPATIENT
Start: 2024-05-24 | End: 2024-06-23

## 2024-05-24 RX ORDER — BENZOCAINE 6 MG-MENTHOL 10 MG LOZENGES
COMMUNITY
Start: 2024-05-22

## 2024-05-24 RX ORDER — METHYLPREDNISOLONE 4 MG/1
TABLET ORAL
Qty: 21 EACH | Refills: 0 | Status: SHIPPED | OUTPATIENT
Start: 2024-05-24 | End: 2024-06-14

## 2024-05-24 RX ORDER — MINERAL OIL AND WHITE PETROLATUM 30; 940 MG/G; MG/G
OINTMENT OPHTHALMIC
COMMUNITY
Start: 2024-02-02

## 2024-05-24 NOTE — PROGRESS NOTES
Subjective:      Patient ID: Ligia Curran is a 78 y.o. female.    Vitals:  height is 5' (1.524 m) and weight is 91.6 kg (202 lb). Her oral temperature is 98.5 °F (36.9 °C). Her blood pressure is 143/86 (abnormal) and her pulse is 94. Her respiration is 16 and oxygen saturation is 97%.     Chief Complaint: Sore Throat    Patient presents c.o. sore throat.Symps include nasal congestion and cough.Symps began Tuesday.    Sore Throat   The current episode started in the past 7 days. The problem has been unchanged. Neither side of throat is experiencing more pain than the other. The pain is at a severity of 8/10. The pain is mild. Associated symptoms include trouble swallowing. Pertinent negatives include no abdominal pain, congestion, coughing, diarrhea, drooling, ear discharge, ear pain, headaches, hoarse voice, plugged ear sensation, neck pain, shortness of breath, stridor, swollen glands or vomiting. Exposure to: unknown. Treatments tried: Choloseptic. The treatment provided no relief.       Constitution: Negative for fatigue and fever.   HENT:  Positive for sore throat and trouble swallowing. Negative for ear pain, ear discharge, drooling and congestion.    Neck: Negative for neck pain.   Cardiovascular: Negative.    Respiratory:  Negative for cough, shortness of breath and stridor.    Gastrointestinal:  Negative for abdominal pain, vomiting and diarrhea.   Neurological:  Negative for headaches.      Objective:     Physical Exam   Constitutional: No distress.   HENT:   Head: Normocephalic.   Ears:   Right Ear: Tympanic membrane, external ear and ear canal normal.   Left Ear: Tympanic membrane, external ear and ear canal normal.   Nose: Nose normal.   Mouth/Throat: Mucous membranes are moist. Oropharynx is clear.   Neck: Neck supple.   Pulmonary/Chest: Effort normal and breath sounds normal.   Abdominal: Normal appearance.   Neurological: She is alert.       Assessment:     1. Rhinosinusitis        Plan:        Rhinosinusitis  -     SARS Coronavirus 2 Antigen, POCT Manual Read  -     POCT Strep A, Molecular  -     montelukast (SINGULAIR) 10 mg tablet; Take 1 tablet (10 mg total) by mouth every evening.  Dispense: 30 tablet; Refill: 0  -     methylPREDNISolone (MEDROL DOSEPACK) 4 mg tablet; use as directed  Dispense: 21 each; Refill: 0      Results for orders placed or performed in visit on 05/24/24   SARS Coronavirus 2 Antigen, POCT Manual Read   Result Value Ref Range    SARS Coronavirus 2 Antigen Negative Negative     Acceptable Yes    POCT Strep A, Molecular   Result Value Ref Range    Molecular Strep A, POC Negative Negative     Acceptable Yes      Patient Instructions   Drink warm liquids: Drinking tea with lemon and honey, broth or bouillon may help dry, scratchy throats.  Apply ice: Sucking on ice chips or popsicles may help sore throat pain.  Use a humidifier or vaporizer: Adding moisture to your environment, especially your bedroom when youre sleeping, helps dry throats.  Rest: If your throat is sore from shouting, screaming, singing or even talking a lot, resting your voice may help.  Even more rest: Try to get as much rest as you can, including eight hours of sleep at night.  Avoid irritants: Second-hand smoke, smoking, spicy foods and very hot liquids may irritate your sore throat. If you smoke, please try giving up cigarettes or cigars for a few days.

## 2024-05-27 ENCOUNTER — HOSPITAL ENCOUNTER (OUTPATIENT)
Dept: RADIOLOGY | Facility: HOSPITAL | Age: 79
Discharge: HOME | End: 2024-05-27
Attending: PHYSICIAN ASSISTANT
Payer: MEDICARE

## 2024-05-27 DIAGNOSIS — Z12.31 SCREENING MAMMOGRAM, ENCOUNTER FOR: ICD-10-CM

## 2024-05-27 PROCEDURE — 77067 SCR MAMMO BI INCL CAD: CPT | Mod: TC

## 2024-05-27 PROCEDURE — 77063 BREAST TOMOSYNTHESIS BI: CPT | Mod: TC

## 2024-05-27 PROCEDURE — 77067 SCR MAMMO BI INCL CAD: CPT | Mod: 26,,, | Performed by: RADIOLOGY

## 2024-05-27 PROCEDURE — 77063 BREAST TOMOSYNTHESIS BI: CPT | Mod: 26,,, | Performed by: RADIOLOGY

## 2024-05-31 ENCOUNTER — EXTERNAL CHRONIC CARE MANAGEMENT (OUTPATIENT)
Dept: PRIMARY CARE CLINIC | Facility: CLINIC | Age: 79
End: 2024-05-31
Payer: MEDICARE

## 2024-05-31 PROCEDURE — 99490 CHRNC CARE MGMT STAFF 1ST 20: CPT | Mod: PBBFAC,PO | Performed by: FAMILY MEDICINE

## 2024-05-31 PROCEDURE — 99490 CHRNC CARE MGMT STAFF 1ST 20: CPT | Mod: S$PBB,,, | Performed by: FAMILY MEDICINE

## 2024-06-05 ENCOUNTER — TELEPHONE (OUTPATIENT)
Dept: RADIOLOGY | Facility: HOSPITAL | Age: 79
End: 2024-06-05
Payer: MEDICARE

## 2024-06-05 ENCOUNTER — OFFICE VISIT (OUTPATIENT)
Dept: OPHTHALMOLOGY | Facility: CLINIC | Age: 79
End: 2024-06-05
Payer: MEDICARE

## 2024-06-05 DIAGNOSIS — T85.398D CORNEAL GRAFT MALFUNCTION, SUBSEQUENT ENCOUNTER: ICD-10-CM

## 2024-06-05 DIAGNOSIS — H18.20 CORNEAL EDEMA: ICD-10-CM

## 2024-06-05 DIAGNOSIS — H21.41: ICD-10-CM

## 2024-06-05 DIAGNOSIS — H54.10 BLINDNESS AND LOW VISION: ICD-10-CM

## 2024-06-05 DIAGNOSIS — Z96.89 HISTORY OF GLAUCOMA TUBE SHUNT PROCEDURE: Primary | ICD-10-CM

## 2024-06-05 PROCEDURE — 99212 OFFICE O/P EST SF 10 MIN: CPT | Mod: PBBFAC | Performed by: OPHTHALMOLOGY

## 2024-06-05 PROCEDURE — 99999 PR PBB SHADOW E&M-EST. PATIENT-LVL II: CPT | Mod: PBBFAC,,, | Performed by: OPHTHALMOLOGY

## 2024-06-05 PROCEDURE — 92014 COMPRE OPH EXAM EST PT 1/>: CPT | Mod: S$PBB,,, | Performed by: OPHTHALMOLOGY

## 2024-06-05 NOTE — TELEPHONE ENCOUNTER
Looks like a comedone, self-limiting. Reassurance provided. Let me know if recurs    Spoke with patient and explained mammogram findings.Patient expressed understanding of results. Patient scheduled abnormal mammogram follow up appointment at [facility] [place] on 6/11/2024.

## 2024-06-05 NOTE — PROGRESS NOTES
HPI    PT RESENT TODAY FOR K CHK OU    PT STATED OS HAS BEEN A LITTLE IRRITATED, BURNING FOR THE PAST COUPLE OF   DAYS , PT HAS BEEN USING A LOT OF TEARS    Last edited by Rocio Martinez on 6/5/2024 10:36 AM.            Assessment /Plan     For exam results, see Encounter Report.    Glaucoma shunt device of both eyes    Blindness and low vision - Both Eyes    Corneal graft malfunction, subsequent encounter    Corneal edema    Adhesions and disruptions of pupillary membranes of right eye      Thin conj coverage near [place] foot plate OS  No signs of infection or inflammation currently.  Seen with JN    PKP OD opaque, failed with fibrous ingrowth  PKP OS failing, thick, ACIOL in close proximity to endo  Monitor for now, may need repeat PKP OS soon.

## 2024-06-11 ENCOUNTER — HOSPITAL ENCOUNTER (OUTPATIENT)
Dept: RADIOLOGY | Facility: HOSPITAL | Age: 79
Discharge: HOME | End: 2024-06-11
Attending: PHYSICIAN ASSISTANT
Payer: MEDICARE

## 2024-06-11 DIAGNOSIS — R92.8 ABNORMAL FINDING ON BREAST IMAGING: ICD-10-CM

## 2024-06-11 PROCEDURE — 76642 ULTRASOUND BREAST LIMITED: CPT | Mod: TC,RT

## 2024-06-11 PROCEDURE — 77061 BREAST TOMOSYNTHESIS UNI: CPT | Mod: TC,RT

## 2024-06-11 PROCEDURE — 77065 DX MAMMO INCL CAD UNI: CPT | Mod: 26,RT,, | Performed by: RADIOLOGY

## 2024-06-11 PROCEDURE — 77065 DX MAMMO INCL CAD UNI: CPT | Mod: TC,RT

## 2024-06-11 PROCEDURE — 77061 BREAST TOMOSYNTHESIS UNI: CPT | Mod: 26,RT,, | Performed by: RADIOLOGY

## 2024-06-11 PROCEDURE — 76642 ULTRASOUND BREAST LIMITED: CPT | Mod: 26,RT,, | Performed by: RADIOLOGY

## 2024-06-13 ENCOUNTER — TELEPHONE (OUTPATIENT)
Dept: INTERNAL MEDICINE | Facility: CLINIC | Age: 79
End: 2024-06-13
Payer: MEDICARE

## 2024-06-13 NOTE — TELEPHONE ENCOUNTER
----- Message from Elle Slade PA-C sent at 6/12/2024 12:55 PM CDT -----  Please make sure patient knows she will need short term 3 month f/u of breast due to abnormality seen on mammogram and US to ensure stability

## 2024-06-21 DIAGNOSIS — Z94.7 STATUS POST CORNEAL TRANSPLANT: ICD-10-CM

## 2024-06-24 RX ORDER — FLUOROMETHOLONE 1 MG/ML
SUSPENSION/ DROPS OPHTHALMIC
Qty: 10 ML | Refills: 11 | Status: SHIPPED | OUTPATIENT
Start: 2024-06-24

## 2024-06-30 ENCOUNTER — EXTERNAL CHRONIC CARE MANAGEMENT (OUTPATIENT)
Dept: PRIMARY CARE CLINIC | Facility: CLINIC | Age: 79
End: 2024-06-30
Payer: MEDICARE

## 2024-06-30 PROCEDURE — 99490 CHRNC CARE MGMT STAFF 1ST 20: CPT | Mod: PBBFAC,PO | Performed by: FAMILY MEDICINE

## 2024-06-30 PROCEDURE — 99490 CHRNC CARE MGMT STAFF 1ST 20: CPT | Mod: S$PBB,,, | Performed by: FAMILY MEDICINE

## 2024-07-16 ENCOUNTER — TELEPHONE (OUTPATIENT)
Dept: OPHTHALMOLOGY | Facility: CLINIC | Age: 79
End: 2024-07-16
Payer: MEDICARE

## 2024-07-16 NOTE — TELEPHONE ENCOUNTER
Contacted pt- scheduled appointment with Dr. Wilkerson 7/18/2024.    ----- Message from Aletha Khanna sent at 7/16/2024  1:18 PM CDT -----  Contact: [phone]  Patient is calling to reschedule appointment. Please call to assist. Per ANYI

## 2024-07-16 NOTE — TELEPHONE ENCOUNTER
Left message on voicemail.    ----- Message from Sari Butler sent at 7/16/2024 10:53 AM CDT -----  Regarding: Appointment      Name Of Caller:   Ligia       Contact Preference:   [phone]       Nature of call:    Pt states their left eye feels like it's burnings and twitching. She was informed by Dr. Wilkerson to come in if her condition got worse. Pt would like to come in today.

## 2024-07-18 ENCOUNTER — OFFICE VISIT (OUTPATIENT)
Dept: OPHTHALMOLOGY | Facility: CLINIC | Age: 79
End: 2024-07-18
Payer: MEDICARE

## 2024-07-18 DIAGNOSIS — H40.2213 CHRONIC ANGLE-CLOSURE GLAUCOMA OF RIGHT EYE, SEVERE STAGE: ICD-10-CM

## 2024-07-18 DIAGNOSIS — H40.33X3 GLAUCOMA OF BOTH EYES ASSOCIATED WITH OCULAR TRAUMA, SEVERE STAGE: ICD-10-CM

## 2024-07-18 DIAGNOSIS — T85.398D CORNEAL GRAFT MALFUNCTION, SUBSEQUENT ENCOUNTER: ICD-10-CM

## 2024-07-18 DIAGNOSIS — H54.10 BLINDNESS AND LOW VISION: ICD-10-CM

## 2024-07-18 DIAGNOSIS — Z94.7 STATUS POST CORNEAL TRANSPLANT: ICD-10-CM

## 2024-07-18 DIAGNOSIS — S05.01XA ABRASION OF RIGHT CORNEA, INITIAL ENCOUNTER: Primary | ICD-10-CM

## 2024-07-18 DIAGNOSIS — Z94.7 HX OF CORNEA TRANSPLANT: ICD-10-CM

## 2024-07-18 DIAGNOSIS — Z96.89 HISTORY OF GLAUCOMA TUBE SHUNT PROCEDURE: ICD-10-CM

## 2024-07-18 DIAGNOSIS — H40.20X3 ANGLE-CLOSURE GLAUCOMA, SEVERE STAGE: ICD-10-CM

## 2024-07-18 PROCEDURE — 99999 PR PBB SHADOW E&M-EST. PATIENT-LVL III: CPT | Mod: PBBFAC,,, | Performed by: OPHTHALMOLOGY

## 2024-07-18 PROCEDURE — 99214 OFFICE O/P EST MOD 30 MIN: CPT | Mod: S$PBB,,, | Performed by: OPHTHALMOLOGY

## 2024-07-18 PROCEDURE — 99213 OFFICE O/P EST LOW 20 MIN: CPT | Mod: PBBFAC | Performed by: OPHTHALMOLOGY

## 2024-07-18 PROCEDURE — G2211 COMPLEX E/M VISIT ADD ON: HCPCS | Mod: S$PBB,,, | Performed by: OPHTHALMOLOGY

## 2024-07-18 RX ORDER — FLUOROMETHOLONE 1 MG/ML
1 SUSPENSION/ DROPS OPHTHALMIC 2 TIMES DAILY
Qty: 10 ML | Refills: 11 | Status: SHIPPED | OUTPATIENT
Start: 2024-07-18

## 2024-07-18 RX ORDER — DORZOLAMIDE HYDROCHLORIDE AND TIMOLOL MALEATE 20; 5 MG/ML; MG/ML
1 SOLUTION/ DROPS OPHTHALMIC 2 TIMES DAILY
Qty: 10 ML | Refills: 12 | Status: SHIPPED | OUTPATIENT
Start: 2024-07-18

## 2024-07-18 RX ORDER — BRIMONIDINE TARTRATE 2 MG/ML
1 SOLUTION/ DROPS OPHTHALMIC 2 TIMES DAILY
Qty: 15 ML | Refills: 11 | Status: SHIPPED | OUTPATIENT
Start: 2024-07-18 | End: 2025-07-18

## 2024-07-18 RX ORDER — ERYTHROMYCIN 5 MG/G
OINTMENT OPHTHALMIC NIGHTLY
Qty: 3.5 G | Refills: 11 | Status: SHIPPED | OUTPATIENT
Start: 2024-07-18

## 2024-07-18 NOTE — PROGRESS NOTES
Assessment /Plan     For exam results, see Encounter Report.    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, severe stage    Corneal graft malfunction, subsequent encounter    Glaucoma shunt device of both eyes    Hx of cornea transplant            [employer] [employer]  Joined Order @ 17 yo  Strong FMHx Glc / Blindness --> Justyn Creole Dz / mom's side    Complex ocular hx  multiple sx --> trabs, tubes and tube removes etc   Too numerous to count    Right eye pain x 2-3 days 07/18/2024  Corneal erosion / small abrasion  No infiltrates  Discussed risk for k-ulcer / infection  ==> Hold FML  ==> start EES TID and q HS      POAG  2/2 CACG OD  Traumatic Glaucoma severe OU    ==> Follow OCT RNFL      CCT  ==>  01/09/2024 --> clear PKP OS    Target  --> mid teens --> not achieved OS --> Thick CCT & difficult assessment    Right eye  PKP OD   11/10/2022 --> thick / edema  PKP failure  -->  10/26/2017   PKP  PC IOL  Fibrotic anterior capsule membrane  Tubes x 2 in place ST & IN --> removed ST BV 2/27/2017  Glc Shunt graft site OD  Revision 04/09/2013    Glaucoma Incisional Surgery  Patient with exposed ST Tube OD  SP OD ST BV Removal 2/27/2017    Left eye  SP PKP // AC IOL placement  05/10/2021 & 11/10/2022  PKP edema    03/12/2024  ST Ahmed implant --> no erosion // exposure 07/18/2024      Both eyes --> Tolerating well & good adherence -->adjust  Cosopt BID  Alphagan BID  FML using TID OD // BID OS --> hold OD  Idania 128 BID --> feels improved Va OS    Refresh PM OS --> start    PF1%  BID --> steroid response --> Holding    Xal q day -->  intolerant with blurring  Generic Zioptan not covered      MGD --> improved surface  Dry Eye Syndrome: discussed use of warm compresses, preserved & non-preserved artificial tears, gel and PM ointment options.  Also  discussed options utilizing medications.      RD precautions:  Discussed symptoms of RD with increased flashes, floaters, decreasing vision.  Patient/Family to call and return immediately to clinic should the symptoms of RD occur. Voiced good understanding with Q & A.      Plan  RTC [place] 3 days --> Right cornea  RTC sooner prn with understanding

## 2024-07-23 ENCOUNTER — OFFICE VISIT (OUTPATIENT)
Dept: OPHTHALMOLOGY | Facility: CLINIC | Age: 79
End: 2024-07-23
Payer: MEDICARE

## 2024-07-23 DIAGNOSIS — Z96.89 HISTORY OF GLAUCOMA TUBE SHUNT PROCEDURE: ICD-10-CM

## 2024-07-23 DIAGNOSIS — Z98.49 STATUS POST CATARACT EXTRACTION AND INSERTION OF INTRAOCULAR LENS, UNSPECIFIED LATERALITY: ICD-10-CM

## 2024-07-23 DIAGNOSIS — H40.2213 CHRONIC ANGLE-CLOSURE GLAUCOMA OF RIGHT EYE, SEVERE STAGE: ICD-10-CM

## 2024-07-23 DIAGNOSIS — Z94.7 HX OF CORNEA TRANSPLANT: ICD-10-CM

## 2024-07-23 DIAGNOSIS — Z96.1 STATUS POST CATARACT EXTRACTION AND INSERTION OF INTRAOCULAR LENS, UNSPECIFIED LATERALITY: ICD-10-CM

## 2024-07-23 DIAGNOSIS — S05.01XD ABRASION OF RIGHT CORNEA, SUBSEQUENT ENCOUNTER: Primary | ICD-10-CM

## 2024-07-23 DIAGNOSIS — H54.10 BLINDNESS AND LOW VISION: ICD-10-CM

## 2024-07-23 DIAGNOSIS — H40.33X3 GLAUCOMA OF BOTH EYES ASSOCIATED WITH OCULAR TRAUMA, SEVERE STAGE: ICD-10-CM

## 2024-07-23 DIAGNOSIS — H04.123 BILATERAL DRY EYES: ICD-10-CM

## 2024-07-23 PROCEDURE — 99213 OFFICE O/P EST LOW 20 MIN: CPT | Mod: S$PBB,,, | Performed by: OPHTHALMOLOGY

## 2024-07-23 PROCEDURE — 99999 PR PBB SHADOW E&M-EST. PATIENT-LVL III: CPT | Mod: PBBFAC,,, | Performed by: OPHTHALMOLOGY

## 2024-07-23 PROCEDURE — 99213 OFFICE O/P EST LOW 20 MIN: CPT | Mod: PBBFAC | Performed by: OPHTHALMOLOGY

## 2024-07-23 PROCEDURE — G2211 COMPLEX E/M VISIT ADD ON: HCPCS | Mod: S$PBB,,, | Performed by: OPHTHALMOLOGY

## 2024-07-23 NOTE — PROGRESS NOTES
Assessment /Plan     For exam results, see Encounter Report.    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, severe stage    Glaucoma shunt device of both eyes    Bilateral dry eyes    Hx of cornea transplant    Status post cataract extraction and insertion of intraocular lens, unspecified laterality            [employer]  Joined Order @ 17 yo  Strong FMHx Glc / Blindness --> Justyn Creole Dz / mom's side    Complex ocular hx  multiple sx --> trabs, tubes and tube removes etc   Too numerous to count    Right eye pain x 2-3 days 07/18/2024 --> resolved 07/23/2024  Corneal erosion / small abrasion --> resolved  No infiltrates  ==> EES q HS this week and can hold  ==> use PF ATs      POAG  2/2 CACG OD  Traumatic Glaucoma severe OU    ==> Follow OCT RNFL      CCT  ==>  01/09/2024 --> clear PKP OS  ==>  07/23/2024 --> small haze    Target  --> mid teens --> not achieved OS --> Thick CCT & difficult assessment    Right eye  PKP OD   11/10/2022 --> thick / edema  PKP failure  -->  10/26/2017   PKP  PC IOL  Fibrotic anterior capsule membrane  Tubes x 2 in place ST & IN --> removed ST BV 2/27/2017  Glc Shunt graft site OD  Revision 04/09/2013    Glaucoma Incisional Surgery  Patient with exposed ST Tube OD  SP OD ST BV Removal 2/27/2017    Left eye  SP PKP // AC IOL placement  05/10/2021 & 11/10/2022  PKP edema    03/12/2024  ST Ahmed implant --> no erosion // exposure 07/18/2024      Both eyes --> Tolerating well & good adherence -->adjust  Cosopt BID  Alphagan BID  FML using TID OD // BID OS --> holding OD  Idania 128 BID --> feels improved Va OS    Refresh PM OS --> OU    PF1%  BID --> steroid response --> Holding    Xal q day -->  intolerant with blurring  Generic Zioptan not covered      MGD --> improved surface  Dry Eye Syndrome:  discussed use of warm compresses, preserved & non-preserved artificial tears, gel and PM ointment options.  Also discussed options utilizing medications.      RD precautions:  Discussed symptoms of RD with increased flashes, floaters, decreasing vision.  Patient/Family to call and return immediately to clinic should the symptoms of RD occur. Voiced good understanding with Q & A.      Plan  RTC 2 months IOP and adherence & OCT RNFL OS  RTC sooner prn with understanding

## 2024-07-31 ENCOUNTER — EXTERNAL CHRONIC CARE MANAGEMENT (OUTPATIENT)
Dept: PRIMARY CARE CLINIC | Facility: CLINIC | Age: 79
End: 2024-07-31
Payer: MEDICARE

## 2024-07-31 PROCEDURE — 99490 CHRNC CARE MGMT STAFF 1ST 20: CPT | Mod: PBBFAC,PO | Performed by: FAMILY MEDICINE

## 2024-07-31 PROCEDURE — 99490 CHRNC CARE MGMT STAFF 1ST 20: CPT | Mod: S$PBB,,, | Performed by: FAMILY MEDICINE

## 2024-08-09 ENCOUNTER — OFFICE VISIT (OUTPATIENT)
Dept: FAMILY MEDICINE | Facility: CLINIC | Age: 79
End: 2024-08-09
Attending: FAMILY MEDICINE
Payer: MEDICARE

## 2024-08-09 ENCOUNTER — LAB VISIT (OUTPATIENT)
Dept: LAB | Facility: HOSPITAL | Age: 79
End: 2024-08-09
Attending: NURSE PRACTITIONER
Payer: MEDICARE

## 2024-08-09 VITALS
WEIGHT: 201 LBS | SYSTOLIC BLOOD PRESSURE: 128 MMHG | DIASTOLIC BLOOD PRESSURE: 81 MMHG | HEART RATE: 71 BPM | OXYGEN SATURATION: 95 % | BODY MASS INDEX: 39.26 KG/M2

## 2024-08-09 DIAGNOSIS — E66.01 CLASS 2 SEVERE OBESITY WITH SERIOUS COMORBIDITY AND BODY MASS INDEX (BMI) OF 39.0 TO 39.9 IN ADULT, UNSPECIFIED OBESITY TYPE: ICD-10-CM

## 2024-08-09 DIAGNOSIS — Z23 ENCOUNTER FOR IMMUNIZATION: ICD-10-CM

## 2024-08-09 DIAGNOSIS — Z12.31 ENCOUNTER FOR SCREENING MAMMOGRAM FOR BREAST CANCER: ICD-10-CM

## 2024-08-09 DIAGNOSIS — Z78.0 ASYMPTOMATIC MENOPAUSE: ICD-10-CM

## 2024-08-09 DIAGNOSIS — E11.9 DIABETES MELLITUS TYPE 2, DIET-CONTROLLED: ICD-10-CM

## 2024-08-09 DIAGNOSIS — Z00.00 ANNUAL PHYSICAL EXAM: ICD-10-CM

## 2024-08-09 DIAGNOSIS — Z00.00 ANNUAL PHYSICAL EXAM: Primary | ICD-10-CM

## 2024-08-09 LAB
ALBUMIN SERPL BCP-MCNC: 3.8 G/DL (ref 3.5–5.2)
ALBUMIN/CREAT UR: NORMAL UG/MG (ref 0–30)
ALP SERPL-CCNC: 81 U/L (ref 55–135)
ALT SERPL W/O P-5'-P-CCNC: 11 U/L (ref 10–44)
ANION GAP SERPL CALC-SCNC: 10 MMOL/L (ref 8–16)
AST SERPL-CCNC: 19 U/L (ref 10–40)
BASOPHILS # BLD AUTO: 0.04 K/UL (ref 0–0.2)
BASOPHILS NFR BLD: 0.6 % (ref 0–1.9)
BILIRUB SERPL-MCNC: 0.8 MG/DL (ref 0.1–1)
BUN SERPL-MCNC: 10 MG/DL (ref 8–23)
CALCIUM SERPL-MCNC: 9.7 MG/DL (ref 8.7–10.5)
CHLORIDE SERPL-SCNC: 106 MMOL/L (ref 95–110)
CHOLEST SERPL-MCNC: 168 MG/DL (ref 120–199)
CHOLEST/HDLC SERPL: 3.7 {RATIO} (ref 2–5)
CO2 SERPL-SCNC: 24 MMOL/L (ref 23–29)
CREAT SERPL-MCNC: 0.8 MG/DL (ref 0.5–1.4)
CREAT UR-MCNC: 77 MG/DL (ref 15–325)
DIFFERENTIAL METHOD BLD: ABNORMAL
EOSINOPHIL # BLD AUTO: 0.1 K/UL (ref 0–0.5)
EOSINOPHIL NFR BLD: 0.7 % (ref 0–8)
ERYTHROCYTE [DISTWIDTH] IN BLOOD BY AUTOMATED COUNT: 12.8 % (ref 11.5–14.5)
EST. GFR  (NO RACE VARIABLE): >60 ML/MIN/1.73 M^2
ESTIMATED AVG GLUCOSE: 120 MG/DL (ref 68–131)
GLUCOSE SERPL-MCNC: 113 MG/DL (ref 70–110)
HBA1C MFR BLD: 5.8 % (ref 4–5.6)
HCT VFR BLD AUTO: 47.5 % (ref 37–48.5)
HDLC SERPL-MCNC: 45 MG/DL (ref 40–75)
HDLC SERPL: 26.8 % (ref 20–50)
HGB BLD-MCNC: 14.8 G/DL (ref 12–16)
IMM GRANULOCYTES # BLD AUTO: 0.02 K/UL (ref 0–0.04)
IMM GRANULOCYTES NFR BLD AUTO: 0.3 % (ref 0–0.5)
LDLC SERPL CALC-MCNC: 106 MG/DL (ref 63–159)
LYMPHOCYTES # BLD AUTO: 2.2 K/UL (ref 1–4.8)
LYMPHOCYTES NFR BLD: 30.7 % (ref 18–48)
MCH RBC QN AUTO: 30.6 PG (ref 27–31)
MCHC RBC AUTO-ENTMCNC: 31.2 G/DL (ref 32–36)
MCV RBC AUTO: 98 FL (ref 82–98)
MICROALBUMIN UR DL<=1MG/L-MCNC: <5 UG/ML
MONOCYTES # BLD AUTO: 0.5 K/UL (ref 0.3–1)
MONOCYTES NFR BLD: 6.4 % (ref 4–15)
NEUTROPHILS # BLD AUTO: 4.3 K/UL (ref 1.8–7.7)
NEUTROPHILS NFR BLD: 61.3 % (ref 38–73)
NONHDLC SERPL-MCNC: 123 MG/DL
NRBC BLD-RTO: 0 /100 WBC
PLATELET # BLD AUTO: 210 K/UL (ref 150–450)
PMV BLD AUTO: 12 FL (ref 9.2–12.9)
POTASSIUM SERPL-SCNC: 4.3 MMOL/L (ref 3.5–5.1)
PROT SERPL-MCNC: 7.4 G/DL (ref 6–8.4)
RBC # BLD AUTO: 4.84 M/UL (ref 4–5.4)
SODIUM SERPL-SCNC: 140 MMOL/L (ref 136–145)
TRIGL SERPL-MCNC: 85 MG/DL (ref 30–150)
TSH SERPL DL<=0.005 MIU/L-ACNC: 0.98 UIU/ML (ref 0.4–4)
WBC # BLD AUTO: 7.03 K/UL (ref 3.9–12.7)

## 2024-08-09 PROCEDURE — 99215 OFFICE O/P EST HI 40 MIN: CPT | Mod: PBBFAC,PO | Performed by: NURSE PRACTITIONER

## 2024-08-09 PROCEDURE — 84443 ASSAY THYROID STIM HORMONE: CPT | Performed by: NURSE PRACTITIONER

## 2024-08-09 PROCEDURE — 80053 COMPREHEN METABOLIC PANEL: CPT | Performed by: NURSE PRACTITIONER

## 2024-08-09 PROCEDURE — 82570 ASSAY OF URINE CREATININE: CPT | Performed by: NURSE PRACTITIONER

## 2024-08-09 PROCEDURE — 80061 LIPID PANEL: CPT | Performed by: NURSE PRACTITIONER

## 2024-08-09 PROCEDURE — 85025 COMPLETE CBC W/AUTO DIFF WBC: CPT | Performed by: NURSE PRACTITIONER

## 2024-08-09 PROCEDURE — 82043 UR ALBUMIN QUANTITATIVE: CPT | Performed by: NURSE PRACTITIONER

## 2024-08-09 PROCEDURE — 99999 PR PBB SHADOW E&M-EST. PATIENT-LVL V: CPT | Mod: PBBFAC,,, | Performed by: NURSE PRACTITIONER

## 2024-08-09 PROCEDURE — 83036 HEMOGLOBIN GLYCOSYLATED A1C: CPT | Performed by: NURSE PRACTITIONER

## 2024-08-09 PROCEDURE — 36415 COLL VENOUS BLD VENIPUNCTURE: CPT | Mod: PO | Performed by: NURSE PRACTITIONER

## 2024-08-09 RX ORDER — HYDROCORTISONE 25 MG/G
CREAM TOPICAL 2 TIMES DAILY
Qty: 28 G | Refills: 1 | Status: SHIPPED | OUTPATIENT
Start: 2024-08-09

## 2024-08-12 ENCOUNTER — TELEPHONE (OUTPATIENT)
Dept: FAMILY MEDICINE | Facility: CLINIC | Age: 79
End: 2024-08-12
Payer: MEDICARE

## 2024-08-12 NOTE — TELEPHONE ENCOUNTER
----- Message from Allyson Ramos MA sent at 8/12/2024  3:02 PM CDT -----  Name of Who is Calling:[facility] [[id number]]                What is the request in detail: Pt is requesting a call back to get a hard copy of her lab results. Please assist.                Can the clinic reply by [facility]: No                What Number to Call Back if not in [facility]: [phone]

## 2024-08-21 ENCOUNTER — HOSPITAL ENCOUNTER (OUTPATIENT)
Dept: RADIOLOGY | Facility: CLINIC | Age: 79
Discharge: HOME | End: 2024-08-21
Attending: NURSE PRACTITIONER
Payer: MEDICARE

## 2024-08-21 DIAGNOSIS — Z78.0 ASYMPTOMATIC MENOPAUSE: ICD-10-CM

## 2024-08-21 PROCEDURE — 77080 DXA BONE DENSITY AXIAL: CPT | Mod: TC

## 2024-09-05 ENCOUNTER — TELEPHONE (OUTPATIENT)
Dept: FAMILY MEDICINE | Facility: CLINIC | Age: 79
End: 2024-09-05
Payer: MEDICARE

## 2024-09-05 NOTE — TELEPHONE ENCOUNTER
----- Message from Trisha Sravan sent at 9/5/2024  3:07 PM CDT -----  Regarding: Bone Destinty Results  Name of Who is Calling:  Patient          What is the request in detail:  Please contact patients he would like to speak with Dr. Louie about her Bone Denise test results and something else she stated they discuss            Can the clinic reply by [facility]: No            [facility]: [phone]

## 2024-09-09 ENCOUNTER — TELEPHONE (OUTPATIENT)
Dept: INTERNAL MEDICINE | Facility: CLINIC | Age: 79
End: 2024-09-09
Payer: MEDICARE

## 2024-09-09 NOTE — TELEPHONE ENCOUNTER
"----- Message from Gely Riley MA sent at 9/5/2024  4:25 PM CDT -----  Regarding: FW: pt advice  Contact: [phone]    ----- Message -----  From: Kayli Parker  Sent: 9/5/2024   2:39 PM CDT  To: [facility] Staff  Subject: pt advice                                        .Name Of Caller: Self     Contact Preference?:[phone]     What is the nature of the call?:pt requesting orders for tests, in refence to recall letter  pls call pt     Additional Notes:  "Thank you for all that you do for our patients"  "

## 2024-09-17 ENCOUNTER — OFFICE VISIT (OUTPATIENT)
Dept: FAMILY MEDICINE | Facility: CLINIC | Age: 79
End: 2024-09-17
Payer: MEDICARE

## 2024-09-17 VITALS
BODY MASS INDEX: 38.38 KG/M2 | WEIGHT: 196.5 LBS | OXYGEN SATURATION: 96 % | SYSTOLIC BLOOD PRESSURE: 132 MMHG | HEART RATE: 83 BPM | DIASTOLIC BLOOD PRESSURE: 79 MMHG

## 2024-09-17 DIAGNOSIS — R92.8 ABNORMAL MAMMOGRAM: ICD-10-CM

## 2024-09-17 DIAGNOSIS — K62.89 RECTAL DISCOMFORT: Primary | ICD-10-CM

## 2024-09-17 PROCEDURE — 99999 PR PBB SHADOW E&M-EST. PATIENT-LVL V: CPT | Mod: PBBFAC,,, | Performed by: NURSE PRACTITIONER

## 2024-09-17 PROCEDURE — 99215 OFFICE O/P EST HI 40 MIN: CPT | Mod: PBBFAC,PO | Performed by: NURSE PRACTITIONER

## 2024-09-17 PROCEDURE — 99214 OFFICE O/P EST MOD 30 MIN: CPT | Mod: S$PBB,,, | Performed by: NURSE PRACTITIONER

## 2024-09-17 NOTE — PROGRESS NOTES
"Subjective:       Patient ID: Ligia Curran is a 78 y.o. female.    The chief complaint leading to consultation is:   Chief Complaint   Patient presents with    Results    Rectal Problems     HPI  History of Present Illness    CHIEF COMPLAINT:  Ligia presents today for follow up on bone density results.    BONE HEALTH:  She reports receiving results from a bone density scan, diagnosed with osteopenia. She currently takes a calcium supplement of 500-600 mg daily and understands the need to increase her calcium and vitamin D supplementation as recommended for her condition.    WEIGHT MANAGEMENT:  She reports recent unintentional weight loss of 5 lbs since her last visit, attributing this to increased consumption of cranberry juice, which she understands to be a natural diuretic.    GASTROINTESTINAL CONCERNS:  She reports rectal discomfort characterized by a pressure sensation on the inside, with a vibration or "cracking" feeling occurring sometimes before and sometimes after bowel movements, but not necessarily close to the time of defecation. The discomfort is not burning. She notes that the pressure is sometimes felt before having a bowel movement, and after straining during a less satisfactory bowel movement. A previously prescribed cream was ineffective in alleviating the symptoms. She had a colonoscopy in 2020 and an upper GI tract scope, both reported as normal. She recalls experiencing previous abdominal pain on the left side.    MEDICAL HISTORY:  She is postmenopausal and denies smoking.    EXERCISE:  She reports a reduced walking routine compared to her previous habits and expresses intention to resume her walking regimen more consistently.      ROS:  General: -fever, -chills, -fatigue, -weight gain, +weight loss  Eyes: -vision changes, -redness, -discharge  ENT: -ear pain, -nasal congestion, -sore throat  Cardiovascular: -chest pain, -palpitations, -lower extremity edema  Respiratory: -cough, -shortness of " breath  Gastrointestinal: +abdominal pain, -nausea, -vomiting, -diarrhea, -constipation, -blood in stool  Genitourinary: -dysuria, -hematuria, -frequency  Musculoskeletal: -joint pain, -muscle pain  Skin: -rash, -lesion  Neurological: -headache, -dizziness, -numbness, -tingling  Psychiatric: -anxiety, -depression, -sleep difficulty           All of your core healthy metrics are met.      Social History     Social History Narrative    Not on file       Family History   Problem Relation Name Age of Onset    Glaucoma Mother      Blindness Mother      Cholecystitis Mother      Arthritis Mother      Glaucoma Sister x4     Cataracts Sister x4     Hypertension Sister x4     Arthritis Father      Cancer Brother x2         prostate    Melanoma Neg Hx      Amblyopia Neg Hx      Macular degeneration Neg Hx      Retinal detachment Neg Hx      Strabismus Neg Hx         Current Outpatient Medications:     acetaminophen (TYLENOL) 650 MG TbSR, Take 1 tablet (650 mg total) by mouth every 8 (eight) hours., Disp: 30 tablet, Rfl: 0    brimonidine 0.2% (ALPHAGAN) 0.2 % Drop, Place 1 drop into both eyes 2 (two) times daily., Disp: 15 mL, Rfl: 11    calcium-vitamin D 500-125 mg-unit tablet, Take 1 tablet by mouth once daily., Disp: , Rfl:     carboxymethylcell/hypromellose (GENTEAL GEL OPHT), Place into the left eye every evening., Disp: , Rfl:     CHLORASEPTIC SORE THROAT 6-10 mg lozenge, , Disp: , Rfl:     dorzolamide-timolol 2-0.5% (COSOPT) 22.3-6.8 mg/mL ophthalmic solution, Place 1 drop into both eyes 2 (two) times daily., Disp: 10 mL, Rfl: 12    erythromycin (ROMYCIN) ophthalmic ointment, Place into both eyes every evening., Disp: 3.5 g, Rfl: 11    fish oil-omega-3 fatty acids 300-1,000 mg capsule, Take 1 capsule by mouth once daily., Disp: , Rfl:     fluorometholone 0.1% (FML) 0.1 % DrpS, Place 1 drop into both eyes 2 (two) times daily., Disp: 10 mL, Rfl: 11    hydrocortisone 2.5 % cream, Apply topically 2 (two) times daily., Disp:  28 g, Rfl: 1    ibuprofen (ADVIL,MOTRIN) 600 MG tablet, Take 1 tablet (600 mg total) by mouth every 6 (six) hours as needed for Pain., Disp: 30 tablet, Rfl: 1    latanoprost 0.005 % ophthalmic solution, Place 1 drop into both eyes every evening., Disp: 7.5 mL, Rfl: 4    MULTIVITAMINS,THER W-MINERALS (VITAMINS & MINERALS ORAL), Take 1 tablet by mouth once daily. , Disp: , Rfl:      5 % ophthalmic solution, Place into both eyes., Disp: , Rfl:     ofloxacin (OCUFLOX) 0.3 % ophthalmic solution, , Disp: , Rfl:     prednisoLONE acetate (PRED FORTE) 1 % DrpS, Place 1 drop into both eyes 2 (two) times daily., Disp: 10 mL, Rfl: 4    REFRESH OPTIVE 0.5-0.9 % Drop, , Disp: , Rfl:     SYSTANE NIGHTTIME 94-3 % Oint, Place into both eyes., Disp: , Rfl:     tafluprost, PF, (ZIOPTAN, PF,) 0.0015 % Dpet, Place 1 drop into both eyes once daily., Disp: 30 each, Rfl: 2    TURMERIC ROOT EXTRACT ORAL, Take 1 capsule by mouth once daily., Disp: , Rfl:     VOLTAREN ARTHRITIS PAIN 1 % Gel, Apply topically., Disp: , Rfl:     Objective:   /79   Pulse 83   Wt 89.1 kg (196 lb 8 oz)   SpO2 96%   BMI 38.38 kg/m²      Physical Exam    Physical Exam    Vitals: Weight: 196 lbs.  General: No acute distress. Well-developed. Well-nourished.  Eyes: EOMI. Sclerae anicteric.  HENT: Normocephalic. Atraumatic. Nares patent. Moist oral mucosa.  Ears: Bilateral TMs clear. Bilateral EACs clear.  Cardiovascular: Regular rate. Regular rhythm. No murmurs. No rubs. No gallops. Normal S1, S2.  Respiratory: Normal respiratory effort. Clear to auscultation bilaterally. No rales. No rhonchi. No wheezing.  Abdomen: Soft. Non-tender. Non-distended. Normoactive bowel sounds.  Musculoskeletal: No  obvious deformity.  Extremities: No lower extremity edema.  Neurological: Alert & oriented x3. No slurred speech. Normal gait.  Psychiatric: Normal mood. Normal affect. Good insight. Good judgment.  Skin: Warm. Dry. No rash.         Assessment & Plan    Assessment & Plan    Reviewed bone density scan results, revealing osteopenia  Considered osteopenia management strategy, focusing on supplementation and lifestyle modifications rather than medical treatment for osteoporosis  Evaluated patient's current calcium intake and determined need for increased supplementation  Assessed need for colorectal consult to investigate patient's reported rectal pressure and discomfort, considering options between repeat colonoscopy and specialist evaluation    OSTEOPENIA:  - Explained osteopenia as a precursor to osteoporosis, resulting from decreased estrogen production in postmenopausal women.  - Discussed the role of estrogen in bone composition and its impact on bone quality.  - Clarified that osteopenia does not indicate fractures but requires closer monitoring.  - Emphasized the commonality of osteopenia in postmenopausal women and its independence from patient behaviors.  - Emphasized the importance of weight-bearing exercise in strengthening muscles to cushion bones.  - Ligia to implement fall precautions at home (e.g., be conscious of railings, rugs, potential tripping hazards).  - Recommend engaging in weight-bearing exercise (e.g., aerobic walking, muscle building with free weights).  - Ligia to resume regular walking regimen.  - Started calcium 1,200 mg daily (OTC) and vitamin D 5,000 IU daily (OTC).  - Ordered bone density scan in 2 years.    RECTAL DISCOMFORT:  - Referred to colorectal consultation to evaluate rectal pressure and discomfort.    HYDRATION:  - Ligia to increase water intake instead of relying on cranberry juice for hydration.    FOLLOW UP:  - Follow up before the next bone density scan in 2 years to assess progress.  - Contact office if needed.         Problem List Items Addressed This Visit    None  Visit Diagnoses       Rectal discomfort    -  Primary    Relevant Orders    Ambulatory referral/consult to Colorectal Surgery    Abnormal mammogram         Relevant Orders    US Breast Right Complete              Immunizations Administered on Date of Encounter - 9/17/2024       No immunizations on file.             No follow-ups on file.    This note was generated with the assistance of ambient listening technology. Verbal consent was obtained by the patient and accompanying visitor(s) for the recording of patient appointment to facilitate this note. I attest to having reviewed and edited the generated note for accuracy, though some syntax or spelling errors may persist. Please contact the author of this note for any clarification.      Disclaimer:  This note may have been prepared using voice recognition software, it may have not been extensively proofed, as such there could be errors within the text such as sound alike errors.

## 2024-09-23 ENCOUNTER — TELEPHONE (OUTPATIENT)
Dept: RADIOLOGY | Facility: HOSPITAL | Age: 79
End: 2024-09-23
Payer: MEDICARE

## 2024-09-23 NOTE — TELEPHONE ENCOUNTER
----- Message from Kendal Win sent at 2024  3:15 PM CDT -----  Regarding: No availability  Contact: Pt @[phone]  Pt called in to schedule an appt; no available appts in Epic. Pt is asking for a call back soon to schedule. Thanks.             Patient's DX:Abnormal mammogram         Patient requesting call back or [facility] [phone]

## 2024-09-24 ENCOUNTER — OFFICE VISIT (OUTPATIENT)
Dept: OPHTHALMOLOGY | Facility: CLINIC | Age: 79
End: 2024-09-24
Payer: MEDICARE

## 2024-09-24 DIAGNOSIS — H40.33X3 GLAUCOMA OF BOTH EYES ASSOCIATED WITH OCULAR TRAUMA, SEVERE STAGE: Primary | ICD-10-CM

## 2024-09-24 DIAGNOSIS — H54.10 BLINDNESS AND LOW VISION: ICD-10-CM

## 2024-09-24 DIAGNOSIS — Z96.89 HISTORY OF GLAUCOMA TUBE SHUNT PROCEDURE: ICD-10-CM

## 2024-09-24 DIAGNOSIS — Z96.1 STATUS POST CATARACT EXTRACTION AND INSERTION OF INTRAOCULAR LENS, UNSPECIFIED LATERALITY: ICD-10-CM

## 2024-09-24 DIAGNOSIS — Z98.49 STATUS POST CATARACT EXTRACTION AND INSERTION OF INTRAOCULAR LENS, UNSPECIFIED LATERALITY: ICD-10-CM

## 2024-09-24 DIAGNOSIS — T86.8413 FAILURE OF CORNEA TRANSPLANT OF BOTH EYES: ICD-10-CM

## 2024-09-24 DIAGNOSIS — H40.1133 PRIMARY OPEN ANGLE GLAUCOMA (POAG) OF BOTH EYES, SEVERE STAGE: ICD-10-CM

## 2024-09-24 PROCEDURE — 99214 OFFICE O/P EST MOD 30 MIN: CPT | Mod: S$PBB,,, | Performed by: OPHTHALMOLOGY

## 2024-09-24 PROCEDURE — 99999 PR PBB SHADOW E&M-EST. PATIENT-LVL III: CPT | Mod: PBBFAC,,, | Performed by: OPHTHALMOLOGY

## 2024-09-24 PROCEDURE — 92133 CPTRZD OPH DX IMG PST SGM ON: CPT | Mod: PBBFAC | Performed by: OPHTHALMOLOGY

## 2024-09-24 PROCEDURE — 99213 OFFICE O/P EST LOW 20 MIN: CPT | Mod: PBBFAC | Performed by: OPHTHALMOLOGY

## 2024-09-24 PROCEDURE — G2211 COMPLEX E/M VISIT ADD ON: HCPCS | Mod: S$PBB,,, | Performed by: OPHTHALMOLOGY

## 2024-09-25 NOTE — PROGRESS NOTES
Assessment /Plan     For exam results, see Encounter Report.    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    Glaucoma shunt device of both eyes    Status post cataract extraction and insertion of intraocular lens, unspecified laterality            [employer]  Joined Order @ 19 yo  Strong FMHx Glc / Blindness --> Justyn Creole Dz / mom's side    Complex ocular hx  multiple sx --> trabs, tubes and tube removes etc   Too numerous to count    POAG  2/2 CACG OD  Traumatic Glaucoma severe OU    ==> Follow OCT RNFL      CCT  ==>  01/09/2024 --> clear PKP OS  ==>  07/23/2024 --> small haze    Target  --> mid teens --> not achieved OS --> Thick CCT & difficult assessment --> LWIT    Right eye  PKP OD   11/10/2022 --> thick / edema  PKP failure  -->  10/26/2017   PKP  PC IOL  Fibrotic anterior capsule membrane  Tubes x 2 in place ST & IN --> removed ST BV 2/27/2017  Glc Shunt graft site OD  Revision 04/09/2013    Glaucoma Incisional Surgery  Patient with exposed ST Tube OD  SP OD ST BV Removal 2/27/2017    Left eye  SP PKP // AC IOL placement  05/10/2021 & 11/10/2022  PKP edema    03/12/2024  ST Ahmed implant --> no erosion // exposure 07/18/2024      Both eyes --> Tolerating well & good adherence --> CSM  Cosopt BID  Alphagan BID  FML using TID OD // BID OS --> holding OD  Idania 128 BID --> feels improved Va OS    Refresh PM OS --> OU    PF1%  BID --> steroid response --> Holding    Xal q day -->  intolerant with blurring  Generic Zioptan not covered    Right eye 07/18/2024 --> resolved 07/23/2024  Corneal erosion / small abrasion --> resolved  No infiltrates  ==> EES q HS use PRN  ==> use PF ATs      MGD --> improved surface  Dry Eye Syndrome: discussed use of warm  compresses, preserved & non-preserved artificial tears, gel and PM ointment options.  Also discussed options utilizing medications.      RD precautions:  Discussed symptoms of RD with increased flashes, floaters, decreasing vision.  Patient/Family to call and return immediately to clinic should the symptoms of RD occur. Voiced good understanding with Q & A.      Plan  RTC 2 months IOP and adherence --> check Ahmed placement OS  Keep fu with CORDELIA Calvo --> consider PKP OS  RTC sooner prn with understanding

## 2024-10-01 ENCOUNTER — HOSPITAL ENCOUNTER (OUTPATIENT)
Dept: RADIOLOGY | Facility: HOSPITAL | Age: 79
Discharge: HOME | End: 2024-10-01
Attending: NURSE PRACTITIONER
Payer: MEDICARE

## 2024-10-01 ENCOUNTER — TELEPHONE (OUTPATIENT)
Dept: FAMILY MEDICINE | Facility: CLINIC | Age: 79
End: 2024-10-01
Payer: MEDICARE

## 2024-10-01 DIAGNOSIS — R92.8 ABNORMAL MAMMOGRAM: ICD-10-CM

## 2024-10-01 PROCEDURE — 76642 ULTRASOUND BREAST LIMITED: CPT | Mod: 26,RT,, | Performed by: RADIOLOGY

## 2024-10-01 PROCEDURE — 76642 ULTRASOUND BREAST LIMITED: CPT | Mod: TC,RT

## 2024-10-02 ENCOUNTER — TELEPHONE (OUTPATIENT)
Dept: SURGERY | Facility: CLINIC | Age: 79
End: 2024-10-02
Payer: MEDICARE

## 2024-10-03 ENCOUNTER — OFFICE VISIT (OUTPATIENT)
Dept: SURGERY | Facility: CLINIC | Age: 79
End: 2024-10-03
Payer: MEDICARE

## 2024-10-03 VITALS
BODY MASS INDEX: 39.87 KG/M2 | HEIGHT: 60 IN | WEIGHT: 203.06 LBS | SYSTOLIC BLOOD PRESSURE: 158 MMHG | HEART RATE: 86 BPM | RESPIRATION RATE: 16 BRPM | DIASTOLIC BLOOD PRESSURE: 78 MMHG

## 2024-10-03 DIAGNOSIS — K62.89 RECTAL DISCOMFORT: ICD-10-CM

## 2024-10-03 PROCEDURE — 46600 DIAGNOSTIC ANOSCOPY SPX: CPT | Mod: PBBFAC | Performed by: NURSE PRACTITIONER

## 2024-10-03 PROCEDURE — 99215 OFFICE O/P EST HI 40 MIN: CPT | Mod: PBBFAC | Performed by: NURSE PRACTITIONER

## 2024-10-03 PROCEDURE — 99999 PR PBB SHADOW E&M-EST. PATIENT-LVL V: CPT | Mod: PBBFAC,,, | Performed by: NURSE PRACTITIONER

## 2024-10-03 NOTE — PROGRESS NOTES
CRS Office Visit History and Physical    Referring Md:   Hillary Gutierrez, Np  [address] [facility]  [address]  [place] [address]    SUBJECTIVE:     Chief Complaint: rectal discomfort    History of Present Illness:  The patient is a new patient to this practice.   Course is as follows:  Patient is a 78 y.o. female presents with rectal pressure and discomfort and rectal vibrations.  Symptoms have been present for 3 months. She feels it before and after a BM sometimes. Can occur with out without movement  She does not feel any external lesions  Associated bleeding: no  confirms straining/prolonged time on toilet with bowel movements.  Takes metamucil powder, 1 tsp a day for a few years, she does not do this every day. She reports prunes   Blood thinners: No    Last Colonoscopy: 2020   c scope- Tortuous colon.                         - The examination was otherwise normal on direct                         and retroflexion views.                         - No specimens collected.   Family history of colorectal cancer or IBD: no.    Review of patient's allergies indicates:   Allergen Reactions    Augmentin [amoxicillin-pot clavulanate] Diarrhea    Sulfa (sulfonamide antibiotics) Rash       Past Medical History:   Diagnosis Date    Arthritis     Breast cyst     Chronic angle-closure glaucoma of both eyes, severe stage 11/18/2016    Diabetes mellitus type 2, diet-controlled 4/27/2023    Fibrocystic breast     Gallbladder problem     Glaucoma     Glaucoma     Joint pain     Keloid cicatrix     PONV (postoperative nausea and vomiting)     Uveitis      Past Surgical History:   Procedure Laterality Date    AK POST CATARACT/PKP Right 10/26/2017    PKP     BAERVELDT GLAUCOMA SHUNT Right 12/26/2012        breast biopsy Left 1975    benign    BREAST BIOPSY Right 1980    benign    CATARACT EXTRACTION Left n/a    Aphakic    CATARACT EXTRACTION W/  INTRAOCULAR LENS IMPLANT Right 05/24/2012    WITH DSEK ()     CHOLECYSTECTOMY      COLONOSCOPY N/A 02/19/2020    Procedure: COLONOSCOPY;  Surgeon: Stewart Domingo MD;  Location: [facility] ENDO (4TH FLR);  Service: Endoscopy;  Laterality: N/A;    CORNEAL TRANSPLANT Right 05/24/2012    DSEK/PHACO IOL ()    CORNEAL TRANSPLANT Right 10/26/2017    PKP ()    DISSECTION AND REMOVAL OF FIBROUS PUPILLARY MEMBRANE Right 01/26/2017    DR. CALVO    ESOPHAGOGASTRODUODENOSCOPY N/A 5/24/2022    Procedure: EGD (ESOPHAGOGASTRODUODENOSCOPY);  Surgeon: Stewart Cassidy MD;  [facility]  Service: Endoscopy;  Laterality: N/A;  fully vaccinated- sm  pt is legally blind but can get around good - sm    HYSTERECTOMY      INTRAOCULAR LENS IMPLANT, SECONDARY Left 05/10/2021    Procedure: INSERTION, IOL PROSTHESIS, SECONDARY;  Surgeon: Lucia Calvo MD;  Location: [facility] OR;  Service: Ophthalmology;  Laterality: Left;    OOPHORECTOMY      PENETRATING KERATOPLASTY Left 05/10/2021    Procedure: KERATOPLASTY, PENETRATING;  Surgeon: Lucia Calvo MD;  Location: [facility] OR;  Service: Ophthalmology;  Laterality: Left;    PENETRATING KERATOPLASTY Right 11/10/2022    Procedure: KERATOPLASTY, PENETRATING;  Surgeon: Lucia Calvo MD;  Location: [facility] OR;  Service: Ophthalmology;  Laterality: Right;    REMOVAL OF BAERVELDT GLAUCOMA SHUNT Right 02/27/2017        REMOVAL OF NECROTIC TISSUE OF EYE Right 04/09/2013    REMOVAL OF TISSUE AROUND SHUNT WITH PERICARDIAL PATCH ( AND )    STRABISMUS SURGERY       Family History   Problem Relation Name Age of Onset    Glaucoma Mother      Blindness Mother      Cholecystitis Mother      Arthritis Mother      Glaucoma Sister x4     Cataracts Sister x4     Hypertension Sister x4     Arthritis Father      Cancer Brother x2         prostate    Melanoma Neg Hx      Amblyopia Neg Hx      Macular degeneration Neg Hx      Retinal detachment Neg Hx      Strabismus Neg Hx       Social History     Tobacco Use    Smoking status:  Never     Passive exposure: Never    Smokeless tobacco: Never   Substance Use Topics    Alcohol use: Not Currently     Comment: socially    Drug use: No        Review of Systems:  Review of Systems   Constitutional:  Negative for chills, fever and weight loss.       OBJECTIVE:     Vital Signs (Most Recent)  BP (!) 158/78 (BP Location: Left arm)   Pulse 86   Resp 16   Ht 5' (1.524 m)   Wt 92.1 kg (203 lb 0.7 oz)   BMI 39.65 kg/m²     Physical Exam:  General: Black or  female in no distress   Neuro: Alert and oriented to person, place, and time.  Moves all extremities.     HEENT: No icterus.  Trachea midline  Respiratory: Respirations are even and unlabored, no cough or audible wheezing  Skin: Warm dry and intact, No visible rashes, no jaundice    Labs reviewed today:  Lab Results   Component Value Date    WBC 7.03 08/09/2024    HGB 14.8 08/09/2024    HCT 47.5 08/09/2024     08/09/2024    CHOL 168 08/09/2024    TRIG 85 08/09/2024    HDL 45 08/09/2024    ALT 11 08/09/2024    AST 19 08/09/2024     08/09/2024    K 4.3 08/09/2024     08/09/2024    CREATININE 0.8 08/09/2024    BUN 10 08/09/2024    CO2 24 08/09/2024    TSH 0.984 08/09/2024    INR 1.1 10/03/2012    HGBA1C 5.8 (H) 08/09/2024       Endoscopy reviewed today:  2020  c scope - Tortuous colon.                         - The examination was otherwise normal on direct                         and retroflexion views.                         - No specimens collected.     Anorectal Exam:    Anal Skin:  very mild superficial fissuring     Digital Rectal Exam:  Resting Tone normal  Mass mobile stool pellets in rectum  Tenderness  absent    Anoscopy:  Verbal consent was obtained.   Clear plastic anoscope inserted.    Hemorrhoids  present  Stigmata of bleeding  absent  Stigmata of prolapsed  absent  Distal rectal mucosa stool in vault        ASSESSMENT/PLAN:     Diagnoses and all orders for this visit:    Rectal discomfort  -      Ambulatory referral/consult to Colorectal Surgery    79 yo F here with rectal discomfort, I think this is constipation related. Exam benign today. Will treat w fiber and water but if she's not improved in a month then c scope, pt to call me    F/u PRN    Radha Nowak, FNP-C  Colon and Rectal Surgery

## 2024-10-03 NOTE — PATIENT INSTRUCTIONS
Increase metamucil use  Eat prunes and or dates daily  64 oz od water per day  If still having discomfort in a month call Radha and will order colonoscopy  Calmoseptine around anus if itchy

## 2024-10-30 ENCOUNTER — OFFICE VISIT (OUTPATIENT)
Dept: OPHTHALMOLOGY | Facility: CLINIC | Age: 79
End: 2024-10-30
Payer: MEDICARE

## 2024-10-30 DIAGNOSIS — Z96.89 HISTORY OF GLAUCOMA TUBE SHUNT PROCEDURE: ICD-10-CM

## 2024-10-30 DIAGNOSIS — Z94.7 HX OF CORNEA TRANSPLANT: ICD-10-CM

## 2024-10-30 DIAGNOSIS — H40.2213 CHRONIC ANGLE-CLOSURE GLAUCOMA OF RIGHT EYE, SEVERE STAGE: Primary | ICD-10-CM

## 2024-10-30 DIAGNOSIS — H54.10 BLINDNESS AND LOW VISION: ICD-10-CM

## 2024-10-30 DIAGNOSIS — T86.8413 FAILURE OF CORNEA TRANSPLANT OF BOTH EYES: ICD-10-CM

## 2024-10-30 PROCEDURE — 99212 OFFICE O/P EST SF 10 MIN: CPT | Mod: PBBFAC | Performed by: OPHTHALMOLOGY

## 2024-10-30 PROCEDURE — 99214 OFFICE O/P EST MOD 30 MIN: CPT | Mod: S$PBB,,, | Performed by: OPHTHALMOLOGY

## 2024-10-30 PROCEDURE — 99999 PR PBB SHADOW E&M-EST. PATIENT-LVL II: CPT | Mod: PBBFAC,,, | Performed by: OPHTHALMOLOGY

## 2024-10-30 RX ORDER — MOXIFLOXACIN 5 MG/ML
1 SOLUTION/ DROPS OPHTHALMIC
OUTPATIENT
Start: 2024-10-30

## 2024-10-30 RX ORDER — MOXIFLOXACIN 5 MG/ML
1 SOLUTION/ DROPS OPHTHALMIC 4 TIMES DAILY
Qty: 3 ML | Refills: 3 | Status: SHIPPED | OUTPATIENT
Start: 2024-10-30

## 2024-10-30 RX ORDER — PREDNISOLONE ACETATE 10 MG/ML
1 SUSPENSION/ DROPS OPHTHALMIC 4 TIMES DAILY
Qty: 10 ML | Refills: 4 | Status: SHIPPED | OUTPATIENT
Start: 2024-10-30

## 2024-10-30 RX ORDER — SODIUM CHLORIDE 0.9 % (FLUSH) 0.9 %
10 SYRINGE (ML) INJECTION
OUTPATIENT
Start: 2024-10-30

## 2024-10-30 RX ORDER — TETRACAINE HYDROCHLORIDE 5 MG/ML
1 SOLUTION OPHTHALMIC
OUTPATIENT
Start: 2024-10-30

## 2024-11-11 ENCOUNTER — TELEPHONE (OUTPATIENT)
Dept: OPHTHALMOLOGY | Facility: CLINIC | Age: 79
End: 2024-11-11
Payer: MEDICARE

## 2024-11-11 DIAGNOSIS — T86.8413 FAILURE OF CORNEA TRANSPLANT OF BOTH EYES: Primary | ICD-10-CM

## 2024-11-11 DIAGNOSIS — T85.398D CORNEAL GRAFT MALFUNCTION, SUBSEQUENT ENCOUNTER: ICD-10-CM

## 2024-12-04 ENCOUNTER — TELEPHONE (OUTPATIENT)
Dept: OPHTHALMOLOGY | Facility: CLINIC | Age: 79
End: 2024-12-04
Payer: MEDICARE

## 2024-12-04 NOTE — TELEPHONE ENCOUNTER
----- Message from Aletha sent at 12/4/2024 11:29 AM CST -----  Contact: [phone]  Patient is calling to reschedule appointment. Please call to assist.

## 2024-12-09 ENCOUNTER — OFFICE VISIT (OUTPATIENT)
Dept: OPHTHALMOLOGY | Facility: CLINIC | Age: 79
End: 2024-12-09
Payer: MEDICARE

## 2024-12-09 DIAGNOSIS — H40.1133 PRIMARY OPEN ANGLE GLAUCOMA (POAG) OF BOTH EYES, SEVERE STAGE: Primary | ICD-10-CM

## 2024-12-09 DIAGNOSIS — H54.10 BLINDNESS AND LOW VISION: ICD-10-CM

## 2024-12-09 DIAGNOSIS — T86.8413 FAILURE OF CORNEA TRANSPLANT OF BOTH EYES: ICD-10-CM

## 2024-12-09 DIAGNOSIS — H40.2213 CHRONIC ANGLE-CLOSURE GLAUCOMA OF RIGHT EYE, SEVERE STAGE: ICD-10-CM

## 2024-12-09 DIAGNOSIS — Z96.89 HISTORY OF GLAUCOMA TUBE SHUNT PROCEDURE: ICD-10-CM

## 2024-12-09 DIAGNOSIS — H40.33X3 GLAUCOMA OF BOTH EYES ASSOCIATED WITH OCULAR TRAUMA, SEVERE STAGE: ICD-10-CM

## 2024-12-09 DIAGNOSIS — Z96.1 PSEUDOPHAKIA OF BOTH EYES: ICD-10-CM

## 2024-12-09 DIAGNOSIS — H18.20 CORNEAL EDEMA: ICD-10-CM

## 2024-12-09 PROBLEM — Z98.49 STATUS POST CATARACT EXTRACTION AND INSERTION OF INTRAOCULAR LENS: Status: RESOLVED | Noted: 2021-08-24 | Resolved: 2024-12-09

## 2024-12-09 PROCEDURE — G2211 COMPLEX E/M VISIT ADD ON: HCPCS | Mod: S$PBB,,, | Performed by: OPHTHALMOLOGY

## 2024-12-09 PROCEDURE — 99999 PR PBB SHADOW E&M-EST. PATIENT-LVL III: CPT | Mod: PBBFAC,,, | Performed by: OPHTHALMOLOGY

## 2024-12-09 PROCEDURE — 99213 OFFICE O/P EST LOW 20 MIN: CPT | Mod: PBBFAC | Performed by: OPHTHALMOLOGY

## 2024-12-09 PROCEDURE — 76512 OPH US DX B-SCAN: CPT | Mod: 50,PBBFAC | Performed by: OPHTHALMOLOGY

## 2024-12-09 PROCEDURE — 99214 OFFICE O/P EST MOD 30 MIN: CPT | Mod: S$PBB,,, | Performed by: OPHTHALMOLOGY

## 2024-12-09 NOTE — PROGRESS NOTES
Assessment /Plan     For exam results, see Encounter Report.    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, severe stage    Glaucoma shunt device of both eyes    Pseudophakia of both eyes            S[facility] of [employer]  Joined Order @ 17 yo  Strong FMHx Glc / Blindness --> Justyn Creole Dz / mom's side    Complex ocular hx  multiple sx --> trabs, tubes and tube removes etc   Too numerous to count    POAG  2/2 CACG OD  Traumatic Glaucoma severe OU    ==> Follow OCT RNFL      CCT  ==>  01/09/2024 --> clear PKP OS  ==>  07/23/2024 --> small haze --> planning PKP 01/16/2025    Target  --> mid teens --> close / achieved OS --> Thick CCT & difficult assessment --> LWIT    Right eye  PKP OD 11/10/2022 --> thick / edema --> poor view B-scan 12/09/2024 OK  PKP failure 0/26/2017   PKP  PC IOL  Fibrotic anterior capsule membrane  Tubes x 2 in place ST & IN --> removed ST BV 2/27/2017  Glc Shunt graft site OD  Revision 04/09/2013    Glaucoma Incisional Surgery  Patient with exposed ST Tube OD  SP OD ST BV Removal 2/27/2017    Left eye  SP PKP // AC IOL placement  05/10/2021 & 11/10/2022  PKP edema    03/12/2024  ST Ahmed implant --> no erosion // exposure 07/18/2024      Both eyes --> Tolerating well & good adherence --> CSM  Cosopt BID  Alphagan BID  FML using TID OD // BID OS --> holding OD  Idania 128 BID --> feels improved Va OS    Refresh PM OS --> OU    PF1%  BID --> steroid response --> Holding    Xal q day -->  intolerant with blurring  Generic Zioptan not covered    MGD --> improved surface  Dry Eye Syndrome: discussed use of warm compresses, preserved & non-preserved artificial tears, gel and PM ointment options.  Also discussed options  utilizing medications.    RD precautions:  Discussed symptoms of RD with increased flashes, floaters, decreasing vision.  Patient/Family to call and return immediately to clinic should the symptoms of RD occur. Voiced good understanding with Q & A.      Plan  RTC 2-3 months IOP and adherence --> check Ahmed placement OS  Planning PKP OS 01/16/20225 CORDELIA Calvo  RTC sooner prn with understanding

## 2024-12-31 ENCOUNTER — EXTERNAL CHRONIC CARE MANAGEMENT (OUTPATIENT)
Dept: PRIMARY CARE CLINIC | Facility: CLINIC | Age: 79
End: 2024-12-31
Payer: MEDICARE

## 2024-12-31 PROCEDURE — 99490 CHRNC CARE MGMT STAFF 1ST 20: CPT | Mod: PBBFAC,PO | Performed by: FAMILY MEDICINE

## 2025-01-13 ENCOUNTER — TELEPHONE (OUTPATIENT)
Dept: OPHTHALMOLOGY | Facility: CLINIC | Age: 80
End: 2025-01-13
Payer: MEDICARE

## 2025-01-13 NOTE — TELEPHONE ENCOUNTER
Patient given arrival time of 11:00 am on Thursday January 16  . Nothing to eat or drink after 11:59 pm.  Start drops into the operative eye Tuesday. [address] [facility]

## 2025-01-15 NOTE — PRE-PROCEDURE INSTRUCTIONS
Patient reviewed on 1/15/2025.  Okay to proceed at [facility]. The following pre-procedure instructions and arrival time have been reviewed with patient via phone and sent to patient portal for review.  Patient verbalized an understanding.  Pt to be accompanied by Ke Ted day of procedure as responsible .    Dear Sister Ligia    Below you will find basic pre-procedure instructions in preparation for your procedure on 1/16/25 with Dr. Calvo              You should receive your arrival time within 24-48 hours of your scheduled procedure date from the  at your surgeon's office.    -Nothing to eat or drink after midnight the night before your procedure until after your procedure, except AM meds with small sips of water.     - HOLD all oral Diabetic medications night before and morning of procedure  - HOLD all Insulin morning of procedure  - HOLD all Fluid pills morning of procedure  - HOLD all non-insulin shots until after surgery (Ozempic, Mounjaro, Trulicity, Victoza, Byetta, Wegovy and Adlyxin) (7 days prior)  - HOLD all vitamins, minerals and herbal supplements morning of procedure   - TAKE all B/P meds, EXCEPT those that contain a fluid pill (ex. Lasix, Hydroclorothiazide/HCTZ, Spirnolactone)  - USE inhalers as needed and bring AM of surgery  - USE EYE DROPS as directed  -TAKE blood thinner meds AM of surgery unless otherwise instructed by your provider to not take     - Shower and wash face with antibacterial soap (ex. Dial) for 3 mins PM prior and AM of surgery  - No powder, lotions, creams, oils, gels, ointments, makeup,  or jewelry    - Wear comfortable clothing (button up shirt)     (Patient is required to have a responsible ride to transport home, ride may not leave while patient is in surgery)     -- [facility], 2nd floor Surgery Center, located   @ [address] [place] [address]  2nd Floor Registration      If you have any questions or concerns please feel free  to contact your surgeon's office.        Please reply to this message as receipt of delivery.    Catina, LPN [facility]  Pre-Admit - Anesthesia Dept

## 2025-01-16 ENCOUNTER — HOSPITAL ENCOUNTER (OUTPATIENT)
Facility: HOSPITAL | Age: 80
Discharge: HOME | End: 2025-01-16
Attending: OPHTHALMOLOGY | Admitting: OPHTHALMOLOGY
Payer: MEDICARE

## 2025-01-16 VITALS
BODY MASS INDEX: 38.28 KG/M2 | HEIGHT: 60 IN | TEMPERATURE: 98 F | HEART RATE: 73 BPM | SYSTOLIC BLOOD PRESSURE: 139 MMHG | OXYGEN SATURATION: 99 % | WEIGHT: 195 LBS | DIASTOLIC BLOOD PRESSURE: 64 MMHG | RESPIRATION RATE: 18 BRPM

## 2025-01-16 DIAGNOSIS — T86.8413 FAILURE OF CORNEA TRANSPLANT OF BOTH EYES: Primary | ICD-10-CM

## 2025-01-16 DIAGNOSIS — H40.2213 CHRONIC ANGLE-CLOSURE GLAUCOMA OF RIGHT EYE, SEVERE STAGE: ICD-10-CM

## 2025-01-16 DIAGNOSIS — Z94.7 HX OF CORNEA TRANSPLANT: ICD-10-CM

## 2025-01-16 PROCEDURE — 27201423 OPTIME MED/SURG SUP & DEVICES STERILE SUPPLY: Performed by: OPHTHALMOLOGY

## 2025-01-16 PROCEDURE — 99153 MOD SED SAME PHYS/QHP EA: CPT | Performed by: OPHTHALMOLOGY

## 2025-01-16 PROCEDURE — 63600175 PHARM REV CODE 636 W HCPCS: Performed by: OPHTHALMOLOGY

## 2025-01-16 PROCEDURE — 71000015 HC POSTOP RECOV 1ST HR: Performed by: OPHTHALMOLOGY

## 2025-01-16 PROCEDURE — 88313 SPECIAL STAINS GROUP 2: CPT | Performed by: STUDENT IN AN ORGANIZED HEALTH CARE EDUCATION/TRAINING PROGRAM

## 2025-01-16 PROCEDURE — 65730 CORNEAL TRANSPLANT: CPT | Mod: LT,,, | Performed by: OPHTHALMOLOGY

## 2025-01-16 PROCEDURE — 88313 SPECIAL STAINS GROUP 2: CPT | Mod: 26,,, | Performed by: STUDENT IN AN ORGANIZED HEALTH CARE EDUCATION/TRAINING PROGRAM

## 2025-01-16 PROCEDURE — 25000003 PHARM REV CODE 250: Performed by: OPHTHALMOLOGY

## 2025-01-16 PROCEDURE — 99152 MOD SED SAME PHYS/QHP 5/>YRS: CPT | Performed by: OPHTHALMOLOGY

## 2025-01-16 PROCEDURE — 88304 TISSUE EXAM BY PATHOLOGIST: CPT | Mod: 26,,, | Performed by: STUDENT IN AN ORGANIZED HEALTH CARE EDUCATION/TRAINING PROGRAM

## 2025-01-16 PROCEDURE — 36000707: Performed by: OPHTHALMOLOGY

## 2025-01-16 PROCEDURE — 94760 N-INVAS EAR/PLS OXIMETRY 1: CPT

## 2025-01-16 PROCEDURE — V2785 CORNEAL TISSUE PROCESSING: HCPCS | Performed by: OPHTHALMOLOGY

## 2025-01-16 PROCEDURE — 99900035 HC TECH TIME PER 15 MIN (STAT)

## 2025-01-16 PROCEDURE — 36000706: Performed by: OPHTHALMOLOGY

## 2025-01-16 PROCEDURE — 88304 TISSUE EXAM BY PATHOLOGIST: CPT | Performed by: STUDENT IN AN ORGANIZED HEALTH CARE EDUCATION/TRAINING PROGRAM

## 2025-01-16 RX ORDER — BUPIVACAINE HYDROCHLORIDE 7.5 MG/ML
INJECTION, SOLUTION EPIDURAL; RETROBULBAR
Status: DISCONTINUED | OUTPATIENT
Start: 2025-01-16 | End: 2025-01-16 | Stop reason: HOSPADM

## 2025-01-16 RX ORDER — LIDOCAINE HYDROCHLORIDE 40 MG/ML
INJECTION, SOLUTION RETROBULBAR
Status: DISCONTINUED | OUTPATIENT
Start: 2025-01-16 | End: 2025-01-16 | Stop reason: HOSPADM

## 2025-01-16 RX ORDER — ONDANSETRON HYDROCHLORIDE 2 MG/ML
4 INJECTION, SOLUTION INTRAVENOUS EVERY 12 HOURS PRN
Status: CANCELLED | OUTPATIENT
Start: 2025-01-16

## 2025-01-16 RX ORDER — TETRACAINE HYDROCHLORIDE 5 MG/ML
1 SOLUTION OPHTHALMIC
Status: COMPLETED | OUTPATIENT
Start: 2025-01-16 | End: 2025-01-16

## 2025-01-16 RX ORDER — MIDAZOLAM HYDROCHLORIDE 1 MG/ML
1 INJECTION, SOLUTION INTRAMUSCULAR; INTRAVENOUS
Status: DISCONTINUED | OUTPATIENT
Start: 2025-01-16 | End: 2025-01-16 | Stop reason: HOSPADM

## 2025-01-16 RX ORDER — MOXIFLOXACIN 5 MG/ML
1 SOLUTION/ DROPS OPHTHALMIC
Status: COMPLETED | OUTPATIENT
Start: 2025-01-16 | End: 2025-01-16

## 2025-01-16 RX ORDER — LIDOCAINE HYDROCHLORIDE 20 MG/ML
INJECTION, SOLUTION INFILTRATION; PERINEURAL
Status: DISCONTINUED | OUTPATIENT
Start: 2025-01-16 | End: 2025-01-16 | Stop reason: HOSPADM

## 2025-01-16 RX ORDER — FENTANYL CITRATE 50 UG/ML
25 INJECTION, SOLUTION INTRAMUSCULAR; INTRAVENOUS
Status: DISCONTINUED | OUTPATIENT
Start: 2025-01-16 | End: 2025-01-16 | Stop reason: HOSPADM

## 2025-01-16 RX ORDER — SODIUM CHLORIDE 0.9 % (FLUSH) 0.9 %
10 SYRINGE (ML) INJECTION
Status: DISCONTINUED | OUTPATIENT
Start: 2025-01-16 | End: 2025-01-16 | Stop reason: HOSPADM

## 2025-01-16 RX ORDER — CEFAZOLIN SODIUM 1 G/3ML
INJECTION, POWDER, FOR SOLUTION INTRAMUSCULAR; INTRAVENOUS
Status: DISCONTINUED | OUTPATIENT
Start: 2025-01-16 | End: 2025-01-16 | Stop reason: HOSPADM

## 2025-01-16 RX ORDER — DEXAMETHASONE SODIUM PHOSPHATE 4 MG/ML
INJECTION, SOLUTION INTRA-ARTICULAR; INTRALESIONAL; INTRAMUSCULAR; INTRAVENOUS; SOFT TISSUE
Status: DISCONTINUED | OUTPATIENT
Start: 2025-01-16 | End: 2025-01-16 | Stop reason: HOSPADM

## 2025-01-16 RX ADMIN — TETRACAINE HYDROCHLORIDE 1 DROP: 5 SOLUTION OPHTHALMIC at 11:01

## 2025-01-16 RX ADMIN — MIDAZOLAM HYDROCHLORIDE 2 MG: 1 INJECTION, SOLUTION INTRAMUSCULAR; INTRAVENOUS at 12:01

## 2025-01-16 RX ADMIN — MOXIFLOXACIN OPHTHALMIC 1 DROP: 5 SOLUTION/ DROPS OPHTHALMIC at 11:01

## 2025-01-16 RX ADMIN — MIDAZOLAM HYDROCHLORIDE 1 MG: 1 INJECTION, SOLUTION INTRAMUSCULAR; INTRAVENOUS at 12:01

## 2025-01-16 RX ADMIN — FENTANYL CITRATE 50 MCG: 50 INJECTION, SOLUTION INTRAMUSCULAR; INTRAVENOUS at 12:01

## 2025-01-16 NOTE — OP NOTE
SURGEON:  Lucia Calvo M.D.    PREOPERATIVE DIAGNOSIS:    1) Failed PKP OS    2) Advanced bobby closure glaucoma OS    POSTOPERATIVE DIAGNOSIS:     1) Failed PKP OS    2) Advanced bobby closure glaucoma OS    PROCEDURE:    Penetrating keratoplasty, left eye  2.   With moderate sedation >10min (32574)    DATE OF SURGERY: 01/16/2025      ANESTHESIA:  Under my direct supervision, intravenous moderate sedation was administered during the course of this procedure, with continuous monitoring of hemodynamic parameters. Total time of sedation and amount of sedatives are documented in the nursing logs.      GRAFT SIZE:  9.0 mm donor button into a   8.0 mm host trephination      COMPLICATIONS:  None.    BLOOD LOSS: Less than 5 cc    SPECIMENS:   1) Cornea     INDICATIONS FOR PROCEDURE :       After a thorough discussion of risks, benefits and alternatives, including, but not limited to, infection, severe hemorrhage, graft failure, need for repeat transplantation, loss of vision, and loss of the eye,  the patient voices understanding and desires to proceed with corneal transplantation.    PROCEDURE IN DETAIL:    The patient was brought to the operating room in supine position where anesthesia was achieved without complication.  Next, the eye was prepped and draped in standard sterile fashion with 5% Betadine and a lid speculum placed in the eye.  The procedure was begun by marking the center of the cornea and sizers  used to determine the necessary size of the grafts.    Attention was then directed to the side table where a donor punch was used to cut the donor tissue.  Attention was then redirected to the patient's eye where a  trephine and an Wellington PKP tess blade were used to excise the patient's cornea.  The donor button was then sewn into place with 10-0 nylon using 8 interrupted sutures and a 16 x running.  All remaining viscoelastics were removed from the anterior chamber.  The eye was reformed with BSS and wound  integrity verified.  Subconjunctival injections of antibiotic and steroid were administered and a patch placed over the eye. The patient will follow up in the eye clinic tomorrow with Dr. Calvo.

## 2025-01-16 NOTE — DISCHARGE INSTRUCTIONS
CATARACT SURGERY    POST-OPERATIVE INSTRUCTIONS    · Apply drops THREE times a day into operative eye for 30 days.    · DO NOT rub your eye    · Wear protective sunglasses during the day    · Resume moderate activity    · Bathe/shower/wash face normally    · DO NOT apply makeup around the operative eye for 1 week.         You should expect    - Blurry vision and halos for 24-48 hours    - Dilated pupil for 24-48 hours    - Scratchy feeling in the eye for 1-2 days    - Curved shadow in your peripheral vision for 2-3 weeks    - Occasional flickering of lights for up to 1 week    -If you experience severe pain or nausea, please call Dr Calvo or the on-call doctor at [phone]    - Plan to see Dr Calvo tomorrow .      [facility]    [address] [facility] [id number]    ** Most patients can drive the next day, but if you do not feel comfortable driving, please arrange for transportation.

## 2025-01-16 NOTE — PLAN OF CARE
Pt Resting in bed no s/s of distress RR even and unlabored VSS. Discharge instructions given and pt verbalized. IV D/C. Pt ready for discharge home with family. Protective glasses in place. To wait in waiting room with Sister Shell until the ride arrives.

## 2025-01-17 ENCOUNTER — OFFICE VISIT (OUTPATIENT)
Dept: OPHTHALMOLOGY | Facility: CLINIC | Age: 80
End: 2025-01-17
Payer: MEDICARE

## 2025-01-17 DIAGNOSIS — Z94.7 STATUS POST CORNEAL TRANSPLANT: ICD-10-CM

## 2025-01-17 DIAGNOSIS — Z98.890 POST-OPERATIVE STATE: Primary | ICD-10-CM

## 2025-01-17 PROCEDURE — 99999 PR PBB SHADOW E&M-EST. PATIENT-LVL III: CPT | Mod: PBBFAC,,, | Performed by: OPHTHALMOLOGY

## 2025-01-17 PROCEDURE — 99213 OFFICE O/P EST LOW 20 MIN: CPT | Mod: PBBFAC | Performed by: OPHTHALMOLOGY

## 2025-01-17 PROCEDURE — 99024 POSTOP FOLLOW-UP VISIT: CPT | Mod: POP,,, | Performed by: OPHTHALMOLOGY

## 2025-01-17 NOTE — PROGRESS NOTES
HPI    PROCEDURE:    1. Penetrating keratoplasty, left eye  2.   With moderate sedation >10min (95056)     DATE OF SURGERY: 01/16/2025    1 day Post Op OS     Last edited by Rocio Martinez on 1/17/2025  7:53 AM.            Assessment /Plan     For exam results, see Encounter Report.    Post-operative state    Status post corneal transplant      POD1 PKP: Wound stable. Graft with normal edema, folds. Post operative precautions reviewed.

## 2025-01-24 ENCOUNTER — OFFICE VISIT (OUTPATIENT)
Dept: OPHTHALMOLOGY | Facility: CLINIC | Age: 80
End: 2025-01-24
Payer: MEDICARE

## 2025-01-24 DIAGNOSIS — Z98.890 POST-OPERATIVE STATE: Primary | ICD-10-CM

## 2025-01-24 DIAGNOSIS — Z94.7 STATUS POST CORNEAL TRANSPLANT: ICD-10-CM

## 2025-01-24 LAB
FINAL PATHOLOGIC DIAGNOSIS: NORMAL
GROSS: NORMAL
Lab: NORMAL
MICROSCOPIC EXAM: NORMAL

## 2025-01-24 PROCEDURE — 99213 OFFICE O/P EST LOW 20 MIN: CPT | Mod: PBBFAC | Performed by: OPHTHALMOLOGY

## 2025-01-24 PROCEDURE — 99024 POSTOP FOLLOW-UP VISIT: CPT | Mod: POP,,, | Performed by: OPHTHALMOLOGY

## 2025-01-24 PROCEDURE — 99999 PR PBB SHADOW E&M-EST. PATIENT-LVL III: CPT | Mod: PBBFAC,,, | Performed by: OPHTHALMOLOGY

## 2025-01-24 NOTE — PROGRESS NOTES
HPI    Penetrating keratoplasty, left eye  With moderate sedation >10min (05723)     DATE OF SURGERY: 01/16/2025    80 y/o female present to clinic for 1 week post op of left eye. She   reports vision is improving daily. Eyes has been dry, irritated, and   burning sensation. No ocular pain reported.     Eyemeds  Cosopt BID OU  Alphagan BID OU  FML using TID OD // --> holding OS  Pred QID OS  Ocuflox QID OS  Refresh P TID OU    Last edited by Leonor Terrell on 1/24/2025 11:12 AM.            Assessment /Plan     For exam results, see Encounter Report.    Post-operative state    Status post corneal transplant      PKP OS POW1  2+ folds, clear and healing well  IOP high, so cont IOP meds

## 2025-01-31 ENCOUNTER — EXTERNAL CHRONIC CARE MANAGEMENT (OUTPATIENT)
Dept: PRIMARY CARE CLINIC | Facility: CLINIC | Age: 80
End: 2025-01-31
Payer: MEDICARE

## 2025-01-31 PROCEDURE — 99490 CHRNC CARE MGMT STAFF 1ST 20: CPT | Mod: S$PBB,,, | Performed by: FAMILY MEDICINE

## 2025-01-31 PROCEDURE — 99490 CHRNC CARE MGMT STAFF 1ST 20: CPT | Mod: PBBFAC,PO | Performed by: FAMILY MEDICINE

## 2025-02-14 ENCOUNTER — OFFICE VISIT (OUTPATIENT)
Dept: OPHTHALMOLOGY | Facility: CLINIC | Age: 80
End: 2025-02-14
Payer: MEDICARE

## 2025-02-14 DIAGNOSIS — Z94.7 STATUS POST CORNEAL TRANSPLANT: ICD-10-CM

## 2025-02-14 DIAGNOSIS — H40.2213 CHRONIC ANGLE-CLOSURE GLAUCOMA OF RIGHT EYE, SEVERE STAGE: ICD-10-CM

## 2025-02-14 DIAGNOSIS — H40.043 STEROID RESPONDERS, BILATERAL: Primary | ICD-10-CM

## 2025-02-14 DIAGNOSIS — Z96.89 HISTORY OF GLAUCOMA TUBE SHUNT PROCEDURE: ICD-10-CM

## 2025-02-14 PROCEDURE — 99213 OFFICE O/P EST LOW 20 MIN: CPT | Mod: PBBFAC | Performed by: OPHTHALMOLOGY

## 2025-02-14 PROCEDURE — 99999 PR PBB SHADOW E&M-EST. PATIENT-LVL III: CPT | Mod: PBBFAC,,, | Performed by: OPHTHALMOLOGY

## 2025-02-14 NOTE — PROGRESS NOTES
HPI    Patient is here today for PKP OS follow up. No visual complaints at this   time. No pain or discomfort.     Cosopt BID OU  Alphagan BID OU  FML using BID OD  Pred QID OS  Refresh TID-QID OU    Last edited by Dee Rowell on 2/14/2025 10:58 AM.            Assessment /Plan     For exam results, see Encounter Report.    Steroid responders, bilateral    Glaucoma shunt device of both eyes    Status post corneal transplant    Chronic angle-closure glaucoma of right eye, severe stage      PKP OS clearing nicely at 1 month, good vault over ACIOL  IOP elevated today, so decrease PF tid, recheck IOP with JASON Harris in 1 month

## 2025-02-28 ENCOUNTER — EXTERNAL CHRONIC CARE MANAGEMENT (OUTPATIENT)
Dept: PRIMARY CARE CLINIC | Facility: CLINIC | Age: 80
End: 2025-02-28
Payer: MEDICARE

## 2025-02-28 PROCEDURE — 99490 CHRNC CARE MGMT STAFF 1ST 20: CPT | Mod: PBBFAC,PO | Performed by: FAMILY MEDICINE

## 2025-03-11 ENCOUNTER — OFFICE VISIT (OUTPATIENT)
Dept: OPHTHALMOLOGY | Facility: CLINIC | Age: 80
End: 2025-03-11
Payer: MEDICARE

## 2025-03-11 DIAGNOSIS — Z94.7 HX OF CORNEA TRANSPLANT: ICD-10-CM

## 2025-03-11 DIAGNOSIS — Z96.1 PSEUDOPHAKIA OF BOTH EYES: ICD-10-CM

## 2025-03-11 DIAGNOSIS — H40.33X3 GLAUCOMA OF BOTH EYES ASSOCIATED WITH OCULAR TRAUMA, SEVERE STAGE: ICD-10-CM

## 2025-03-11 DIAGNOSIS — H40.2213 CHRONIC ANGLE-CLOSURE GLAUCOMA OF RIGHT EYE, SEVERE STAGE: Primary | ICD-10-CM

## 2025-03-11 DIAGNOSIS — Z96.89 HISTORY OF GLAUCOMA TUBE SHUNT PROCEDURE: ICD-10-CM

## 2025-03-11 DIAGNOSIS — H54.10 BLINDNESS AND LOW VISION: ICD-10-CM

## 2025-03-11 PROCEDURE — 92250 FUNDUS PHOTOGRAPHY W/I&R: CPT | Mod: PBBFAC | Performed by: OPHTHALMOLOGY

## 2025-03-11 PROCEDURE — 99213 OFFICE O/P EST LOW 20 MIN: CPT | Mod: PBBFAC | Performed by: OPHTHALMOLOGY

## 2025-03-11 PROCEDURE — 99999 PR PBB SHADOW E&M-EST. PATIENT-LVL III: CPT | Mod: PBBFAC,,, | Performed by: OPHTHALMOLOGY

## 2025-03-11 PROCEDURE — 99214 OFFICE O/P EST MOD 30 MIN: CPT | Mod: 24,S$PBB,, | Performed by: OPHTHALMOLOGY

## 2025-03-11 PROCEDURE — G2211 COMPLEX E/M VISIT ADD ON: HCPCS | Mod: S$PBB,,, | Performed by: OPHTHALMOLOGY

## 2025-03-11 NOTE — PROGRESS NOTES
Assessment /Plan     For exam results, see Encounter Report.    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, severe stage  -     Posterior Segment OCT Optic Nerve- Both eyes    Blindness and low vision - Both Eyes    Pseudophakia of both eyes    Glaucoma shunt device of both eyes    Hx of cornea transplant            [employer]  Joined Order @ 17 yo  Strong FMHx Glc / Blindness --> Justyn Creole Dz / mom's side    Complex ocular hx  multiple sx --> trabs, tubes and tube removes etc   Too numerous to count    POAG  2/2 CACG OD  Traumatic Glaucoma severe OU    ==> Follow OCT RNFL      CCT  New PKP OS  01/16/2025    Target  --> mid teens --> not achieved  --> LWIT c new PKP OS    Right eye  PKP OD 11/10/2022 --> thick / edema --> poor view B-scan 12/09/2024 OK  PKP failure 0/26/2017   PKP  PC IOL  Fibrotic anterior capsule membrane  Tubes x 2 in place ST & IN --> removed ST BV 2/27/2017  Glc Shunt graft site OD  Revision 04/09/2013    Glaucoma Incisional Surgery  Patient with exposed ST Tube OD  SP OD ST BV Removal 2/27/2017    Left eye  SP PKP // AC IOL placement  05/10/2021 & 11/10/2022 & 01/16/2025 (14yo)  ST Ahmed implant --> no erosion // exposure 07/18/2024      Both eyes --> Tolerating well & good adherence --> CSM  Cosopt BID  Alphagan BID  FML using TID OD // BID OS --> holding OD  Idania 128 BID --> feels improved Va OS    Refresh PM OS --> OU    Left eye  PF1%  TID --> steroid response --> new PKP    Xal q day -->  intolerant with blurring --> consider travoprost // Zioptan    MGD --> improved surface  Dry Eye Syndrome: discussed use of warm compresses, non-preserved artificial tears, gel and PM ointment options.      RD precautions:  Discussed symptoms of RD with increased flashes, floaters, decreasing vision.  Patient/Family to call and return immediately to clinic should the symptoms of RD occur. Voiced good  understanding with Q & A.        03/11/2025          Plan  RTC 1 months IOP and adherence  RTC sooner prn with understanding

## 2025-03-31 ENCOUNTER — EXTERNAL CHRONIC CARE MANAGEMENT (OUTPATIENT)
Dept: PRIMARY CARE CLINIC | Facility: CLINIC | Age: 80
End: 2025-03-31
Payer: MEDICARE

## 2025-03-31 PROCEDURE — 99490 CHRNC CARE MGMT STAFF 1ST 20: CPT | Mod: S$PBB,,, | Performed by: FAMILY MEDICINE

## 2025-03-31 PROCEDURE — 99490 CHRNC CARE MGMT STAFF 1ST 20: CPT | Mod: PBBFAC,PO | Performed by: FAMILY MEDICINE

## 2025-04-08 ENCOUNTER — OFFICE VISIT (OUTPATIENT)
Dept: OPHTHALMOLOGY | Facility: CLINIC | Age: 80
End: 2025-04-08
Payer: MEDICARE

## 2025-04-08 DIAGNOSIS — T86.8411 FAILURE OF CORNEA TRANSPLANT OF RIGHT EYE: ICD-10-CM

## 2025-04-08 DIAGNOSIS — Z94.7 STATUS POST CORNEAL TRANSPLANT: Primary | ICD-10-CM

## 2025-04-08 DIAGNOSIS — H40.043 STEROID RESPONDERS, BILATERAL: ICD-10-CM

## 2025-04-08 DIAGNOSIS — H54.10 BLINDNESS AND LOW VISION: ICD-10-CM

## 2025-04-08 DIAGNOSIS — H40.33X3 GLAUCOMA OF BOTH EYES ASSOCIATED WITH OCULAR TRAUMA, SEVERE STAGE: ICD-10-CM

## 2025-04-08 PROCEDURE — 99999 PR PBB SHADOW E&M-EST. PATIENT-LVL II: CPT | Mod: PBBFAC,,, | Performed by: OPHTHALMOLOGY

## 2025-04-08 PROCEDURE — 99024 POSTOP FOLLOW-UP VISIT: CPT | Mod: POP,,, | Performed by: OPHTHALMOLOGY

## 2025-04-08 PROCEDURE — 99212 OFFICE O/P EST SF 10 MIN: CPT | Mod: PBBFAC | Performed by: OPHTHALMOLOGY

## 2025-04-08 NOTE — PROGRESS NOTES
HPI    Penetrating keratoplasty, left eye 01/16/2025   Ahmed placement OS    Patient state OD bothersome. No other complaints       Cosopt BID OU    Alphagan BID OU   Pred TID OS   Refresh TID-QID OU     Last edited by Rocio Martinez on 4/8/2025 11:19 AM.            Assessment /Plan     For exam results, see Encounter Report.    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

## 2025-04-28 DIAGNOSIS — Z00.00 ENCOUNTER FOR MEDICARE ANNUAL WELLNESS EXAM: ICD-10-CM

## 2025-04-30 ENCOUNTER — EXTERNAL CHRONIC CARE MANAGEMENT (OUTPATIENT)
Dept: PRIMARY CARE CLINIC | Facility: CLINIC | Age: 80
End: 2025-04-30
Payer: MEDICARE

## 2025-04-30 PROCEDURE — 99490 CHRNC CARE MGMT STAFF 1ST 20: CPT | Mod: PBBFAC,PO | Performed by: FAMILY MEDICINE

## 2025-05-31 ENCOUNTER — EXTERNAL CHRONIC CARE MANAGEMENT (OUTPATIENT)
Dept: PRIMARY CARE CLINIC | Facility: CLINIC | Age: 80
End: 2025-05-31
Payer: MEDICARE

## 2025-05-31 PROCEDURE — 99490 CHRNC CARE MGMT STAFF 1ST 20: CPT | Mod: PBBFAC,PO | Performed by: FAMILY MEDICINE

## 2025-05-31 PROCEDURE — 99490 CHRNC CARE MGMT STAFF 1ST 20: CPT | Mod: S$PBB,,, | Performed by: FAMILY MEDICINE

## 2025-06-17 ENCOUNTER — OFFICE VISIT (OUTPATIENT)
Dept: OPHTHALMOLOGY | Facility: CLINIC | Age: 80
End: 2025-06-17
Payer: MEDICARE

## 2025-06-17 DIAGNOSIS — H54.10 BLINDNESS AND LOW VISION: ICD-10-CM

## 2025-06-17 DIAGNOSIS — Z96.1 PSEUDOPHAKIA OF BOTH EYES: ICD-10-CM

## 2025-06-17 DIAGNOSIS — H40.2213 CHRONIC ANGLE-CLOSURE GLAUCOMA OF RIGHT EYE, SEVERE STAGE: Primary | ICD-10-CM

## 2025-06-17 DIAGNOSIS — H40.33X3 GLAUCOMA OF BOTH EYES ASSOCIATED WITH OCULAR TRAUMA, SEVERE STAGE: ICD-10-CM

## 2025-06-17 DIAGNOSIS — Z96.89 HISTORY OF GLAUCOMA TUBE SHUNT PROCEDURE: ICD-10-CM

## 2025-06-17 PROCEDURE — G2211 COMPLEX E/M VISIT ADD ON: HCPCS | Mod: ,,, | Performed by: OPHTHALMOLOGY

## 2025-06-17 PROCEDURE — 99999 PR PBB SHADOW E&M-EST. PATIENT-LVL III: CPT | Mod: PBBFAC,,, | Performed by: OPHTHALMOLOGY

## 2025-06-17 PROCEDURE — 99213 OFFICE O/P EST LOW 20 MIN: CPT | Mod: PBBFAC | Performed by: OPHTHALMOLOGY

## 2025-06-17 PROCEDURE — 99213 OFFICE O/P EST LOW 20 MIN: CPT | Mod: S$PBB,,, | Performed by: OPHTHALMOLOGY

## 2025-06-18 NOTE — PROGRESS NOTES
Assessment /Plan     For exam results, see Encounter Report.    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, severe stage    Pseudophakia of both eyes    Glaucoma shunt device of both eyes            [employer]  Joined Order @ 17 yo  Strong FMHx Glc / Blindness --> Justyn Creole Dz / mom's side    Complex ocular hx  Dx @ age 34 yo  multiple sx --> trabs, tubes and tube removes etc   Too numerous to count    POAG  2/2 CACG OD  Traumatic Glaucoma severe OU    ==> Follow OCT RNFL      CCT  New PKP OS  01/16/2025    Target  --> mid teens --> not achieved  --> LWIT c new PKP OS & discussed    Right eye  PKP OD 11/10/2022 --> thick / edema --> poor view B-scan 12/09/2024 OK  PKP failure 0/26/2017   PKP  PC IOL  Fibrotic anterior capsule membrane  Tubes x 2 in place ST & IN --> removed ST BV 2/27/2017  Glc Shunt graft site OD  Revision 04/09/2013    Glaucoma Incisional Surgery  Patient with exposed ST Tube OD  SP OD ST BV Removal 2/27/2017    Left eye  SP PKP // AC IOL placement  05/10/2021 & 11/10/2022 & 01/16/2025 (16yo)  ST Ahmed implant --> no erosion // exposure 07/18/2024      Both eyes --> Tolerating well & good adherence --> CSM  Cosopt BID  Alphagan BID  FML using TID OD // BID OS --> holding OD  Idania 128 BID --> feels improved Va OS    Refresh PM OS --> OU    Left eye  PF1%  TID --> steroid response --> new PKP    Xal q day -->  intolerant with blurring --> consider travoprost // Zioptan    MGD --> improved surface  Dry Eye Syndrome: discussed use of warm compresses, non-preserved artificial tears, gel and PM ointment options.      RD precautions:  Discussed symptoms of RD with increased flashes, floaters, decreasing vision.  Patient/Family to call and return immediately to clinic should the symptoms of RD occur. Voiced good understanding with Q & A.        03/11/2025          Plan  RTC  3 months CCT & IOP and adherence  RTC sooner prn with understanding

## 2025-06-30 ENCOUNTER — EXTERNAL CHRONIC CARE MANAGEMENT (OUTPATIENT)
Dept: PRIMARY CARE CLINIC | Facility: CLINIC | Age: 80
End: 2025-06-30
Payer: MEDICARE

## 2025-06-30 PROCEDURE — 99490 CHRNC CARE MGMT STAFF 1ST 20: CPT | Mod: S$PBB,,, | Performed by: FAMILY MEDICINE

## 2025-06-30 PROCEDURE — 99490 CHRNC CARE MGMT STAFF 1ST 20: CPT | Mod: PBBFAC,PO | Performed by: FAMILY MEDICINE

## 2025-07-10 DIAGNOSIS — H01.009 BLEPHARITIS, UNSPECIFIED LATERALITY, UNSPECIFIED TYPE: Primary | ICD-10-CM

## 2025-07-10 RX ORDER — ERYTHROMYCIN 5 MG/G
OINTMENT OPHTHALMIC NIGHTLY
Qty: 3.5 G | Refills: 6 | Status: SHIPPED | OUTPATIENT
Start: 2025-07-10

## 2025-07-31 ENCOUNTER — EXTERNAL CHRONIC CARE MANAGEMENT (OUTPATIENT)
Dept: PRIMARY CARE CLINIC | Facility: CLINIC | Age: 80
End: 2025-07-31
Payer: MEDICARE

## 2025-07-31 PROCEDURE — 99490 CHRNC CARE MGMT STAFF 1ST 20: CPT | Mod: S$PBB,,, | Performed by: FAMILY MEDICINE

## 2025-07-31 PROCEDURE — 99490 CHRNC CARE MGMT STAFF 1ST 20: CPT | Mod: PBBFAC,PO | Performed by: FAMILY MEDICINE

## 2025-08-04 DIAGNOSIS — H40.33X3 GLAUCOMA OF BOTH EYES ASSOCIATED WITH OCULAR TRAUMA, SEVERE STAGE: ICD-10-CM

## 2025-08-04 RX ORDER — BRIMONIDINE TARTRATE 2 MG/ML
1 SOLUTION/ DROPS OPHTHALMIC 2 TIMES DAILY
Qty: 15 ML | Refills: 5 | Status: SHIPPED | OUTPATIENT
Start: 2025-08-04 | End: 2026-08-04

## 2025-08-21 DIAGNOSIS — H40.20X3 ANGLE-CLOSURE GLAUCOMA, SEVERE STAGE: ICD-10-CM

## 2025-08-21 RX ORDER — DORZOLAMIDE HYDROCHLORIDE AND TIMOLOL MALEATE 20; 5 MG/ML; MG/ML
1 SOLUTION/ DROPS OPHTHALMIC 2 TIMES DAILY
Qty: 10 ML | Refills: 11 | Status: SHIPPED | OUTPATIENT
Start: 2025-08-21

## 2025-09-02 ENCOUNTER — OFFICE VISIT (OUTPATIENT)
Dept: OPHTHALMOLOGY | Facility: CLINIC | Age: 80
End: 2025-09-02
Payer: MEDICARE

## 2025-09-02 DIAGNOSIS — Z96.89 HISTORY OF GLAUCOMA TUBE SHUNT PROCEDURE: Primary | ICD-10-CM

## 2025-09-02 DIAGNOSIS — H18.11 BULLOUS KERATOPATHY OF RIGHT EYE: ICD-10-CM

## 2025-09-02 DIAGNOSIS — T86.8411 FAILURE OF CORNEA TRANSPLANT OF RIGHT EYE: ICD-10-CM

## 2025-09-02 PROCEDURE — 99999 PR PBB SHADOW E&M-EST. PATIENT-LVL III: CPT | Mod: PBBFAC,,, | Performed by: OPHTHALMOLOGY

## 2025-09-02 PROCEDURE — 99213 OFFICE O/P EST LOW 20 MIN: CPT | Mod: PBBFAC | Performed by: OPHTHALMOLOGY

## 2025-09-02 PROCEDURE — 92014 COMPRE OPH EXAM EST PT 1/>: CPT | Mod: S$PBB,,, | Performed by: OPHTHALMOLOGY

## (undated) DEVICE — FORCEP GRIESHABER MAXGRIP 25G

## (undated) DEVICE — TREPHINE RADIAL VACUUM 8.25

## (undated) DEVICE — KIT MEROCEL INSTRUMENT WIPE

## (undated) DEVICE — SPONGE WEC CEL SPEARS

## (undated) DEVICE — SYR 3CC 21 X 1 LUER LOCK

## (undated) DEVICE — Device

## (undated) DEVICE — SUT NYLON HGL5 DA 10/0 12IN

## (undated) DEVICE — SUT 10/0 1X12IN BLK TG160-6

## (undated) DEVICE — TREPHINE BARRON VAC RAD 8MM

## (undated) DEVICE — SYR 10CC LUER LOCK

## (undated) DEVICE — PUNCH VAC DONOR CORNEA SZ 8.5

## (undated) DEVICE — SOL POVIDONE SCRUB IODINE 4 OZ

## (undated) DEVICE — GAUZE SPONGE 4X4 12PLY

## (undated) DEVICE — SUT ETHILON 10/0 CS160-6

## (undated) DEVICE — SUT VICRYL 9-0 VIL MONO

## (undated) DEVICE — GLOVE BIOGEL SKINSENSE PI 6.5

## (undated) DEVICE — BLADE SCALP OPHTL RND TIP

## (undated) DEVICE — PENCIL BIPOLAR 18G STR 2 PIN

## (undated) DEVICE — DRESSING EYE OVAL LF

## (undated) DEVICE — SCISSOR 25GA DISP CURVED

## (undated) DEVICE — TREPHINE BARRON RADIAL 7.75MM

## (undated) DEVICE — GLOVE BIOGEL SKINSENSE PI 7.5

## (undated) DEVICE — PAD EYE OVAL STRL 1 5/8X 2 5/8

## (undated) DEVICE — MARKER SKIN RULER STERILE

## (undated) DEVICE — TUBING VENTED 90IN

## (undated) DEVICE — SKIN MARKER DEVON 160

## (undated) DEVICE — CORD BIPOLAR 10/CASE

## (undated) DEVICE — PUNCH BARRON VACUUM 8.25 MM

## (undated) DEVICE — KNIFE ANGLED OPTH

## (undated) DEVICE — CORD FOR BIPOLAR FORCEPS 12

## (undated) DEVICE — TANK GAS ISPAN 125GR FOR SF6

## (undated) DEVICE — EYE SHIELD UNIVERSAL

## (undated) DEVICE — SOL BETADINE 5%

## (undated) DEVICE — GARTER EYE ADULT

## (undated) DEVICE — CANNULA IRR ANTERIOR 27GX4MM

## (undated) DEVICE — NDL 27G X 1 1/4

## (undated) DEVICE — NDL BOX COUNTER

## (undated) DEVICE — BLADE EYE

## (undated) DEVICE — NDL RETROBULAR AKTKINSON 23G

## (undated) DEVICE — NDL HYPO A BEVEL 30X1/2

## (undated) DEVICE — GLOVE BIOGEL SKINSENSE PI 7.0

## (undated) DEVICE — SUT 10/0 12IN VICRYL VIO M

## (undated) DEVICE — NDL HYPO A BEVEL 22X1 1/2

## (undated) DEVICE — SEE MEDLINE ITEM 146313

## (undated) DEVICE — KNIFE OPHTHALMIC 15 DEG

## (undated) DEVICE — SUT 6/0 18IN COATED VICRYL

## (undated) DEVICE — SHIELD COLLAGEN 12HR CORNEAL

## (undated) DEVICE — SEE MEDLINE ITEM 146372

## (undated) DEVICE — SOL PVP-I SCRUB 7.5% 4OZ

## (undated) DEVICE — CASSETTE INFINITI

## (undated) DEVICE — SOL IRR BSS OPHTH 500ML STRL

## (undated) DEVICE — ERASER HEMSTAT BPLR 23G BLUNT

## (undated) DEVICE — SHIELD EYE PLASTIC 3100G